# Patient Record
Sex: FEMALE | Race: BLACK OR AFRICAN AMERICAN | Employment: FULL TIME | URBAN - METROPOLITAN AREA
[De-identification: names, ages, dates, MRNs, and addresses within clinical notes are randomized per-mention and may not be internally consistent; named-entity substitution may affect disease eponyms.]

---

## 2018-08-28 ENCOUNTER — HOSPITAL ENCOUNTER (EMERGENCY)
Age: 28
Discharge: HOME | End: 2018-08-28
Payer: MEDICAID

## 2018-08-28 ENCOUNTER — APPOINTMENT (OUTPATIENT)
Dept: GENERAL RADIOLOGY | Age: 28
End: 2018-08-28
Payer: MEDICAID

## 2018-08-28 VITALS
SYSTOLIC BLOOD PRESSURE: 167 MMHG | OXYGEN SATURATION: 100 % | WEIGHT: 293 LBS | HEIGHT: 67 IN | DIASTOLIC BLOOD PRESSURE: 84 MMHG | BODY MASS INDEX: 45.99 KG/M2 | RESPIRATION RATE: 14 BRPM | HEART RATE: 92 BPM | TEMPERATURE: 99.5 F

## 2018-08-28 DIAGNOSIS — J18.9 PNEUMONIA OF BOTH LOWER LOBES DUE TO INFECTIOUS ORGANISM: Primary | ICD-10-CM

## 2018-08-28 DIAGNOSIS — J02.9 SORE THROAT: ICD-10-CM

## 2018-08-28 DIAGNOSIS — M25.551 RIGHT HIP PAIN: ICD-10-CM

## 2018-08-28 LAB
CHP ED QC CHECK: YES
PREGNANCY TEST URINE, POC: NEGATIVE
STREP GRP A PCR: NEGATIVE

## 2018-08-28 PROCEDURE — 87880 STREP A ASSAY W/OPTIC: CPT

## 2018-08-28 PROCEDURE — 99283 EMERGENCY DEPT VISIT LOW MDM: CPT

## 2018-08-28 PROCEDURE — 73502 X-RAY EXAM HIP UNI 2-3 VIEWS: CPT

## 2018-08-28 PROCEDURE — 71046 X-RAY EXAM CHEST 2 VIEWS: CPT

## 2018-08-28 PROCEDURE — 6370000000 HC RX 637 (ALT 250 FOR IP): Performed by: NURSE PRACTITIONER

## 2018-08-28 PROCEDURE — 94664 DEMO&/EVAL PT USE INHALER: CPT

## 2018-08-28 PROCEDURE — 94640 AIRWAY INHALATION TREATMENT: CPT

## 2018-08-28 RX ORDER — ACETAMINOPHEN 500 MG
500 TABLET ORAL EVERY 6 HOURS PRN
Qty: 20 TABLET | Refills: 0 | Status: SHIPPED | OUTPATIENT
Start: 2018-08-28 | End: 2019-12-29

## 2018-08-28 RX ORDER — ALBUTEROL SULFATE 90 UG/1
2 AEROSOL, METERED RESPIRATORY (INHALATION) EVERY 6 HOURS PRN
Qty: 1 INHALER | Refills: 0 | Status: SHIPPED | OUTPATIENT
Start: 2018-08-28 | End: 2019-12-29

## 2018-08-28 RX ORDER — IPRATROPIUM BROMIDE AND ALBUTEROL SULFATE 2.5; .5 MG/3ML; MG/3ML
1 SOLUTION RESPIRATORY (INHALATION)
Status: COMPLETED | OUTPATIENT
Start: 2018-08-28 | End: 2018-08-28

## 2018-08-28 RX ORDER — DOXYCYCLINE HYCLATE 100 MG
100 TABLET ORAL 2 TIMES DAILY
Qty: 20 TABLET | Refills: 0 | Status: SHIPPED | OUTPATIENT
Start: 2018-08-28 | End: 2018-09-07

## 2018-08-28 RX ORDER — PREDNISONE 20 MG/1
60 TABLET ORAL ONCE
Status: COMPLETED | OUTPATIENT
Start: 2018-08-28 | End: 2018-08-28

## 2018-08-28 RX ORDER — PREDNISONE 20 MG/1
TABLET ORAL
Qty: 18 TABLET | Refills: 0 | Status: SHIPPED | OUTPATIENT
Start: 2018-08-28 | End: 2018-09-07

## 2018-08-28 RX ADMIN — IPRATROPIUM BROMIDE AND ALBUTEROL SULFATE 1 AMPULE: 2.5; .5 SOLUTION RESPIRATORY (INHALATION) at 20:38

## 2018-08-28 RX ADMIN — PREDNISONE 60 MG: 20 TABLET ORAL at 19:49

## 2018-08-28 RX ADMIN — IPRATROPIUM BROMIDE AND ALBUTEROL SULFATE 1 AMPULE: 2.5; .5 SOLUTION RESPIRATORY (INHALATION) at 20:37

## 2018-08-28 ASSESSMENT — PAIN DESCRIPTION - FREQUENCY: FREQUENCY: CONTINUOUS

## 2018-08-28 ASSESSMENT — PAIN DESCRIPTION - ONSET: ONSET: SUDDEN

## 2018-08-28 ASSESSMENT — PAIN DESCRIPTION - PAIN TYPE: TYPE: ACUTE PAIN

## 2018-08-28 ASSESSMENT — PAIN DESCRIPTION - LOCATION: LOCATION: THROAT

## 2018-08-28 ASSESSMENT — PAIN SCALES - GENERAL: PAINLEVEL_OUTOF10: 9

## 2018-08-28 ASSESSMENT — PAIN DESCRIPTION - DESCRIPTORS: DESCRIPTORS: SORE

## 2018-08-28 NOTE — ED PROVIDER NOTES
Exam  · Constitutional/General: Alert and oriented x3, well appearing, non toxic  · HEENT:  NC/NT. PERRLA,  Airway patent. Posterior pharynx is erythematous with no exudates and 1+ tonsils. · Neck: Supple, full ROM, non tender to palpation in the midline, no stridor, no crepitus, no meningeal signs  · Respiratory: Lungs diminished to auscultation bilaterally, no wheezes, rales, or rhonchi. Not in respiratory distress  · CV:  Regular rate. Regular rhythm. No murmurs, gallops, or rubs. 2+ distal pulses  · Chest: No chest wall tenderness  · GI:  Abdomen Soft, Non tender, Non distended. +BS. No rebound, guarding, or rigidity. No pulsatile masses. · Musculoskeletal: Moves all extremities x 4. Warm and well perfused, no clubbing, cyanosis, or edema. Capillary refill <3 seconds. 2+ pedal and posterior tibial pulses intact tenderness negative Homans sign bilaterally. · Integument: skin warm and dry. No rashes. · Lymphatic: no lymphadenopathy noted  · Neurologic: GCS 15, no focal deficits, symmetric strength 5/5 in the upper and lower extremities bilaterally  · Psychiatric: Normal Affect    Lab / Imaging Results   (All laboratory and radiology results have been personally reviewed by myself)  Labs:  Results for orders placed or performed during the hospital encounter of 08/28/18   Strep screen group a throat   Result Value Ref Range    Strep Grp A PCR Negative Negative   POC Pregnancy Urine Qual   Result Value Ref Range    Preg Test, Ur NEGATIVE     QC OK? YES      Imaging: All Radiology results interpreted by Radiologist unless otherwise noted. XR HIP RIGHT (2-3 VIEWS)   Final Result   1. Mild joint space narrowing and acetabular sclerosis of the right   hip, greater than expected for patient's age. Orthopedic consultation   suggested. 2. No acute fracture identified. If there is persistent clinical pain   or symptomatology, a return to medical attention within 2-7 days and   further imaging is recommended.

## 2018-11-10 ENCOUNTER — HOSPITAL ENCOUNTER (EMERGENCY)
Age: 28
Discharge: HOME | End: 2018-11-10
Payer: MEDICAID

## 2018-11-10 ENCOUNTER — APPOINTMENT (OUTPATIENT)
Dept: GENERAL RADIOLOGY | Age: 28
End: 2018-11-10
Payer: MEDICAID

## 2018-11-10 VITALS
OXYGEN SATURATION: 98 % | BODY MASS INDEX: 43.79 KG/M2 | DIASTOLIC BLOOD PRESSURE: 90 MMHG | RESPIRATION RATE: 16 BRPM | TEMPERATURE: 98.3 F | HEART RATE: 80 BPM | HEIGHT: 67 IN | SYSTOLIC BLOOD PRESSURE: 150 MMHG | WEIGHT: 279 LBS

## 2018-11-10 DIAGNOSIS — S93.402A SPRAIN OF LEFT ANKLE, UNSPECIFIED LIGAMENT, INITIAL ENCOUNTER: Primary | ICD-10-CM

## 2018-11-10 PROCEDURE — 73630 X-RAY EXAM OF FOOT: CPT

## 2018-11-10 PROCEDURE — 73610 X-RAY EXAM OF ANKLE: CPT

## 2018-11-10 PROCEDURE — 6370000000 HC RX 637 (ALT 250 FOR IP): Performed by: PHYSICIAN ASSISTANT

## 2018-11-10 PROCEDURE — 99283 EMERGENCY DEPT VISIT LOW MDM: CPT

## 2018-11-10 RX ORDER — NAPROXEN 500 MG/1
500 TABLET ORAL 2 TIMES DAILY
Qty: 14 TABLET | Refills: 0 | Status: SHIPPED | OUTPATIENT
Start: 2018-11-10 | End: 2019-12-29

## 2018-11-10 RX ORDER — IBUPROFEN 800 MG/1
800 TABLET ORAL ONCE
Status: COMPLETED | OUTPATIENT
Start: 2018-11-10 | End: 2018-11-10

## 2018-11-10 RX ADMIN — IBUPROFEN 800 MG: 800 TABLET, FILM COATED ORAL at 18:01

## 2018-11-10 ASSESSMENT — PAIN SCALES - GENERAL: PAINLEVEL_OUTOF10: 8

## 2018-11-10 NOTE — ED PROVIDER NOTES
of left ankle, unspecified ligament, initial encounter      Plan   Discharge to home  Patient condition is stable    New Medications     Discharge Medication List as of 11/10/2018  6:18 PM      START taking these medications    Details   naproxen (NAPROSYN) 500 MG tablet Take 1 tablet by mouth 2 times daily for 7 days, Disp-14 tablet, R-0Print           Electronically signed by Paul Ga PA-C   DD: 11/10/18  **This report was transcribed using voice recognition software. Every effort was made to ensure accuracy; however, inadvertent computerized transcription errors may be present.   END OF ED PROVIDER NOTE'      Pina Dickinson PA-C  11/10/18 [id number]

## 2019-08-21 ENCOUNTER — APPOINTMENT (OUTPATIENT)
Dept: GENERAL RADIOLOGY | Age: 29
End: 2019-08-21
Payer: MEDICAID

## 2019-08-21 ENCOUNTER — HOSPITAL ENCOUNTER (EMERGENCY)
Age: 29
Discharge: HOME | End: 2019-08-21
Payer: MEDICAID

## 2019-08-21 VITALS
HEIGHT: 67 IN | BODY MASS INDEX: 43.79 KG/M2 | DIASTOLIC BLOOD PRESSURE: 95 MMHG | OXYGEN SATURATION: 98 % | WEIGHT: 279 LBS | HEART RATE: 93 BPM | SYSTOLIC BLOOD PRESSURE: 151 MMHG | TEMPERATURE: 98.6 F | RESPIRATION RATE: 14 BRPM

## 2019-08-21 DIAGNOSIS — S93.401A SPRAIN OF RIGHT ANKLE, UNSPECIFIED LIGAMENT, INITIAL ENCOUNTER: Primary | ICD-10-CM

## 2019-08-21 DIAGNOSIS — S90.01XA CONTUSION OF RIGHT ANKLE, INITIAL ENCOUNTER: ICD-10-CM

## 2019-08-21 PROCEDURE — 73630 X-RAY EXAM OF FOOT: CPT

## 2019-08-21 PROCEDURE — 99283 EMERGENCY DEPT VISIT LOW MDM: CPT

## 2019-08-21 PROCEDURE — 6370000000 HC RX 637 (ALT 250 FOR IP): Performed by: NURSE PRACTITIONER

## 2019-08-21 PROCEDURE — 73610 X-RAY EXAM OF ANKLE: CPT

## 2019-08-21 RX ORDER — IBUPROFEN 800 MG/1
800 TABLET ORAL ONCE
Status: COMPLETED | OUTPATIENT
Start: 2019-08-21 | End: 2019-08-21

## 2019-08-21 RX ORDER — IBUPROFEN 800 MG/1
800 TABLET ORAL EVERY 8 HOURS PRN
Qty: 21 TABLET | Refills: 0 | Status: SHIPPED | OUTPATIENT
Start: 2019-08-21 | End: 2020-07-07 | Stop reason: ALTCHOICE

## 2019-08-21 RX ADMIN — IBUPROFEN 800 MG: 800 TABLET, FILM COATED ORAL at 21:23

## 2019-08-21 ASSESSMENT — PAIN SCALES - GENERAL: PAINLEVEL_OUTOF10: 7

## 2019-09-03 ENCOUNTER — HOSPITAL ENCOUNTER (EMERGENCY)
Age: 29
Discharge: HOME | End: 2019-09-03
Payer: MEDICAID

## 2019-09-03 VITALS
HEIGHT: 67 IN | SYSTOLIC BLOOD PRESSURE: 171 MMHG | TEMPERATURE: 98.1 F | OXYGEN SATURATION: 95 % | HEART RATE: 98 BPM | BODY MASS INDEX: 45.36 KG/M2 | WEIGHT: 289 LBS | DIASTOLIC BLOOD PRESSURE: 89 MMHG | RESPIRATION RATE: 16 BRPM

## 2019-09-03 DIAGNOSIS — L02.419 AXILLARY ABSCESS: Primary | ICD-10-CM

## 2019-09-03 PROCEDURE — 99282 EMERGENCY DEPT VISIT SF MDM: CPT

## 2019-09-03 PROCEDURE — 2500000003 HC RX 250 WO HCPCS: Performed by: NURSE PRACTITIONER

## 2019-09-03 PROCEDURE — 6370000000 HC RX 637 (ALT 250 FOR IP): Performed by: NURSE PRACTITIONER

## 2019-09-03 PROCEDURE — 10060 I&D ABSCESS SIMPLE/SINGLE: CPT

## 2019-09-03 RX ORDER — SULFAMETHOXAZOLE AND TRIMETHOPRIM 800; 160 MG/1; MG/1
2 TABLET ORAL ONCE
Status: COMPLETED | OUTPATIENT
Start: 2019-09-03 | End: 2019-09-03

## 2019-09-03 RX ORDER — TRAMADOL HYDROCHLORIDE 50 MG/1
50 TABLET ORAL EVERY 6 HOURS PRN
Qty: 8 TABLET | Refills: 0 | Status: SHIPPED | OUTPATIENT
Start: 2019-09-03 | End: 2019-09-05

## 2019-09-03 RX ORDER — CEPHALEXIN 500 MG/1
500 CAPSULE ORAL 3 TIMES DAILY
Qty: 21 CAPSULE | Refills: 0 | Status: SHIPPED | OUTPATIENT
Start: 2019-09-03 | End: 2019-09-10

## 2019-09-03 RX ORDER — SULFAMETHOXAZOLE AND TRIMETHOPRIM 800; 160 MG/1; MG/1
2 TABLET ORAL 2 TIMES DAILY
Qty: 28 TABLET | Refills: 0 | Status: SHIPPED | OUTPATIENT
Start: 2019-09-03 | End: 2019-09-10

## 2019-09-03 RX ORDER — LIDOCAINE HYDROCHLORIDE 10 MG/ML
5 INJECTION, SOLUTION EPIDURAL; INFILTRATION; INTRACAUDAL; PERINEURAL ONCE
Status: COMPLETED | OUTPATIENT
Start: 2019-09-03 | End: 2019-09-03

## 2019-09-03 RX ORDER — CEPHALEXIN 500 MG/1
500 CAPSULE ORAL ONCE
Status: COMPLETED | OUTPATIENT
Start: 2019-09-03 | End: 2019-09-03

## 2019-09-03 RX ADMIN — LIDOCAINE HYDROCHLORIDE 5 ML: 10 INJECTION, SOLUTION EPIDURAL; INFILTRATION; INTRACAUDAL; PERINEURAL at 13:21

## 2019-09-03 RX ADMIN — CEPHALEXIN 500 MG: 500 CAPSULE ORAL at 13:21

## 2019-09-03 RX ADMIN — SULFAMETHOXAZOLE AND TRIMETHOPRIM 2 TABLET: 800; 160 TABLET ORAL at 13:21

## 2019-09-03 ASSESSMENT — PAIN DESCRIPTION - LOCATION: LOCATION: ARM

## 2019-09-03 ASSESSMENT — PAIN SCALES - GENERAL: PAINLEVEL_OUTOF10: 9

## 2019-09-03 ASSESSMENT — PAIN DESCRIPTION - FREQUENCY: FREQUENCY: CONTINUOUS

## 2019-09-03 ASSESSMENT — PAIN DESCRIPTION - PAIN TYPE: TYPE: ACUTE PAIN

## 2019-09-03 ASSESSMENT — PAIN DESCRIPTION - ONSET: ONSET: GRADUAL

## 2019-09-03 ASSESSMENT — PAIN DESCRIPTION - PROGRESSION: CLINICAL_PROGRESSION: GRADUALLY WORSENING

## 2019-09-03 ASSESSMENT — PAIN DESCRIPTION - DESCRIPTORS: DESCRIPTORS: NUMBNESS

## 2019-09-03 ASSESSMENT — PAIN DESCRIPTION - ORIENTATION: ORIENTATION: RIGHT

## 2019-09-03 NOTE — ED PROVIDER NOTES
[facility]       Department of Emergency Medicine   ED  Provider Note  Admit Date/RoomTime: 9/3/2019 12:56 PM  ED Room: 32/32    HPI:   Nazanin Dodson 33 y.o. female who presents to the ED with localized pain and swelling with erythema to right axilla. The patient states this began gradually. In nature, the pain is described as burning and aching. The patient denies any signs and symptoms of systemic illness associated with this including fever. Pt also denies chest pain, SOB, pain with breathing, abdominal pain, nausea, vomiting, fever/chills, numbness/tingling, sensation changes, arm swelling, rash, or recent trauma/injury. She denies any new soaps, lotions, detergents, or medications. Patient states she has had an abscess in the same spot a while ago. Patient is alert and oriented x3 and in no apparent distress at this exam. She is nontoxic appearing. ROS:   Unless otherwise stated in this report or unable to obtain because of the patient's clinical or mental status as evidenced by the medical record, this patients's positive and negative responses for Review of Systems, constitutional, psych, eyes, ENT, cardiovascular, respiratory, gastrointestinal, neurological, genitourinary, musculoskeletal, integument systems and systems related to the presenting problem are either stated in the preceding or were not pertinent or were negative for the symptoms and/or complaints related to the medical problem. Past Medical History:  has no past medical history on file. Past Surgical History:  has a past surgical history that includes [place] tooth extraction. Social History:  reports that she has never smoked. She has never used smokeless tobacco. She reports that she drinks alcohol. She reports that she does not use drugs. Family History: family history is not on file. The patients home medications have been reviewed. Allergies: Patient has no known allergies.   Allergies have been reviewed with

## 2019-12-29 ENCOUNTER — APPOINTMENT (OUTPATIENT)
Dept: GENERAL RADIOLOGY | Age: 29
End: 2019-12-29
Payer: MEDICAID

## 2019-12-29 ENCOUNTER — HOSPITAL ENCOUNTER (EMERGENCY)
Age: 29
Discharge: HOME | End: 2019-12-29
Payer: MEDICAID

## 2019-12-29 VITALS
DIASTOLIC BLOOD PRESSURE: 107 MMHG | TEMPERATURE: 98.3 F | WEIGHT: 289 LBS | RESPIRATION RATE: 20 BRPM | SYSTOLIC BLOOD PRESSURE: 175 MMHG | HEIGHT: 67 IN | BODY MASS INDEX: 45.36 KG/M2 | OXYGEN SATURATION: 98 % | HEART RATE: 102 BPM

## 2019-12-29 DIAGNOSIS — J40 BRONCHITIS: Primary | ICD-10-CM

## 2019-12-29 PROCEDURE — 71046 X-RAY EXAM CHEST 2 VIEWS: CPT

## 2019-12-29 PROCEDURE — 99283 EMERGENCY DEPT VISIT LOW MDM: CPT

## 2019-12-29 RX ORDER — BROMPHENIRAMINE MALEATE, PSEUDOEPHEDRINE HYDROCHLORIDE, AND DEXTROMETHORPHAN HYDROBROMIDE 2; 30; 10 MG/5ML; MG/5ML; MG/5ML
5 SYRUP ORAL 4 TIMES DAILY PRN
Qty: 118 ML | Refills: 0 | Status: SHIPPED | OUTPATIENT
Start: 2019-12-29 | End: 2021-09-01

## 2019-12-29 RX ORDER — ALBUTEROL SULFATE 90 UG/1
2 AEROSOL, METERED RESPIRATORY (INHALATION) 4 TIMES DAILY PRN
Qty: 1 INHALER | Refills: 0 | Status: SHIPPED | OUTPATIENT
Start: 2019-12-29 | End: 2021-09-01

## 2019-12-29 RX ORDER — DOXYCYCLINE HYCLATE 100 MG
100 TABLET ORAL 2 TIMES DAILY
Qty: 20 TABLET | Refills: 0 | Status: SHIPPED | OUTPATIENT
Start: 2019-12-29 | End: 2020-01-08

## 2019-12-29 RX ORDER — IBUPROFEN 800 MG/1
800 TABLET ORAL EVERY 6 HOURS PRN
Qty: 16 TABLET | Refills: 0 | Status: SHIPPED | OUTPATIENT
Start: 2019-12-29 | End: 2020-07-07 | Stop reason: ALTCHOICE

## 2020-05-25 ENCOUNTER — HOSPITAL ENCOUNTER (EMERGENCY)
Age: 30
Discharge: HOME | End: 2020-05-25
Attending: EMERGENCY MEDICINE
Payer: MEDICAID

## 2020-05-25 VITALS
DIASTOLIC BLOOD PRESSURE: 78 MMHG | TEMPERATURE: 97.2 F | HEIGHT: 67 IN | RESPIRATION RATE: 14 BRPM | HEART RATE: 82 BPM | WEIGHT: 289 LBS | OXYGEN SATURATION: 96 % | BODY MASS INDEX: 45.36 KG/M2 | SYSTOLIC BLOOD PRESSURE: 158 MMHG

## 2020-05-25 LAB
AMORPHOUS: ABNORMAL
BACTERIA: ABNORMAL /HPF
BILIRUBIN URINE: NEGATIVE
BLOOD, URINE: ABNORMAL
CLARITY: ABNORMAL
COLOR: YELLOW
EPITHELIAL CELLS, UA: ABNORMAL /HPF
GLUCOSE URINE: NEGATIVE MG/DL
HCG, URINE, POC: NEGATIVE
KETONES, URINE: NEGATIVE MG/DL
LEUKOCYTE ESTERASE, URINE: ABNORMAL
Lab: NORMAL
NEGATIVE QC PASS/FAIL: NORMAL
NITRITE, URINE: NEGATIVE
PH UA: 7 (ref 5–9)
POSITIVE QC PASS/FAIL: NORMAL
PROTEIN UA: NEGATIVE MG/DL
RBC UA: ABNORMAL /HPF (ref 0–2)
SPECIFIC GRAVITY UA: 1.02 (ref 1–1.03)
UROBILINOGEN, URINE: 0.2 E.U./DL
WBC UA: ABNORMAL /HPF (ref 0–5)

## 2020-05-25 PROCEDURE — 81001 URINALYSIS AUTO W/SCOPE: CPT

## 2020-05-25 PROCEDURE — 99284 EMERGENCY DEPT VISIT MOD MDM: CPT

## 2020-05-25 RX ORDER — PHENAZOPYRIDINE HYDROCHLORIDE 100 MG/1
100 TABLET, FILM COATED ORAL 3 TIMES DAILY PRN
Qty: 9 TABLET | Refills: 0 | Status: SHIPPED | OUTPATIENT
Start: 2020-05-25 | End: 2020-05-28

## 2020-05-25 RX ORDER — CEFDINIR 300 MG/1
300 CAPSULE ORAL 2 TIMES DAILY
Qty: 14 CAPSULE | Refills: 0 | Status: SHIPPED | OUTPATIENT
Start: 2020-05-25 | End: 2020-06-01

## 2020-05-25 ASSESSMENT — PAIN DESCRIPTION - PAIN TYPE: TYPE: ACUTE PAIN

## 2020-05-25 ASSESSMENT — PAIN DESCRIPTION - LOCATION: LOCATION: ABDOMEN

## 2020-05-25 ASSESSMENT — ENCOUNTER SYMPTOMS
COUGH: 0
SINUS PAIN: 0
ABDOMINAL PAIN: 0
DIARRHEA: 0
BACK PAIN: 0
NAUSEA: 0
SHORTNESS OF BREATH: 0
CHEST TIGHTNESS: 0
VOMITING: 0
SORE THROAT: 0

## 2020-05-25 ASSESSMENT — PAIN DESCRIPTION - FREQUENCY: FREQUENCY: CONTINUOUS

## 2020-05-25 ASSESSMENT — PAIN DESCRIPTION - ORIENTATION: ORIENTATION: RIGHT;LEFT;LOWER

## 2020-05-25 ASSESSMENT — PAIN SCALES - GENERAL: PAINLEVEL_OUTOF10: 7

## 2020-05-25 ASSESSMENT — PAIN DESCRIPTION - DESCRIPTORS: DESCRIPTORS: CRAMPING

## 2020-05-25 NOTE — LETTER
[address] [facility] Emergency Department  [address] [place] [address]  Phone: [phone]    No name on file. May 25, 2020     Patient: Savannah Clarke   YOB: 1990   Date of Visit: 5/25/2020       To Whom It May Concern: It is my medical opinion that Eleonora Ramirez may return to work on 1 day. If you have any questions or concerns, please don't hesitate to call.     Sincerely,      Kayla Zavala

## 2020-05-25 NOTE — ED PROVIDER NOTES
---------------------------------------------  Past Medical History:  has no past medical history on file. Past Surgical History:  has a past surgical history that includes [place] tooth extraction. Social History:  reports that she has never smoked. She has never used smokeless tobacco. She reports current alcohol use. She reports that she does not use drugs. Family History: family history is not on file. The patients home medications have been reviewed. Allergies: Patient has no known allergies. -------------------------------------------------- RESULTS -------------------------------------------------  Labs:  Results for orders placed or performed during the hospital encounter of 05/25/20   URINALYSIS   Result Value Ref Range    Color, UA Yellow Straw/Yellow    Clarity, UA SL CLOUDY Clear    Glucose, Ur Negative Negative mg/dL    Bilirubin Urine Negative Negative    Ketones, Urine Negative Negative mg/dL    Specific Gravity, UA 1.020 1.005 - 1.030    Blood, Urine TRACE-INTACT Negative    pH, UA 7.0 5.0 - 9.0    Protein, UA Negative Negative mg/dL    Urobilinogen, Urine 0.2 <2.0 E.U./dL    Nitrite, Urine Negative Negative    Leukocyte Esterase, Urine LARGE (A) Negative   Microscopic Urinalysis   Result Value Ref Range    WBC, UA 10-20 (A) 0 - 5 /HPF    RBC, UA 0-1 0 - 2 /HPF    Epithelial Cells, UA MODERATE /HPF    Bacteria, UA MODERATE (A) None Seen /HPF    Amorphous, UA MODERATE    POC Pregnancy Urine Qual   Result Value Ref Range    HCG, Urine, POC Negative Negative    Lot Number [id number]     Positive QC Pass/Fail Acceptable     Negative QC Pass/Fail Acceptable        Radiology:  No orders to display           ------------------------- NURSING NOTES AND VITALS REVIEWED ---------------------------  Date / Time Roomed:  5/25/2020  2:00 AM  ED Bed Assignment:  20/20    The nursing notes within the ED encounter and vital signs as below have been reviewed.    BP (!) 161/85   Pulse 89   Temp

## 2020-05-25 NOTE — ED NOTES
Reviewed discharge instructions with patient, discussed medications and addressed all patient questions/concerns. Pt verbalizes understanding.        Lakesha Lilly RN  05/25/20 [id number]

## 2020-07-06 ASSESSMENT — PAIN DESCRIPTION - DESCRIPTORS: DESCRIPTORS: BURNING

## 2020-07-06 ASSESSMENT — PAIN SCALES - GENERAL: PAINLEVEL_OUTOF10: 10

## 2020-07-06 ASSESSMENT — PAIN DESCRIPTION - LOCATION: LOCATION: VAGINA

## 2020-07-07 ENCOUNTER — HOSPITAL ENCOUNTER (EMERGENCY)
Age: 30
Discharge: HOME | End: 2020-07-07
Payer: MEDICAID

## 2020-07-07 VITALS
HEART RATE: 70 BPM | BODY MASS INDEX: 45.99 KG/M2 | DIASTOLIC BLOOD PRESSURE: 99 MMHG | WEIGHT: 293 LBS | SYSTOLIC BLOOD PRESSURE: 157 MMHG | TEMPERATURE: 97.4 F | HEIGHT: 67 IN | OXYGEN SATURATION: 98 % | RESPIRATION RATE: 16 BRPM

## 2020-07-07 LAB
BACTERIA: ABNORMAL /HPF
BILIRUBIN URINE: NEGATIVE
BLOOD, URINE: ABNORMAL
CLARITY: ABNORMAL
COLOR: YELLOW
CRYSTALS, UA: ABNORMAL /HPF
EPITHELIAL CELLS, UA: ABNORMAL /HPF
GLUCOSE URINE: NEGATIVE MG/DL
HCG(URINE) PREGNANCY TEST: NEGATIVE
KETONES, URINE: NEGATIVE MG/DL
LEUKOCYTE ESTERASE, URINE: ABNORMAL
NITRITE, URINE: NEGATIVE
PH UA: 6 (ref 5–9)
PROTEIN UA: NEGATIVE MG/DL
RBC UA: ABNORMAL /HPF (ref 0–2)
SPECIFIC GRAVITY UA: >=1.03 (ref 1–1.03)
UROBILINOGEN, URINE: 0.2 E.U./DL
WBC UA: >20 /HPF (ref 0–5)
YEAST: PRESENT /HPF

## 2020-07-07 PROCEDURE — 6360000002 HC RX W HCPCS: Performed by: NURSE PRACTITIONER

## 2020-07-07 PROCEDURE — 96372 THER/PROPH/DIAG INJ SC/IM: CPT

## 2020-07-07 PROCEDURE — 99283 EMERGENCY DEPT VISIT LOW MDM: CPT

## 2020-07-07 PROCEDURE — 81025 URINE PREGNANCY TEST: CPT

## 2020-07-07 PROCEDURE — 6370000000 HC RX 637 (ALT 250 FOR IP): Performed by: NURSE PRACTITIONER

## 2020-07-07 PROCEDURE — 81001 URINALYSIS AUTO W/SCOPE: CPT

## 2020-07-07 RX ORDER — IBUPROFEN 800 MG/1
800 TABLET ORAL EVERY 8 HOURS PRN
Qty: 21 TABLET | Refills: 0 | OUTPATIENT
Start: 2020-07-07 | End: 2021-03-27

## 2020-07-07 RX ORDER — KETOROLAC TROMETHAMINE 30 MG/ML
30 INJECTION, SOLUTION INTRAMUSCULAR; INTRAVENOUS ONCE
Status: COMPLETED | OUTPATIENT
Start: 2020-07-07 | End: 2020-07-07

## 2020-07-07 RX ORDER — HYDROXYZINE PAMOATE 25 MG/1
25 CAPSULE ORAL ONCE
Status: COMPLETED | OUTPATIENT
Start: 2020-07-07 | End: 2020-07-07

## 2020-07-07 RX ORDER — DEXAMETHASONE SODIUM PHOSPHATE 10 MG/ML
10 INJECTION INTRAMUSCULAR; INTRAVENOUS ONCE
Status: COMPLETED | OUTPATIENT
Start: 2020-07-07 | End: 2020-07-07

## 2020-07-07 RX ORDER — CEPHALEXIN 500 MG/1
500 CAPSULE ORAL ONCE
Status: COMPLETED | OUTPATIENT
Start: 2020-07-07 | End: 2020-07-07

## 2020-07-07 RX ORDER — FLUCONAZOLE 150 MG/1
150 TABLET ORAL ONCE
Qty: 1 TABLET | Refills: 0 | Status: SHIPPED | OUTPATIENT
Start: 2020-07-07 | End: 2020-07-07

## 2020-07-07 RX ORDER — DIAPER,BRIEF,INFANT-TODD,DISP
EACH MISCELLANEOUS ONCE
Status: COMPLETED | OUTPATIENT
Start: 2020-07-07 | End: 2020-07-07

## 2020-07-07 RX ORDER — HYDROXYZINE 50 MG/1
25 TABLET, FILM COATED ORAL 3 TIMES DAILY PRN
Qty: 5 TABLET | Refills: 0 | Status: SHIPPED | OUTPATIENT
Start: 2020-07-07 | End: 2021-09-01

## 2020-07-07 RX ORDER — CEPHALEXIN 500 MG/1
500 CAPSULE ORAL 2 TIMES DAILY
Qty: 20 CAPSULE | Refills: 0 | Status: SHIPPED | OUTPATIENT
Start: 2020-07-07 | End: 2020-07-17

## 2020-07-07 RX ORDER — BACITRACIN, NEOMYCIN, POLYMYXIN B 400; 3.5; 5 [USP'U]/G; MG/G; [USP'U]/G
OINTMENT TOPICAL
Qty: 30 G | Refills: 0 | Status: SHIPPED | OUTPATIENT
Start: 2020-07-07 | End: 2020-07-17

## 2020-07-07 RX ADMIN — DEXAMETHASONE SODIUM PHOSPHATE 10 MG: 10 INJECTION INTRAMUSCULAR; INTRAVENOUS at 01:28

## 2020-07-07 RX ADMIN — BACITRACIN ZINC: 500 OINTMENT TOPICAL at 01:28

## 2020-07-07 RX ADMIN — CEPHALEXIN 500 MG: 500 CAPSULE ORAL at 02:36

## 2020-07-07 RX ADMIN — HYDROXYZINE PAMOATE 25 MG: 25 CAPSULE ORAL at 01:28

## 2020-07-07 RX ADMIN — KETOROLAC TROMETHAMINE 30 MG: 30 INJECTION, SOLUTION INTRAMUSCULAR at 01:28

## 2020-07-07 ASSESSMENT — PAIN SCALES - GENERAL: PAINLEVEL_OUTOF10: 9

## 2020-07-07 NOTE — ED PROVIDER NOTES
[facility]  HPI:  7/7/20, Time: 1:11 AM EDT         Mica Cerna is a 26 y.o. female presenting to the ED for burning and swelling to vaginal area. Patient reports that she got a [place] wax 2 days ago and that they did not do a good job so she decided to use Sam Leaver to her pubic area to remove hair. Reports that it instantly started to burn and swell. Patient then removed cream.  She reports that she did have burning upon urination and had increasing swelling and pain in vaginal area. She reports that she did have pain relief when ice was applied and she did take Tylenol earlier today and 3 PM. Denies any shortness of breath, chest pain, nausea, vomiting or diarrhea. Reports that the time of the [place] wax, she felt that she was developing a yeast infection. Denies any fever or general malaise. Review of Systems:   Pertinent positives and negatives are stated within HPI, all other systems reviewed and are negative.          --------------------------------------------- PAST HISTORY ---------------------------------------------  Past Medical History:  has no past medical history on file. Past Surgical History:  has a past surgical history that includes [place] tooth extraction. Social History:  reports that she has been smoking. She has never used smokeless tobacco. She reports current alcohol use. She reports that she does not use drugs. Family History: family history is not on file. The patients home medications have been reviewed. Allergies: Patient has no known allergies.     -------------------------------------------------- RESULTS -------------------------------------------------  All laboratory and radiology results have been personally reviewed by myself   LABS:  Results for orders placed or performed during the hospital encounter of 07/07/20   Urinalysis   Result Value Ref Range    Color, UA Yellow Straw/Yellow    Clarity, UA CLOUDY (A) Clear    Glucose, Ur Negative Negative mg/dL Bilirubin Urine Negative Negative    Ketones, Urine Negative Negative mg/dL    Specific Gravity, UA >=1.030 1.005 - 1.030    Blood, Urine MODERATE (A) Negative    pH, UA 6.0 5.0 - 9.0    Protein, UA Negative Negative mg/dL    Urobilinogen, Urine 0.2 <2.0 E.U./dL    Nitrite, Urine Negative Negative    Leukocyte Esterase, Urine LARGE (A) Negative   Pregnancy, urine   Result Value Ref Range    HCG(Urine) Pregnancy Test NEGATIVE NEGATIVE   Microscopic Urinalysis   Result Value Ref Range    WBC, UA >20 (A) 0 - 5 /HPF    RBC, UA 10-20 (A) 0 - 2 /HPF    Epithelial Cells, UA MANY /HPF    Bacteria, UA MANY (A) None Seen /HPF    Yeast, UA Present (A) None Seen /HPF    Crystals, UA Few (A) None Seen /HPF       RADIOLOGY:  Interpreted by Radiologist.  No orders to display       ------------------------- NURSING NOTES AND VITALS REVIEWED ---------------------------   The nursing notes within the ED encounter and vital signs as below have been reviewed. BP (!) 184/115   Pulse 90   Temp 97.4 °F (36.3 °C)   Resp 16   Ht 5' 7\" (1.702 m)   Wt 297 lb (134.7 kg)   SpO2 97%   BMI 46.52 kg/m²   Oxygen Saturation Interpretation: Normal      ---------------------------------------------------PHYSICAL EXAM--------------------------------------      Constitutional/General: Alert and oriented x3, well appearing, non toxic in NAD, denies any fever or general malaise  Head: Normocephalic and atraumatic  Eyes: PERRL, EOMI  Mouth: Oropharynx clear, handling secretions, no trismus  Neck: Supple, full ROM,   Pulmonary: Lungs clear to auscultation bilaterally, no wheezes, rales, or rhonchi. Not in respiratory distress  Cardiovascular:  Regular rate and rhythm, no murmurs, gallops, or rubs. 2+ distal pulses  Abdomen: Soft, non tender, non distended, reports pain upon urination, no point tenderness, negative for CVA tenderness  Extremities: Moves all extremities x 4.  Warm and well perfused  Skin: warm and dry without rash,upon examination, patient labia majora to have erythema and edema. Tissue appears to be fragile. Patient reports that it is itchy. No blister formation noted. Neurologic: GCS 15, cranial nerves II through XII intact, speech clear and appropriate, gait steady, no acute neurological deficits noted. Psych: Normal Affect      ------------------------------ ED COURSE/MEDICAL DECISION MAKING----------------------  Medications   dexamethasone (DECADRON) injection 10 mg (10 mg Intramuscular Given 7/7/20 0128)   ketorolac (TORADOL) injection 30 mg (30 mg Intramuscular Given 7/7/20 0128)   bacitracin zinc ointment ( Topical Given 7/7/20 0128)   hydrOXYzine (VISTARIL) capsule 25 mg (25 mg Oral Given 7/7/20 0128)   cephALEXin (KEFLEX) capsule 500 mg (500 mg Oral Given 7/7/20 0236)         ED COURSE:       Medical Decision Making: We will obtain urinalysis to rule out urinary tract infection or yeast infection. Will provide patient with Decadron to reduce swelling and hydroxyzine for itching relief. Will provide patient with Toradol pain relief and bacitracin ointment to vaginal area for skin management. Patient's urinalysis shows large leukocytes and cloudy clarity. Will provide patient with first dose of Keflex and provide prescription for discharge. Provide patient with prescription for 1 tablet of Diflucan for yeast infection. Will provide patient with prescription for bacitracin. Patient educated regarding discharge instructions and to follow-up with her primary care physician or OB/GYN. Patient provided with work notice for time off. Patient verbalized understanding and discharged. Also educated as when to return to the emergency department. Counseling: The emergency provider has spoken with the patient and discussed todays results, in addition to providing specific details for the plan of care and counseling regarding the diagnosis and prognosis.   Questions are answered at this time and they are agreeable with the

## 2020-07-07 NOTE — ED NOTES
Bed: 10  Expected date:   Expected time:   Means of arrival:   Comments:  triage     Dilshad Dickinson, RN  07/07/20 8619

## 2020-07-28 ENCOUNTER — TELEPHONE (OUTPATIENT)
Dept: OBGYN | Age: 30
End: 2020-07-28

## 2020-08-05 ENCOUNTER — HOSPITAL ENCOUNTER (EMERGENCY)
Age: 30
Discharge: HOME | End: 2020-08-06
Payer: MEDICAID

## 2020-08-05 PROCEDURE — 87491 CHLMYD TRACH DNA AMP PROBE: CPT

## 2020-08-05 PROCEDURE — 99283 EMERGENCY DEPT VISIT LOW MDM: CPT

## 2020-08-05 PROCEDURE — 96372 THER/PROPH/DIAG INJ SC/IM: CPT

## 2020-08-05 PROCEDURE — 87591 N.GONORRHOEAE DNA AMP PROB: CPT

## 2020-08-05 PROCEDURE — 99284 EMERGENCY DEPT VISIT MOD MDM: CPT

## 2020-08-06 VITALS
SYSTOLIC BLOOD PRESSURE: 156 MMHG | HEART RATE: 74 BPM | OXYGEN SATURATION: 98 % | BODY MASS INDEX: 45.99 KG/M2 | WEIGHT: 293 LBS | HEIGHT: 67 IN | TEMPERATURE: 97.1 F | DIASTOLIC BLOOD PRESSURE: 98 MMHG | RESPIRATION RATE: 18 BRPM

## 2020-08-06 LAB
BACTERIA: ABNORMAL /HPF
BILIRUBIN URINE: NEGATIVE
BLOOD, URINE: NEGATIVE
CLARITY: CLEAR
CLUE CELLS: NORMAL
COLOR: YELLOW
EPITHELIAL CELLS, UA: ABNORMAL /HPF
GLUCOSE URINE: NEGATIVE MG/DL
KETONES, URINE: NEGATIVE MG/DL
LEUKOCYTE ESTERASE, URINE: ABNORMAL
NITRITE, URINE: NEGATIVE
PH UA: 6.5 (ref 5–9)
PROTEIN UA: NEGATIVE MG/DL
RBC UA: ABNORMAL /HPF (ref 0–2)
SOURCE WET PREP: NORMAL
SPECIFIC GRAVITY UA: 1.02 (ref 1–1.03)
TRICHOMONAS PREP: NORMAL
UROBILINOGEN, URINE: 1 E.U./DL
WBC UA: ABNORMAL /HPF (ref 0–5)
YEAST WET PREP: NORMAL

## 2020-08-06 PROCEDURE — 81001 URINALYSIS AUTO W/SCOPE: CPT

## 2020-08-06 PROCEDURE — 6360000002 HC RX W HCPCS: Performed by: NURSE PRACTITIONER

## 2020-08-06 PROCEDURE — 6370000000 HC RX 637 (ALT 250 FOR IP): Performed by: NURSE PRACTITIONER

## 2020-08-06 PROCEDURE — 87088 URINE BACTERIA CULTURE: CPT

## 2020-08-06 PROCEDURE — 87210 SMEAR WET MOUNT SALINE/INK: CPT

## 2020-08-06 RX ORDER — AZITHROMYCIN 250 MG/1
1000 TABLET, FILM COATED ORAL ONCE
Status: COMPLETED | OUTPATIENT
Start: 2020-08-06 | End: 2020-08-06

## 2020-08-06 RX ORDER — CEPHALEXIN 500 MG/1
500 CAPSULE ORAL 2 TIMES DAILY
Qty: 20 CAPSULE | Refills: 0 | Status: SHIPPED | OUTPATIENT
Start: 2020-08-06 | End: 2020-08-16

## 2020-08-06 RX ORDER — METRONIDAZOLE 500 MG/1
500 TABLET ORAL 2 TIMES DAILY
Qty: 14 TABLET | Refills: 0 | Status: SHIPPED | OUTPATIENT
Start: 2020-08-06 | End: 2020-08-13

## 2020-08-06 RX ORDER — FLUCONAZOLE 150 MG/1
150 TABLET ORAL ONCE
Qty: 1 TABLET | Refills: 0 | Status: SHIPPED | OUTPATIENT
Start: 2020-08-06 | End: 2020-08-06

## 2020-08-06 RX ORDER — CEFTRIAXONE SODIUM 250 MG/1
250 INJECTION, POWDER, FOR SOLUTION INTRAMUSCULAR; INTRAVENOUS ONCE
Status: COMPLETED | OUTPATIENT
Start: 2020-08-06 | End: 2020-08-06

## 2020-08-06 RX ORDER — LIDOCAINE HYDROCHLORIDE 10 MG/ML
INJECTION, SOLUTION EPIDURAL; INFILTRATION; INTRACAUDAL; PERINEURAL
Status: DISCONTINUED
Start: 2020-08-06 | End: 2020-08-06 | Stop reason: HOSPADM

## 2020-08-06 RX ORDER — AZITHROMYCIN 250 MG/1
TABLET, FILM COATED ORAL
Status: DISCONTINUED
Start: 2020-08-06 | End: 2020-08-06 | Stop reason: HOSPADM

## 2020-08-06 RX ORDER — CEFTRIAXONE SODIUM 250 MG/1
INJECTION, POWDER, FOR SOLUTION INTRAMUSCULAR; INTRAVENOUS
Status: DISCONTINUED
Start: 2020-08-06 | End: 2020-08-06 | Stop reason: HOSPADM

## 2020-08-06 RX ADMIN — AZITHROMYCIN 1000 MG: 250 TABLET, FILM COATED ORAL at 01:08

## 2020-08-06 RX ADMIN — CEFTRIAXONE SODIUM 250 MG: 250 INJECTION, POWDER, FOR SOLUTION INTRAMUSCULAR; INTRAVENOUS at 01:08

## 2020-08-06 NOTE — ED PROVIDER NOTES
encounter of 08/05/20   Wet prep, genital    Specimen: Vaginal   Result Value Ref Range    Trichomonas Prep None Seen     Yeast, Wet Prep None Seen     Clue Cells, Wet Prep None Seen     Source Wet Prep VAGINAL    C.trachomatis N.gonorrhoeae DNA    Specimen: Cervix   Result Value Ref Range    Source Endocervix    Urinalysis   Result Value Ref Range    Color, UA Yellow Straw/Yellow    Clarity, UA Clear Clear    Glucose, Ur Negative Negative mg/dL    Bilirubin Urine Negative Negative    Ketones, Urine Negative Negative mg/dL    Specific Gravity, UA 1.025 1.005 - 1.030    Blood, Urine Negative Negative    pH, UA 6.5 5.0 - 9.0    Protein, UA Negative Negative mg/dL    Urobilinogen, Urine 1.0 <2.0 E.U./dL    Nitrite, Urine Negative Negative    Leukocyte Esterase, Urine SMALL (A) Negative   Microscopic Urinalysis   Result Value Ref Range    WBC, UA 5-10 (A) 0 - 5 /HPF    RBC, UA NONE 0 - 2 /HPF    Epithelial Cells, UA FEW /HPF    Bacteria, UA FEW (A) None Seen /HPF       RADIOLOGY:  Interpreted by Radiologist.  No orders to display       ------------------------- NURSING NOTES AND VITALS REVIEWED ---------------------------   The nursing notes within the ED encounter and vital signs as below have been reviewed. BP (!) 162/107   Pulse 83   Temp 97.1 °F (36.2 °C) (Temporal)   Resp 18   Ht 5' 7\" (1.702 m)   Wt (!) 315 lb (142.9 kg)   SpO2 100%   BMI 49.34 kg/m²   Oxygen Saturation Interpretation: Normal      ---------------------------------------------------PHYSICAL EXAM--------------------------------------      Constitutional/General: Alert and oriented x3, well appearing, non toxic in NAD  Head: Normocephalic and atraumatic  Eyes: PERRL, EOMI  Mouth: Oropharynx clear, handling secretions, no trismus  Neck: Supple, full ROM,   Pulmonary: Lungs clear to auscultation bilaterally, no wheezes, rales, or rhonchi. Not in respiratory distress  Cardiovascular:  Regular rate and rhythm, no murmurs, gallops, or rubs.  2+ distal pulses  Abdomen: Soft, non tender, non distended, pelvic exam completed by this provider. Patient with thick white vaginal discharge noted in the vaginal vault. Cervix is pink and free from lesions. No adnexal tender no cervical wall motion tenderness noted no bleeding noted labia majora is erythematous and slightly swollen patient does admit to aggressively itching down there  Extremities: Moves all extremities x 4. Warm and well perfused  Skin: warm and dry without rash  Neurologic: GCS 15,  Psych: Normal Affect      ------------------------------ ED COURSE/MEDICAL DECISION MAKING----------------------  Medications   cefTRIAXone (ROCEPHIN) injection 250 mg (250 mg Intramuscular Given 8/6/20 0108)   azithromycin (ZITHROMAX) tablet 1,000 mg (1,000 mg Oral Given 8/6/20 0108)         ED COURSE:       Medical Decision Making: Plan will be to obtain urinalysis will also perform pelvic exam, urine for GC and chlamydia is pending. We did empirically treat patient with Rocephin 250 mg IM injection as well as Zithromax 1 g orally. Urine is positive for urinary tract infection, urine culture will be pending. Patient was treated already with the Rocephin, patient with negative wet prep. There still is concern for vaginitis as patient does have notable vaginal irritation and discharge, will start patient on Flagyl, patient will be discharged home with Flagyl, Keflex, Diflucan. She was educated on supportive measures at home and the importance of good follow-up care as well as when to return back to the emergency department with full understanding. Patient overall nontoxic. Patient neurovascular intact. Patient safely discharged home       Counseling: The emergency provider has spoken with the patient and discussed todays results, in addition to providing specific details for the plan of care and counseling regarding the diagnosis and prognosis.   Questions are answered at this time and they are agreeable with the plan.      --------------------------------- IMPRESSION AND DISPOSITION ---------------------------------    IMPRESSION  1. Urinary tract infection without hematuria, site unspecified    2. Acute vaginitis        DISPOSITION  Disposition: Discharge to home  Patient condition is good      NOTE: This report was transcribed using voice recognition software.  Every effort was made to ensure accuracy; however, inadvertent computerized transcription errors may be present     DONTAE Aguirre - SRIRAM  08/06/20 [id number]

## 2020-08-07 LAB
C TRACH DNA GENITAL QL NAA+PROBE: NEGATIVE
N. GONORRHOEAE DNA: NEGATIVE
SOURCE: NORMAL

## 2020-08-08 LAB — URINE CULTURE, ROUTINE: NORMAL

## 2020-08-12 ENCOUNTER — TELEPHONE (OUTPATIENT)
Dept: OBGYN | Age: 30
End: 2020-08-12

## 2021-02-11 ENCOUNTER — HOSPITAL ENCOUNTER (EMERGENCY)
Age: 31
Discharge: HOME | End: 2021-02-11
Attending: EMERGENCY MEDICINE
Payer: MEDICAID

## 2021-02-11 VITALS
DIASTOLIC BLOOD PRESSURE: 92 MMHG | SYSTOLIC BLOOD PRESSURE: 145 MMHG | BODY MASS INDEX: 45.99 KG/M2 | RESPIRATION RATE: 16 BRPM | OXYGEN SATURATION: 97 % | HEIGHT: 67 IN | WEIGHT: 293 LBS | HEART RATE: 93 BPM | TEMPERATURE: 97.1 F

## 2021-02-11 DIAGNOSIS — I10 ASYMPTOMATIC HYPERTENSION: Primary | ICD-10-CM

## 2021-02-11 PROCEDURE — 99282 EMERGENCY DEPT VISIT SF MDM: CPT

## 2021-02-11 ASSESSMENT — ENCOUNTER SYMPTOMS
EYE PAIN: 0
SINUS PRESSURE: 0
ABDOMINAL DISTENTION: 0
COUGH: 0
SORE THROAT: 0
EYE DISCHARGE: 0
EYE REDNESS: 0
NAUSEA: 0
VOMITING: 0
BACK PAIN: 0
SHORTNESS OF BREATH: 0
ABDOMINAL PAIN: 0
WHEEZING: 0
DIARRHEA: 0

## 2021-02-11 ASSESSMENT — PAIN DESCRIPTION - ORIENTATION: ORIENTATION: MID

## 2021-02-11 ASSESSMENT — PAIN DESCRIPTION - LOCATION: LOCATION: HEAD

## 2021-02-11 ASSESSMENT — PAIN DESCRIPTION - FREQUENCY: FREQUENCY: CONTINUOUS

## 2021-02-11 ASSESSMENT — PAIN SCALES - GENERAL: PAINLEVEL_OUTOF10: 4

## 2021-02-11 ASSESSMENT — PAIN DESCRIPTION - DESCRIPTORS: DESCRIPTORS: DISCOMFORT;CONSTANT

## 2021-02-11 ASSESSMENT — PAIN DESCRIPTION - PROGRESSION: CLINICAL_PROGRESSION: NOT CHANGED

## 2021-02-11 ASSESSMENT — PAIN DESCRIPTION - PAIN TYPE: TYPE: ACUTE PAIN

## 2021-02-11 NOTE — ED PROVIDER NOTES
70-year-old female presents to the emergency department with high blood pressure. She was seen by her OB/GYN Dr. Kyle Watson earlier today and she had a blood pressure reading in the 575 systolic. She was told to come to the emergency department to have this evaluated. She states she has had intermittent headaches but is not currently having a headache and has not had one today and this headaches generally related to wearing her glasses. She states currently no headache no vision changes no ear ringing no dizziness no chest pain no shortness of breath no cough no abdominal pain nausea vomiting blood in her urine or other complaints at this time. She is currently on no antihypertensives. She states no other complaints at    The history is provided by the patient. Hypertension  Severity:  Mild  Onset quality:  Gradual  Chronicity:  New  Relieved by:  Nothing  Worsened by:  Nothing  Ineffective treatments:  None tried  Associated symptoms: no abdominal pain, no chest pain, no confusion, no dizziness, no ear pain, no fatigue, no fever, no headaches, no hypokalemia, no loss of consciousness, no nausea, no neck pain, no palpitations, no peripheral edema, no shortness of breath, no tinnitus, not vomiting and no weakness         Review of Systems   Constitutional: Negative for chills, fatigue and fever. HENT: Negative for ear pain, sinus pressure, sore throat and tinnitus. Eyes: Negative for pain, discharge and redness. Respiratory: Negative for cough, shortness of breath and wheezing. Cardiovascular: Negative for chest pain and palpitations. Gastrointestinal: Negative for abdominal distention, abdominal pain, diarrhea, nausea and vomiting. Genitourinary: Negative for dysuria and frequency. Musculoskeletal: Negative for arthralgias, back pain and neck pain. Skin: Negative for rash and wound. Neurological: Negative for dizziness, loss of consciousness, weakness and headaches.    Hematological: Negative for adenopathy. Psychiatric/Behavioral: Negative for confusion. All other systems reviewed and are negative. Physical Exam  Constitutional:       Appearance: Normal appearance. She is obese. HENT:      Head: Normocephalic. Mouth/Throat:      Mouth: Mucous membranes are moist.   Eyes:      Extraocular Movements: Extraocular movements intact. Pupils: Pupils are equal, round, and reactive to light. Neck:      Musculoskeletal: Normal range of motion and neck supple. No neck rigidity. Cardiovascular:      Rate and Rhythm: Normal rate and regular rhythm. Pulses: Normal pulses. Heart sounds: Normal heart sounds. Pulmonary:      Effort: Pulmonary effort is normal.      Breath sounds: Normal breath sounds. Abdominal:      General: Abdomen is flat. Bowel sounds are normal.      Palpations: Abdomen is soft. Musculoskeletal: Normal range of motion. Left lower leg: No edema. Skin:     General: Skin is warm. Capillary Refill: Capillary refill takes less than 2 seconds. Neurological:      General: No focal deficit present. Mental Status: She is alert and oriented to person, place, and time. Procedures     MDM  Number of Diagnoses or Management Options  Asymptomatic hypertension  Diagnosis management comments: Patient seen and examined. Patient is currently asymptomatic and her blood pressure on initial reading is 145/92. While this is an elevated blood pressure it is not a critical blood pressure that I feel immediate intervention needs to be done. Patient is felt to be safe for discharge as she is calm no complaints. She is encouraged to follow-up with her primary care physician for further evaluation of this elevated blood pressure.   She is agreeable to plan is discharged with outpatient follow-up.             --------------------------------------------- PAST HISTORY ---------------------------------------------  Past Medical History:  has no past medical history on file. Past Surgical History:  has a past surgical history that includes [place] tooth extraction. Social History:  reports that she has been smoking. She has never used smokeless tobacco. She reports current alcohol use. She reports that she does not use drugs. Family History: family history is not on file. The patients home medications have been reviewed. Allergies: Patient has no known allergies. -------------------------------------------------- RESULTS -------------------------------------------------  Labs:  No results found for this visit on 02/11/21. Radiology:  No orders to display       ------------------------- NURSING NOTES AND VITALS REVIEWED ---------------------------  Date / Time Roomed:  2/11/2021  5:58 PM  ED Bed Assignment:  [id number]    The nursing notes within the ED encounter and vital signs as below have been reviewed. BP (!) 145/92   Pulse 93   Temp 97.1 °F (36.2 °C)   Resp 16   Ht 5' 7\" (1.702 m)   Wt (!) 323 lb (146.5 kg)   LMP 02/01/2021 (Exact Date)   SpO2 97%   BMI 50.59 kg/m²   Oxygen Saturation Interpretation: Normal      ------------------------------------------ PROGRESS NOTES ------------------------------------------  I have spoken with the patient and discussed todays results, in addition to providing specific details for the plan of care and counseling regarding the diagnosis and prognosis. Their questions are answered at this time and they are agreeable with the plan. I discussed at length with them reasons for immediate return here for re evaluation. They will followup with their primary care physician by calling their office tomorrow. --------------------------------- ADDITIONAL PROVIDER NOTES ---------------------------------  At this time the patient is without objective evidence of an acute process requiring hospitalization or inpatient management.   They have remained hemodynamically stable throughout their entire ED visit and are stable for discharge with outpatient follow-up. The plan has been discussed in detail and they are aware of the specific conditions for emergent return, as well as the importance of follow-up. New Prescriptions    No medications on file       Diagnosis:  1. Asymptomatic hypertension        Disposition:  Patient's disposition: Discharge to home  Patient's condition is stable.          Jl Schirmer,   02/11/21 1809

## 2021-03-27 ENCOUNTER — HOSPITAL ENCOUNTER (EMERGENCY)
Age: 31
Discharge: HOME | End: 2021-03-27
Payer: MEDICAID

## 2021-03-27 ENCOUNTER — APPOINTMENT (OUTPATIENT)
Dept: GENERAL RADIOLOGY | Age: 31
End: 2021-03-27
Payer: MEDICAID

## 2021-03-27 VITALS
WEIGHT: 293 LBS | RESPIRATION RATE: 18 BRPM | HEART RATE: 99 BPM | OXYGEN SATURATION: 96 % | HEIGHT: 67 IN | TEMPERATURE: 98.1 F | BODY MASS INDEX: 45.99 KG/M2 | SYSTOLIC BLOOD PRESSURE: 155 MMHG | DIASTOLIC BLOOD PRESSURE: 96 MMHG

## 2021-03-27 DIAGNOSIS — S60.211A CONTUSION OF RIGHT WRIST, INITIAL ENCOUNTER: ICD-10-CM

## 2021-03-27 DIAGNOSIS — S62.001A OCCULT CLOSED FRACTURE OF SCAPHOID OF RIGHT WRIST, INITIAL ENCOUNTER: Primary | ICD-10-CM

## 2021-03-27 PROCEDURE — 99283 EMERGENCY DEPT VISIT LOW MDM: CPT

## 2021-03-27 PROCEDURE — 73110 X-RAY EXAM OF WRIST: CPT

## 2021-03-27 PROCEDURE — 99284 EMERGENCY DEPT VISIT MOD MDM: CPT

## 2021-03-27 PROCEDURE — 6370000000 HC RX 637 (ALT 250 FOR IP): Performed by: NURSE PRACTITIONER

## 2021-03-27 PROCEDURE — 29125 APPL SHORT ARM SPLINT STATIC: CPT

## 2021-03-27 RX ORDER — NAPROXEN 500 MG/1
500 TABLET ORAL 2 TIMES DAILY PRN
Qty: 14 TABLET | Refills: 0 | Status: SHIPPED | OUTPATIENT
Start: 2021-03-27 | End: 2021-09-01

## 2021-03-27 RX ORDER — HYDROCODONE BITARTRATE AND ACETAMINOPHEN 5; 325 MG/1; MG/1
1 TABLET ORAL EVERY 6 HOURS PRN
Qty: 12 TABLET | Refills: 0 | Status: SHIPPED | OUTPATIENT
Start: 2021-03-27 | End: 2021-03-30

## 2021-03-27 RX ORDER — IBUPROFEN 800 MG/1
800 TABLET ORAL ONCE
Status: COMPLETED | OUTPATIENT
Start: 2021-03-27 | End: 2021-03-27

## 2021-03-27 RX ADMIN — IBUPROFEN 800 MG: 800 TABLET, FILM COATED ORAL at 18:09

## 2021-03-27 ASSESSMENT — PAIN DESCRIPTION - LOCATION: LOCATION: WRIST

## 2021-03-27 ASSESSMENT — PAIN DESCRIPTION - ORIENTATION: ORIENTATION: RIGHT
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
## 2021-08-10 ENCOUNTER — HOSPITAL ENCOUNTER (OUTPATIENT)
Age: 31
Discharge: HOME | End: 2021-08-10
Payer: MEDICAID

## 2021-08-10 LAB — GONADOTROPIN, CHORIONIC (HCG) QUANT: 645.2 MIU/ML

## 2021-08-10 PROCEDURE — 36415 COLL VENOUS BLD VENIPUNCTURE: CPT

## 2021-08-10 PROCEDURE — 84702 CHORIONIC GONADOTROPIN TEST: CPT

## 2021-08-11 ENCOUNTER — HOSPITAL ENCOUNTER (OUTPATIENT)
Age: 31
Discharge: HOME | End: 2021-08-11
Payer: MEDICAID

## 2021-08-11 LAB — GONADOTROPIN, CHORIONIC (HCG) QUANT: 793.9 MIU/ML

## 2021-08-11 PROCEDURE — 84702 CHORIONIC GONADOTROPIN TEST: CPT

## 2021-08-11 PROCEDURE — 36415 COLL VENOUS BLD VENIPUNCTURE: CPT

## 2021-08-16 ENCOUNTER — APPOINTMENT (OUTPATIENT)
Dept: ULTRASOUND IMAGING | Age: 31
End: 2021-08-16
Payer: MEDICAID

## 2021-08-16 ENCOUNTER — HOSPITAL ENCOUNTER (EMERGENCY)
Age: 31
Discharge: HOME | End: 2021-08-16
Payer: MEDICAID

## 2021-08-16 VITALS
WEIGHT: 293 LBS | SYSTOLIC BLOOD PRESSURE: 142 MMHG | HEIGHT: 67 IN | TEMPERATURE: 98.5 F | RESPIRATION RATE: 16 BRPM | OXYGEN SATURATION: 99 % | DIASTOLIC BLOOD PRESSURE: 94 MMHG | BODY MASS INDEX: 45.99 KG/M2 | HEART RATE: 72 BPM

## 2021-08-16 DIAGNOSIS — O46.90 VAGINAL BLEEDING IN PREGNANCY: ICD-10-CM

## 2021-08-16 DIAGNOSIS — O20.0 THREATENED ABORTION, ANTEPARTUM: Primary | ICD-10-CM

## 2021-08-16 LAB
ABO/RH: NORMAL
BACTERIA: ABNORMAL /HPF
BILIRUBIN URINE: NEGATIVE
BLOOD, URINE: ABNORMAL
CLARITY: CLEAR
COLOR: YELLOW
EPITHELIAL CELLS, UA: ABNORMAL /HPF
GLUCOSE URINE: NEGATIVE MG/DL
GONADOTROPIN, CHORIONIC (HCG) QUANT: 2247 MIU/ML
KETONES, URINE: NEGATIVE MG/DL
LEUKOCYTE ESTERASE, URINE: NEGATIVE
MUCUS: PRESENT /LPF
NITRITE, URINE: NEGATIVE
PH UA: 6 (ref 5–9)
PROTEIN UA: NEGATIVE MG/DL
RBC UA: ABNORMAL /HPF (ref 0–2)
SPECIFIC GRAVITY UA: >=1.03 (ref 1–1.03)
UROBILINOGEN, URINE: 0.2 E.U./DL
WBC UA: ABNORMAL /HPF (ref 0–5)

## 2021-08-16 PROCEDURE — 86901 BLOOD TYPING SEROLOGIC RH(D): CPT

## 2021-08-16 PROCEDURE — 76817 TRANSVAGINAL US OBSTETRIC: CPT

## 2021-08-16 PROCEDURE — 86900 BLOOD TYPING SEROLOGIC ABO: CPT

## 2021-08-16 PROCEDURE — 84702 CHORIONIC GONADOTROPIN TEST: CPT

## 2021-08-16 PROCEDURE — 99285 EMERGENCY DEPT VISIT HI MDM: CPT

## 2021-08-16 PROCEDURE — 36415 COLL VENOUS BLD VENIPUNCTURE: CPT

## 2021-08-16 PROCEDURE — 81001 URINALYSIS AUTO W/SCOPE: CPT

## 2021-08-16 ASSESSMENT — PAIN DESCRIPTION - LOCATION: LOCATION: ABDOMEN

## 2021-08-16 ASSESSMENT — PAIN DESCRIPTION - FREQUENCY: FREQUENCY: CONTINUOUS

## 2021-08-16 ASSESSMENT — PAIN DESCRIPTION - DESCRIPTORS: DESCRIPTORS: CRAMPING

## 2021-08-16 ASSESSMENT — PAIN DESCRIPTION - ONSET: ONSET: GRADUAL

## 2021-08-16 ASSESSMENT — PAIN DESCRIPTION - ORIENTATION: ORIENTATION: LOWER

## 2021-08-16 ASSESSMENT — PAIN SCALES - GENERAL: PAINLEVEL_OUTOF10: 7

## 2021-08-16 ASSESSMENT — PAIN DESCRIPTION - PROGRESSION: CLINICAL_PROGRESSION: GRADUALLY WORSENING

## 2021-08-16 ASSESSMENT — PAIN DESCRIPTION - PAIN TYPE: TYPE: ACUTE PAIN

## 2021-08-16 NOTE — ED NOTES
FIRST PROVIDER CONTACT ASSESSMENT NOTE      Department of Emergency Medicine   8/16/21  5:38 PM EDT    Chief Complaint: Vaginal Bleeding (vaginal spotting, early pregnancy, haven't had first appt yet)      History of Present Illness:   Malka Wood is a 31 y.o. female who presents to the ED for abnormal spotting in early pregnancy. She is followed with Dr. Ashwini Ty and had blood work done. She has an appointment coming up with Dr. Vijay Cruz during the want to switch. She is currently not taking prenatal vitamin daily. She denies any blood clots. She states she has lower abdominal cramping. Medical History:  has no past medical history on file. Surgical History:  has a past surgical history that includes [place] tooth extraction. Social History:  reports that she has been smoking. She has never used smokeless tobacco. She reports current alcohol use. She reports that she does not use drugs. Family History: family history is not on file. *ALLERGIES*     Patient has no known allergies.      Physical Exam:      VS:  BP (!) 117/111   Pulse 90   Temp 98.2 °F (36.8 °C)   Resp 16   Ht 5' 7\" (1.702 m)   Wt (!) 323 lb (146.5 kg)   SpO2 97%   BMI 50.59 kg/m²      Initial Plan of Care:  Initiate Treatment-Testing, Proceed toTreatment Area When Bed Available for ED Attending/MLP to Continue Care    -----------------END OF FIRST PROVIDER CONTACT ASSESSMENT NOTE--------------  Electronically signed by DONTAE Coles CNP   DD: 8/16/21             DONTAE Coles CNP  08/16/21 1739

## 2021-08-17 NOTE — ED NOTES
Assisted Arben LINDO with pelvic exam, patient tolerated procedure well.      Brett Horton RN  08/16/21 0292

## 2021-08-17 NOTE — ED NOTES
Discharge instructions given and reviewed with patient. Instructed to follow up with Dr Nacho Hollins. Questions and concerns addressed. Pt departed ED ambulatory in no apparent distress. Personal belongings taken.      Shaun Cheatham RN  08/16/21 9491

## 2021-08-17 NOTE — ED PROVIDER NOTES
[facility]  Department of Emergency Medicine   ED  Encounter Note  Admit Date/RoomTime: 2021  8:08 PM  ED Room: 33/33    NAME: Melchor Garcia  : 1990  MRN: [id number]     Chief Complaint:  Vaginal Bleeding (vaginal spotting, early pregnancy, haven't had first appt yet)    History of Present Illness       Melchor Garcia is a 31 y.o. old female who presents to the emergency department by private vehicle for pelvic pain: mild and abnormal bleeding, which occured 1 day(s) prior to arrival.  Since onset the symptoms have been stable and mild in severity. Symptoms are associated with spotting and denies any back pain, fever, chills, vomiting, urinary frequency, dysuria or vaginal discharge. She takes no blood thinning agents. Patient's last menstrual period was 06/10/2021 (exact date). . . GYN/STD Hx: No Gynecological history, Birth Control: None. and STD Hx: No prior history of sexually transmitted disease. . Patient reports she followed up with her OB/GYN and they did an ultrasound but reported that it was very small. She has had blood tests every other day this week which she reports her doctor told her her numbers are growing but they are still following these. She is getting a new OB/GYN and has appointment established for this Friday. ROS   Pertinent positives and negatives are stated within HPI, all other systems reviewed and are negative. Past Medical History:  has no past medical history on file. Surgical History:  has a past surgical history that includes [place] tooth extraction. Social History:  reports that she has been smoking. She has never used smokeless tobacco. She reports current alcohol use. She reports that she does not use drugs. Family History: family history is not on file. Allergies: Patient has no known allergies.     Physical Exam   Oxygen Saturation Interpretation: Normal.        ED Triage Vitals [21 1736] BP Temp Temp src Pulse Resp SpO2 Height Weight   (!) 117/111 98.2 °F (36.8 °C) -- 90 16 97 % 5' 7\" (1.702 m) (!) 323 lb (146.5 kg)         General Appearance/Constitutional:  Alert, development consistent with age, NAD. HEENT:  NC/NT. PERRLA. Airway patent. Neck:  Supple. No lymphadenopathy. Respiratory:  Clear to auscultation and breath sounds equal.  CV:  Regular rate and rhythm. GI:  normal appearing, non-distended with no visible hernias. Bowel sounds: normal bowel sounds. Tenderness: No abdominal tenderness, guarding, rebound, rigidity or pulsatile mass. .        Liver: non-tender. Spleen:  non-tender. Back: CVA Tenderness: No CVA tenderness. : Pelvic Exam: (Same sex / third party chaperone present during examination). External Genitalia: General appearance; normal, Hair distribution; normal, Lesions absent. Urethral Meatus: Size normal, Location normal, Lesions absent, Prolapse absent. Vagina: no abnormal discharge or lesions. Cervix: normal appearing cervix without discharge or lesions nontender and nulliparous os. Uterus: difficult to discern due to obesity       [place]: no tenderness bilaterally. Anus/Perineum:  normal.  Integument:  Normal turgor. Warm, dry, without visible rash, unless noted elsewhere. Lymphatics: No lymphangitis or adenopathy noted. Neurological:  Orientation age-appropriate. Motor functions intact.     Lab / Imaging Results   (All laboratory and radiology results have been personally reviewed by myself)  Labs:  Results for orders placed or performed during the hospital encounter of 08/16/21   Urinalysis, reflex to microscopic   Result Value Ref Range    Color, UA Yellow Straw/Yellow    Clarity, UA Clear Clear    Glucose, Ur Negative Negative mg/dL    Bilirubin Urine Negative Negative    Ketones, Urine Negative Negative mg/dL    Specific Gravity, UA >=1.030 1.005 - 1.030    Blood, Urine LARGE (A) Negative pH, UA 6.0 5.0 - 9.0    Protein, UA Negative Negative mg/dL    Urobilinogen, Urine 0.2 <2.0 E.U./dL    Nitrite, Urine Negative Negative    Leukocyte Esterase, Urine Negative Negative   HCG, Quantitative, Pregnancy   Result Value Ref Range    hCG Quant 2247.0 (H) <10 mIU/mL   Microscopic Urinalysis   Result Value Ref Range    Mucus, UA Present (A) None Seen /LPF    WBC, UA 1-3 0 - 5 /HPF    RBC, UA 2-5 0 - 2 /HPF    Epithelial Cells, UA MANY /HPF    Bacteria, UA MANY (A) None Seen /HPF   ABO/RH   Result Value Ref Range    ABO/Rh O POS      Imaging: All Radiology results interpreted by Radiologist unless otherwise noted. US OB TRANSVAGINAL   Final Result   1. There appears to be a small early intrauterine gestational sac with an   estimated gestational age by ultrasound based on the mean sac diameter of 5   weeks and 2 days which corresponds to an estimated date of delivery by   ultrasound of 2022.      2.  Normal appearing small yolk sac identified. Fetal pole not seen. 3.  Normal appearance of the bilateral ovaries. Probable corpus luteum cyst   within the left ovary. There are no adnexal masses. Normal ovarian   vascularity. 4.  Small amount of fluid in the pelvis. Recommendation: Serial quantitative beta HCG evaluations and repeat pelvic   ultrasound in 7-10 days. ED Course / Medical Decision Making   Medications - No data to display     Re-examination:  21       Time: pt is in no distress  Consults:   None    Procedures:   none    MDM:   Patient presents with spotting and cramping in early pregnancy. She already has been seen by OB/GYN and ultrasound in office and as well as today showing gestational sac intrauterine but very small suggesting a very early gestation. Beta quant is climbing though it does not show that it is doubling mathematically.   Did discuss with patient that this could just be too early to tell versus a threatened  and gave her precautions of what may occur if she is having a miscarriage. She is advised to continue her outpatient follow-up with OB/GYN to follow the ultrasound and beta quant. Advised pelvic rest until follow-up with OB/GYN. Plan of Care/Counseling:  James Borden PA-C reviewed today's visit with the patient in addition to providing specific details for the plan of care and counseling regarding the diagnosis and prognosis. Questions are answered at this time and are agreeable with the plan. Assessment      1. Threatened , antepartum    2. Vaginal bleeding in pregnancy      Plan   Discharged home. Patient condition is stable    New Medications     Discharge Medication List as of 2021 10:00 PM        Electronically signed by James Borden PA-C   DD: 21  **This report was transcribed using voice recognition software. Every effort was made to ensure accuracy; however, inadvertent computerized transcription errors may be present.   END OF ED PROVIDER NOTE       James Borden PA-C  21 [id number]

## 2021-09-01 ENCOUNTER — APPOINTMENT (OUTPATIENT)
Dept: ULTRASOUND IMAGING | Age: 31
End: 2021-09-01
Payer: MEDICAID

## 2021-09-01 ENCOUNTER — HOSPITAL ENCOUNTER (EMERGENCY)
Age: 31
Discharge: HOME | End: 2021-09-01
Payer: MEDICAID

## 2021-09-01 VITALS
SYSTOLIC BLOOD PRESSURE: 164 MMHG | HEART RATE: 102 BPM | OXYGEN SATURATION: 97 % | DIASTOLIC BLOOD PRESSURE: 105 MMHG | TEMPERATURE: 97.3 F | RESPIRATION RATE: 16 BRPM

## 2021-09-01 DIAGNOSIS — O20.0 THREATENED ABORTION, ANTEPARTUM: Primary | ICD-10-CM

## 2021-09-01 LAB
ABO/RH: NORMAL
ALBUMIN SERPL-MCNC: 4 G/DL (ref 3.5–5.2)
ALP BLD-CCNC: 77 U/L (ref 35–104)
ALT SERPL-CCNC: 20 U/L (ref 0–32)
ANION GAP SERPL CALCULATED.3IONS-SCNC: 9 MMOL/L (ref 7–16)
AST SERPL-CCNC: 16 U/L (ref 0–31)
BACTERIA: ABNORMAL /HPF
BASOPHILS ABSOLUTE: 0.06 E9/L (ref 0–0.2)
BASOPHILS RELATIVE PERCENT: 0.5 % (ref 0–2)
BILIRUB SERPL-MCNC: <0.2 MG/DL (ref 0–1.2)
BILIRUBIN URINE: NEGATIVE
BLOOD, URINE: ABNORMAL
BUN BLDV-MCNC: 6 MG/DL (ref 6–20)
CALCIUM SERPL-MCNC: 9.5 MG/DL (ref 8.6–10.2)
CHLORIDE BLD-SCNC: 101 MMOL/L (ref 98–107)
CLARITY: ABNORMAL
CO2: 26 MMOL/L (ref 22–29)
COLOR: ABNORMAL
CREAT SERPL-MCNC: 0.7 MG/DL (ref 0.5–1)
EOSINOPHILS ABSOLUTE: 0.17 E9/L (ref 0.05–0.5)
EOSINOPHILS RELATIVE PERCENT: 1.4 % (ref 0–6)
GFR AFRICAN AMERICAN: >60
GFR NON-AFRICAN AMERICAN: >60 ML/MIN/1.73
GLUCOSE BLD-MCNC: 123 MG/DL (ref 74–99)
GLUCOSE URINE: NEGATIVE MG/DL
GONADOTROPIN, CHORIONIC (HCG) QUANT: 3917 MIU/ML
HCT VFR BLD CALC: 37.9 % (ref 34–48)
HEMOGLOBIN: 12.2 G/DL (ref 11.5–15.5)
IMMATURE GRANULOCYTES #: 0.04 E9/L
IMMATURE GRANULOCYTES %: 0.3 % (ref 0–5)
KETONES, URINE: ABNORMAL MG/DL
LEUKOCYTE ESTERASE, URINE: NEGATIVE
LYMPHOCYTES ABSOLUTE: 3.16 E9/L (ref 1.5–4)
LYMPHOCYTES RELATIVE PERCENT: 26 % (ref 20–42)
MCH RBC QN AUTO: 27.8 PG (ref 26–35)
MCHC RBC AUTO-ENTMCNC: 32.2 % (ref 32–34.5)
MCV RBC AUTO: 86.3 FL (ref 80–99.9)
MONOCYTES ABSOLUTE: 1.02 E9/L (ref 0.1–0.95)
MONOCYTES RELATIVE PERCENT: 8.4 % (ref 2–12)
NEUTROPHILS ABSOLUTE: 7.72 E9/L (ref 1.8–7.3)
NEUTROPHILS RELATIVE PERCENT: 63.4 % (ref 43–80)
NITRITE, URINE: NEGATIVE
PDW BLD-RTO: 14.8 FL (ref 11.5–15)
PH UA: 6 (ref 5–9)
PLATELET # BLD: 490 E9/L (ref 130–450)
PMV BLD AUTO: 9.2 FL (ref 7–12)
POTASSIUM SERPL-SCNC: 3.9 MMOL/L (ref 3.5–5)
PROTEIN UA: 100 MG/DL
RBC # BLD: 4.39 E12/L (ref 3.5–5.5)
RBC UA: ABNORMAL /HPF (ref 0–2)
SODIUM BLD-SCNC: 136 MMOL/L (ref 132–146)
SPECIFIC GRAVITY UA: >=1.03 (ref 1–1.03)
TOTAL PROTEIN: 7.5 G/DL (ref 6.4–8.3)
UROBILINOGEN, URINE: 1 E.U./DL
WBC # BLD: 12.2 E9/L (ref 4.5–11.5)
WBC UA: >20 /HPF (ref 0–5)

## 2021-09-01 PROCEDURE — 81001 URINALYSIS AUTO W/SCOPE: CPT

## 2021-09-01 PROCEDURE — 86901 BLOOD TYPING SEROLOGIC RH(D): CPT

## 2021-09-01 PROCEDURE — 86900 BLOOD TYPING SEROLOGIC ABO: CPT

## 2021-09-01 PROCEDURE — 99283 EMERGENCY DEPT VISIT LOW MDM: CPT

## 2021-09-01 PROCEDURE — 85025 COMPLETE CBC W/AUTO DIFF WBC: CPT

## 2021-09-01 PROCEDURE — 76817 TRANSVAGINAL US OBSTETRIC: CPT

## 2021-09-01 PROCEDURE — 80053 COMPREHEN METABOLIC PANEL: CPT

## 2021-09-01 PROCEDURE — 84702 CHORIONIC GONADOTROPIN TEST: CPT

## 2021-09-01 PROCEDURE — 36415 COLL VENOUS BLD VENIPUNCTURE: CPT

## 2021-09-01 ASSESSMENT — PAIN SCALES - GENERAL: PAINLEVEL_OUTOF10: 7

## 2021-09-02 NOTE — ED NOTES
FIRST PROVIDER CONTACT ASSESSMENT NOTE                                                                                                Department of Emergency Medicine                                                      First Provider Note  21  8:02 PM EDT  NAME: Shira Bergeron  : 1990  MRN: [id number]    Chief Complaint: Vaginal Bleeding (pt states she is 6 weeks pregnant. pt states she went to the restroom and a whole bunch of blood came out. )      History of Present Illness:   Shira Bergeron is a 31 y.o. female who presents to the ED for vaginal bleeding in the setting of pregnancy. Patient is approximately 6 weeks pregnant, G1, P0 who reports she started having vaginal bleeding. Patient also reports severe abdominal pain and cramping. Focused Physical Exam:  VS:    ED Triage Vitals [21]   BP Temp Temp src Pulse Resp SpO2 Height Weight   -- 97.3 °F (36.3 °C) -- 102 -- 97 % -- --        General: Alert and in no apparent distress. Medical History:  has no past medical history on file. Surgical History:  has a past surgical history that includes [place] tooth extraction. Social History:  reports that she has been smoking. She has never used smokeless tobacco. She reports current alcohol use. She reports that she does not use drugs. Family History: family history is not on file. Allergies: Patient has no known allergies.      Initial Plan of Care:  Initiate Treatment-Testing, Proceed toTreatment Area When Bed Available for ED Attending/MLP to Continue Care    -------------------------------------------------END OF FIRST PROVIDER CONTACT ASSESSMENT NOTE--------------------------------------------------------  Electronically signed by DONTAE Gaines CNP   DD: 21       DONTAE Gaines CNP  21

## 2021-09-02 NOTE — ED PROVIDER NOTES
Auto Differential   Result Value Ref Range    WBC 12.2 (H) 4.5 - 11.5 E9/L    RBC 4.39 3.50 - 5.50 E12/L    Hemoglobin 12.2 11.5 - 15.5 g/dL    Hematocrit 37.9 34.0 - 48.0 %    MCV 86.3 80.0 - 99.9 fL    MCH 27.8 26.0 - 35.0 pg    MCHC 32.2 32.0 - 34.5 %    RDW 14.8 11.5 - 15.0 fL    Platelets 680 (H) 368 - 450 E9/L    MPV 9.2 7.0 - 12.0 fL    Neutrophils % 63.4 43.0 - 80.0 %    Immature Granulocytes % 0.3 0.0 - 5.0 %    Lymphocytes % 26.0 20.0 - 42.0 %    Monocytes % 8.4 2.0 - 12.0 %    Eosinophils % 1.4 0.0 - 6.0 %    Basophils % 0.5 0.0 - 2.0 %    Neutrophils Absolute 7.72 (H) 1.80 - 7.30 E9/L    Immature Granulocytes # 0.04 E9/L    Lymphocytes Absolute 3.16 1.50 - 4.00 E9/L    Monocytes Absolute 1.02 (H) 0.10 - 0.95 E9/L    Eosinophils Absolute 0.17 0.05 - 0.50 E9/L    Basophils Absolute 0.06 0.00 - 0.20 E9/L   Comprehensive Metabolic Panel   Result Value Ref Range    Sodium 136 132 - 146 mmol/L    Potassium 3.9 3.5 - 5.0 mmol/L    Chloride 101 98 - 107 mmol/L    CO2 26 22 - 29 mmol/L    Anion Gap 9 7 - 16 mmol/L    Glucose 123 (H) 74 - 99 mg/dL    BUN 6 6 - 20 mg/dL    CREATININE 0.7 0.5 - 1.0 mg/dL    GFR Non-African American >60 >=60 mL/min/1.73    GFR African American >60     Calcium 9.5 8.6 - 10.2 mg/dL    Total Protein 7.5 6.4 - 8.3 g/dL    Albumin 4.0 3.5 - 5.2 g/dL    Total Bilirubin <0.2 0.0 - 1.2 mg/dL    Alkaline Phosphatase 77 35 - 104 U/L    ALT 20 0 - 32 U/L    AST 16 0 - 31 U/L   Urinalysis   Result Value Ref Range    Color, UA ORANGE (A) Straw/Yellow    Clarity, UA CLOUDY (A) Clear    Glucose, Ur Negative Negative mg/dL    Bilirubin Urine Negative Negative    Ketones, Urine TRACE (A) Negative mg/dL    Specific Gravity, UA >=1.030 1.005 - 1.030    Blood, Urine LARGE (A) Negative    pH, UA 6.0 5.0 - 9.0    Protein,  (A) Negative mg/dL    Urobilinogen, Urine 1.0 <2.0 E.U./dL    Nitrite, Urine Negative Negative    Leukocyte Esterase, Urine Negative Negative   hCG, quantitative, pregnancy Result Value Ref Range    hCG Quant 3917.0 (H) <10 mIU/mL   Microscopic Urinalysis   Result Value Ref Range    WBC, UA >20 (A) 0 - 5 /HPF    RBC, UA 0-1 0 - 2 /HPF    Bacteria, UA RARE (A) None Seen /HPF   ABO/RH   Result Value Ref Range    ABO/Rh O POS        RADIOLOGY:  Interpreted by Radiologist.  US OB TRANSVAGINAL   Final Result   1. There is an irregularly shaped and echogenic intrauterine gestational   sac. Small yolk sac identified. No fetal pole. The gestational sac resides   in the mid/lower uterine segment of the uterus. (Imaging features are concerning for a failed 1st trimester pregnancy. Please consider trending patient's quantitative beta HCG values. Repeat   pelvic ultrasound could be considered if indicated.)      2. Nonvisualization of either ovary. There are no adnexal masses. Critical results were called by Dr. Patricia Lerner to Edmond Mahajan on   9/1/2021 at 21:19.             ------------------------- NURSING NOTES AND VITALS REVIEWED ---------------------------   The nursing notes within the ED encounter and vital signs as below have been reviewed. BP (!) 164/105   Pulse 102   Temp 97.3 °F (36.3 °C)   Resp 16   LMP 06/10/2021 (Exact Date)   SpO2 97%   Oxygen Saturation Interpretation: Normal      ---------------------------------------------------PHYSICAL EXAM--------------------------------------      Constitutional/General: Alert and oriented x3, well appearing, non toxic in NAD  Head: Normocephalic and atraumatic  Eyes: PERRL, EOMI  Mouth: Oropharynx clear, handling secretions, no trismus  Neck: Supple, full ROM,   Pulmonary: Lungs clear to auscultation bilaterally, no wheezes, rales, or rhonchi. Not in respiratory distress  Cardiovascular:  Regular rate and rhythm, no murmurs, gallops, or rubs. 2+ distal pulses  Abdomen: Soft, non tender, non distended, pelvic exam completed by this provider.  Patient with small amount of dark burgundy blood noted in the vaginal vault. No clots noted. Cervical os is closed. Extremities: Moves all extremities x 4. Warm and well perfused  Skin: warm and dry without rash  Neurologic: GCS 15,  Psych: Normal Affect      ------------------------------ ED COURSE/MEDICAL DECISION MAKING----------------------  Medications - No data to display      ED COURSE:       Medical Decision Making:    Plan will be to obtain labs will also obtain ultrasound. Labs resulted CBC with white blood cell count 12.2 likely inflammatory response related to pregnancy, chemistry panel negative, beta quant 3917 patient actually during her last visit was 2200 so she is not significantly advancing as she should be. Patient is O+, urinalysis does show greater than 20 WBCs, will send off urine culture, ultrasound showing small irregularity shaped and echogenic intrauterine gestational sac, small yolk sac identified no fetal pole. The gestational sac resides in the mid to lower uterine segment of the uterus. Did make patient aware of these findings that they are concerning for an impending miscarriage. I did make her aware of pelvic rest. Patient was made aware to follow-up with her OB/GYN and to call in a.m. for appointment. Patient was educated on the importance of pelvic rest as well as when to return and that likely she is in the process of a miscarriage due to the low beta quant number as well as the irregularity noted on the ultrasound today. Patient expressed understanding. Patient will be safely discharged home. Counseling: The emergency provider has spoken with the patient and discussed todays results, in addition to providing specific details for the plan of care and counseling regarding the diagnosis and prognosis. Questions are answered at this time and they are agreeable with the plan.      --------------------------------- IMPRESSION AND DISPOSITION ---------------------------------    IMPRESSION  1.  Threatened , antepartum DISPOSITION  Disposition: Discharge to home  Patient condition is good      NOTE: This report was transcribed using voice recognition software.  Every effort was made to ensure accuracy; however, inadvertent computerized transcription errors may be present     DONTAE Gaines CNP  09/02/21 0301  ATTENDING PROVIDER ATTESTATION:     Supervising Physician, on-site, available for consultation, non-participatory in the evaluation or care of this patient       Marie Heredia MD  09/02/21 8444

## 2021-09-04 ENCOUNTER — HOSPITAL ENCOUNTER (EMERGENCY)
Age: 31
Discharge: LEFT BEFORE DISCHARGE | End: 2021-09-04
Payer: MEDICAID

## 2021-09-04 VITALS
TEMPERATURE: 97.3 F | SYSTOLIC BLOOD PRESSURE: 171 MMHG | OXYGEN SATURATION: 96 % | BODY MASS INDEX: 45.99 KG/M2 | DIASTOLIC BLOOD PRESSURE: 109 MMHG | RESPIRATION RATE: 16 BRPM | WEIGHT: 293 LBS | HEART RATE: 74 BPM | HEIGHT: 67 IN

## 2021-09-04 ASSESSMENT — PAIN DESCRIPTION - ORIENTATION: ORIENTATION: LOWER

## 2021-09-04 ASSESSMENT — PAIN DESCRIPTION - LOCATION: LOCATION: ABDOMEN

## 2021-09-04 ASSESSMENT — PAIN SCALES - GENERAL: PAINLEVEL_OUTOF10: 10

## 2021-09-04 NOTE — ED NOTES
FIRST PROVIDER CONTACT ASSESSMENT NOTE      Department of Emergency Medicine   9/4/21  3:15 AM EDT    Chief Complaint: Threatened Miscarriage (woke up @ 2am with cramping, passed a lot of blood, about 7 weeks pregnant)      History of Present Illness:   Missael Patrick is a 31 y.o. female who presents to the ED for    Medical History:  has no past medical history on file. Surgical History:  has a past surgical history that includes [place] tooth extraction. Social History:  reports that she has been smoking. She has never used smokeless tobacco. She reports current alcohol use. She reports that she does not use drugs. Family History: family history is not on file. *ALLERGIES*     Patient has no known allergies.      Physical Exam:      VS:  Pulse 74   Temp 97.3 °F (36.3 °C)   Resp 16   LMP 06/10/2021 (Exact Date)   SpO2 96%      Initial Plan of Care:  Initiate Treatment-Testing, Proceed toTreatment Area When Bed Available for ED Attending/MLP to Continue Care    -----------------END OF FIRST PROVIDER CONTACT ASSESSMENT NOTE--------------  Electronically signed by DONTAE Crabtree CNP   DD: 9/4/21             DONTAE Beltrán CNP  09/04/21 0315

## 2021-09-05 ENCOUNTER — HOSPITAL ENCOUNTER (EMERGENCY)
Age: 31
Discharge: HOME | End: 2021-09-05
Attending: EMERGENCY MEDICINE
Payer: MEDICAID

## 2021-09-05 ENCOUNTER — APPOINTMENT (OUTPATIENT)
Dept: ULTRASOUND IMAGING | Age: 31
End: 2021-09-05
Payer: MEDICAID

## 2021-09-05 VITALS
WEIGHT: 293 LBS | SYSTOLIC BLOOD PRESSURE: 155 MMHG | DIASTOLIC BLOOD PRESSURE: 86 MMHG | HEART RATE: 71 BPM | RESPIRATION RATE: 14 BRPM | OXYGEN SATURATION: 100 % | BODY MASS INDEX: 53.09 KG/M2 | TEMPERATURE: 98.5 F

## 2021-09-05 DIAGNOSIS — O03.9 MISCARRIAGE: Primary | ICD-10-CM

## 2021-09-05 LAB
ALBUMIN SERPL-MCNC: 4.1 G/DL (ref 3.5–5.2)
ALP BLD-CCNC: 69 U/L (ref 35–104)
ALT SERPL-CCNC: 14 U/L (ref 0–32)
ANION GAP SERPL CALCULATED.3IONS-SCNC: 11 MMOL/L (ref 7–16)
AST SERPL-CCNC: 18 U/L (ref 0–31)
BACTERIA: NORMAL /HPF
BASOPHILS ABSOLUTE: 0.03 E9/L (ref 0–0.2)
BASOPHILS RELATIVE PERCENT: 0.3 % (ref 0–2)
BILIRUB SERPL-MCNC: <0.2 MG/DL (ref 0–1.2)
BILIRUBIN URINE: ABNORMAL
BLOOD, URINE: ABNORMAL
BUN BLDV-MCNC: 6 MG/DL (ref 6–20)
CALCIUM SERPL-MCNC: 9 MG/DL (ref 8.6–10.2)
CHLORIDE BLD-SCNC: 105 MMOL/L (ref 98–107)
CLARITY: ABNORMAL
CO2: 25 MMOL/L (ref 22–29)
COLOR: ABNORMAL
CREAT SERPL-MCNC: 0.6 MG/DL (ref 0.5–1)
EOSINOPHILS ABSOLUTE: 0.13 E9/L (ref 0.05–0.5)
EOSINOPHILS RELATIVE PERCENT: 1.3 % (ref 0–6)
GFR AFRICAN AMERICAN: >60
GFR NON-AFRICAN AMERICAN: >60 ML/MIN/1.73
GLUCOSE BLD-MCNC: 99 MG/DL (ref 74–99)
GLUCOSE URINE: NEGATIVE MG/DL
HCT VFR BLD CALC: 37.9 % (ref 34–48)
HEMOGLOBIN: 12.1 G/DL (ref 11.5–15.5)
IMMATURE GRANULOCYTES #: 0.04 E9/L
IMMATURE GRANULOCYTES %: 0.4 % (ref 0–5)
KETONES, URINE: NEGATIVE MG/DL
LEUKOCYTE ESTERASE, URINE: NEGATIVE
LYMPHOCYTES ABSOLUTE: 2.66 E9/L (ref 1.5–4)
LYMPHOCYTES RELATIVE PERCENT: 27.2 % (ref 20–42)
MCH RBC QN AUTO: 28.1 PG (ref 26–35)
MCHC RBC AUTO-ENTMCNC: 31.9 % (ref 32–34.5)
MCV RBC AUTO: 87.9 FL (ref 80–99.9)
MONOCYTES ABSOLUTE: 0.63 E9/L (ref 0.1–0.95)
MONOCYTES RELATIVE PERCENT: 6.4 % (ref 2–12)
NEUTROPHILS ABSOLUTE: 6.28 E9/L (ref 1.8–7.3)
NEUTROPHILS RELATIVE PERCENT: 64.4 % (ref 43–80)
NITRITE, URINE: NEGATIVE
PDW BLD-RTO: 14.7 FL (ref 11.5–15)
PH UA: 6 (ref 5–9)
PLATELET # BLD: 463 E9/L (ref 130–450)
PMV BLD AUTO: 9.3 FL (ref 7–12)
POTASSIUM SERPL-SCNC: 4.3 MMOL/L (ref 3.5–5)
PROTEIN UA: 30 MG/DL
RBC # BLD: 4.31 E12/L (ref 3.5–5.5)
RBC UA: >20 /HPF (ref 0–2)
SODIUM BLD-SCNC: 141 MMOL/L (ref 132–146)
SPECIFIC GRAVITY UA: >=1.03 (ref 1–1.03)
TOTAL PROTEIN: 7.5 G/DL (ref 6.4–8.3)
UROBILINOGEN, URINE: 1 E.U./DL
WBC # BLD: 9.8 E9/L (ref 4.5–11.5)
WBC UA: NORMAL /HPF (ref 0–5)

## 2021-09-05 PROCEDURE — 96374 THER/PROPH/DIAG INJ IV PUSH: CPT

## 2021-09-05 PROCEDURE — 81001 URINALYSIS AUTO W/SCOPE: CPT

## 2021-09-05 PROCEDURE — 76817 TRANSVAGINAL US OBSTETRIC: CPT

## 2021-09-05 PROCEDURE — 80053 COMPREHEN METABOLIC PANEL: CPT

## 2021-09-05 PROCEDURE — 6360000002 HC RX W HCPCS: Performed by: EMERGENCY MEDICINE

## 2021-09-05 PROCEDURE — 99284 EMERGENCY DEPT VISIT MOD MDM: CPT

## 2021-09-05 PROCEDURE — 85025 COMPLETE CBC W/AUTO DIFF WBC: CPT

## 2021-09-05 RX ORDER — KETOROLAC TROMETHAMINE 30 MG/ML
30 INJECTION, SOLUTION INTRAMUSCULAR; INTRAVENOUS ONCE
Status: COMPLETED | OUTPATIENT
Start: 2021-09-05 | End: 2021-09-05

## 2021-09-05 RX ORDER — KETOROLAC TROMETHAMINE 10 MG/1
10 TABLET, FILM COATED ORAL EVERY 8 HOURS PRN
Qty: 15 TABLET | Refills: 0 | Status: SHIPPED | OUTPATIENT
Start: 2021-09-05 | End: 2022-04-18

## 2021-09-05 RX ADMIN — KETOROLAC TROMETHAMINE 30 MG: 30 INJECTION, SOLUTION INTRAMUSCULAR at 14:37

## 2021-09-05 ASSESSMENT — ENCOUNTER SYMPTOMS
COUGH: 0
NAUSEA: 0
CONSTIPATION: 0
SINUS PRESSURE: 0
EYE REDNESS: 0
VOMITING: 0
EYE DISCHARGE: 0
ABDOMINAL PAIN: 1
BELCHING: 0
WHEEZING: 0
EYE PAIN: 0
SHORTNESS OF BREATH: 0
SORE THROAT: 0
ABDOMINAL DISTENTION: 0
BACK PAIN: 0
DIARRHEA: 0

## 2021-09-05 ASSESSMENT — PAIN SCALES - GENERAL
PAINLEVEL_OUTOF10: 10
PAINLEVEL_OUTOF10: 10
PAINLEVEL_OUTOF10: 2

## 2021-09-05 ASSESSMENT — PAIN DESCRIPTION - PROGRESSION: CLINICAL_PROGRESSION: GRADUALLY IMPROVING

## 2021-09-05 NOTE — ED PROVIDER NOTES
65-year-old female who believes she is approximately 7 weeks pregnant presents emergency department after passing a large clot. Patient is known to be O+ she has an appointment with her OB on 9/13. Patient states she has had the bathroom for approximately hour excruciating pain before a large clot passed. She states no other complaint she has no nausea vomiting diarrhea abdominal pain just some mild abdominal pain. The history is provided by the patient. Abdominal Pain  Pain location:  Suprapubic  Pain quality: cramping    Pain radiates to:  Does not radiate  Pain severity:  Mild  Onset quality:  Gradual  Duration:  3 days  Timing:  Intermittent  Progression:  Waxing and waning  Chronicity:  New  Relieved by:  Nothing  Worsened by:  Nothing  Ineffective treatments:  None tried  Associated symptoms: vaginal bleeding    Associated symptoms: no anorexia, no belching, no chest pain, no chills, no constipation, no cough, no diarrhea, no dysuria, no fatigue, no fever, no hematuria, no nausea, no shortness of breath, no sore throat and no vomiting         Review of Systems   Constitutional: Negative for chills, fatigue and fever. HENT: Negative for ear pain, sinus pressure and sore throat. Eyes: Negative for pain, discharge and redness. Respiratory: Negative for cough, shortness of breath and wheezing. Cardiovascular: Negative for chest pain. Gastrointestinal: Positive for abdominal pain. Negative for abdominal distention, anorexia, constipation, diarrhea, nausea and vomiting. Genitourinary: Positive for vaginal bleeding. Negative for dysuria, frequency and hematuria. Musculoskeletal: Negative for arthralgias and back pain. Skin: Negative for rash and wound. Neurological: Negative for weakness and headaches. Hematological: Negative for adenopathy. All other systems reviewed and are negative. Physical Exam  Constitutional:       Appearance: She is well-developed.    HENT:      Head: Normocephalic and atraumatic. Cardiovascular:      Rate and Rhythm: Normal rate and regular rhythm. Pulmonary:      Effort: Pulmonary effort is normal.      Breath sounds: Normal breath sounds. Abdominal:      General: Abdomen is flat. Bowel sounds are normal.      Palpations: Abdomen is soft. Tenderness: There is abdominal tenderness in the suprapubic area. Skin:     General: Skin is warm. Neurological:      General: No focal deficit present. Mental Status: She is alert and oriented to person, place, and time. Procedures     MDM  Number of Diagnoses or Management Options  Miscarriage  Diagnosis management comments: Patient was seen and examined. Labs and imaging were ordered. Labs found to be reassuring ultrasound revealed empty uterus. Patient is likely had miscarriage vaginal exam revealed scant amount of blood within the vaginal vault and the os was still open. Explained to the patient she will likely have some continued bleeding for the next couple of days. Patient states understanding she will follow-up with OB/GYN is felt a safe for discharge outpatient follow-up.             --------------------------------------------- PAST HISTORY ---------------------------------------------  Past Medical History:  has no past medical history on file. Past Surgical History:  has a past surgical history that includes [place] tooth extraction. Social History:  reports that she has been smoking. She has never used smokeless tobacco. She reports current alcohol use. She reports that she does not use drugs. Family History: family history is not on file. The patients home medications have been reviewed. Allergies: Patient has no known allergies.     -------------------------------------------------- RESULTS -------------------------------------------------  Labs:  Results for orders placed or performed during the hospital encounter of 09/05/21   CBC auto differential   Result RBC, UA >20 0 - 2 /HPF    Bacteria, UA NONE SEEN None Seen /HPF       Radiology:  US OB TRANSVAGINAL   Final Result   No gestational sac or fetal structures seen within the uterus             ------------------------- NURSING NOTES AND VITALS REVIEWED ---------------------------  Date / Time Roomed:  9/5/2021 12:08 PM  ED Bed Assignment:  18/18    The nursing notes within the ED encounter and vital signs as below have been reviewed. BP (!) 155/86   Pulse 71   Temp 98.5 °F (36.9 °C) (Oral)   Resp 14   Wt (!) 339 lb (153.8 kg)   LMP 06/10/2021 (Exact Date)   SpO2 100%   BMI 53.09 kg/m²   Oxygen Saturation Interpretation: Normal      ------------------------------------------ PROGRESS NOTES ------------------------------------------  I have spoken with the patient and discussed todays results, in addition to providing specific details for the plan of care and counseling regarding the diagnosis and prognosis. Their questions are answered at this time and they are agreeable with the plan. I discussed at length with them reasons for immediate return here for re evaluation. They will followup with their primary care physician by calling their office tomorrow. --------------------------------- ADDITIONAL PROVIDER NOTES ---------------------------------  At this time the patient is without objective evidence of an acute process requiring hospitalization or inpatient management. They have remained hemodynamically stable throughout their entire ED visit and are stable for discharge with outpatient follow-up. The plan has been discussed in detail and they are aware of the specific conditions for emergent return, as well as the importance of follow-up. Discharge Medication List as of 9/5/2021  2:34 PM      START taking these medications    Details   ketorolac (TORADOL) 10 MG tablet Take 1 tablet by mouth every 8 hours as needed for Pain, Disp-15 tablet, R-0Print             Diagnosis:  1.  Miscarriage Disposition:  Patient's disposition: Discharge to home  Patient's condition is stable.        Joel Garcia DO  09/06/21 [id number]

## 2021-09-05 NOTE — ED NOTES
Pt transported to [address] [place] [address] Rd[address].      Nadine Wilson RN  09/05/21 1647

## 2021-09-05 NOTE — ED NOTES
Dr Robin Better completed vaginal exam at this time with nurse at bedside, patient tolerated well.      Gosia Gilman RN  09/05/21 [id number]

## 2022-03-06 ENCOUNTER — APPOINTMENT (OUTPATIENT)
Dept: CT IMAGING | Age: 32
End: 2022-03-06
Payer: MEDICAID

## 2022-03-06 ENCOUNTER — HOSPITAL ENCOUNTER (EMERGENCY)
Age: 32
Discharge: HOME | End: 2022-03-06
Attending: EMERGENCY MEDICINE
Payer: MEDICAID

## 2022-03-06 VITALS
TEMPERATURE: 97.6 F | DIASTOLIC BLOOD PRESSURE: 100 MMHG | RESPIRATION RATE: 18 BRPM | HEIGHT: 67 IN | OXYGEN SATURATION: 98 % | SYSTOLIC BLOOD PRESSURE: 164 MMHG | HEART RATE: 95 BPM | BODY MASS INDEX: 45.99 KG/M2 | WEIGHT: 293 LBS

## 2022-03-06 DIAGNOSIS — O26.891 ABDOMINAL PAIN DURING PREGNANCY IN FIRST TRIMESTER: ICD-10-CM

## 2022-03-06 DIAGNOSIS — R10.9 ABDOMINAL PAIN DURING PREGNANCY IN FIRST TRIMESTER: ICD-10-CM

## 2022-03-06 DIAGNOSIS — O21.9 NAUSEA AND VOMITING DURING PREGNANCY: Primary | ICD-10-CM

## 2022-03-06 LAB
ALBUMIN SERPL-MCNC: 4.1 G/DL (ref 3.5–5.2)
ALP BLD-CCNC: 88 U/L (ref 35–104)
ALT SERPL-CCNC: 16 U/L (ref 0–32)
ANION GAP SERPL CALCULATED.3IONS-SCNC: 11 MMOL/L (ref 7–16)
AST SERPL-CCNC: 20 U/L (ref 0–31)
BACTERIA: ABNORMAL /HPF
BASOPHILS ABSOLUTE: 0.02 E9/L (ref 0–0.2)
BASOPHILS RELATIVE PERCENT: 0.2 % (ref 0–2)
BILIRUB SERPL-MCNC: 0.3 MG/DL (ref 0–1.2)
BILIRUBIN URINE: NEGATIVE
BLOOD, URINE: NEGATIVE
BUN BLDV-MCNC: 6 MG/DL (ref 6–20)
CALCIUM SERPL-MCNC: 8.9 MG/DL (ref 8.6–10.2)
CHLORIDE BLD-SCNC: 102 MMOL/L (ref 98–107)
CLARITY: ABNORMAL
CO2: 22 MMOL/L (ref 22–29)
COLOR: YELLOW
CREAT SERPL-MCNC: 0.5 MG/DL (ref 0.5–1)
EOSINOPHILS ABSOLUTE: 0.08 E9/L (ref 0.05–0.5)
EOSINOPHILS RELATIVE PERCENT: 0.7 % (ref 0–6)
EPITHELIAL CELLS, UA: ABNORMAL /HPF
GFR AFRICAN AMERICAN: >60
GFR NON-AFRICAN AMERICAN: >60 ML/MIN/1.73
GLUCOSE BLD-MCNC: 114 MG/DL (ref 74–99)
GLUCOSE URINE: NEGATIVE MG/DL
GONADOTROPIN, CHORIONIC (HCG) QUANT: 315.1 MIU/ML
HCG, URINE, POC: POSITIVE
HCT VFR BLD CALC: 39.2 % (ref 34–48)
HEMOGLOBIN: 12.3 G/DL (ref 11.5–15.5)
IMMATURE GRANULOCYTES #: 0.04 E9/L
IMMATURE GRANULOCYTES %: 0.4 % (ref 0–5)
KETONES, URINE: NEGATIVE MG/DL
LACTIC ACID: 0.8 MMOL/L (ref 0.5–2.2)
LEUKOCYTE ESTERASE, URINE: NEGATIVE
LIPASE: 17 U/L (ref 13–60)
LYMPHOCYTES ABSOLUTE: 1.37 E9/L (ref 1.5–4)
LYMPHOCYTES RELATIVE PERCENT: 12.8 % (ref 20–42)
Lab: ABNORMAL
MCH RBC QN AUTO: 27.6 PG (ref 26–35)
MCHC RBC AUTO-ENTMCNC: 31.4 % (ref 32–34.5)
MCV RBC AUTO: 88.1 FL (ref 80–99.9)
MONOCYTES ABSOLUTE: 0.58 E9/L (ref 0.1–0.95)
MONOCYTES RELATIVE PERCENT: 5.4 % (ref 2–12)
NEGATIVE QC PASS/FAIL: ABNORMAL
NEUTROPHILS ABSOLUTE: 8.6 E9/L (ref 1.8–7.3)
NEUTROPHILS RELATIVE PERCENT: 80.5 % (ref 43–80)
NITRITE, URINE: NEGATIVE
PDW BLD-RTO: 14.2 FL (ref 11.5–15)
PH UA: 7 (ref 5–9)
PLATELET # BLD: 459 E9/L (ref 130–450)
PMV BLD AUTO: 9.1 FL (ref 7–12)
POSITIVE QC PASS/FAIL: ABNORMAL
POTASSIUM REFLEX MAGNESIUM: 4.4 MMOL/L (ref 3.5–5)
PROTEIN UA: NEGATIVE MG/DL
RBC # BLD: 4.45 E12/L (ref 3.5–5.5)
RBC UA: ABNORMAL /HPF (ref 0–2)
SODIUM BLD-SCNC: 135 MMOL/L (ref 132–146)
SPECIFIC GRAVITY UA: 1.02 (ref 1–1.03)
TOTAL PROTEIN: 7.7 G/DL (ref 6.4–8.3)
UROBILINOGEN, URINE: 1 E.U./DL
WBC # BLD: 10.7 E9/L (ref 4.5–11.5)
WBC UA: ABNORMAL /HPF (ref 0–5)

## 2022-03-06 PROCEDURE — 87088 URINE BACTERIA CULTURE: CPT

## 2022-03-06 PROCEDURE — 6360000002 HC RX W HCPCS: Performed by: STUDENT IN AN ORGANIZED HEALTH CARE EDUCATION/TRAINING PROGRAM

## 2022-03-06 PROCEDURE — 99283 EMERGENCY DEPT VISIT LOW MDM: CPT

## 2022-03-06 PROCEDURE — 83690 ASSAY OF LIPASE: CPT

## 2022-03-06 PROCEDURE — 96361 HYDRATE IV INFUSION ADD-ON: CPT

## 2022-03-06 PROCEDURE — 96375 TX/PRO/DX INJ NEW DRUG ADDON: CPT

## 2022-03-06 PROCEDURE — 83605 ASSAY OF LACTIC ACID: CPT

## 2022-03-06 PROCEDURE — 96374 THER/PROPH/DIAG INJ IV PUSH: CPT

## 2022-03-06 PROCEDURE — 2500000003 HC RX 250 WO HCPCS: Performed by: EMERGENCY MEDICINE

## 2022-03-06 PROCEDURE — 2580000003 HC RX 258: Performed by: STUDENT IN AN ORGANIZED HEALTH CARE EDUCATION/TRAINING PROGRAM

## 2022-03-06 PROCEDURE — 85025 COMPLETE CBC W/AUTO DIFF WBC: CPT

## 2022-03-06 PROCEDURE — 80053 COMPREHEN METABOLIC PANEL: CPT

## 2022-03-06 PROCEDURE — 81001 URINALYSIS AUTO W/SCOPE: CPT

## 2022-03-06 PROCEDURE — 84702 CHORIONIC GONADOTROPIN TEST: CPT

## 2022-03-06 RX ORDER — ONDANSETRON 2 MG/ML
8 INJECTION INTRAMUSCULAR; INTRAVENOUS ONCE
Status: COMPLETED | OUTPATIENT
Start: 2022-03-06 | End: 2022-03-06

## 2022-03-06 RX ORDER — 0.9 % SODIUM CHLORIDE 0.9 %
1000 INTRAVENOUS SOLUTION INTRAVENOUS ONCE
Status: COMPLETED | OUTPATIENT
Start: 2022-03-06 | End: 2022-03-06

## 2022-03-06 RX ADMIN — SODIUM CHLORIDE 1000 ML: 9 INJECTION, SOLUTION INTRAVENOUS at 08:34

## 2022-03-06 RX ADMIN — FAMOTIDINE 20 MG: 10 INJECTION, SOLUTION INTRAVENOUS at 08:41

## 2022-03-06 RX ADMIN — ONDANSETRON 8 MG: 2 INJECTION INTRAMUSCULAR; INTRAVENOUS at 08:41

## 2022-03-06 ASSESSMENT — ENCOUNTER SYMPTOMS
BACK PAIN: 0
CHEST TIGHTNESS: 0
COUGH: 0
NAUSEA: 1
SHORTNESS OF BREATH: 0
CONSTIPATION: 0
ABDOMINAL PAIN: 1
SINUS PAIN: 0
DIARRHEA: 1
SINUS PRESSURE: 0
EYE PAIN: 0
VOMITING: 1
EYE REDNESS: 0

## 2022-03-06 NOTE — ED PROVIDER NOTES
[facility]  Department of Emergency Medicine     Written by: Francisco Gaytan DO  Patient Name: Marilee Silveira  Attending Provider: All Moreno DO  Admit Date: 3/6/2022  7:24 AM  MRN: [id number]                   : 1990        Chief Complaint   Patient presents with    Abdominal Pain     LUQ abd pain with radiation to left back with emesis and diarrhea sicne yesterday     Back Pain    Emesis    - Chief complaint    Ms. Freddie Ugarte is a 26 yo female who presents to the ED due to abdominal pain, nausea, vomiting and diarrhea. Patient states she began having left-sided back pain yesterday that was mild in nature. Stated that she also had nausea without vomiting throughout the day yesterday. She notes that she woke up this morning with left upper quadrant abdominal pain that radiated to the left side of her back with associated nausea, vomiting and diarrhea. She describes the pain as cramping in nature and has been constant since onset. She states her pain is aggravated with palpation and certain movements and relieved with rest.  States that she has not been able to eat or drink anything due to persistent nausea and vomiting. She notes that she has not seen any blood in her emesis, urine or stool. She states that there is a possibility that she could be pregnant but she has not taken any home test.  She denies any fever, chills, chest pain, shortness of breath, urinary changes, headaches or vision changes. Review of Systems   Constitutional: Negative for chills, fatigue and fever. HENT: Negative for congestion, sinus pressure and sinus pain. Eyes: Negative for pain and redness. Respiratory: Negative for cough, chest tightness and shortness of breath. Cardiovascular: Negative for chest pain, palpitations and leg swelling. Gastrointestinal: Positive for abdominal pain, diarrhea, nausea and vomiting. Negative for constipation.    Genitourinary: Negative for dysuria, flank pain, frequency, hematuria and urgency. Musculoskeletal: Negative for arthralgias, back pain, gait problem, joint swelling and myalgias. Skin: Negative for rash. Neurological: Negative for dizziness, seizures, syncope, weakness, light-headedness, numbness and headaches. Physical Exam  Constitutional:       General: She is not in acute distress. Appearance: Normal appearance. She is obese. She is not ill-appearing. HENT:      Head: Normocephalic and atraumatic. Right Ear: External ear normal.      Left Ear: External ear normal.      Mouth/Throat:      Mouth: Mucous membranes are moist.      Pharynx: Oropharynx is clear. Eyes:      Extraocular Movements: Extraocular movements intact. Conjunctiva/sclera: Conjunctivae normal.   Cardiovascular:      Rate and Rhythm: Normal rate and regular rhythm. Pulses: Normal pulses. Heart sounds: Normal heart sounds. Pulmonary:      Effort: Pulmonary effort is normal. No respiratory distress. Breath sounds: Normal breath sounds. No wheezing. Abdominal:      General: Abdomen is flat. Bowel sounds are normal.      Palpations: Abdomen is soft. Tenderness: There is abdominal tenderness in the left upper quadrant. There is no guarding or rebound. Negative signs include Mccollum's sign. Musculoskeletal:         General: Normal range of motion. Cervical back: Normal range of motion and neck supple. Skin:     General: Skin is warm and dry. Neurological:      General: No focal deficit present. Mental Status: She is alert and oriented to person, place, and time. Mental status is at baseline. Motor: No weakness. Psychiatric:         Mood and Affect: Mood normal.         Behavior: Behavior normal.          Procedures       MDM  Number of Diagnoses or Management Options  Abdominal pain during pregnancy in first trimester  Nausea and vomiting during pregnancy  Diagnosis management comments:  This is a 26 yo female who presents to the ED due to abdominal pain, nausea, vomiting and diarrhea. Patient was initially given Zofran, 1 L normal saline bolus and Pepcid. Patient's CBC was benign within normal limits. CMP was also benign within normal limits. Lipase and lactic acid were normal.  Urinalysis is negative with few bacteria. Patient's pregnancy test was positive with a quantitative hCG of 315. Patient has been told of these findings and on reevaluation she reports some relief of her symptoms. She been told to follow-up with her OB/GYN and primary care provider regarding her symptoms and new positive pregnancy result. She has been told to return the emergency department if her symptoms return, worsen or change in any time. --------------------------------------------- PAST HISTORY ---------------------------------------------  Past Medical History:  has no past medical history on file. Past Surgical History:  has a past surgical history that includes [place] tooth extraction. Social History:  reports that she has been smoking. She has never used smokeless tobacco. She reports current alcohol use. She reports that she does not use drugs. Family History: family history is not on file. The patients home medications have been reviewed. Allergies: Patient has no known allergies.     -------------------------------------------------- RESULTS -------------------------------------------------  Labs:  Results for orders placed or performed during the hospital encounter of 03/06/22   CBC with Auto Differential   Result Value Ref Range    WBC 10.7 4.5 - 11.5 E9/L    RBC 4.45 3.50 - 5.50 E12/L    Hemoglobin 12.3 11.5 - 15.5 g/dL    Hematocrit 39.2 34.0 - 48.0 %    MCV 88.1 80.0 - 99.9 fL    MCH 27.6 26.0 - 35.0 pg    MCHC 31.4 (L) 32.0 - 34.5 %    RDW 14.2 11.5 - 15.0 fL    Platelets 823 (H) 343 - 450 E9/L    MPV 9.1 7.0 - 12.0 fL    Neutrophils % 80.5 (H) 43.0 - 80.0 %    Immature Granulocytes % 0.4 0.0 - 5.0 %    Lymphocytes % 12.8 (L) 20.0 - 42.0 %    Monocytes % 5.4 2.0 - 12.0 %    Eosinophils % 0.7 0.0 - 6.0 %    Basophils % 0.2 0.0 - 2.0 %    Neutrophils Absolute 8.60 (H) 1.80 - 7.30 E9/L    Immature Granulocytes # 0.04 E9/L    Lymphocytes Absolute 1.37 (L) 1.50 - 4.00 E9/L    Monocytes Absolute 0.58 0.10 - 0.95 E9/L    Eosinophils Absolute 0.08 0.05 - 0.50 E9/L    Basophils Absolute 0.02 0.00 - 0.20 E9/L   Comprehensive Metabolic Panel w/ Reflex to MG   Result Value Ref Range    Sodium 135 132 - 146 mmol/L    Potassium reflex Magnesium 4.4 3.5 - 5.0 mmol/L    Chloride 102 98 - 107 mmol/L    CO2 22 22 - 29 mmol/L    Anion Gap 11 7 - 16 mmol/L    Glucose 114 (H) 74 - 99 mg/dL    BUN 6 6 - 20 mg/dL    CREATININE 0.5 0.5 - 1.0 mg/dL    GFR Non-African American >60 >=60 mL/min/1.73    GFR African American >60     Calcium 8.9 8.6 - 10.2 mg/dL    Total Protein 7.7 6.4 - 8.3 g/dL    Albumin 4.1 3.5 - 5.2 g/dL    Total Bilirubin 0.3 0.0 - 1.2 mg/dL    Alkaline Phosphatase 88 35 - 104 U/L    ALT 16 0 - 32 U/L    AST 20 0 - 31 U/L   Lipase   Result Value Ref Range    Lipase 17 13 - 60 U/L   Lactic Acid   Result Value Ref Range    Lactic Acid 0.8 0.5 - 2.2 mmol/L   Urinalysis with Microscopic   Result Value Ref Range    Color, UA Yellow Straw/Yellow    Clarity, UA SL CLOUDY Clear    Glucose, Ur Negative Negative mg/dL    Bilirubin Urine Negative Negative    Ketones, Urine Negative Negative mg/dL    Specific Gravity, UA 1.020 1.005 - 1.030    Blood, Urine Negative Negative    pH, UA 7.0 5.0 - 9.0    Protein, UA Negative Negative mg/dL    Urobilinogen, Urine 1.0 <2.0 E.U./dL    Nitrite, Urine Negative Negative    Leukocyte Esterase, Urine Negative Negative    WBC, UA 0-1 0 - 5 /HPF    RBC, UA NONE 0 - 2 /HPF    Epithelial Cells, UA FEW /HPF    Bacteria, UA FEW (A) None Seen /HPF   HCG, Quantitative, Pregnancy   Result Value Ref Range    hCG Quant 315.1 (H) <10 mIU/mL   POC Pregnancy Urine   Result Value Ref Range    HCG, Urine, POC Positive Negative    Lot Number [id number]     Positive QC Pass/Fail Pass     Negative QC Pass/Fail Pass        Radiology:  No orders to display       ------------------------- NURSING NOTES AND VITALS REVIEWED ---------------------------  Date / Time Roomed:  3/6/2022  7:24 AM  ED Bed Assignment:  07/07    The nursing notes within the ED encounter and vital signs as below have been reviewed. BP (!) 164/100   Pulse 95   Temp 97.6 °F (36.4 °C) (Temporal)   Resp 18   Ht 5' 7\" (1.702 m)   Wt (!) 338 lb (153.3 kg)   LMP 01/25/2021   SpO2 98%   BMI 52.94 kg/m²   Oxygen Saturation Interpretation: Normal      ------------------------------------------ PROGRESS NOTES ------------------------------------------  11:04 AM EST  I have spoken with the patient and discussed todays results, in addition to providing specific details for the plan of care and counseling regarding the diagnosis and prognosis. Their questions are answered at this time and they are agreeable with the plan. I discussed at length with them reasons for immediate return here for re evaluation. They will followup with their OB/GYN and primary care physician by calling their office tomorrow. --------------------------------- ADDITIONAL PROVIDER NOTES ---------------------------------  At this time the patient is without objective evidence of an acute process requiring hospitalization or inpatient management. They have remained hemodynamically stable throughout their entire ED visit and are stable for discharge with outpatient follow-up. The plan has been discussed in detail and they are aware of the specific conditions for emergent return, as well as the importance of follow-up. New Prescriptions    No medications on file       Diagnosis:  1. Nausea and vomiting during pregnancy    2.  Abdominal pain during pregnancy in first trimester        Disposition:  Patient's disposition: Discharge to home  Patient's condition is stable. Patient was seen and evaluated by myself and my attending Brad Serrano DO. Assessment and Plan discussed with attending provider, please see attestation for final plan of care.      DO Hayes Maza DO  Resident  03/07/22 5215

## 2022-03-08 LAB — URINE CULTURE, ROUTINE: NORMAL

## 2022-04-14 ENCOUNTER — HOSPITAL ENCOUNTER (EMERGENCY)
Age: 32
Discharge: HOME | End: 2022-04-14
Attending: STUDENT IN AN ORGANIZED HEALTH CARE EDUCATION/TRAINING PROGRAM
Payer: MEDICAID

## 2022-04-14 ENCOUNTER — APPOINTMENT (OUTPATIENT)
Dept: ULTRASOUND IMAGING | Age: 32
End: 2022-04-14
Payer: MEDICAID

## 2022-04-14 VITALS
OXYGEN SATURATION: 98 % | RESPIRATION RATE: 18 BRPM | DIASTOLIC BLOOD PRESSURE: 110 MMHG | TEMPERATURE: 98 F | SYSTOLIC BLOOD PRESSURE: 160 MMHG | HEART RATE: 89 BPM

## 2022-04-14 DIAGNOSIS — E86.0 DEHYDRATION: Primary | ICD-10-CM

## 2022-04-14 DIAGNOSIS — R53.83 FATIGUE, UNSPECIFIED TYPE: ICD-10-CM

## 2022-04-14 DIAGNOSIS — R11.0 NAUSEA: ICD-10-CM

## 2022-04-14 LAB
ALBUMIN SERPL-MCNC: 3.8 G/DL (ref 3.5–5.2)
ALP BLD-CCNC: 61 U/L (ref 35–104)
ALT SERPL-CCNC: 15 U/L (ref 0–32)
ANION GAP SERPL CALCULATED.3IONS-SCNC: 8 MMOL/L (ref 7–16)
AST SERPL-CCNC: 13 U/L (ref 0–31)
BACTERIA: ABNORMAL /HPF
BASOPHILS ABSOLUTE: 0.03 E9/L (ref 0–0.2)
BASOPHILS RELATIVE PERCENT: 0.3 % (ref 0–2)
BILIRUB SERPL-MCNC: <0.2 MG/DL (ref 0–1.2)
BILIRUBIN URINE: NEGATIVE
BLOOD, URINE: NEGATIVE
BUN BLDV-MCNC: 6 MG/DL (ref 6–20)
CALCIUM SERPL-MCNC: 9.2 MG/DL (ref 8.6–10.2)
CHLORIDE BLD-SCNC: 101 MMOL/L (ref 98–107)
CHP ED QC CHECK: YES
CLARITY: CLEAR
CO2: 24 MMOL/L (ref 22–29)
COLOR: YELLOW
CREAT SERPL-MCNC: 0.7 MG/DL (ref 0.5–1)
EKG ATRIAL RATE: 75 BPM
EKG P AXIS: 56 DEGREES
EKG P-R INTERVAL: 184 MS
EKG Q-T INTERVAL: 400 MS
EKG QRS DURATION: 78 MS
EKG QTC CALCULATION (BAZETT): 446 MS
EKG R AXIS: 41 DEGREES
EKG T AXIS: 30 DEGREES
EKG VENTRICULAR RATE: 75 BPM
EOSINOPHILS ABSOLUTE: 0.09 E9/L (ref 0.05–0.5)
EOSINOPHILS RELATIVE PERCENT: 0.8 % (ref 0–6)
GFR AFRICAN AMERICAN: >60
GFR NON-AFRICAN AMERICAN: >60 ML/MIN/1.73
GLUCOSE BLD-MCNC: 112 MG/DL (ref 74–99)
GLUCOSE BLD-MCNC: 116 MG/DL
GLUCOSE URINE: NEGATIVE MG/DL
GONADOTROPIN, CHORIONIC (HCG) QUANT: ABNORMAL MIU/ML
HCT VFR BLD CALC: 36.4 % (ref 34–48)
HEMOGLOBIN: 11.9 G/DL (ref 11.5–15.5)
IMMATURE GRANULOCYTES #: 0.06 E9/L
IMMATURE GRANULOCYTES %: 0.5 % (ref 0–5)
INFLUENZA A BY PCR: NOT DETECTED
INFLUENZA B BY PCR: NOT DETECTED
KETONES, URINE: ABNORMAL MG/DL
LACTIC ACID: 1 MMOL/L (ref 0.5–2.2)
LEUKOCYTE ESTERASE, URINE: ABNORMAL
LYMPHOCYTES ABSOLUTE: 2.32 E9/L (ref 1.5–4)
LYMPHOCYTES RELATIVE PERCENT: 20.3 % (ref 20–42)
MCH RBC QN AUTO: 28.3 PG (ref 26–35)
MCHC RBC AUTO-ENTMCNC: 32.7 % (ref 32–34.5)
MCV RBC AUTO: 86.5 FL (ref 80–99.9)
METER GLUCOSE: 116 MG/DL (ref 74–99)
MONOCYTES ABSOLUTE: 0.77 E9/L (ref 0.1–0.95)
MONOCYTES RELATIVE PERCENT: 6.7 % (ref 2–12)
NEUTROPHILS ABSOLUTE: 8.15 E9/L (ref 1.8–7.3)
NEUTROPHILS RELATIVE PERCENT: 71.4 % (ref 43–80)
NITRITE, URINE: NEGATIVE
PDW BLD-RTO: 14.7 FL (ref 11.5–15)
PH UA: 6 (ref 5–9)
PLATELET # BLD: 403 E9/L (ref 130–450)
PMV BLD AUTO: 9.2 FL (ref 7–12)
POTASSIUM SERPL-SCNC: 4.1 MMOL/L (ref 3.5–5)
PROTEIN UA: NEGATIVE MG/DL
RBC # BLD: 4.21 E12/L (ref 3.5–5.5)
RBC UA: ABNORMAL /HPF (ref 0–2)
SARS-COV-2, NAAT: NOT DETECTED
SODIUM BLD-SCNC: 133 MMOL/L (ref 132–146)
SPECIFIC GRAVITY UA: 1.02 (ref 1–1.03)
TOTAL PROTEIN: 6.8 G/DL (ref 6.4–8.3)
UROBILINOGEN, URINE: 1 E.U./DL
WBC # BLD: 11.4 E9/L (ref 4.5–11.5)
WBC UA: ABNORMAL /HPF (ref 0–5)

## 2022-04-14 PROCEDURE — 93010 ELECTROCARDIOGRAM REPORT: CPT | Performed by: INTERNAL MEDICINE

## 2022-04-14 PROCEDURE — 96361 HYDRATE IV INFUSION ADD-ON: CPT

## 2022-04-14 PROCEDURE — 85025 COMPLETE CBC W/AUTO DIFF WBC: CPT

## 2022-04-14 PROCEDURE — 76817 TRANSVAGINAL US OBSTETRIC: CPT

## 2022-04-14 PROCEDURE — 87635 SARS-COV-2 COVID-19 AMP PRB: CPT

## 2022-04-14 PROCEDURE — 80053 COMPREHEN METABOLIC PANEL: CPT

## 2022-04-14 PROCEDURE — 2580000003 HC RX 258: Performed by: STUDENT IN AN ORGANIZED HEALTH CARE EDUCATION/TRAINING PROGRAM

## 2022-04-14 PROCEDURE — 82962 GLUCOSE BLOOD TEST: CPT

## 2022-04-14 PROCEDURE — 99284 EMERGENCY DEPT VISIT MOD MDM: CPT

## 2022-04-14 PROCEDURE — 6360000002 HC RX W HCPCS: Performed by: STUDENT IN AN ORGANIZED HEALTH CARE EDUCATION/TRAINING PROGRAM

## 2022-04-14 PROCEDURE — 36415 COLL VENOUS BLD VENIPUNCTURE: CPT

## 2022-04-14 PROCEDURE — 96374 THER/PROPH/DIAG INJ IV PUSH: CPT

## 2022-04-14 PROCEDURE — 84702 CHORIONIC GONADOTROPIN TEST: CPT

## 2022-04-14 PROCEDURE — 87502 INFLUENZA DNA AMP PROBE: CPT

## 2022-04-14 PROCEDURE — 93005 ELECTROCARDIOGRAM TRACING: CPT | Performed by: STUDENT IN AN ORGANIZED HEALTH CARE EDUCATION/TRAINING PROGRAM

## 2022-04-14 PROCEDURE — 81001 URINALYSIS AUTO W/SCOPE: CPT

## 2022-04-14 PROCEDURE — 83605 ASSAY OF LACTIC ACID: CPT

## 2022-04-14 RX ORDER — ONDANSETRON 2 MG/ML
4 INJECTION INTRAMUSCULAR; INTRAVENOUS ONCE
Status: COMPLETED | OUTPATIENT
Start: 2022-04-14 | End: 2022-04-14

## 2022-04-14 RX ORDER — 0.9 % SODIUM CHLORIDE 0.9 %
1000 INTRAVENOUS SOLUTION INTRAVENOUS ONCE
Status: COMPLETED | OUTPATIENT
Start: 2022-04-14 | End: 2022-04-14

## 2022-04-14 RX ADMIN — ONDANSETRON 4 MG: 2 INJECTION INTRAMUSCULAR; INTRAVENOUS at 14:39

## 2022-04-14 RX ADMIN — SODIUM CHLORIDE 1000 ML: 9 INJECTION, SOLUTION INTRAVENOUS at 14:27

## 2022-04-14 NOTE — Clinical Note
Saritha Garcia was seen and treated in our emergency department on 4/14/2022. She may return to work on 04/17/2022. If you have any questions or concerns, please don't hesitate to call.       Mark Daniel MD

## 2022-04-14 NOTE — ED PROVIDER NOTES
Fariba Oseguera is a 27year old female  currently about 11 weeks pregnant who presented to ED with concern for nausea, vomiting, diarrhea, and lightheadedness. Patient states she has had one day of not feeling well and having gastroenteritis symptoms. Patient denies chest pain or shortness of breath. Patient is currently about 10-11 weeks pregnant, patient's pregnancy has been complicated by gestational diabetes. Patient denies blood in stool. Patient has been having some cramping pain, denies vaginal bleeding or discharge. The history is provided by the patient and medical records. Review of Systems   Constitutional: Positive for fatigue. Negative for chills, diaphoresis and fever. Eyes: Negative for photophobia and visual disturbance. Respiratory: Negative for cough, chest tightness and shortness of breath. Cardiovascular: Negative for chest pain, palpitations and leg swelling. Gastrointestinal: Positive for diarrhea, nausea and vomiting. Negative for abdominal distention, abdominal pain and blood in stool. Genitourinary: Negative for dysuria. Musculoskeletal: Negative for neck pain and neck stiffness. Skin: Negative for pallor and rash. Neurological: Negative for headaches. Psychiatric/Behavioral: Negative for confusion. Physical Exam  Vitals and nursing note reviewed. Constitutional:       General: She is not in acute distress. Appearance: Normal appearance. She is not ill-appearing. HENT:      Head: Normocephalic and atraumatic. Eyes:      General: No scleral icterus. Conjunctiva/sclera: Conjunctivae normal.      Pupils: Pupils are equal, round, and reactive to light. Cardiovascular:      Rate and Rhythm: Normal rate and regular rhythm. Pulmonary:      Effort: Pulmonary effort is normal.      Breath sounds: Normal breath sounds. Abdominal:      General: Bowel sounds are normal. There is no distension. Palpations: Abdomen is soft.       Tenderness: There is no abdominal tenderness. There is no guarding or rebound. Musculoskeletal:      Cervical back: Normal range of motion and neck supple. No rigidity. No muscular tenderness. Right lower leg: No edema. Left lower leg: No edema. Skin:     General: Skin is warm and dry. Capillary Refill: Capillary refill takes less than 2 seconds. Coloration: Skin is not pale. Findings: No erythema or rash. Neurological:      Mental Status: She is alert and oriented to person, place, and time. Psychiatric:         Mood and Affect: Mood normal.          Procedures     MDM  Number of Diagnoses or Management Options  Dehydration  Fatigue, unspecified type  Nausea  Diagnosis management comments: Briana Quinteros is a 27year old female who presented to ED with concern for gastroenteritis  Lab work stable  Patient given IVF with significant improvement of symptoms  Patient was not found to have UTI  Patient tolerating PO  Patient was not having chest pain or shortness of breath   U/s unremarkable   Patient will be discharged home with supportive care she is agreeable to plan and well appearing at time of discharge           EKG: This EKG is signed and interpreted by me. Rate: 75  Rhythm: Sinus  Interpretation: non-specific EKG, no St elevation or depression  Comparison: was normal         --------------------------------------------- PAST HISTORY ---------------------------------------------  Past Medical History:  has no past medical history on file. Past Surgical History:  has a past surgical history that includes [place] tooth extraction. Social History:  reports that she has been smoking. She has never used smokeless tobacco. She reports current alcohol use. She reports that she does not use drugs. Family History: family history is not on file. The patients home medications have been reviewed.     Allergies: Patient has no known allergies.     -------------------------------------------------- RESULTS -------------------------------------------------  Labs:  Results for orders placed or performed during the hospital encounter of 04/14/22   Rapid influenza A/B antigens    Specimen: Nares   Result Value Ref Range    Influenza A by PCR Not Detected Not Detected    Influenza B by PCR Not Detected Not Detected   COVID-19, Rapid    Specimen: Nasopharyngeal Swab   Result Value Ref Range    SARS-CoV-2, NAAT Not Detected Not Detected   CBC with Auto Differential   Result Value Ref Range    WBC 11.4 4.5 - 11.5 E9/L    RBC 4.21 3.50 - 5.50 E12/L    Hemoglobin 11.9 11.5 - 15.5 g/dL    Hematocrit 36.4 34.0 - 48.0 %    MCV 86.5 80.0 - 99.9 fL    MCH 28.3 26.0 - 35.0 pg    MCHC 32.7 32.0 - 34.5 %    RDW 14.7 11.5 - 15.0 fL    Platelets 007 809 - 131 E9/L    MPV 9.2 7.0 - 12.0 fL    Neutrophils % 71.4 43.0 - 80.0 %    Immature Granulocytes % 0.5 0.0 - 5.0 %    Lymphocytes % 20.3 20.0 - 42.0 %    Monocytes % 6.7 2.0 - 12.0 %    Eosinophils % 0.8 0.0 - 6.0 %    Basophils % 0.3 0.0 - 2.0 %    Neutrophils Absolute 8.15 (H) 1.80 - 7.30 E9/L    Immature Granulocytes # 0.06 E9/L    Lymphocytes Absolute 2.32 1.50 - 4.00 E9/L    Monocytes Absolute 0.77 0.10 - 0.95 E9/L    Eosinophils Absolute 0.09 0.05 - 0.50 E9/L    Basophils Absolute 0.03 0.00 - 0.20 E9/L   Comprehensive Metabolic Panel   Result Value Ref Range    Sodium 133 132 - 146 mmol/L    Potassium 4.1 3.5 - 5.0 mmol/L    Chloride 101 98 - 107 mmol/L    CO2 24 22 - 29 mmol/L    Anion Gap 8 7 - 16 mmol/L    Glucose 112 (H) 74 - 99 mg/dL    BUN 6 6 - 20 mg/dL    CREATININE 0.7 0.5 - 1.0 mg/dL    GFR Non-African American >60 >=60 mL/min/1.73    GFR African American >60     Calcium 9.2 8.6 - 10.2 mg/dL    Total Protein 6.8 6.4 - 8.3 g/dL    Albumin 3.8 3.5 - 5.2 g/dL    Total Bilirubin <0.2 0.0 - 1.2 mg/dL    Alkaline Phosphatase 61 35 - 104 U/L    ALT 15 0 - 32 U/L    AST 13 0 - 31 U/L   hCG, quantitative, pregnancy   Result Value Ref Range    hCG Quant 778307.0 (H) <10 mIU/mL   Urinalysis with Microscopic   Result Value Ref Range    Color, UA Yellow Straw/Yellow    Clarity, UA Clear Clear    Glucose, Ur Negative Negative mg/dL    Bilirubin Urine Negative Negative    Ketones, Urine TRACE (A) Negative mg/dL    Specific Gravity, UA 1.020 1.005 - 1.030    Blood, Urine Negative Negative    pH, UA 6.0 5.0 - 9.0    Protein, UA Negative Negative mg/dL    Urobilinogen, Urine 1.0 <2.0 E.U./dL    Nitrite, Urine Negative Negative    Leukocyte Esterase, Urine TRACE (A) Negative    WBC, UA 0-1 0 - 5 /HPF    RBC, UA 0-1 0 - 2 /HPF    Bacteria, UA RARE (A) None Seen /HPF   Lactic Acid   Result Value Ref Range    Lactic Acid 1.0 0.5 - 2.2 mmol/L   POCT glucose   Result Value Ref Range    Glucose 116 mg/dL    QC OK? Yes    POCT Glucose   Result Value Ref Range    Meter Glucose 116 (H) 74 - 99 mg/dL   EKG 12 Lead   Result Value Ref Range    Ventricular Rate 75 BPM    Atrial Rate 75 BPM    P-R Interval 184 ms    QRS Duration 78 ms    Q-T Interval 400 ms    QTc Calculation (Bazett) 446 ms    P Axis 56 degrees    R Axis 41 degrees    T Axis 30 degrees       Radiology:  US OB TRANSVAGINAL   Final Result   1. Single living intrauterine gestation estimated at 10 weeks 3 days   gestational age, EDC is 11/07/2022.      2.  No evidence of significant subchorionic fluid collection, trace fluid in   the endocervical canal.      3.  No adnexal mass or free pelvic fluid. US OB LESS THAN 14 WEEKS SINGLE OR FIRST GESTATION   Final Result   1. Single living early intrauterine gestation estimated at 10 weeks 3 days   +/-1 week by current ultrasound. 2.  Minimal fluid in the endometrial canal could reflect mild implantation   type hemorrhage.       3.  No significant subchorionic hemorrhage.             ------------------------- NURSING NOTES AND VITALS REVIEWED ---------------------------  Date / Time Roomed:  4/14/2022 11:41 AM  ED Bed Assignment:  39/39    The nursing notes within the ED encounter and vital signs as below have been reviewed. BP (!) 160/110   Pulse 89   Temp 98 °F (36.7 °C)   Resp 18   LMP 01/30/2022   SpO2 98%   Oxygen Saturation Interpretation: Normal      ------------------------------------------ PROGRESS NOTES ------------------------------------------  2:35 PM EDT  I have spoken with the patient and discussed todays results, in addition to providing specific details for the plan of care and counseling regarding the diagnosis and prognosis. Their questions are answered at this time and they are agreeable with the plan. I discussed at length with them reasons for immediate return here for re evaluation. They will followup with their primary care physician by calling their office tomorrow. --------------------------------- ADDITIONAL PROVIDER NOTES ---------------------------------  At this time the patient is without objective evidence of an acute process requiring hospitalization or inpatient management. They have remained hemodynamically stable throughout their entire ED visit and are stable for discharge with outpatient follow-up. The plan has been discussed in detail and they are aware of the specific conditions for emergent return, as well as the importance of follow-up. Discharge Medication List as of 4/14/2022  3:52 PM          Diagnosis:  1. Dehydration    2. Nausea    3. Fatigue, unspecified type        Disposition:  Patient's disposition: Discharge to home  Patient's condition is stable.           Dany Blandon MD  04/18/22 3253

## 2022-04-14 NOTE — ED NOTES
Department of Emergency Medicine  FIRST PROVIDER TRIAGE NOTE             Independent MLP           4/14/22  11:16 AM EDT    Date of Encounter: 4/14/22   MRN: [id number]      HPI: Anuj Mercedes is a 27 y.o. female who presents to the ED for No chief complaint on file. Pt presenting with vomiting starting today. Pt dx with gestational diabetes recently. 11 weeks pregnant. Denies abdominal pain or vaginal bleeding. ROS: Negative for cp or sob. PE: Gen Appearance/Constitutional: alert  HEENT: NC/NT. PERRLA,  Airway patent. Initial Plan of Care: All treatment areas with department are currently occupied. Plan to order/Initiate the following while awaiting opening in ED: labs.   Initiate Treatment-Testing, Proceed toTreatment Area When Bed Available for ED Attending/MLP to Continue Care    Electronically signed by Eliud Casiano PA-C   DD: 4/14/22       Eliud Casiano PA-C  04/14/22 1118

## 2022-04-18 ASSESSMENT — ENCOUNTER SYMPTOMS
VOMITING: 1
COUGH: 0
BLOOD IN STOOL: 0
NAUSEA: 1
PHOTOPHOBIA: 0
DIARRHEA: 1
ABDOMINAL DISTENTION: 0
CHEST TIGHTNESS: 0
SHORTNESS OF BREATH: 0
ABDOMINAL PAIN: 0

## 2022-06-15 ENCOUNTER — ANCILLARY PROCEDURE (OUTPATIENT)
Dept: OBGYN CLINIC | Age: 32
DRG: 547 | End: 2022-06-15
Payer: MEDICAID

## 2022-06-15 ENCOUNTER — HOSPITAL ENCOUNTER (INPATIENT)
Age: 32
LOS: 1 days | Discharge: HOME | DRG: 547 | End: 2022-06-17
Attending: OBSTETRICS & GYNECOLOGY | Admitting: OBSTETRICS & GYNECOLOGY
Payer: MEDICAID

## 2022-06-15 ENCOUNTER — INITIAL PRENATAL (OUTPATIENT)
Dept: OBGYN CLINIC | Age: 32
DRG: 547 | End: 2022-06-15
Payer: MEDICAID

## 2022-06-15 VITALS
BODY MASS INDEX: 45.99 KG/M2 | HEIGHT: 67 IN | HEART RATE: 101 BPM | DIASTOLIC BLOOD PRESSURE: 84 MMHG | WEIGHT: 293 LBS | SYSTOLIC BLOOD PRESSURE: 135 MMHG

## 2022-06-15 DIAGNOSIS — Z36.3 ENCOUNTER FOR ANTENATAL SCREENING FOR MALFORMATION USING ULTRASOUND: ICD-10-CM

## 2022-06-15 DIAGNOSIS — E66.01 MORBID OBESITY WITH BMI OF 50.0-59.9, ADULT (HCC): ICD-10-CM

## 2022-06-15 DIAGNOSIS — Z34.90 PREGNANCY, UNSPECIFIED GESTATIONAL AGE: ICD-10-CM

## 2022-06-15 DIAGNOSIS — O10.019 BENIGN ESSENTIAL HYPERTENSION, ANTEPARTUM: ICD-10-CM

## 2022-06-15 DIAGNOSIS — O34.32 CERVICAL FUNNELING AFFECTING PREGNANCY IN SECOND TRIMESTER: ICD-10-CM

## 2022-06-15 DIAGNOSIS — E66.01 MATERNAL MORBID OBESITY, ANTEPARTUM (HCC): ICD-10-CM

## 2022-06-15 DIAGNOSIS — Z36.3 ENCOUNTER FOR ANTENATAL SCREENING FOR MALFORMATION USING ULTRASOUND: Primary | ICD-10-CM

## 2022-06-15 DIAGNOSIS — O99.210 MATERNAL MORBID OBESITY, ANTEPARTUM (HCC): ICD-10-CM

## 2022-06-15 LAB
ABO/RH: NORMAL
ALBUMIN SERPL-MCNC: 3.8 G/DL (ref 3.5–5.2)
ALP BLD-CCNC: 63 U/L (ref 35–104)
ALT SERPL-CCNC: 10 U/L (ref 0–32)
ANION GAP SERPL CALCULATED.3IONS-SCNC: 12 MMOL/L (ref 7–16)
ANTIBODY SCREEN: NORMAL
AST SERPL-CCNC: 13 U/L (ref 0–31)
BILIRUB SERPL-MCNC: <0.2 MG/DL (ref 0–1.2)
BUN BLDV-MCNC: 6 MG/DL (ref 6–20)
CALCIUM SERPL-MCNC: 9.4 MG/DL (ref 8.6–10.2)
CHLORIDE BLD-SCNC: 103 MMOL/L (ref 98–107)
CO2: 20 MMOL/L (ref 22–29)
CREAT SERPL-MCNC: 0.5 MG/DL (ref 0.5–1)
CREATININE URINE: 147 MG/DL (ref 29–226)
GFR AFRICAN AMERICAN: >60
GFR NON-AFRICAN AMERICAN: >60 ML/MIN/1.73
GLUCOSE BLD-MCNC: 93 MG/DL (ref 74–99)
HBA1C MFR BLD: 5.9 % (ref 4–5.6)
HCT VFR BLD CALC: 35 % (ref 34–48)
HEMOGLOBIN: 11.5 G/DL (ref 11.5–15.5)
MCH RBC QN AUTO: 28.7 PG (ref 26–35)
MCHC RBC AUTO-ENTMCNC: 32.9 % (ref 32–34.5)
MCV RBC AUTO: 87.3 FL (ref 80–99.9)
PDW BLD-RTO: 14.2 FL (ref 11.5–15)
PLATELET # BLD: 411 E9/L (ref 130–450)
PMV BLD AUTO: 8.8 FL (ref 7–12)
POTASSIUM SERPL-SCNC: 4.1 MMOL/L (ref 3.5–5)
PROTEIN PROTEIN: 9 MG/DL (ref 0–12)
PROTEIN/CREAT RATIO: 0.1
PROTEIN/CREAT RATIO: 0.1 (ref 0–0.2)
RBC # BLD: 4.01 E12/L (ref 3.5–5.5)
SODIUM BLD-SCNC: 135 MMOL/L (ref 132–146)
TOTAL PROTEIN: 6.9 G/DL (ref 6.4–8.3)
TSH SERPL DL<=0.05 MIU/L-ACNC: 2.01 UIU/ML (ref 0.27–4.2)
WBC # BLD: 12.5 E9/L (ref 4.5–11.5)

## 2022-06-15 PROCEDURE — 84156 ASSAY OF PROTEIN URINE: CPT

## 2022-06-15 PROCEDURE — 83036 HEMOGLOBIN GLYCOSYLATED A1C: CPT

## 2022-06-15 PROCEDURE — G0378 HOSPITAL OBSERVATION PER HR: HCPCS

## 2022-06-15 PROCEDURE — 76817 TRANSVAGINAL US OBSTETRIC: CPT | Performed by: OBSTETRICS & GYNECOLOGY

## 2022-06-15 PROCEDURE — 85027 COMPLETE CBC AUTOMATED: CPT

## 2022-06-15 PROCEDURE — 76811 OB US DETAILED SNGL FETUS: CPT | Performed by: OBSTETRICS & GYNECOLOGY

## 2022-06-15 PROCEDURE — 80053 COMPREHEN METABOLIC PANEL: CPT

## 2022-06-15 PROCEDURE — 84443 ASSAY THYROID STIM HORMONE: CPT

## 2022-06-15 PROCEDURE — 99214 OFFICE O/P EST MOD 30 MIN: CPT | Performed by: NURSE PRACTITIONER

## 2022-06-15 PROCEDURE — 99203 OFFICE O/P NEW LOW 30 MIN: CPT | Performed by: OBSTETRICS & GYNECOLOGY

## 2022-06-15 PROCEDURE — 99203 OFFICE O/P NEW LOW 30 MIN: CPT

## 2022-06-15 PROCEDURE — 86901 BLOOD TYPING SEROLOGIC RH(D): CPT

## 2022-06-15 PROCEDURE — 36415 COLL VENOUS BLD VENIPUNCTURE: CPT

## 2022-06-15 PROCEDURE — 86850 RBC ANTIBODY SCREEN: CPT

## 2022-06-15 PROCEDURE — 86900 BLOOD TYPING SEROLOGIC ABO: CPT

## 2022-06-15 PROCEDURE — 82570 ASSAY OF URINE CREATININE: CPT

## 2022-06-15 NOTE — PROGRESS NOTES
Patient presents to labor and delivery [email or web address],  for cerclage scheduled tomorrow at 1400. Denies LOF, VB, CTX's.

## 2022-06-15 NOTE — PROGRESS NOTES
6/15/22    Carol Ann Silver MD  [address] [facility]     RE:  Justino Nicolas  : 1990   AGE: 27 y.o. This report has been created using voice recognition software. It may contain errors which are inherent in voice recognition technology. Dear Dr. Alexx Dukes:    Nohemy Alaniz had an appointment today for the following indications:    Patient Active Problem List   Diagnosis    Maternal morbid obesity, antepartum ([address]    Incompetent cervix during second trimester, antepartum    Benign essential hypertension, antepartum    Morbid obesity with BMI of 50.0-59.9, adult ([address]     Nohemy Alaniz is a 27 y.o. female, who is G3(0,0,2,0). She has an Estimated Date of Delivery: 22 based on her previous ultrasound assessment. She is currently 19 weeks 3 days gestation based on that assessment. The patient was seen for initial evaluation in our office today secondary to a prior history of 2 previous first trimester pregnancy losses. She has had no vaginal bleeding or leaking of amniotic fluid. She complained of no uterine contraction activity. The cervix was noted to be open with the amniotic membranes funneling to the external cervical os on the ultrasound evaluation performed today. The patient has no risk factors for incompetent cervix. She denied any cervical surgery. She had no family history of mullerian abnormalities. She was asymptomatic. The patient has a past history of hypertension. She currently takes no antihypertensive medication. Her blood pressure today was 135/84. She had 1+ albuminuria. I discussed the [place] screen with the patient today. I advised her that this a screening test, not a diagnostic test. I advised her that the test screens only for trisomy 21, 25, 15, and sex chromosome aneuploidy.  We discussed the sensitivity of the test which I advised the patient is as follows:      99.99% for detection of trisomy 21 with a specificity of 99.99%     99.99% for trisomy 25 with a specificity of 99.99%     99.99% for trisomy 15 with a specificity of 99.99%     99.99% for triploidy with a specificity of 99.99%    >99.9% for fetal sex determination    89% of XXX, XXY and XYY    She understands that the [place] testing does not replace diagnostic genetic testing. She understands the limitations of this testing, including, but not limited to, not detecting partial fetal karyotype abnormalities, and in some cases, limited information regarding unbalanced translocations. The test is also limited in that the results may not reflect chromosomes of the fetus due to confined placental mosaicism or chromosomal changes in the mother. The test is validated for single and twin pregnancies with a gestational age of at least 9 weeks. Chromosomal mosaicism cannot be distinguished, and in some cases, not detected by this method. A negative test does not eliminate the possibility of fetal chromosome abnormalities in regions tested or and other areas of the genome. The tests cannot rule out other genetic diseases or birth defects, including open neural tube defects. The patient had her NIPT drawn on 6/14/2022. The results are not yet available for review. A fetal ultrasound evaluation was performed and a detailed report included in the EMR under the imaging tab. A living ziegler intrauterine fetus was identified in the cephalic presentation, with normal fetal heart motion and normal fetal motion noted. The placenta was anterior. The amniotic fluid volume was within normal limits. The biometric measurements were consistent with an estimated fetal weight of 304 grams. Visualization of the fetal anatomy was limited secondary to poor acoustic transmission to the patient's anterior abdominal wall in the fetal position.                      GENETIC SCREENING/TERATOLOGY COUNSELING                  (Includes patient, FTB, and any affected family members)    Patient Age > 35 Years NO   Thalassemia ( MVC<80) NO   Congential Heart Defect NO   Neural Tube Defect NO   Cisco-Sachs NO   Sickle Cell Disease NO   Sickle Cell Trait NO   Sickle C Disease or Trait NO   Hemophilia NO   Muscular Dystrophy NO   Cystic Fibrosis NO   Lindsey Disease NO   Autism NO   Mental Retardation NO   History of Fragile X NO   Maternal Diabetes NO   Other Genetic Disease or Syndrome NO   Previous Child With Congenital Abnormality Not Listed NO   Recreational Drugs NO                                        INFECTION HISTORY     HEPATITIS IMMUNIZED:  YES   HEPATITIS INFECTION:  NO   EXPOSURE TO TB NO   PARVOVIRUS B-19 NO   CHICKEN POX  YES   MEASLES NO   HIV NO   OTHER RASH OR VIRAL ILLNESS SINCE LMP NO   UTI RECURRENT NO   HPV NO     OB History    Para Term  AB Living   3 0 0 0 2 0   SAB IAB Ectopic Molar Multiple Live Births   2 0 0 0 0 0      # Outcome Date GA Lbr Skyler/2nd Weight Sex Delivery Anes PTL Lv   3 Current            2 2021 6w0d          1 2020 5w0d            PAST GYNECOLOGICAL  HISTORY:  Negative for abnormal pap smears. Negative for sexually transmitted diseases. Negative for cervical LEEP / conization /cryosurgery. Negative for uterine surgery. Negative for ovarian or tubal surgery. Past Medical History:   Diagnosis Date    Hypertension        Past Surgical History:   Procedure Laterality Date    WISDOM TOOTH EXTRACTION         No Known Allergies    No current outpatient medications on file. Social History     Tobacco Use    Smoking status: Former Smoker    Smokeless tobacco: Never Used   Substance Use Topics    Alcohol use: Not Currently     Comment: social     FAMILY MEDICAL HISTORY:   Negative for congenital abnormalities, autism, genetic disease and mental retardation, not listed above.      Review of Systems :   CONSTITUTIONAL : No fever, no chills   HEENT : No headache, no visual changes, no rhinorrhea, no sore throat be admitted for placement of a rescue cervical cerclage secondary to the finding of amniotic membranes funneling to the external cervical os on today's ultrasound evaluation. Laboratory testing was ordered including a CMP, CBC, TSH, type and screen, hemoglobin A1c, and protein creatinine ratio. DVT prophylaxis should be utilized throughout her admission. The patient was advised that she should be sexually abstinent through the balance of her pregnancy. Further evaluation and management will be dependent on the results of patient's testing and her clinical presentation. The total time in minutes spent with the patient, reviewing medical records, reviewing imaging studies, performing ultrasonic imaging, reviewing laboratory testing, and documenting information was 45 minutes, of which, 50% of the time was spent in patient education, counseling, and coordinating care with the patient, her provider, and/or her family. This included our telephone conversation at the time of the patient's assessment to discuss the findings on today's evaluation and my recommendations for management. Available paper and electronic medical records were reviewed. Medical, surgical, obstetric, GYN, family, and genetic histories were obtained. I reviewed with the patient the results of the fetal ultrasound evaluation today including the finding of an incompetent cervix with the amniotic membranes funneling to the external cervical os. I discussed with her the option for placement of a rescue cervical cerclage in an effort to prolong her pregnancy. I advised the patient the placement of a cervical cerclage does not guarantee prolongation of her pregnancy but may improve her chances of carrying her pregnancy to a viable gestational age. I discussed with her limitations in her physical and sexual activity recommended after placement of the cervical cerclage. I reviewed with her potential risks and benefits of the procedure. The patient elected to have the cervical cerclage performed will be admitted to the hospital today. I discussed the patient with Dr. Nirav Leon who is the physician on-call for our service. He will be performing the cervical cerclage on 6/16/2022. I answered all of her questions to her satisfaction. I asked her to call if she had any additional questions prior to her next visit. If you have any questions regarding her management, please contact me at your convenience and thank you for allowing me to participate in her care.     Sincerely,        Shonna Peterson MD, MS, Donnie Meeks RDCS, RDMS, RVT  Director [address] [place] [address]  [phone]

## 2022-06-15 NOTE — H&P
Department of Obstetrics and Gynecology  Labor and Delivery  Nurse Practitioner Triage Note      SUBJECTIVE:  Lelia Hernandez is a 27 y.o. female, , Patient's last menstrual period was 2022.,Estimated Date of Delivery: 22, 19w3d, sent from [facility] office for cerclage placement. Hx of incompetent cervix. Two previous first trimester losses. Prenatal course: stated above    MEDICAL HISTORY:      Diagnosis Date    Hypertension        SURGICAL HISTORY:      Procedure Laterality Date    WISDOM TOOTH EXTRACTION         FAMILY HISTORY  History reviewed. No pertinent family history. Medication prior to Admission:  Medications Prior to Admission: Blood Glucose Monitoring Suppl (ONE TOUCH ULTRA 2) w/Device KIT, USE AS DIRECTED  ONETOUCH ULTRA strip, TEST AS DIRECTED FOUR TIMES DAILY  Lancets (ONETOUCH DELICA PLUS [id number]) MISC, TEST FOUR TIMES DAILY  Prenatal Vit-Fe Fumarate-FA (PRENATAL VITAMIN) 27-1 MG TABS tablet, Take 1 tablet by mouth daily  Prenatal Vit-Fe Fumarate-FA (PRENATAL PLUS) 27-1 MG TABS, Take 1 tablet by mouth daily (Patient not taking: Reported on 2021)    ALLERGIES:  Patient has no known allergies.       Review of Systems:   Ears, nose, mouth, throat, and face: negative  Respiratory: negative  Cardiovascular: negative  Gastrointestinal: negative  Genitourinary:negative  Integument/breast: negative  Hematologic/lymphatic: negative  Musculoskeletal:negative  Neurological: negative  Behavioral/Psych: negative  Endocrine: negative  Allergic/Immunologic: negative    OBJECTIVE    Vitals:  LMP 2022       NECK:  Supple, symmetrical, trachea midline, no adenopathy, thyroid symmetric, not enlarged and no tenderness, skin normal  LUNGS:  No increased work of breathing, good air exchange, clear to auscultation bilaterally, no crackles or wheezing  CARDIOVASCULAR:  normal S1 and S2  ABDOMEN:  Soft, nontender    Cervix:         deferred    Fetal heart rate:        Spot check fhts-150 bpm    Contraction frequency: 0 minutes        General Labs:    CBC:   Lab Results   Component Value Date    WBC 11.4 04/14/2022    RBC 4.21 04/14/2022    HGB 11.9 04/14/2022    HCT 36.4 04/14/2022    MCV 86.5 04/14/2022    RDW 14.7 04/14/2022     04/14/2022     CMP:    Lab Results   Component Value Date     04/14/2022    K 4.1 04/14/2022    K 4.4 03/06/2022     04/14/2022    CO2 24 04/14/2022    BUN 6 04/14/2022    PROT 6.8 04/14/2022     U/A:  No components found for: Joel Bredianne, USPGRAV, UPH, UPROTEIN, UGLUCOSE, UKETONE, UBILI, UBLOOD, UNITRITE, UUROBIL, ULEUKEST, USQEPI, URENEPI, UWBC, URBC, Synchari, Willian Schwab        ASSESSMENT & PLAN:   Orders per mfm  Observation  Cerclage tomorrow at 1400  Npo at midnight

## 2022-06-15 NOTE — PROGRESS NOTES
Patient here as new OB for prenatal ultrasound  Complaining of slight cramping and some pressure  Did not bring sugar log  States that she had 3 hr sugar test and did not pass first hr but the next 2 were normal  +fluttering    Ning Asher in antepartum notified of pts arrival to unit

## 2022-06-15 NOTE — PATIENT INSTRUCTIONS
Please arrive for your scheduled appointment at least 15 minutes early with your actual insurance card+ a photo ID. Also if you need any refills ordered or have questions, it may take up 48 hours to reply. Please allow ample time for your refills. Call me when you use last refill. Thank you for your cooperation. Call your primary obstetrician with bleeding, leaking of fluid, abdominal tenderness, headache, blurry vision, epigastric pain and increased urinary frequency. Any questions contact Wayne Licona at [phone]. If you are experiencing an emergency and need immediate help, call 911 or go to go emergency room or labor and delivery. if you are sick, not feeling well or have an infectious process going on please reschedule your appointment by calling [phone]. Also if any family members are not feeling well, please do not bring them to your appointment. We appreciate your cooperation. We are doing this in order to protect our pregnant mothers+ their babies. Patient Education        Weeks 18 to 22 of Your Pregnancy: Care Instructions  Overview     Your baby is continuing to develop quickly. Sometime between 18 and 22 weeks, you'll probably start to feel your baby move. At first, these small fetal movements feel like fluttering or \"butterflies. \" Or they may feel like gas bubbles. As your baby grows, these movements will become stronger. You may also notice that your baby hiccups. Babies at this stage cannow suck their thumbs. You may find that your nausea and fatigue are gone. You may feel better overall and have more energy than you did in your first trimester. But you might now also have some new discomforts, like sleep problems or leg cramps. Talk to yourdoctor about things you can do at home to ease these problems. Follow-up care is a key part of your treatment and safety. Be sure to make and go to all appointments, and call your doctor if you are having problems.  It's also a good idea to know your test results and keep alist of the medicines you take. How can you care for yourself at home? Ease sleep problems   Avoid caffeine in drinks or chocolate late in the day.  Get some exercise every day.  Take a warm shower or bath before bed.  Have a light snack or glass of milk at bedtime.  Do relaxation exercises in bed to calm your mind and body.  Support your legs and back with extra pillows. Try a pillow between your legs if you sleep on your side.  Do not use sleeping pills or alcohol. They could harm your baby. Ease leg cramps   Do not massage your calf during the cramp.  Sit on a firm bed or chair. Straighten your leg, and bend your foot (flex your ankle) slowly upward, toward your knee. Bend your toes up and down.  Stand on a cool, flat surface. Stretch your toes upward, and take small steps walking on your heels.  Use a heating pad or hot water bottle to help with muscle ache. Prevent leg cramps   Be sure to get enough calcium. If you are worried that you are not getting enough, talk to your doctor.  Exercise every day, and stretch your legs before bed.  Take a warm bath before bed, and try leg warmers at night. Where can you learn more? Go to https://KabampeSeaDragon Softwareeb.Wipster. org and sign in to your E[employer] account. Enter R169 in the [employer] box to learn more about \"Weeks 18 to 22 of Your Pregnancy: Care Instructions. \"     If you do not have an account, please click on the \"Sign Up Now\" link. Current as of: June 16, 2021               Content Version: 13.2  © 2006-2022 [employer]. Care instructions adapted under license by [facility] [address] If you have questions about a medical condition or this instruction, always ask your healthcare professional. [place] [address] any warranty or liability for your use of this information.          Patient Education        Learning About When to Call Your Doctor During Pregnancy (Up to 20 Weeks)  Overview     It's common to have concerns about what might be a problem during your pregnancy. Most pregnancies don't have any serious problems. But it's stillimportant to know when to call your doctor if you have certain symptoms. These are general suggestions. Your doctor may give you some more informationabout when to call. When to call your doctor (up to 20 weeks)  Call 911 anytime you think you may need emergency care. For example, call if:   You passed out (lost consciousness). Call your doctor now or seek immediate medical care if:   You have a fever.  You have vaginal bleeding.  You are dizzy or lightheaded, or you feel like you may faint.  You have symptoms of a urinary tract infection. These may include:  ? Pain or burning when you urinate. ? A frequent need to urinate without being able to pass much urine. ? Pain in the flank, which is just below the rib cage and above the waist on either side of the back. ? Blood in your urine.  You have belly pain.  You think you are having contractions.  You have a sudden release of fluid from your vagina. Watch closely for changes in your health, and be sure to contact your doctor if:   You have vaginal discharge that smells bad.  You have other concerns about your pregnancy. Follow-up care is a key part of your treatment and safety. Be sure to make and go to all appointments, and call your doctor if you are having problems. It's also a good idea to know your test results and keep alist of the medicines you take. Where can you learn more? Go to https://john.healthFilmTrack. org and sign in to your [employer] account. Enter O015 in the [employer] box to learn more about \"Learning About When to Call Your Doctor During Pregnancy (Up to 20 Weeks). \"     If you do not have an account, please click on the \"Sign Up Now\" link.   Current as of: June 16, 2021               Content Version: 13.2  © 7588-6101 Healthwise, Incorporated. Care instructions adapted under license by [facility] ([facility]). If you have questions about a medical condition or this instruction, always ask your healthcare professional. [address] any warranty or liability for your use of this information.

## 2022-06-16 ENCOUNTER — ANESTHESIA (OUTPATIENT)
Dept: LABOR AND DELIVERY | Age: 32
DRG: 547 | End: 2022-06-16
Payer: MEDICAID

## 2022-06-16 ENCOUNTER — ANESTHESIA EVENT (OUTPATIENT)
Dept: LABOR AND DELIVERY | Age: 32
DRG: 547 | End: 2022-06-16
Payer: MEDICAID

## 2022-06-16 PROBLEM — Z3A.19 19 WEEKS GESTATION OF PREGNANCY: Status: ACTIVE | Noted: 2022-06-16

## 2022-06-16 LAB
GLUCOSE URINE, POC: NORMAL
PROTEIN UA: ABNORMAL

## 2022-06-16 PROCEDURE — 3600000002 HC SURGERY LEVEL 2 BASE: Performed by: OBSTETRICS & GYNECOLOGY

## 2022-06-16 PROCEDURE — 2500000003 HC RX 250 WO HCPCS: Performed by: ANESTHESIOLOGY

## 2022-06-16 PROCEDURE — 6370000000 HC RX 637 (ALT 250 FOR IP): Performed by: OBSTETRICS & GYNECOLOGY

## 2022-06-16 PROCEDURE — 51701 INSERT BLADDER CATHETER: CPT

## 2022-06-16 PROCEDURE — 2709999900 HC NON-CHARGEABLE SUPPLY: Performed by: OBSTETRICS & GYNECOLOGY

## 2022-06-16 PROCEDURE — 99232 SBSQ HOSP IP/OBS MODERATE 35: CPT | Performed by: OBSTETRICS & GYNECOLOGY

## 2022-06-16 PROCEDURE — 3700000000 HC ANESTHESIA ATTENDED CARE: Performed by: OBSTETRICS & GYNECOLOGY

## 2022-06-16 PROCEDURE — 7100000001 HC PACU RECOVERY - ADDTL 15 MIN: Performed by: OBSTETRICS & GYNECOLOGY

## 2022-06-16 PROCEDURE — 6360000002 HC RX W HCPCS: Performed by: OBSTETRICS & GYNECOLOGY

## 2022-06-16 PROCEDURE — 0UVC7ZZ RESTRICTION OF CERVIX, VIA NATURAL OR ARTIFICIAL OPENING: ICD-10-PCS | Performed by: OBSTETRICS & GYNECOLOGY

## 2022-06-16 PROCEDURE — 2580000003 HC RX 258: Performed by: OBSTETRICS & GYNECOLOGY

## 2022-06-16 PROCEDURE — 96374 THER/PROPH/DIAG INJ IV PUSH: CPT

## 2022-06-16 PROCEDURE — G0379 DIRECT REFER HOSPITAL OBSERV: HCPCS

## 2022-06-16 PROCEDURE — 6370000000 HC RX 637 (ALT 250 FOR IP)

## 2022-06-16 PROCEDURE — G0378 HOSPITAL OBSERVATION PER HR: HCPCS

## 2022-06-16 PROCEDURE — 3700000001 HC ADD 15 MINUTES (ANESTHESIA): Performed by: OBSTETRICS & GYNECOLOGY

## 2022-06-16 PROCEDURE — 2580000003 HC RX 258: Performed by: NURSE ANESTHETIST, CERTIFIED REGISTERED

## 2022-06-16 PROCEDURE — 3600000012 HC SURGERY LEVEL 2 ADDTL 15MIN: Performed by: OBSTETRICS & GYNECOLOGY

## 2022-06-16 PROCEDURE — 1220000000 HC SEMI PRIVATE OB R&B

## 2022-06-16 PROCEDURE — 7100000000 HC PACU RECOVERY - FIRST 15 MIN: Performed by: OBSTETRICS & GYNECOLOGY

## 2022-06-16 RX ORDER — BUPIVACAINE HYDROCHLORIDE 7.5 MG/ML
INJECTION, SOLUTION INTRASPINAL
Status: COMPLETED | OUTPATIENT
Start: 2022-06-16 | End: 2022-06-16

## 2022-06-16 RX ORDER — ONDANSETRON 2 MG/ML
4 INJECTION INTRAMUSCULAR; INTRAVENOUS EVERY 6 HOURS PRN
Status: DISCONTINUED | OUTPATIENT
Start: 2022-06-16 | End: 2022-06-17 | Stop reason: HOSPADM

## 2022-06-16 RX ORDER — SODIUM CHLORIDE 0.9 % (FLUSH) 0.9 %
5-40 SYRINGE (ML) INJECTION PRN
Status: DISCONTINUED | OUTPATIENT
Start: 2022-06-16 | End: 2022-06-16 | Stop reason: HOSPADM

## 2022-06-16 RX ORDER — SODIUM CHLORIDE, SODIUM LACTATE, POTASSIUM CHLORIDE, CALCIUM CHLORIDE 600; 310; 30; 20 MG/100ML; MG/100ML; MG/100ML; MG/100ML
INJECTION, SOLUTION INTRAVENOUS CONTINUOUS PRN
Status: DISCONTINUED | OUTPATIENT
Start: 2022-06-16 | End: 2022-06-16 | Stop reason: SDUPTHER

## 2022-06-16 RX ORDER — ACETAMINOPHEN 325 MG/1
650 TABLET ORAL EVERY 4 HOURS PRN
Status: DISCONTINUED | OUTPATIENT
Start: 2022-06-16 | End: 2022-06-17 | Stop reason: HOSPADM

## 2022-06-16 RX ORDER — SODIUM CHLORIDE 0.9 % (FLUSH) 0.9 %
5-40 SYRINGE (ML) INJECTION EVERY 12 HOURS SCHEDULED
Status: DISCONTINUED | OUTPATIENT
Start: 2022-06-16 | End: 2022-06-16 | Stop reason: HOSPADM

## 2022-06-16 RX ORDER — SODIUM CHLORIDE 9 MG/ML
INJECTION, SOLUTION INTRAVENOUS PRN
Status: DISCONTINUED | OUTPATIENT
Start: 2022-06-16 | End: 2022-06-16 | Stop reason: HOSPADM

## 2022-06-16 RX ORDER — ONDANSETRON 4 MG/1
4 TABLET, ORALLY DISINTEGRATING ORAL EVERY 8 HOURS PRN
Status: DISCONTINUED | OUTPATIENT
Start: 2022-06-16 | End: 2022-06-17 | Stop reason: HOSPADM

## 2022-06-16 RX ORDER — ACETAMINOPHEN 650 MG
TABLET, EXTENDED RELEASE ORAL
Status: DISPENSED
Start: 2022-06-16 | End: 2022-06-17

## 2022-06-16 RX ORDER — SODIUM CHLORIDE, SODIUM LACTATE, POTASSIUM CHLORIDE, CALCIUM CHLORIDE 600; 310; 30; 20 MG/100ML; MG/100ML; MG/100ML; MG/100ML
INJECTION, SOLUTION INTRAVENOUS CONTINUOUS
Status: DISCONTINUED | OUTPATIENT
Start: 2022-06-16 | End: 2022-06-17 | Stop reason: HOSPADM

## 2022-06-16 RX ORDER — ACETAMINOPHEN 325 MG/1
TABLET ORAL
Status: COMPLETED
Start: 2022-06-16 | End: 2022-06-16

## 2022-06-16 RX ORDER — CEPHALEXIN 500 MG/1
500 CAPSULE ORAL 2 TIMES DAILY
Status: DISCONTINUED | OUTPATIENT
Start: 2022-06-16 | End: 2022-06-17 | Stop reason: HOSPADM

## 2022-06-16 RX ORDER — INDOMETHACIN 25 MG/1
50 CAPSULE ORAL EVERY 8 HOURS
Status: DISCONTINUED | OUTPATIENT
Start: 2022-06-16 | End: 2022-06-17 | Stop reason: HOSPADM

## 2022-06-16 RX ADMIN — INDOMETHACIN 50 MG: 25 CAPSULE ORAL at 16:31

## 2022-06-16 RX ADMIN — BUPIVACAINE HYDROCHLORIDE 1.3 MG: 7.5 INJECTION, SOLUTION SUBARACHNOID at 14:08

## 2022-06-16 RX ADMIN — CEPHALEXIN 500 MG: 500 CAPSULE ORAL at 21:15

## 2022-06-16 RX ADMIN — SODIUM CHLORIDE, POTASSIUM CHLORIDE, SODIUM LACTATE AND CALCIUM CHLORIDE: 600; 310; 30; 20 INJECTION, SOLUTION INTRAVENOUS at 17:26

## 2022-06-16 RX ADMIN — ACETAMINOPHEN 650 MG: 325 TABLET ORAL at 05:55

## 2022-06-16 RX ADMIN — SODIUM CHLORIDE, POTASSIUM CHLORIDE, SODIUM LACTATE AND CALCIUM CHLORIDE: 600; 310; 30; 20 INJECTION, SOLUTION INTRAVENOUS at 13:49

## 2022-06-16 RX ADMIN — Medication 2000 MG: at 13:48

## 2022-06-16 RX ADMIN — SODIUM CHLORIDE, POTASSIUM CHLORIDE, SODIUM LACTATE AND CALCIUM CHLORIDE: 600; 310; 30; 20 INJECTION, SOLUTION INTRAVENOUS at 14:25

## 2022-06-16 RX ADMIN — SODIUM CHLORIDE, POTASSIUM CHLORIDE, SODIUM LACTATE AND CALCIUM CHLORIDE: 600; 310; 30; 20 INJECTION, SOLUTION INTRAVENOUS at 05:30

## 2022-06-16 ASSESSMENT — PAIN DESCRIPTION - LOCATION
LOCATION: HEAD
LOCATION: ABDOMEN

## 2022-06-16 ASSESSMENT — PAIN SCALES - GENERAL
PAINLEVEL_OUTOF10: 8
PAINLEVEL_OUTOF10: 5

## 2022-06-16 ASSESSMENT — PAIN DESCRIPTION - DESCRIPTORS
DESCRIPTORS: DULL
DESCRIPTORS: SORE;THROBBING
DESCRIPTORS: CRAMPING
DESCRIPTORS: SORE;THROBBING

## 2022-06-16 ASSESSMENT — PAIN DESCRIPTION - ORIENTATION
ORIENTATION: INNER
ORIENTATION: LOWER

## 2022-06-16 ASSESSMENT — PAIN - FUNCTIONAL ASSESSMENT: PAIN_FUNCTIONAL_ASSESSMENT: ACTIVITIES ARE NOT PREVENTED

## 2022-06-16 NOTE — ANESTHESIA PROCEDURE NOTES
Spinal Block    Patient location during procedure: OB  End time: 6/16/2022 2:32 PM  Reason for block: procedure for pain, post-op pain management, primary anesthetic and at surgeon's request  Staffing  Performed: anesthesiologist   Anesthesiologist: Phil Wells MD  Resident/CRNA: Carri Barton APRN - ANNELIESE  Other anesthesia staff: Giselle Abbott RN  Spinal Block  Patient position: sitting  Prep: ChloraPrep  Patient monitoring: cardiac monitor, continuous pulse ox, frequent blood pressure checks and oxygen  Approach: midline  Location: L3/L4  Provider prep: mask and sterile gloves  Local infiltration: lidocaine  Needle  Needle type: Pencan   Needle gauge: 24 G  Needle length: 4.75 in  Needle insertion depth: 10 cm  Assessment  Events: cerebrospinal fluid  Swirl obtained: Yes  CSF: clear  Attempts: 3+  Hemodynamics: stable  Additional Notes  Tuohy needle was used as an introducer.   Preanesthetic Checklist  Completed: patient identified, IV checked, risks and benefits discussed, surgical/procedural consents, equipment checked, pre-op evaluation, timeout performed, anesthesia consent given, oxygen available and monitors applied/VS acknowledged

## 2022-06-16 NOTE — PROGRESS NOTES
Dr. Parekh Class called with orders. States to begin indocin 50 mg Q8H for 9 total doses and Keflex 500 mg BID for 6 total doses. States to keep pt over night and he will come to assess tomorrow.

## 2022-06-16 NOTE — PROGRESS NOTES
Pt back in room post cerclage placement. No LOF, VB or ctx. Pt states she feels a very minimal cramping on her left side. VSS, BP cycling. Pt reports no pain at this time. Call light within reach.

## 2022-06-16 NOTE — ANESTHESIA PRE PROCEDURE
Department of Anesthesiology  Preprocedure Note       Name:  Lord Nguyen   Age:  27 y.o.  :  1990                                          MRN:  [id number]         Date:  2022      Surgeon: Rebecca Yañez):  Cody Bradley MD    Procedure: Procedure(s):  CERVIX CERCLAGE PLACEMENT    Medications prior to admission:   Prior to Admission medications    Medication Sig Start Date End Date Taking?  Authorizing Provider   Blood Glucose Monitoring Suppl (ONE TOUCH ULTRA 2) w/Device KIT USE AS DIRECTED 22   Historical Provider, MD   ONETOUCH ULTRA strip TEST AS DIRECTED FOUR TIMES DAILY 22   Historical Provider, MD   Lancets ([address]) [address] [facility] TEST FOUR TIMES DAILY 22   Historical Provider, MD   Prenatal Vit-Fe Fumarate-FA (PRENATAL VITAMIN) 27-1 MG TABS tablet Take 1 tablet by mouth daily 3/15/22   Rosetta Khan MD   Prenatal Vit-Fe Fumarate-FA (PRENATAL PLUS) 27-1 MG TABS Take 1 tablet by mouth daily  Patient not taking: Reported on 2021   Rosetta Khan MD   ibuprofen (IBU) 800 MG tablet Take 1 tablet by mouth every 8 hours as needed for Pain 7/7/20 3/27/21  Iván Holder, APRN - CNP       Current medications:    Current Facility-Administered Medications   Medication Dose Route Frequency Provider Last Rate Last Admin    lactated ringers infusion   IntraVENous Continuous Mikhail Pittman  mL/hr at 22 0530 New Bag at 22 0530    lactated ringers infusion   IntraVENous Continuous Mikhail Pittman MD        acetaminophen (TYLENOL) tablet 650 mg  650 mg Oral Q4H PRN Mikhail Pittamn MD   650 mg at 22 0555    ondansetron (ZOFRAN-ODT) disintegrating tablet 4 mg  4 mg Oral Q8H PRN Mikhail Pittman MD        Or    ondansetron [facility] injection 4 mg  4 mg IntraVENous Q6H PRN Mikhail Pittman MD           Allergies:  No Known Allergies    Problem List:    Patient Active Problem List   Diagnosis Code    Maternal morbid obesity, antepartum ([facility] O99.210, E66.01    Incompetent cervix during second trimester, antepartum O34.32    Benign essential hypertension, antepartum O10.019    Morbid obesity with BMI of 50.0-59.9, adult ([facility]) E66.01, Z68.43    19 weeks gestation of pregnancy Z3A.19       Past Medical History:        Diagnosis Date    Hypertension        Past Surgical History:        Procedure Laterality Date    WISDOM TOOTH EXTRACTION         Social History:    Social History     Tobacco Use    Smoking status: Former Smoker    Smokeless tobacco: Never Used   Substance Use Topics    Alcohol use: Not Currently     Comment: social                                Counseling given: Not Answered      Vital Signs (Current):   Vitals:    06/15/22 1729 06/15/22 1857 06/16/22 0755   BP: (!) 144/79 (!) 143/77 128/66   Pulse: 84 99 83   Resp: 16 16 16   Temp: 36.6 °C (97.9 °F) 36.7 °C (98 °F) 36.7 °C (98.1 °F)   TempSrc: Oral Oral                                               BP Readings from Last 3 Encounters:   06/16/22 128/66   06/15/22 135/84   06/14/22 132/84       NPO Status:  > 8 hours                                                                               BMI:   Wt Readings from Last 3 Encounters:   06/15/22 (!) 344 lb (156 kg)   06/14/22 (!) 350 lb (158.8 kg)   05/17/22 (!) 348 lb 3.2 oz (157.9 kg)     There is no height or weight on file to calculate BMI.    CBC:   Lab Results   Component Value Date    WBC 12.5 06/15/2022    RBC 4.01 06/15/2022    HGB 11.5 06/15/2022    HCT 35.0 06/15/2022    MCV 87.3 06/15/2022    RDW 14.2 06/15/2022     06/15/2022       CMP:   Lab Results   Component Value Date     06/15/2022    K 4.1 06/15/2022    K 4.4 03/06/2022     06/15/2022    CO2 20 06/15/2022    BUN 6 06/15/2022    CREATININE 0.5 06/15/2022    GFRAA >60 06/15/2022    LABGLOM >60 06/15/2022    GLUCOSE 93 06/15/2022    GLUCOSE 94 10/25/2011    PROT 6.9 06/15/2022    CALCIUM 9.4 06/15/2022    BILITOT <0.2 06/15/2022 ALKPHOS 63 06/15/2022    AST 13 06/15/2022    ALT 10 06/15/2022       POC Tests: No results for input(s): POCGLU, POCNA, POCK, POCCL, POCBUN, POCHEMO, POCHCT in the last 72 hours. Coags: No results found for: PROTIME, INR, APTT    HCG (If Applicable):   Lab Results   Component Value Date    PREGTESTUR positive 03/15/2022        ABGs: No results found for: PHART, PO2ART, EUK2OEM, MKR7AFW, BEART, B5JFLBDC     Type & Screen (If Applicable):  No results found for: LABABO, LABRH    Drug/Infectious Status (If Applicable):  No results found for: HIV, HEPCAB    COVID-19 Screening (If Applicable):   Lab Results   Component Value Date    COVID19 Not Detected 04/14/2022           Anesthesia Evaluation  Patient summary reviewed and Nursing notes reviewed no history of anesthetic complications:   Airway: Mallampati: II  TM distance: >3 FB   Neck ROM: full  Mouth opening: > = 3 FB   Dental: normal exam         Pulmonary:Negative Pulmonary ROS and normal exam  breath sounds clear to auscultation                             Cardiovascular:    (+) hypertension:,         Rhythm: regular  Rate: normal                    Neuro/Psych:   Negative Neuro/Psych ROS              GI/Hepatic/Renal:   (+) morbid obesity          Endo/Other:    (+) Diabetes ( Gestational diabetes), . Abdominal:             Vascular: negative vascular ROS. Other Findings:           Anesthesia Plan      spinal and general     ASA 3     (Dicussed with patient the plan for a spinal with GA as back up. All questions answered.)        Anesthetic plan and risks discussed with patient. Plan discussed with attending.                   CBC   Lab Results   Component Value Date    WBC 12.5 06/15/2022    RBC 4.01 06/15/2022    HGB 11.5 06/15/2022    HCT 35.0 06/15/2022    MCV 87.3 06/15/2022    RDW 14.2 06/15/2022     06/15/2022     CMP    Lab Results   Component Value Date     06/15/2022    K 4.1 06/15/2022    K 4.4 03/06/2022  06/15/2022    CO2 20 06/15/2022    BUN 6 06/15/2022    CREATININE 0.5 06/15/2022    GFRAA >60 06/15/2022    LABGLOM >60 06/15/2022    GLUCOSE 93 06/15/2022    GLUCOSE 94 10/25/2011    PROT 6.9 06/15/2022    CALCIUM 9.4 06/15/2022    BILITOT <0.2 06/15/2022    ALKPHOS 63 06/15/2022    AST 13 06/15/2022    ALT 10 06/15/2022     BMP    Lab Results   Component Value Date     06/15/2022    K 4.1 06/15/2022    K 4.4 03/06/2022     06/15/2022    CO2 20 06/15/2022    BUN 6 06/15/2022    CREATININE 0.5 06/15/2022    CALCIUM 9.4 06/15/2022    GFRAA >60 06/15/2022    LABGLOM >60 06/15/2022    GLUCOSE 93 06/15/2022    GLUCOSE 94 10/25/2011     POCGlucose  Recent Labs     06/15/22  1720   GLUCOSE 93        Coags  No results found for: PROTIME, INR, APTT    HCG (If Applicable)   Lab Results   Component Value Date    PREGTESTUR positive 03/15/2022        ABGs   No results found for: PHART, PO2ART, STT8EKB, ZFQ1GRS, BEART, I2OKQOYR     Type & Screen (If Applicable)  Lab Results   Component Value Date    ABORH O POS 06/15/2022     Active Problem List with ICD10 Codes  Patient Active Problem List   Diagnosis Code    Maternal morbid obesity, antepartum ([facility] O99.210, E66.01    Incompetent cervix during second trimester, antepartum O34.32    Benign essential hypertension, antepartum O10.019    Morbid obesity with BMI of 50.0-59.9, adult ([facility] E66.01, Z68.43    19 weeks gestation of pregnancy Z3A.19       DONTAE Curry CRNA  June 16, 2022  2:33 PM    Jones Seay RN   6/16/2022        Chart reviewed, patient seen. Agree with above assessment.     DONTAE Curry CRNA  6/16/22

## 2022-06-16 NOTE — PROGRESS NOTES
MATERNAL FETAL MEDICINE PROGRESS NOTE    NAME: Cori Felty  MRN: [id number]            SERVICE DATE: 2022  SERVICE TIME: 9:30 AM  REQUESTING PROVIDER: Chanetta Krabbe, MD          Cori Felty is a 27 y.o. female,  at 23w4d with an NILESH of 2022, by Last Menstrual Period dating method. Patient is here with the following problems:  Principal Problem:    Incompetent cervix during second trimester, antepartum  Active Problems:    19 weeks gestation of pregnancy  Resolved Problems:    * No resolved hospital problems. *      SUBJECTIVE:  HISTORY OF THE PRESENT ILLNESS:  HISTORY REVIEW  Past Medical History:   Diagnosis Date    Hypertension      Past Surgical History:   Procedure Laterality Date    WISDOM TOOTH EXTRACTION       History reviewed. No pertinent family history. Social History     Socioeconomic History    Marital status: Single     Spouse name: Not on file    Number of children: Not on file    Years of education: Not on file    Highest education level: Not on file   Occupational History    Not on file   Tobacco Use    Smoking status: Former Smoker    Smokeless tobacco: Never Used   Vaping Use    Vaping Use: Never used   Substance and Sexual Activity    Alcohol use: Not Currently     Comment: social    Drug use: No    Sexual activity: Not on file   Other Topics Concern    Not on file   Social History Narrative    Not on file     Social Determinants of Health     Financial Resource Strain:     Difficulty of Paying Living Expenses: Not on file   Food Insecurity:     Worried About [address] in the Last Year: Not on file    [address] in the Last Year: Not on file   Transportation Needs:     Lack of Transportation (Medical): Not on file    Lack of Transportation (Non-Medical):  Not on file   Physical Activity:     Days of Exercise per Week: Not on file    Minutes of Exercise per Session: Not on file   Stress:     Feeling of Stress : Not on file   Social Connections:     Frequency of Communication with Friends and Family: Not on file    Frequency of Social Gatherings with Friends and Family: Not on file    Attends [employer] Services: Not on file    Active Member of Clubs or Organizations: Not on file    Attends Club or Organization Meetings: Not on file    Marital Status: Not on file   Intimate Partner Violence:     Fear of Current or Ex-Partner: Not on file    Emotionally Abused: Not on file    Physically Abused: Not on file    Sexually Abused: Not on file   Housing Stability:     Unable to Pay for Housing in the Last Year: Not on file    Number of [place] in the Last Year: Not on file    Unstable Housing in the Last Year: Not on file     OB History    Para Term  AB Living   3 0 0 0 2 0   SAB IAB Ectopic Molar Multiple Live Births   2 0 0 0 0 0      # Outcome Date GA Lbr Skyler/2nd Weight Sex Delivery Anes PTL Lv   3 Current            2 2021 6w0d          1 2020 5w0d                ALLERGIES: Patient has no known allergies. CURRENT MEDS:     lactated ringers 125 mL/hr at 22 0530    lactated ringers       acetaminophen, ondansetron **OR** ondansetron    PRIOR TO ADMISSION MEDICATIONS:  Prior to Admission medications    Medication Sig Start Date End Date Taking?  Authorizing Provider   Blood Glucose Monitoring Suppl (ONE TOUCH ULTRA 2) w/Device KIT USE AS DIRECTED 22   Historical Provider, MD   ONETOUCH ULTRA strip TEST AS DIRECTED FOUR TIMES DAILY 22   Historical Provider, MD   Lancets ([address]) [address] [facility] TEST FOUR TIMES DAILY 22   Historical Provider, MD   Prenatal Vit-Fe Fumarate-FA (PRENATAL VITAMIN) 27-1 MG TABS tablet Take 1 tablet by mouth daily 3/15/22   Monalisa Holguin MD   Prenatal Vit-Fe Fumarate-FA (PRENATAL PLUS) 27-1 MG TABS Take 1 tablet by mouth daily  Patient not taking: Reported on 2021   Monalisa Holguin MD   ibuprofen (IBU) 800 MG tablet Take 1 tablet by mouth every 8 hours as needed for Pain 7/7/20 3/27/21  DONTAE Guerra - CNP       OBJECTIVE:    REVIEW OF SYSTEMS:    Fetal Movement normal  Vaginal Bleeding denies  Contractions denies  LOF/ROM denies  S/Sx Pre-eclampsia denies    PHYSICAL EXAM    Well Developed well-nourished -American female in no apparent distress  Lungs/Breathing regular, unlabored  Heart regular rhythm and rate  Abdomen Gravid, soft, nontender, no rebound, no guarding, no contractions palpated  Extremities full range of motion        LAST VITALS:  /66   Pulse 83   Temp 98 °F (36.7 °C) (Oral)   Resp 16   LMP 01/30/2022        PRENATAL LABS  Diagnostic tests reviewed for today's visit:   Recent Results (from the past 24 hour(s))   Hemoglobin A1C    Collection Time: 06/15/22  5:20 PM   Result Value Ref Range    Hemoglobin A1C 5.9 (H) 4.0 - 5.6 %   TSH    Collection Time: 06/15/22  5:20 PM   Result Value Ref Range    TSH 2.010 0.270 - 4.200 uIU/mL   Comprehensive Metabolic Panel    Collection Time: 06/15/22  5:20 PM   Result Value Ref Range    Sodium 135 132 - 146 mmol/L    Potassium 4.1 3.5 - 5.0 mmol/L    Chloride 103 98 - 107 mmol/L    CO2 20 (L) 22 - 29 mmol/L    Anion Gap 12 7 - 16 mmol/L    Glucose 93 74 - 99 mg/dL    BUN 6 6 - 20 mg/dL    CREATININE 0.5 0.5 - 1.0 mg/dL    GFR Non-African American >60 >=60 mL/min/1.73    GFR African American >60     Calcium 9.4 8.6 - 10.2 mg/dL    Total Protein 6.9 6.4 - 8.3 g/dL    Albumin 3.8 3.5 - 5.2 g/dL    Total Bilirubin <0.2 0.0 - 1.2 mg/dL    Alkaline Phosphatase 63 35 - 104 U/L    ALT 10 0 - 32 U/L    AST 13 0 - 31 U/L   CBC    Collection Time: 06/15/22  5:20 PM   Result Value Ref Range    WBC 12.5 (H) 4.5 - 11.5 E9/L    RBC 4.01 3.50 - 5.50 E12/L    Hemoglobin 11.5 11.5 - 15.5 g/dL    Hematocrit 35.0 34.0 - 48.0 %    MCV 87.3 80.0 - 99.9 fL    MCH 28.7 26.0 - 35.0 pg    MCHC 32.9 32.0 - 34.5 %    RDW 14.2 11.5 - 15.0 fL    Platelets 539 006 - 806 E9/L    MPV 8.8 7.0 - 12.0 fL   TYPE AND SCREEN    Collection Time: 06/15/22  5:20 PM   Result Value Ref Range    ABO/Rh O POS     Antibody Screen NEG    Protein / creatinine ratio, urine    Collection Time: 06/15/22  7:30 PM   Result Value Ref Range    Protein, Ur 9 0 - 12 mg/dL    Creatinine, Ur 147 29 - 226 mg/dL    Protein/Creat Ratio 0.1 0.0 - 0.2    Protein/Creat Ratio 0.1          IMP:  1. Nikita Holland is a 27 y.o. female  at 19w4d with incompetent cervix admitted for cerclage placement today. 2. Risks and benefits of the procedure have been explained to the patient. Patient accepts the procedure. She is aware that this gives her the best option for a term pregnancy. PLAN:  1. Cerclage placement today  2. We will most likely watch patient overnight and plan on discharging in a.m.  3. Patient will be placed on pelvic rest the remainder of the pregnancy. 4. I would like her off work for at least 1 to 2 weeks after the cerclage. 5. Follow-up would be otherwise as indicated      I spent 25 minutes of direct contact time with the patient of which greater than 50% of the time was used to  the patient, discuss complications and problems related to her pregnancy, or coordinating her care. I answered all of her questions to her satisfaction.     SIGNATURE: Yayo Lares MD  DATE: 2022  TIME: 9:30 AM

## 2022-06-16 NOTE — OP NOTE
Operative Note      Patient: Marcio Hollis  YOB: 1990  MRN: [id number]    Date of Procedure: 6/16/2022    Pre-Op Diagnosis: cerclage    Post-Op Diagnosis: Same       Procedure(s):  CERVIX CERCLAGE PLACEMENT    Surgeon(s):  Adam Jalloh MD    Assistant:   * No surgical staff found *    Anesthesia: Local    Estimated Blood Loss (mL): less than 50     Complications: None    Specimens:   * No specimens in log *    Implants:  * No implants in log *      Drains: * No LDAs found *    Findings: Cervix appeared to be somewhat shortened. There were no membranes protruding through the os. Detailed Description of Procedure:   DATE OF PROCEDURE:    SURGEON:  Adam Jalloh MD   ASSISTANT:  PREOPERATIVE DIAGNOSES:   19+ week IUP with incompetent cervix  POSTOPERATIVE DIAGNOSES:  Same  OPERATION:  Salcido cerclage. ANESTHESIA:  Spinal.  ESTIMATED BLOOD LOSS: Less than 50 cc  IV FLUIDS:  URINE OUTPUT: mL.  COMPLICATIONS:  None. CONDITION:  Patient taken to recovery room in stable condition. FINDINGS: Cervix was somewhat shortened. The os was not open. There were no membranes noted at the os. The vagina was unremarkable  INDICATION FOR SURGERY: There was good cervical length noted on transvaginal ultrasound. However the cervix appeared to be dilated 1 cm clear to the cervical os. Functional remaining cervix was less than 5 mm. PROCEDURE:  After obtaining informed consent, the patient was taken to the operating room, was given a spinal anesthesia that was found to be successful. She was placed in the dorsal lithotomy position. Her perineum and vagina were scrubbed and draped in the usual sterile fashion. A heavy weighted speculum was placed over the posterior vaginal wall, exposing the cervix. The bladder reflection was identified by placing an uterine sound into the bladder. The upper lip of the cervix was grasped with sponge forceps, as well as the lower lip.   Starting at 1 o'clock and continuing in a counterclockwise fashion sequential bites were taken of the cervix using a 5 Ethibond suture. A sterile button was placed between the cervix and the suture and tied into place. The sponge forceps were removed from the cervix. The vagina was wiped clean of all clots. The weighted speculum was removed from the vagina. Estimated blood loss was < than 50 cc. All pack, lap and instruments counts were correct. The patient was taken out of the dorsal lithotomy position and taken to the recovery room in stable condition. The patient tolerated the procedure well.             Electronically signed by Nestor Kuhn MD on 6/16/2022 at 4:33 PM

## 2022-06-16 NOTE — PROGRESS NOTES
RN at bedside. Spot check performed, FHT noted at 150. Pt denies vaginal bleeding, leaking of fluid, or contractions.  Call light within reach

## 2022-06-17 VITALS
SYSTOLIC BLOOD PRESSURE: 121 MMHG | TEMPERATURE: 98.6 F | OXYGEN SATURATION: 97 % | RESPIRATION RATE: 18 BRPM | DIASTOLIC BLOOD PRESSURE: 63 MMHG | HEART RATE: 95 BPM

## 2022-06-17 PROCEDURE — G0378 HOSPITAL OBSERVATION PER HR: HCPCS

## 2022-06-17 PROCEDURE — 2580000003 HC RX 258: Performed by: OBSTETRICS & GYNECOLOGY

## 2022-06-17 PROCEDURE — 6370000000 HC RX 637 (ALT 250 FOR IP): Performed by: OBSTETRICS & GYNECOLOGY

## 2022-06-17 PROCEDURE — 99232 SBSQ HOSP IP/OBS MODERATE 35: CPT | Performed by: OBSTETRICS & GYNECOLOGY

## 2022-06-17 RX ORDER — CEPHALEXIN 500 MG/1
500 CAPSULE ORAL 2 TIMES DAILY
Qty: 4 CAPSULE | Refills: 0 | Status: SHIPPED | OUTPATIENT
Start: 2022-06-17 | End: 2022-06-19

## 2022-06-17 RX ORDER — INDOMETHACIN 50 MG/1
50 CAPSULE ORAL EVERY 8 HOURS
Qty: 60 CAPSULE | Refills: 3 | Status: ON HOLD | OUTPATIENT
Start: 2022-06-17 | End: 2022-08-01 | Stop reason: HOSPADM

## 2022-06-17 RX ADMIN — CEPHALEXIN 500 MG: 500 CAPSULE ORAL at 08:16

## 2022-06-17 RX ADMIN — INDOMETHACIN 50 MG: 25 CAPSULE ORAL at 00:14

## 2022-06-17 RX ADMIN — SODIUM CHLORIDE, POTASSIUM CHLORIDE, SODIUM LACTATE AND CALCIUM CHLORIDE: 600; 310; 30; 20 INJECTION, SOLUTION INTRAVENOUS at 04:17

## 2022-06-17 RX ADMIN — INDOMETHACIN 50 MG: 25 CAPSULE ORAL at 08:16

## 2022-06-17 ASSESSMENT — PAIN DESCRIPTION - DESCRIPTORS: DESCRIPTORS: CRAMPING

## 2022-06-17 NOTE — PROGRESS NOTES
MATERNAL FETAL MEDICINE PROGRESS NOTE    NAME: Mague Saavedra  MRN: [id number]            SERVICE DATE: 2022  SERVICE TIME: 10:00 AM  REQUESTING PROVIDER: Jacki Casanova MD          Mague Saavedra is a 27 y.o. female,  at 19w5d with an NILESH of 2022, by Last Menstrual Period dating method. Patient is here with the following problems:  Principal Problem:    Incompetent cervix during second trimester, antepartum  Active Problems:    19 weeks gestation of pregnancy  Resolved Problems:    * No resolved hospital problems. *      SUBJECTIVE:  HISTORY OF THE PRESENT ILLNESS:  HISTORY REVIEW  Past Medical History:   Diagnosis Date    Hypertension      Past Surgical History:   Procedure Laterality Date    WISDOM TOOTH EXTRACTION       History reviewed. No pertinent family history. Social History     Socioeconomic History    Marital status: Single     Spouse name: Not on file    Number of children: Not on file    Years of education: Not on file    Highest education level: Not on file   Occupational History    Not on file   Tobacco Use    Smoking status: Former Smoker    Smokeless tobacco: Never Used   Vaping Use    Vaping Use: Never used   Substance and Sexual Activity    Alcohol use: Not Currently     Comment: social    Drug use: No    Sexual activity: Not on file   Other Topics Concern    Not on file   Social History Narrative    Not on file     Social Determinants of Health     Financial Resource Strain:     Difficulty of Paying Living Expenses: Not on file   Food Insecurity:     Worried About [address] in the Last Year: Not on file    [address] in the Last Year: Not on file   Transportation Needs:     Lack of Transportation (Medical): Not on file    Lack of Transportation (Non-Medical):  Not on file   Physical Activity:     Days of Exercise per Week: Not on file    Minutes of Exercise per Session: Not on file   Stress:     Feeling of Stress : Not on file   Social Connections:     Frequency of Communication with Friends and Family: Not on file    Frequency of Social Gatherings with Friends and Family: Not on file    Attends Adventism Services: Not on file    Active Member of Clubs or Organizations: Not on file    Attends Club or Organization Meetings: Not on file    Marital Status: Not on file   Intimate Partner Violence:     Fear of Current or Ex-Partner: Not on file    Emotionally Abused: Not on file    Physically Abused: Not on file    Sexually Abused: Not on file   Housing Stability:     Unable to Pay for Housing in the Last Year: Not on file    Number of [place] in the Last Year: Not on file    Unstable Housing in the Last Year: Not on file     OB History    Para Term  AB Living   3 0 0 0 2 0   SAB IAB Ectopic Molar Multiple Live Births   2 0 0 0 0 0      # Outcome Date GA Lbr Skyler/2nd Weight Sex Delivery Anes PTL Lv   3 Current            2 2021 6w0d          1 2020 5w0d                ALLERGIES: Patient has no known allergies. CURRENT MEDS:   indomethacin  50 mg Oral Q8H    cephALEXin  500 mg Oral BID      lactated ringers 125 mL/hr at 22 0530    lactated ringers 125 mL/hr at 22 0417     acetaminophen, ondansetron **OR** ondansetron    PRIOR TO ADMISSION MEDICATIONS:  Prior to Admission medications    Medication Sig Start Date End Date Taking?  Authorizing Provider   Blood Glucose Monitoring Suppl (ONE TOUCH ULTRA 2) w/Device KIT USE AS DIRECTED 22   Historical ProviderMD Sanders Glassing strip TEST AS DIRECTED FOUR TIMES DAILY 22   Historical Provider, MD   Lancets ([place]) [address] [facility] TEST FOUR TIMES DAILY 22   Historical Provider, MD   Prenatal Vit-Fe Fumarate-FA (PRENATAL VITAMIN) 27-1 MG TABS tablet Take 1 tablet by mouth daily 3/15/22   Helga Saint, MD   Prenatal Vit-Fe Fumarate-FA (PRENATAL PLUS) 27-1 MG TABS Take 1 tablet by mouth daily  Patient not taking: Reported on 2021   Mell Roman MD   ibuprofen (IBU) 800 MG tablet Take 1 tablet by mouth every 8 hours as needed for Pain 7/7/20 3/27/21  DONTAE Horne - SRIRAM       OBJECTIVE:    REVIEW OF SYSTEMS:    Fetal Movement good  Vaginal Bleeding minimal following cerclage  Contractions denies  LOF/ROM denies  S/Sx Pre-eclampsia denies    PHYSICAL EXAM    Well Developed well-nourished -American female in no apparent distress  Lungs/Breathing regular, unlabored  Heart regular rhythm rate  Abdomen Gravid, soft, nontender, no guarding no rebound, no contractions palpated  Extremities full range of motion motion in all        LAST VITALS:  /63   Pulse 95   Temp 98.6 °F (37 °C) (Oral)   Resp 18   LMP 2022   SpO2 97%        PRENATAL LABS  Diagnostic tests reviewed for today's visit:   Recent Results (from the past 24 hour(s))   POCT urine qual dipstick glucose    Collection Time: 22  3:57 PM   Result Value Ref Range    Glucose, UA POC neg    POCT urine qual dipstick protein    Collection Time: 22  3:57 PM   Result Value Ref Range    Protein, UA 1+ (A) Negative         IMP:  1. Holden Paniagua is a 27 y.o. female  at 19w5d with incompetent cervix who is status post cerclage placement  2. She is doing well safe to go home. 3. She needs to be off work at least 2 weeks. Plan:  1. Discharge to home. 2.  See me in 1 week for speculum exam.  3.  Off work for at least 2 weeks. 4.  We will then follow-up with Dr. Zaida Ace. 5.  Follow-up would be otherwise as clinically indicated. I spent 20 minutes of direct contact time with the patient of which greater than 50% of the time was used to  the patient, discuss complications and problems related to her pregnancy, or coordinating her care. I answered all of her questions to her satisfaction.     SIGNATURE: Tammy Sam MD  DATE: 2022  TIME: 10:00 AM

## 2022-06-17 NOTE — DISCHARGE SUMMARY
MATERNAL FETAL MEDICINE CONSULT    NAME: Frank Mcgrath  MRN: [id number]            SERVICE DATE: June 17, 2022  SERVICE TIME: 10:06 AM  DATE OF ADMISSION:  DATE OF DISCHARGE: June 17, 2022  ATTENDING: Satinder Jones MD          ADMISSION DIAGNOSIS:   Patient Active Problem List   Diagnosis    Maternal morbid obesity, antepartum ([facility]    Incompetent cervix during second trimester, antepartum    Benign essential hypertension, antepartum    Morbid obesity with BMI of 50.0-59.9, adult ([facility]    19 weeks gestation of pregnancy       DISCHARGE DIAGNOSIS  19-week IUP with incompetent cervix, status post cerclage placement    SUBJECTIVE:  HISTORY REVIEW  Past Medical History:   Diagnosis Date    Hypertension      Past Surgical History:   Procedure Laterality Date    WISDOM TOOTH EXTRACTION       History reviewed. No pertinent family history. Social History     Socioeconomic History    Marital status: Single     Spouse name: Not on file    Number of children: Not on file    Years of education: Not on file    Highest education level: Not on file   Occupational History    Not on file   Tobacco Use    Smoking status: Former Smoker    Smokeless tobacco: Never Used   Vaping Use    Vaping Use: Never used   Substance and Sexual Activity    Alcohol use: Not Currently     Comment: social    Drug use: No    Sexual activity: Not on file   Other Topics Concern    Not on file   Social History Narrative    Not on file     Social Determinants of Health     Financial Resource Strain:     Difficulty of Paying Living Expenses: Not on file   Food Insecurity:     Worried About [address] in the Last Year: Not on file    [address] in the Last Year: Not on file   Transportation Needs:     Lack of Transportation (Medical): Not on file    Lack of Transportation (Non-Medical):  Not on file   Physical Activity:     Days of Exercise per Week: Not on file    Minutes of Exercise per Session: Not on file   Stress:  Feeling of Stress : Not on file   Social Connections:     Frequency of Communication with Friends and Family: Not on file    Frequency of Social Gatherings with Friends and Family: Not on file    Attends Cheondoism Services: Not on file    Active Member of Clubs or Organizations: Not on file    Attends Club or Organization Meetings: Not on file    Marital Status: Not on file   Intimate Partner Violence:     Fear of Current or Ex-Partner: Not on file    Emotionally Abused: Not on file    Physically Abused: Not on file    Sexually Abused: Not on file   Housing Stability:     Unable to Pay for Housing in the Last Year: Not on file    Number of [place] in the Last Year: Not on file    Unstable Housing in the Last Year: Not on file     OB History    Para Term  AB Living   3 0 0 0 2 0   SAB IAB Ectopic Molar Multiple Live Births   2 0 0 0 0 0      # Outcome Date GA Lbr Skyler/2nd Weight Sex Delivery Anes PTL Lv   3 Current            2 2021 6w0d          1 SAB  5w0d                ALLERGIES: Patient has no known allergies. CURRENT MEDS:   indomethacin  50 mg Oral Q8H    cephALEXin  500 mg Oral BID      lactated ringers 125 mL/hr at 22 0530    lactated ringers 125 mL/hr at 22 0417     acetaminophen, ondansetron **OR** ondansetron      HOSPITAL COURSE:  Clay Nguyen was admitted to the hospital once it was noted that she had an ultrasound consistent with incompetent cervix. After a night of fasting she was taken to the operating room where she underwent a spinal anesthetic and a cerclage was placed. She tolerated procedure well.   And the day following the procedure she was discharged to home        PROCEDURES/TREATMENTS:  Salcido cerclage placement      DIET:  Regular    DISCHARGE MEDICATIONS:  Prenatal vitamin, Indocin for total of 3 days, Keflex for total of 3 days    DISCHARGE TO:  Home    FOLLOW-UP:  1 week in outpatient clinic    SIGNATURE: Camryn Herrera MD  DATE: June 17, 2022  TIME: 10:06 AM

## 2022-06-17 NOTE — PROGRESS NOTES
Pt assisted to BR x1, voided. New pads and linens applied to bed. Pink spotting noted on toilet paper.

## 2022-06-17 NOTE — PROGRESS NOTES
Patient of Dr Gavin Gomez and Dr Erendira Holguin post op cerclage awake upon entry. Denies any VB or LOF. States some cramping. VSS and call light within reach.

## 2022-06-17 NOTE — ANESTHESIA POSTPROCEDURE EVALUATION
Department of Anesthesiology  Postprocedure Note    Patient: Veronica Parham  MRN: [id number]  YOB: 1990  Date of evaluation: 6/17/2022  Time:  7:32 AM     Procedure Summary     Date: 06/16/22 Room / Location: [facility] OR  / [facility]    Anesthesia Start: 6569 Anesthesia Stop: 4029    Procedure: CERVIX CERCLAGE PLACEMENT (N/A ) Diagnosis:       Premature cervical dilation in second trimester      (cerclage)    Surgeons: Ag Ramirez MD Responsible Provider: Hardeep Tang MD    Anesthesia Type: spinal, general ASA Status: 3          Anesthesia Type: No value filed. Wilian Phase I:      Wilian Phase II:      Last vitals: Reviewed and per EMR flowsheets.        Anesthesia Post Evaluation    Patient location during evaluation: bedside  Patient participation: complete - patient participated  Level of consciousness: awake and alert  Pain score: 0  Airway patency: patent  Nausea & Vomiting: no nausea and no vomiting  Complications: no  Cardiovascular status: blood pressure returned to baseline  Respiratory status: room air  Hydration status: euvolemic

## 2022-06-23 ENCOUNTER — ROUTINE PRENATAL (OUTPATIENT)
Dept: OBGYN CLINIC | Age: 32
End: 2022-06-23
Payer: MEDICAID

## 2022-06-23 VITALS
BODY MASS INDEX: 54.74 KG/M2 | HEART RATE: 100 BPM | WEIGHT: 293 LBS | DIASTOLIC BLOOD PRESSURE: 81 MMHG | SYSTOLIC BLOOD PRESSURE: 115 MMHG

## 2022-06-23 DIAGNOSIS — O10.019 BENIGN ESSENTIAL HYPERTENSION, ANTEPARTUM: ICD-10-CM

## 2022-06-23 DIAGNOSIS — O99.210 MATERNAL MORBID OBESITY, ANTEPARTUM (HCC): Primary | ICD-10-CM

## 2022-06-23 DIAGNOSIS — Z3A.20 20 WEEKS GESTATION OF PREGNANCY: ICD-10-CM

## 2022-06-23 DIAGNOSIS — E66.01 MORBID OBESITY WITH BMI OF 50.0-59.9, ADULT (HCC): ICD-10-CM

## 2022-06-23 DIAGNOSIS — E66.01 MATERNAL MORBID OBESITY, ANTEPARTUM (HCC): Primary | ICD-10-CM

## 2022-06-23 DIAGNOSIS — O34.32 CERVICAL FUNNELING AFFECTING PREGNANCY IN SECOND TRIMESTER: ICD-10-CM

## 2022-06-23 PROCEDURE — 99213 OFFICE O/P EST LOW 20 MIN: CPT | Performed by: OBSTETRICS & GYNECOLOGY

## 2022-06-23 PROCEDURE — 99024 POSTOP FOLLOW-UP VISIT: CPT | Performed by: OBSTETRICS & GYNECOLOGY

## 2022-06-23 RX ORDER — FLUCONAZOLE 150 MG/1
150 TABLET ORAL ONCE
Qty: 2 TABLET | Refills: 0 | Status: SHIPPED | OUTPATIENT
Start: 2022-06-23 | End: 2022-06-23

## 2022-06-23 NOTE — PROGRESS NOTES
[address]  FETAL MEDICINE  [address] [facility] [address] [phone]   [facility] [address] FETAL MEDICINE  [address]  [facility] [address]  Dept: [address] [facility]: [phone]     2022    RE:  Lord Nguyen     : 1990   AGE: 27 y.o. Dear Dr. Ardine Opitz,    Thank you for allowing me to see Lord Nguyen. As I'm sure you will recall, Lord Nguyen is a 27 y.o. [id number]'s last menstrual period was 2022.  Estimated Date of Delivery: 22 at 20w4d seen in our office today for the following:    REASON FOR VISIT: Post cerclage checkup    Patient Active Problem List    Diagnosis Date Noted    19 weeks gestation of pregnancy 2022    Maternal morbid obesity, antepartum ([facility] 06/15/2022    Incompetent cervix during second trimester, antepartum 06/15/2022    Benign essential hypertension, antepartum 06/15/2022    Morbid obesity with BMI of 50.0-59.9, adult ([address] 06/15/2022        PAST HISTORY:  OB History    Para Term  AB Living   3 0 0 0 2 0   SAB IAB Ectopic Molar Multiple Live Births   2 0 0 0 0 0      # Outcome Date GA Lbr Skyler/2nd Weight Sex Delivery Anes PTL Lv   3 Current            2 2021 6w0d          1 2020 5w0d                 MEDICAL:  Past Medical History:   Diagnosis Date    Hypertension         SURGICAL:  Past Surgical History:   Procedure Laterality Date    CERVICAL CERCLAGE N/A 2022    CERVIX CERCLAGE PLACEMENT performed by Cody Bradley MD at [facility] OR    WISDOM TOOTH EXTRACTION         ALLERGIES:    No Known Allergies      MEDICATIONS:    Current Outpatient Medications   Medication Sig Dispense Refill    indomethacin (INDOCIN) 50 MG capsule Take 1 capsule by mouth every 8 hours for 6 doses 60 capsule 3    Blood Glucose Monitoring Suppl (ONE TOUCH ULTRA 2) w/Device KIT USE AS DIRECTED      ONETOUCH ULTRA strip TEST AS DIRECTED FOUR TIMES DAILY      Lancets (ONETOUCH DELICA PLUS [id number]) MISC TEST FOUR TIMES DAILY      Prenatal Vit-Fe Fumarate-FA (PRENATAL VITAMIN) 27-1 MG TABS tablet Take 1 tablet by mouth daily 30 tablet 11     No current facility-administered medications for this visit. Social History     Socioeconomic History    Marital status: Single     Spouse name: None    Number of children: None    Years of education: None    Highest education level: None   Occupational History    None   Tobacco Use    Smoking status: Former Smoker    Smokeless tobacco: Never Used   Vaping Use    Vaping Use: Never used   Substance and Sexual Activity    Alcohol use: Not Currently     Comment: social    Drug use: No    Sexual activity: None   Other Topics Concern    None   Social History Narrative    None     Social Determinants of Health     Financial Resource Strain:     Difficulty of Paying Living Expenses: Not on file   Food Insecurity:     Worried About Running Out of Food in the Last Year: Not on file    Andi of Food in the Last Year: Not on file   Transportation Needs:     Lack of Transportation (Medical): Not on file    Lack of Transportation (Non-Medical):  Not on file   Physical Activity:     Days of Exercise per Week: Not on file    Minutes of Exercise per Session: Not on file   Stress:     Feeling of Stress : Not on file   Social Connections:     Frequency of Communication with Friends and Family: Not on file    Frequency of Social Gatherings with Friends and Family: Not on file    Attends Buddhism Services: Not on file    Active Member of Clubs or Organizations: Not on file    Attends Club or Organization Meetings: Not on file    Marital Status: Not on file   Intimate Partner Violence:     Fear of Current or Ex-Partner: Not on file    Emotionally Abused: Not on file    Physically Abused: Not on file    Sexually Abused: Not on file   Housing Stability:     Unable to Pay for Housing in the Last Year: Not on file    Number of Places Lived in the Last Year: Not on file    Unstable Housing in the Last Year: Not on file          FAMILY MEDICAL HISTORY:   History reviewed. No pertinent family history. PHYSICAL EXAMINATION:  /81   Pulse 100   Wt (!) 349 lb 8 oz (158.5 kg)   LMP 01/30/2022   Body mass index is 54.74 kg/m². Urine dipstick:  No results found for this visit on 06/23/22. Discussion:  1. Cervix appeared to be long and closed. 2. The knot for the cerclage is between 12 and 1:00.  3. It does look like the patient may have yeast.    IMPRESSION:  1. Single intrauterine gestation at 20+ weeks with an incompetent cervix status post cerclage placement with yeast infection. RECOMMENDATIONS:  Each of the recommendations were discussed with the patient:  1. I will give her Diflucan for her yeast.  2.  She is to follow-up in 3 weeks for completion of anatomy. The patient is to continue to follow with you in your office for ongoing obstetric care. PLAN:    As noted above or sooner prn.     Sincerely,        Anastasia Owen MD

## 2022-06-23 NOTE — PROGRESS NOTES
Pt here for pregnancy ultrasound  Pt had cerclage 1 week ago today  Pt continuing to take Indocin at home  Dr Narendra Simpson prescribed labetolol but patient did not start medicine as bp stable  Pt taking blood sugars at home and checking fasting in morning-ranges from   No urine obtained in triage area  Pt took antibiotic and thinks she may have yeast infection   Pt feeling fluttering for movement

## 2022-06-23 NOTE — PATIENT INSTRUCTIONS
Patient Education        Weeks 18 to 22 of Your Pregnancy: Care Instructions  Overview     Your baby is continuing to develop quickly. Sometime between 18 and 22 weeks, you'll probably start to feel your baby move. At first, these small fetal movements feel like fluttering or \"butterflies. \" Or they may feel like gas bubbles. As your baby grows, these movements will become stronger. You may also notice that your baby hiccups. Babies at this stage cannow suck their thumbs. You may find that your nausea and fatigue are gone. You may feel better overall and have more energy than you did in your first trimester. But you might now also have some new discomforts, like sleep problems or leg cramps. Talk to yourdoctor about things you can do at home to ease these problems. Follow-up care is a key part of your treatment and safety. Be sure to make and go to all appointments, and call your doctor if you are having problems. It's also a good idea to know your test results and keep alist of the medicines you take. How can you care for yourself at home? Ease sleep problems   Avoid caffeine in drinks or chocolate late in the day.  Get some exercise every day.  Take a warm shower or bath before bed.  Have a light snack or glass of milk at bedtime.  Do relaxation exercises in bed to calm your mind and body.  Support your legs and back with extra pillows. Try a pillow between your legs if you sleep on your side.  Do not use sleeping pills or alcohol. They could harm your baby. Ease leg cramps   Do not massage your calf during the cramp.  Sit on a firm bed or chair. Straighten your leg, and bend your foot (flex your ankle) slowly upward, toward your knee. Bend your toes up and down.  Stand on a cool, flat surface. Stretch your toes upward, and take small steps walking on your heels.  Use a heating pad or hot water bottle to help with muscle ache. Prevent leg cramps   Be sure to get enough calcium.  If you are worried that you are not getting enough, talk to your doctor.  Exercise every day, and stretch your legs before bed.  Take a warm bath before bed, and try leg warmers at night. Where can you learn more? Go to https://john.[employer]. org and sign in to your [employer] account. Enter V146 in the [facility] box to learn more about \"Weeks 18 to 22 of Your Pregnancy: Care Instructions. \"     If you do not have an account, please click on the \"Sign Up Now\" link. Current as of: February 23, 2022               Content Version: 13.3  © 2006-2022 [employer]. Care instructions adapted under license by [facility] ([facility]). If you have questions about a medical condition or this instruction, always ask your healthcare professional. [address] any warranty or liability for your use of this information. Patient Education        Learning About When to Call Your Doctor During Pregnancy (After 20 Weeks)  Overview  It's common to have concerns about what might be a problem when you're pregnant. Most pregnancies don't have any serious problems. But it's still important to know when to call your doctor if you have certain symptoms orsigns of labor. These are general suggestions. Your doctor may give you some more informationabout when to call. When to call your doctor (after 20 weeks)  Call 911 anytime you think you may need emergency care. For example, call if:   You have severe vaginal bleeding.  You have sudden, severe pain in your belly.  You passed out (lost consciousness).  You have a seizure.  You see or feel the umbilical cord.  You think you are about to deliver your baby and can't make it safely to the hospital.  Call your doctor now or seek immediate medical care if:   You have vaginal bleeding.  You have belly pain.  You have a fever.    You have symptoms of preeclampsia, such as:  ? Sudden swelling of your face, hands, or feet.  ? New vision problems (such as dimness, blurring, or seeing spots). ? A severe headache.  You have a sudden release of fluid from your vagina. (You think your water broke.)   You think that you may be in labor. This means that you've had at least 6 contractions in an hour.  You notice that your baby has stopped moving or is moving much less than normal.   You have symptoms of a urinary tract infection. These may include:  ? Pain or burning when you urinate. ? A frequent need to urinate without being able to pass much urine. ? Pain in the flank, which is just below the rib cage and above the waist on either side of the back. ? Blood in your urine. Watch closely for changes in your health, and be sure to contact your doctor if:   You have vaginal discharge that smells bad.  You have skin changes, such as:  ? A rash. ? Itching. ? Yellow color to your skin.  You have other concerns about your pregnancy. If you have labor signs at 37 weeks or more  If you have signs of labor at 37 weeks or more, your doctor may tell you tocall when your labor becomes more active. Symptoms of active labor include:   Contractions that are regular.  Contractions that are less than 5 minutes apart.  Contractions that are hard to talk through. Follow-up care is a key part of your treatment and safety. Be sure to make and go to all appointments, and call your doctor if you are having problems. It's also a good idea to know your test results and keep alist of the medicines you take. Where can you learn more? Go to https://john.[employer]. org and sign in to your [employer] account. Enter  in the [facility] box to learn more about \"Learning About When to Call Your Doctor During Pregnancy (After 20 Weeks). \"     If you do not have an account, please click on the \"Sign Up Now\" link.   Current as of: February 23, 2022               Content Version: 13.3  © 7676-2244 Healthwise[employer]. Care instructions adapted under license by [facility] ([facility]). If you have questions about a medical condition or this instruction, always ask your healthcare professional. [address] any warranty or liability for your use of this information. Please arrive for your scheduled appointment at least 15 minutes early with your actual insurance card+ a photo ID. Also if you need any refills ordered or have questions, it may take up 48 hours to reply. Please allow ample time for your refills. Call me when you use last refill. Thank you for your cooperation. Call your primary obstetrician with bleeding, leaking of fluid, abdominal tenderness, headache, blurry vision, epigastric pain and increased urinary frequency. If you are experiencing an emergency and need immediate help, call 911 or go to go emergency room or labor and delivery. if you are sick, not feeling well or have an infectious process going on please reschedule your appointment by calling [phone]. Also if any family members are not feeling well, please do not bring them to your appointment. We appreciate your cooperation. We are doing this in order to protect our pregnant mothers+ their babies. if you are sick, not feeling well or have an infectious process going on please reschedule your appointment by calling [phone]. Also if any family members are not feeling well, please do not bring them to your appointment. We appreciate your cooperation. We are doing this in order to protect our pregnant mothers+ their babies.

## 2022-07-12 ENCOUNTER — ANCILLARY PROCEDURE (OUTPATIENT)
Dept: OBGYN CLINIC | Age: 32
DRG: 547 | End: 2022-07-12
Payer: MEDICAID

## 2022-07-12 ENCOUNTER — HOSPITAL ENCOUNTER (INPATIENT)
Age: 32
LOS: 19 days | Discharge: HOME | DRG: 547 | End: 2022-08-01
Attending: OBSTETRICS & GYNECOLOGY | Admitting: STUDENT IN AN ORGANIZED HEALTH CARE EDUCATION/TRAINING PROGRAM
Payer: MEDICAID

## 2022-07-12 DIAGNOSIS — N89.8 VAGINA ITCHING: ICD-10-CM

## 2022-07-12 DIAGNOSIS — O28.0 LOW MATERNAL SERUM VITAMIN B12: ICD-10-CM

## 2022-07-12 DIAGNOSIS — O34.32 INCOMPETENT CERVIX IN PREGNANCY, ANTEPARTUM, SECOND TRIMESTER: ICD-10-CM

## 2022-07-12 DIAGNOSIS — O10.019 BENIGN ESSENTIAL HYPERTENSION, ANTEPARTUM: ICD-10-CM

## 2022-07-12 DIAGNOSIS — Z3A.23 23 WEEKS GESTATION OF PREGNANCY: Primary | ICD-10-CM

## 2022-07-12 DIAGNOSIS — R73.09 ELEVATED HEMOGLOBIN A1C: ICD-10-CM

## 2022-07-12 DIAGNOSIS — R79.0 LOW FERRITIN: ICD-10-CM

## 2022-07-12 DIAGNOSIS — R79.89 LOW VITAMIN D LEVEL: ICD-10-CM

## 2022-07-12 DIAGNOSIS — E66.01 MORBID OBESITY WITH BODY MASS INDEX OF 50.0-59.9 IN ADULT (HCC): ICD-10-CM

## 2022-07-12 LAB
ALBUMIN SERPL-MCNC: 3.2 G/DL (ref 3.5–5.2)
ALP BLD-CCNC: 59 U/L (ref 35–104)
ALT SERPL-CCNC: 10 U/L (ref 0–32)
AST SERPL-CCNC: 14 U/L (ref 0–31)
BACTERIA: ABNORMAL /HPF
BILIRUB SERPL-MCNC: <0.2 MG/DL (ref 0–1.2)
BILIRUBIN DIRECT: <0.2 MG/DL (ref 0–0.3)
BILIRUBIN URINE: NEGATIVE
BILIRUBIN, INDIRECT: ABNORMAL MG/DL (ref 0–1)
BLOOD, URINE: NEGATIVE
BUN BLDV-MCNC: 7 MG/DL (ref 6–20)
CLARITY: CLEAR
COLOR: YELLOW
CREAT SERPL-MCNC: 0.6 MG/DL (ref 0.5–1)
CREATININE URINE: 226 MG/DL (ref 29–226)
EPITHELIAL CELLS, UA: ABNORMAL /HPF
FIBRINOGEN: 497 MG/DL (ref 200–400)
GFR AFRICAN AMERICAN: >60
GFR NON-AFRICAN AMERICAN: >60 ML/MIN/1.73
GLUCOSE URINE: NEGATIVE MG/DL
HCT VFR BLD CALC: 32.9 % (ref 34–48)
HEMOGLOBIN: 10.9 G/DL (ref 11.5–15.5)
KETONES, URINE: NEGATIVE MG/DL
LACTATE DEHYDROGENASE: 153 U/L (ref 135–214)
LEUKOCYTE ESTERASE, URINE: ABNORMAL
MCH RBC QN AUTO: 29.2 PG (ref 26–35)
MCHC RBC AUTO-ENTMCNC: 33.1 % (ref 32–34.5)
MCV RBC AUTO: 88.2 FL (ref 80–99.9)
NITRITE, URINE: NEGATIVE
PDW BLD-RTO: 13.8 FL (ref 11.5–15)
PH UA: 7 (ref 5–9)
PLATELET # BLD: 390 E9/L (ref 130–450)
PMV BLD AUTO: 9.2 FL (ref 7–12)
PROTEIN PROTEIN: 15 MG/DL (ref 0–12)
PROTEIN UA: NEGATIVE MG/DL
PROTEIN/CREAT RATIO: 0.1
PROTEIN/CREAT RATIO: 0.1 (ref 0–0.2)
RBC # BLD: 3.73 E12/L (ref 3.5–5.5)
RBC UA: ABNORMAL /HPF (ref 0–2)
SPECIFIC GRAVITY UA: 1.02 (ref 1–1.03)
TOTAL PROTEIN: 6.2 G/DL (ref 6.4–8.3)
URIC ACID, SERUM: 5.1 MG/DL (ref 2.4–5.7)
UROBILINOGEN, URINE: 1 E.U./DL
WBC # BLD: 11.9 E9/L (ref 4.5–11.5)
WBC UA: ABNORMAL /HPF (ref 0–5)

## 2022-07-12 PROCEDURE — G0379 DIRECT REFER HOSPITAL OBSERV: HCPCS

## 2022-07-12 PROCEDURE — 84550 ASSAY OF BLOOD/URIC ACID: CPT

## 2022-07-12 PROCEDURE — 85384 FIBRINOGEN ACTIVITY: CPT

## 2022-07-12 PROCEDURE — 84520 ASSAY OF UREA NITROGEN: CPT

## 2022-07-12 PROCEDURE — 6370000000 HC RX 637 (ALT 250 FOR IP)

## 2022-07-12 PROCEDURE — 82565 ASSAY OF CREATININE: CPT

## 2022-07-12 PROCEDURE — 82570 ASSAY OF URINE CREATININE: CPT

## 2022-07-12 PROCEDURE — 85027 COMPLETE CBC AUTOMATED: CPT

## 2022-07-12 PROCEDURE — 99252 IP/OBS CONSLTJ NEW/EST SF 35: CPT | Performed by: OBSTETRICS & GYNECOLOGY

## 2022-07-12 PROCEDURE — 0UVC7ZZ RESTRICTION OF CERVIX, VIA NATURAL OR ARTIFICIAL OPENING: ICD-10-PCS | Performed by: STUDENT IN AN ORGANIZED HEALTH CARE EDUCATION/TRAINING PROGRAM

## 2022-07-12 PROCEDURE — 36415 COLL VENOUS BLD VENIPUNCTURE: CPT

## 2022-07-12 PROCEDURE — 80076 HEPATIC FUNCTION PANEL: CPT

## 2022-07-12 PROCEDURE — 76816 OB US FOLLOW-UP PER FETUS: CPT | Performed by: OBSTETRICS & GYNECOLOGY

## 2022-07-12 PROCEDURE — G0378 HOSPITAL OBSERVATION PER HR: HCPCS

## 2022-07-12 PROCEDURE — 99219 PR INITIAL OBSERVATION CARE/DAY 50 MINUTES: CPT | Performed by: ADVANCED PRACTICE MIDWIFE

## 2022-07-12 PROCEDURE — 84156 ASSAY OF PROTEIN URINE: CPT

## 2022-07-12 PROCEDURE — 81001 URINALYSIS AUTO W/SCOPE: CPT

## 2022-07-12 PROCEDURE — 6370000000 HC RX 637 (ALT 250 FOR IP): Performed by: OBSTETRICS & GYNECOLOGY

## 2022-07-12 PROCEDURE — 83615 LACTATE (LD) (LDH) ENZYME: CPT

## 2022-07-12 RX ORDER — SODIUM CHLORIDE 0.9 % (FLUSH) 0.9 %
5-40 SYRINGE (ML) INJECTION EVERY 12 HOURS SCHEDULED
Status: DISCONTINUED | OUTPATIENT
Start: 2022-07-12 | End: 2022-07-25

## 2022-07-12 RX ORDER — SODIUM CHLORIDE 0.9 % (FLUSH) 0.9 %
5-40 SYRINGE (ML) INJECTION PRN
Status: DISCONTINUED | OUTPATIENT
Start: 2022-07-12 | End: 2022-07-25

## 2022-07-12 RX ORDER — ACETAMINOPHEN 325 MG/1
TABLET ORAL
Status: COMPLETED
Start: 2022-07-12 | End: 2022-07-12

## 2022-07-12 RX ORDER — SODIUM CHLORIDE 9 MG/ML
INJECTION, SOLUTION INTRAVENOUS PRN
Status: DISCONTINUED | OUTPATIENT
Start: 2022-07-12 | End: 2022-07-25

## 2022-07-12 RX ORDER — ACETAMINOPHEN 325 MG/1
650 TABLET ORAL EVERY 4 HOURS PRN
Status: DISCONTINUED | OUTPATIENT
Start: 2022-07-12 | End: 2022-08-01 | Stop reason: HOSPADM

## 2022-07-12 RX ORDER — INDOMETHACIN 50 MG/1
50 CAPSULE ORAL 2 TIMES DAILY WITH MEALS
COMMUNITY
End: 2022-08-01

## 2022-07-12 RX ORDER — NIFEDIPINE 30 MG/1
30 TABLET, FILM COATED, EXTENDED RELEASE ORAL DAILY
Status: DISCONTINUED | OUTPATIENT
Start: 2022-07-12 | End: 2022-07-15

## 2022-07-12 RX ADMIN — NIFEDIPINE 30 MG: 30 TABLET, EXTENDED RELEASE ORAL at 19:40

## 2022-07-12 RX ADMIN — ACETAMINOPHEN 650 MG: 325 TABLET ORAL at 23:36

## 2022-07-12 ASSESSMENT — PAIN DESCRIPTION - ORIENTATION: ORIENTATION: INNER

## 2022-07-12 ASSESSMENT — PAIN DESCRIPTION - DESCRIPTORS: DESCRIPTORS: NAGGING

## 2022-07-12 ASSESSMENT — PAIN DESCRIPTION - LOCATION: LOCATION: HEAD

## 2022-07-12 ASSESSMENT — PAIN SCALES - GENERAL: PAINLEVEL_OUTOF10: 8

## 2022-07-12 ASSESSMENT — PAIN - FUNCTIONAL ASSESSMENT: PAIN_FUNCTIONAL_ASSESSMENT: ACTIVITIES ARE NOT PREVENTED

## 2022-07-12 NOTE — PROGRESS NOTES
23w2d presents to antepartum from dr Canas Or office for elevated bp's. Pt states she did have a headache this morning and yesterday morning, states headache went away after taking tylenol. Pt states she used to see Dr Abdon Daniel and was taking labetalol but it made her dizzy so she stopped. Pt is now seeing Dr Nidhi Croft and has not needed any more bp medication. Pt had a cerclage placed in , is still taking indocin prescription. Pt denies any bleeding, leaking, or contractions. Placed on EFM, -150, very difficult to continuously monitor due to maternal habitus. Will notify house officer.

## 2022-07-12 NOTE — H&P
Department of Obstetrics and Gynecology  Midwife Obstetrics History and Physical  Admission H and P / Observation Initial Evaluation        CHIEF COMPLAINT:  Sent from Office for elevated blood pressures    HISTORY OF PRESENT ILLNESS:  Lelia Hernandez is a 27 y.o. female , Patient's last menstrual period was 2022.,  at 23w2d. Presents to L&D with  1. A daily headache every day for last two days, located left temple, with no other associated symptoms. Headache has usually resolved with Tylenol within the hour. No headache today. 2. Increased central heart burn / no R sided epigastric pain  3. Positive fetal movement. No leaking of fluid or contraction. 4. S/P Cerclage 3 weeks ago. OB History        3    Para   0    Term   0       0    AB   2    Living   0       SAB   2    IAB   0    Ectopic   0    Molar   0    Multiple   0    Live Births   0                Estimated Due Date: determined by: LMP = 7w6d ultrasound      Pregnancy complicated by:   Patient Active Problem List   Diagnosis Code    Maternal morbid obesity, antepartum ([facility] O99.210, E66.01    Incompetent cervix during second trimester, antepartum O34.32    Benign essential hypertension, antepartum O10.019    Morbid obesity with BMI of 50.0-59.9, adult ([facility] [address] E66.01, Z68.43    20 weeks gestation of pregnancy Z3A.20     PAST OB HISTORY  OB History        3    Para   0    Term   0       0    AB   2    Living   0       SAB   2    IAB   0    Ectopic   0    Molar   0    Multiple   0    Live Births   0                Past Medical History:        Diagnosis Date    Hypertension      Specifically no history of asthma. Past Surgical History:        Procedure Laterality Date    CERVICAL CERCLAGE N/A 2022    CERVIX CERCLAGE PLACEMENT performed by Roland Caldwell MD at [facility] L[facility]D OR    WISDOM TOOTH EXTRACTION       Specifically no history of anesthesia reactions or problems.    No history of bleeding or trouble healing / recovering from surgery. Social History:    TOBACCO:   reports that she has quit smoking. She has never used smokeless tobacco.  ETOH:   reports previous alcohol use. DRUGS:   reports no history of drug use. Family History:   History reviewed. No pertinent family history. Specifically no family history of bleeding problems or anesthesia reactions. Medications Prior to Admission:  Medications Prior to Admission: indomethacin (INDOCIN) 50 MG capsule, Take 50 mg by mouth 2 times daily (with meals)  indomethacin (INDOCIN) 50 MG capsule, Take 1 capsule by mouth every 8 hours for 6 doses  Blood Glucose Monitoring Suppl (ONE TOUCH ULTRA 2) w/Device KIT, USE AS DIRECTED  ONETOUCH ULTRA strip, TEST AS DIRECTED FOUR TIMES DAILY  Lancets (ONETOUCH DELICA PLUS [id number]) MISC, TEST FOUR TIMES DAILY  Prenatal Vit-Fe Fumarate-FA (PRENATAL VITAMIN) 27-1 MG TABS tablet, Take 1 tablet by mouth daily    Allergies:  Patient has no known allergies.     Prenatal Labs  Blood type: O  Antibody screen:   Hemoglobin:   Lab Results   Component Value Date    HGB 11.5 06/15/2022      Hematocrit:   Lab Results   Component Value Date    HCT 35.0 06/15/2022      Platelets:   Rubella: Immine  RPR: Neg  Hepatitis B Surface Antigen: Neg  HIV:Neg  Hep C:    Gonorrhea:   Lab Results   Component Value Date    GONDNA NEGATIVE 08/05/2020     Chlamydia:   Lab Results   Component Value Date    LABCHLA NEGATIVE 08/05/2020     Group B Strep: N/A    COVID-19:   Vaccination status:   1 vaccine, needs second dose and boosters  Lab Results   Component Value Date    COVID19 Not Detected 04/14/2022       TDAP vaccination status: N/A  Flu Vaccination status: N/A    REVIEW OF SYSTEMS:          CONSTITUTIONAL :      No fever, no chills   HEENT :                         Headache absent,   visual disturbances absent  CARDIOVASCULAR :    No chest pain, no palpitations, no edema   RESPIRATORY :            No pain, no shortness of breath ABDOMEN/Uterus:   GASTROINTESTINAL : No N/V, no D/C,    abdominal pain absent   GENITOURINARY :      Dysuria   absent,   hematuria absent,   urinary frequency absent  Vaginal bleeding absent  Vaginal discharge absent  MUSCULOSKELETAL:  No myalgia,   back pain absent  NEUROLOGICAL :    No migraine, no seizures. INTEGUMENTARY: Denies lesions or rashes, Edema none    Pertinent positives and negatives addressed in HPI, other systems reviewed and negative      PHYSICAL EXAM:    /64   Pulse 100   Temp 98.2 °F (36.8 °C) (Oral)   Resp 18   LMP 2022     General appearance:  awake, alert, cooperative, no apparent distress, and appears stated age  Neurologic:  Awake, alert, oriented to name, place and time. Ambulatory to unit    Lungs:  Respirations easy and unlabored  Abdomen:   soft, gravid, non-tender,    Fetal position vertex by Leopold's   EFW AGA  : CVA tenderness absent  EXTREMITIES:   Fetal heart rate:  Baseline Heart Rate 145     SSE: not done      Impression:    1. 27 y.o.  23w2d   2, GBS: unknown  3. Fetal Heart Tracing category: appropriate for 23 weeks  4. Elevated blood pressures       Plan:  Pre-ecclamptic labs  Cycle Bps Q 15    I will call dr Danette Morrison with results of Pre-E panel.              Electronically signed by DONTAE Montero CNM on 2022 at 11:05 AM

## 2022-07-12 NOTE — CONSULTS
[facility]  [address] [place] [address].  [address] [facility]. [address]  Ph: [phone] Fax: [phone]       RE: Jose Maria Alvarado [birth year]  Dear Alessio Lindsay : We saw  Ms. Sameer Bose  for antepartum testing  i ultrasound  on the Antepartum Unit at [facility] in [facility]  on  22 . OB History 32yo  3. Para 0 @ 23w2d    Risk Factors Morbid Obesity - 57  359#    bmi 56   Two Previous SAB  Open Cervix with funneling of the amniotic membranes to the external cervical os. S/P Cerclage   22   High Blood Pressure - labile               ~Note - VSS- Afebrile - no complaints   bp 123/61      The patient had a detailed ultrasound performed today which was reassuring . A detailed report is included in the EMR under the imaging tab from today's date. 1. Single living intrauterine pregnancy at 23w 2d by clinical NILESH. 2. There is appropriate interval growth. 3. EFW is 43% at 550 g.   4. Amniotic fluid appeared normal.   5. Placenta is anterior without evidence of previa. 6. Transvaginal Ultrasound: The cervix measures 10.1 mm with a funnel and debris is noted. The cerclage is visualized. Reviewed w/ attending. Suggest Nifedipine XR 30mg - benefit from BPO control and limiting uterine   irritability   DVT precautions while abed.    Will follow- Steroids / at 23w5/6d    MgSO4 for neuroprotection  @ 24w       Marty Mars MD  Maternal Fetal Medicine      cerclage inplace  across top of picture

## 2022-07-13 ENCOUNTER — ANCILLARY PROCEDURE (OUTPATIENT)
Dept: OBGYN CLINIC | Age: 32
DRG: 547 | End: 2022-07-13
Payer: MEDICAID

## 2022-07-13 LAB
REASON FOR REJECTION: NORMAL
REJECTED TEST: NORMAL
SARS-COV-2, NAAT: NORMAL

## 2022-07-13 PROCEDURE — 1220000000 HC SEMI PRIVATE OB R&B

## 2022-07-13 PROCEDURE — 87635 SARS-COV-2 COVID-19 AMP PRB: CPT

## 2022-07-13 PROCEDURE — 6370000000 HC RX 637 (ALT 250 FOR IP): Performed by: OBSTETRICS & GYNECOLOGY

## 2022-07-13 PROCEDURE — 76815 OB US LIMITED FETUS(S): CPT | Performed by: OBSTETRICS & GYNECOLOGY

## 2022-07-13 RX ADMIN — ACETAMINOPHEN 650 MG: 325 TABLET ORAL at 22:48

## 2022-07-13 RX ADMIN — NIFEDIPINE 30 MG: 30 TABLET, EXTENDED RELEASE ORAL at 09:25

## 2022-07-13 ASSESSMENT — PAIN DESCRIPTION - DESCRIPTORS
DESCRIPTORS: CRAMPING
DESCRIPTORS: CRAMPING
DESCRIPTORS: ACHING

## 2022-07-13 ASSESSMENT — PAIN - FUNCTIONAL ASSESSMENT: PAIN_FUNCTIONAL_ASSESSMENT: ACTIVITIES ARE NOT PREVENTED

## 2022-07-13 ASSESSMENT — PAIN SCALES - GENERAL: PAINLEVEL_OUTOF10: 6

## 2022-07-13 ASSESSMENT — PAIN DESCRIPTION - ORIENTATION: ORIENTATION: LEFT;LOWER

## 2022-07-13 ASSESSMENT — PAIN DESCRIPTION - LOCATION: LOCATION: BACK

## 2022-07-13 NOTE — PROGRESS NOTES
CHIEF COMPLAINT:  elevated blood pressure/cervical incompetence    HISTORY OF PRESENT ILLNESS:      The patient is a 27 y.o. female at 18w1d. OB History        3    Para   0    Term   0       0    AB   2    Living   0       SAB   2    IAB   0    Ectopic   0    Molar   0    Multiple   0    Live Births   0            Patient presents with a chief complaint as above and is being admitted for observation    Estimated Due Date: Estimated Date of Delivery: 22    PRENATAL CARE:    Complicated by: none    PAST OB HISTORY  OB History        3    Para   0    Term   0       0    AB   2    Living   0       SAB   2    IAB   0    Ectopic   0    Molar   0    Multiple   0    Live Births   0                Past Medical History:        Diagnosis Date    Hypertension      Past Surgical History:        Procedure Laterality Date    CERVICAL CERCLAGE N/A 2022    CERVIX CERCLAGE PLACEMENT performed by Cheryl Dominique MD at [facility] L&D OR    WISDOM TOOTH EXTRACTION       Allergies:  Patient has no known allergies.   Social History:    Social History     Socioeconomic History    Marital status: Single     Spouse name: Not on file    Number of children: Not on file    Years of education: Not on file    Highest education level: Not on file   Occupational History    Not on file   Tobacco Use    Smoking status: Former Smoker    Smokeless tobacco: Never Used   Vaping Use    Vaping Use: Never used   Substance and Sexual Activity    Alcohol use: Not Currently     Comment: social    Drug use: No    Sexual activity: Not on file   Other Topics Concern    Not on file   Social History Narrative    Not on file     Social Determinants of Health     Financial Resource Strain:     Difficulty of Paying Living Expenses: Not on file   Food Insecurity:     Worried About [address] in the Last Year: Not on file    Andi of Food in the Last Year: Not on file   Transportation Needs:     Lack of Transportation (Medical): Not on file    Lack of Transportation (Non-Medical): Not on file   Physical Activity:     Days of Exercise per Week: Not on file    Minutes of Exercise per Session: Not on file   Stress:     Feeling of Stress : Not on file   Social Connections:     Frequency of Communication with Friends and Family: Not on file    Frequency of Social Gatherings with Friends and Family: Not on file    Attends Baptism Services: Not on file    Active Member of [address] [place] [address] or Organizations: Not on file    Attends Club or Organization Meetings: Not on file    Marital Status: Not on file   Intimate Partner Violence:     Fear of Current or Ex-Partner: Not on file    Emotionally Abused: Not on file    Physically Abused: Not on file    Sexually Abused: Not on file   Housing Stability:     Unable to Pay for Housing in the Last Year: Not on file    Number of [place] in the Last Year: Not on file    Unstable Housing in the Last Year: Not on file     Family History:   History reviewed. No pertinent family history.   Medications Prior to Admission:  Medications Prior to Admission: indomethacin (INDOCIN) 50 MG capsule, Take 50 mg by mouth 2 times daily (with meals)  indomethacin (INDOCIN) 50 MG capsule, Take 1 capsule by mouth every 8 hours for 6 doses  Blood Glucose Monitoring Suppl (ONE TOUCH ULTRA 2) w/Device KIT, USE AS DIRECTED  ONETOUCH ULTRA strip, TEST AS DIRECTED FOUR TIMES DAILY  Lancets (ONETOUCH DELICA PLUS [id number]) MISC, TEST FOUR TIMES DAILY  Prenatal Vit-Fe Fumarate-FA (PRENATAL VITAMIN) 27-1 MG TABS tablet, Take 1 tablet by mouth daily    REVIEW OF SYSTEMS:    CONSTITUTIONAL:  negative  RESPIRATORY:  negative  CARDIOVASCULAR:  negative  GASTROINTESTINAL:  negative  ALLERGIC/IMMUNOLOGIC:  negative  NEUROLOGICAL:  negative  BEHAVIOR/PSYCH:  negative    PHYSICAL EXAM:  Vitals:    07/12/22 1940 07/12/22 2341 07/13/22 0450 07/13/22 0925   BP: 139/86 121/63 134/79 (!) 143/90   Pulse: 100 96 86 (!) 101   Resp:  16 16 16   Temp:    98.2 °F (36.8 °C)   TempSrc:    Oral     General appearance:  awake, alert, cooperative, no apparent distress, and appears stated age  Neurologic:  Awake, alert, oriented to name, place and time. Lungs:  No increased work of breathing, good air exchange  Abdomen:  Soft, non tender, gravid, consistent with her gestational age,  Fetal heart rate:  Reassuring.   Pelvis:  Adequate pelvis    Membranes:  Intact    ASSESSMENT AND PLAN:    Labor: Admit,r/o PIH, routine labor orders  Fetus: Reassuring    Other: IV hydration and antiemetics, MFM for HTN /cervical cerclage   Discussed with dr Brandie Sousa

## 2022-07-13 NOTE — PROGRESS NOTES
Care of patient taken over. Patient denies leaking of fluid, vaginal bleeding, ctx. +FM. Heart tones Dopplered at 152. Nifedipine given. No needs at this time. Call light within reach.

## 2022-07-14 ENCOUNTER — ANCILLARY PROCEDURE (OUTPATIENT)
Dept: OBGYN CLINIC | Age: 32
DRG: 547 | End: 2022-07-14
Payer: MEDICAID

## 2022-07-14 PROCEDURE — 6370000000 HC RX 637 (ALT 250 FOR IP): Performed by: OBSTETRICS & GYNECOLOGY

## 2022-07-14 PROCEDURE — 1220000000 HC SEMI PRIVATE OB R&B

## 2022-07-14 PROCEDURE — 76815 OB US LIMITED FETUS(S): CPT | Performed by: OBSTETRICS & GYNECOLOGY

## 2022-07-14 RX ADMIN — NIFEDIPINE 30 MG: 30 TABLET, EXTENDED RELEASE ORAL at 08:50

## 2022-07-14 NOTE — PROGRESS NOTES
Patient seen today. No complaints  Patient has no contractions  Fetal heart tones stable  Reviewed MFM note for possible shortening of the cervix  Patient has been placed on calcium channel blocker.   Blood pressure  Blood pressure stable  Intrauterine pregnancy at 23+ weeks  Consider steroids on her soon  Elevate MFM for counseled and decision for discharge

## 2022-07-14 NOTE — FLOWSHEET NOTE
Pt now states she is no longer having pain in left side, but still has lower back pain she rates a 6

## 2022-07-14 NOTE — PROGRESS NOTES
Dr Asha Gilman called and updated on Bps.  Orders to watch the patient over night and we will reassess in the AM

## 2022-07-14 NOTE — PROGRESS NOTES
Pt called out and states she is having pain that just started in her left side and her lower back. She describes the left sided pain constant, cramping rates 8 on 1-10 scale. Back pain she describes as dull constant aching and rates 7 on 1-10 scale. Pt placed on toco. Bedside nurse updated and will medicate with Tylenol that is ordered prn.

## 2022-07-15 ENCOUNTER — ANCILLARY PROCEDURE (OUTPATIENT)
Dept: OBGYN CLINIC | Age: 32
DRG: 547 | End: 2022-07-15
Payer: MEDICAID

## 2022-07-15 LAB
ALBUMIN SERPL-MCNC: 3.9 G/DL (ref 3.5–5.2)
ALP BLD-CCNC: 65 U/L (ref 35–104)
ALT SERPL-CCNC: 10 U/L (ref 0–32)
ANION GAP SERPL CALCULATED.3IONS-SCNC: 17 MMOL/L (ref 7–16)
AST SERPL-CCNC: 9 U/L (ref 0–31)
BACTERIA: ABNORMAL /HPF
BASOPHILS ABSOLUTE: 0.02 E9/L (ref 0–0.2)
BASOPHILS RELATIVE PERCENT: 0.1 % (ref 0–2)
BILIRUB SERPL-MCNC: <0.2 MG/DL (ref 0–1.2)
BILIRUBIN URINE: NEGATIVE
BLOOD, URINE: NEGATIVE
BUN BLDV-MCNC: 9 MG/DL (ref 6–20)
CALCIUM SERPL-MCNC: 9.7 MG/DL (ref 8.6–10.2)
CHLORIDE BLD-SCNC: 100 MMOL/L (ref 98–107)
CLARITY: CLEAR
CLUE CELLS: NORMAL
CO2: 17 MMOL/L (ref 22–29)
COLOR: YELLOW
CREAT SERPL-MCNC: 0.6 MG/DL (ref 0.5–1)
CREATININE URINE: 115 MG/DL (ref 29–226)
EOSINOPHILS ABSOLUTE: 0.01 E9/L (ref 0.05–0.5)
EOSINOPHILS RELATIVE PERCENT: 0.1 % (ref 0–6)
FOLATE: 14.1 NG/ML (ref 4.8–24.2)
GFR AFRICAN AMERICAN: >60
GFR NON-AFRICAN AMERICAN: >60 ML/MIN/1.73
GLUCOSE BLD-MCNC: 192 MG/DL (ref 74–99)
GLUCOSE URINE: >=1000 MG/DL
HBA1C MFR BLD: 5.7 % (ref 4–5.6)
HCT VFR BLD CALC: 35.6 % (ref 34–48)
HEMOGLOBIN: 11.8 G/DL (ref 11.5–15.5)
IMMATURE GRANULOCYTES #: 0.16 E9/L
IMMATURE GRANULOCYTES %: 0.9 % (ref 0–5)
KETONES, URINE: ABNORMAL MG/DL
LACTATE DEHYDROGENASE: 141 U/L (ref 135–214)
LEUKOCYTE ESTERASE, URINE: NEGATIVE
LYMPHOCYTES ABSOLUTE: 1.86 E9/L (ref 1.5–4)
LYMPHOCYTES RELATIVE PERCENT: 10.7 % (ref 20–42)
MAGNESIUM: 1.7 MG/DL (ref 1.6–2.6)
MCH RBC QN AUTO: 29.5 PG (ref 26–35)
MCHC RBC AUTO-ENTMCNC: 33.1 % (ref 32–34.5)
MCV RBC AUTO: 89 FL (ref 80–99.9)
MONOCYTES ABSOLUTE: 1.03 E9/L (ref 0.1–0.95)
MONOCYTES RELATIVE PERCENT: 5.9 % (ref 2–12)
MUCUS: PRESENT /LPF
NEUTROPHILS ABSOLUTE: 14.37 E9/L (ref 1.8–7.3)
NEUTROPHILS RELATIVE PERCENT: 82.3 % (ref 43–80)
NITRITE, URINE: NEGATIVE
PDW BLD-RTO: 13.7 FL (ref 11.5–15)
PH UA: 6 (ref 5–9)
PLATELET # BLD: 440 E9/L (ref 130–450)
PMV BLD AUTO: 9.3 FL (ref 7–12)
POTASSIUM SERPL-SCNC: 4 MMOL/L (ref 3.5–5)
PROTEIN PROTEIN: 7 MG/DL (ref 0–12)
PROTEIN UA: NEGATIVE MG/DL
PROTEIN/CREAT RATIO: 0.1
PROTEIN/CREAT RATIO: 0.1 (ref 0–0.2)
RBC # BLD: 4 E12/L (ref 3.5–5.5)
RBC UA: ABNORMAL /HPF (ref 0–2)
SODIUM BLD-SCNC: 134 MMOL/L (ref 132–146)
SOURCE WET PREP: NORMAL
SPECIFIC GRAVITY UA: 1.02 (ref 1–1.03)
T4 FREE: 0.9 NG/DL (ref 0.93–1.7)
TOTAL PROTEIN: 7.1 G/DL (ref 6.4–8.3)
TRICHOMONAS PREP: NORMAL
TSH SERPL DL<=0.05 MIU/L-ACNC: 1.12 UIU/ML (ref 0.27–4.2)
URIC ACID, SERUM: 4.2 MG/DL (ref 2.4–5.7)
UROBILINOGEN, URINE: 0.2 E.U./DL
VITAMIN B-12: 381 PG/ML (ref 211–946)
WBC # BLD: 17.5 E9/L (ref 4.5–11.5)
WBC UA: ABNORMAL /HPF (ref 0–5)
YEAST WET PREP: NORMAL

## 2022-07-15 PROCEDURE — 84439 ASSAY OF FREE THYROXINE: CPT

## 2022-07-15 PROCEDURE — 99233 SBSQ HOSP IP/OBS HIGH 50: CPT | Performed by: OBSTETRICS & GYNECOLOGY

## 2022-07-15 PROCEDURE — 82746 ASSAY OF FOLIC ACID SERUM: CPT

## 2022-07-15 PROCEDURE — 87088 URINE BACTERIA CULTURE: CPT

## 2022-07-15 PROCEDURE — 82570 ASSAY OF URINE CREATININE: CPT

## 2022-07-15 PROCEDURE — 6370000000 HC RX 637 (ALT 250 FOR IP): Performed by: OBSTETRICS & GYNECOLOGY

## 2022-07-15 PROCEDURE — 36415 COLL VENOUS BLD VENIPUNCTURE: CPT

## 2022-07-15 PROCEDURE — 80053 COMPREHEN METABOLIC PANEL: CPT

## 2022-07-15 PROCEDURE — 84550 ASSAY OF BLOOD/URIC ACID: CPT

## 2022-07-15 PROCEDURE — 81001 URINALYSIS AUTO W/SCOPE: CPT

## 2022-07-15 PROCEDURE — 99356 PR PROLONGED SVC I/P OR OBS SETTING 1ST HOUR: CPT | Performed by: OBSTETRICS & GYNECOLOGY

## 2022-07-15 PROCEDURE — 76815 OB US LIMITED FETUS(S): CPT | Performed by: OBSTETRICS & GYNECOLOGY

## 2022-07-15 PROCEDURE — 82306 VITAMIN D 25 HYDROXY: CPT

## 2022-07-15 PROCEDURE — 84156 ASSAY OF PROTEIN URINE: CPT

## 2022-07-15 PROCEDURE — 85025 COMPLETE CBC W/AUTO DIFF WBC: CPT

## 2022-07-15 PROCEDURE — 87491 CHLMYD TRACH DNA AMP PROBE: CPT

## 2022-07-15 PROCEDURE — 83735 ASSAY OF MAGNESIUM: CPT

## 2022-07-15 PROCEDURE — 87591 N.GONORRHOEAE DNA AMP PROB: CPT

## 2022-07-15 PROCEDURE — 84481 FREE ASSAY (FT-3): CPT

## 2022-07-15 PROCEDURE — 93975 VASCULAR STUDY: CPT | Performed by: OBSTETRICS & GYNECOLOGY

## 2022-07-15 PROCEDURE — 84443 ASSAY THYROID STIM HORMONE: CPT

## 2022-07-15 PROCEDURE — 87070 CULTURE OTHR SPECIMN AEROBIC: CPT

## 2022-07-15 PROCEDURE — 83036 HEMOGLOBIN GLYCOSYLATED A1C: CPT

## 2022-07-15 PROCEDURE — 6360000002 HC RX W HCPCS: Performed by: OBSTETRICS & GYNECOLOGY

## 2022-07-15 PROCEDURE — 86376 MICROSOMAL ANTIBODY EACH: CPT

## 2022-07-15 PROCEDURE — 76816 OB US FOLLOW-UP PER FETUS: CPT | Performed by: OBSTETRICS & GYNECOLOGY

## 2022-07-15 PROCEDURE — 87210 SMEAR WET MOUNT SALINE/INK: CPT

## 2022-07-15 PROCEDURE — 83615 LACTATE (LD) (LDH) ENZYME: CPT

## 2022-07-15 PROCEDURE — 1220000000 HC SEMI PRIVATE OB R&B

## 2022-07-15 PROCEDURE — 86800 THYROGLOBULIN ANTIBODY: CPT

## 2022-07-15 PROCEDURE — 82607 VITAMIN B-12: CPT

## 2022-07-15 PROCEDURE — 82728 ASSAY OF FERRITIN: CPT

## 2022-07-15 PROCEDURE — 2580000003 HC RX 258: Performed by: OBSTETRICS & GYNECOLOGY

## 2022-07-15 RX ORDER — PROGESTERONE 200 MG/1
CAPSULE ORAL
Qty: 30 CAPSULE | Refills: 3 | Status: ON HOLD | OUTPATIENT
Start: 2022-07-15 | End: 2022-10-08 | Stop reason: HOSPADM

## 2022-07-15 RX ORDER — NIFEDIPINE 30 MG/1
30 TABLET, FILM COATED, EXTENDED RELEASE ORAL EVERY 12 HOURS
Status: DISCONTINUED | OUTPATIENT
Start: 2022-07-15 | End: 2022-08-01 | Stop reason: HOSPADM

## 2022-07-15 RX ORDER — ASPIRIN 81 MG/1
81 TABLET, CHEWABLE ORAL DAILY
Status: DISCONTINUED | OUTPATIENT
Start: 2022-07-15 | End: 2022-08-01 | Stop reason: HOSPADM

## 2022-07-15 RX ORDER — CEFDINIR 300 MG/1
300 CAPSULE ORAL EVERY 12 HOURS SCHEDULED
Status: COMPLETED | OUTPATIENT
Start: 2022-07-18 | End: 2022-07-23

## 2022-07-15 RX ORDER — METRONIDAZOLE 500 MG/1
500 TABLET ORAL EVERY 8 HOURS SCHEDULED
Status: COMPLETED | OUTPATIENT
Start: 2022-07-18 | End: 2022-07-23

## 2022-07-15 RX ORDER — BETAMETHASONE SODIUM PHOSPHATE AND BETAMETHASONE ACETATE 3; 3 MG/ML; MG/ML
12 INJECTION, SUSPENSION INTRA-ARTICULAR; INTRALESIONAL; INTRAMUSCULAR; SOFT TISSUE ONCE
Status: COMPLETED | OUTPATIENT
Start: 2022-07-15 | End: 2022-07-15

## 2022-07-15 RX ORDER — CLARITHROMYCIN 500 MG/1
500 TABLET, COATED ORAL EVERY 12 HOURS SCHEDULED
Status: COMPLETED | OUTPATIENT
Start: 2022-07-15 | End: 2022-07-22

## 2022-07-15 RX ORDER — METRONIDAZOLE 500 MG/100ML
500 INJECTION, SOLUTION INTRAVENOUS EVERY 8 HOURS
Status: DISPENSED | OUTPATIENT
Start: 2022-07-15 | End: 2022-07-18

## 2022-07-15 RX ADMIN — Medication 1000 MG: at 23:56

## 2022-07-15 RX ADMIN — CLARITHROMYCIN 500 MG: 500 TABLET ORAL at 23:55

## 2022-07-15 RX ADMIN — ASPIRIN 81 MG CHEWABLE TABLET 81 MG: 81 TABLET CHEWABLE at 18:48

## 2022-07-15 RX ADMIN — BETAMETHASONE SODIUM PHOSPHATE AND BETAMETHASONE ACETATE 12 MG: 3; 3 INJECTION, SUSPENSION INTRA-ARTICULAR; INTRALESIONAL; INTRAMUSCULAR at 00:50

## 2022-07-15 RX ADMIN — NIFEDIPINE 30 MG: 30 TABLET, EXTENDED RELEASE ORAL at 09:27

## 2022-07-15 RX ADMIN — NIFEDIPINE 30 MG: 30 TABLET, EXTENDED RELEASE ORAL at 23:55

## 2022-07-15 NOTE — PROGRESS NOTES
Patient seen today. No complaints  Patient has no contractions  Fetal heart tones stable  Reviewed MFM note for possible shortening of the cervix  Steroids yesterday repeat today   Mgsulfate this weekend   Patient has been placed on calcium channel blocker.   Blood pressure  Blood pressure stable  Intrauterine pregnancy at 23+ weeks  Patient will be here over weekend

## 2022-07-15 NOTE — PROGRESS NOTES
[facility] Follow-Up Consultation/Antepartum Note    S:  The patient is a 79-year-old  3 para 0-0-2-0 currently at 23 weeks 5 days (LMP = [address]) who was sent to the hospital on 2022 secondary to blood pressure elevations that were noted during a routine obstetric visit. She had persistent diastolic elevation in the mid 90s. The patient was admitted on 2022. She was seen and evaluated by maternal-fetal medicine and started on nifedipine XL 30 mg daily. A transvaginal ultrasound was performed. The patient's cervix measured 10 mm with funneling seen to the level of the cerclage. Intra amniotic debris was noted in the cervical canal.  Betamethasone for fetal benefit was recommended at that time. Magnesium sulfate was also recommended. The patient received her first dose of betamethasone on 2022. The patient's blood pressures have continued to be labile. She denied having any symptoms of headache, vision change, chest pain, shortness of breath, nausea, vomiting, and or right upper quadrant pain. She notes fetal movement. She denies having any symptoms of leaking of fluid or vaginal bleeding. She reports having occasional cramping.     O:   Patient Vitals for the past 24 hrs:   BP Temp Temp src Pulse Resp   07/15/22 2259 (!) 140/77 -- -- 91 16   07/15/22 1810 134/85 98.1 °F (36.7 °C) Oral (!) 108 14   07/15/22 1427 130/75 -- -- (!) 112 14   07/15/22 1046 (!) 151/75 -- -- (!) 103 14   07/15/22 0929 (!) 178/91 97.8 °F (36.6 °C) Oral (!) 108 14        Gen NAD  CV RRR  Lungs CTA B  Abd Gravid/soft/NTTP/NABS  Ext no edema BLE, no calf TTP BLE, +1 patellar reflexes BLE, no clonus BLE    Ultrasound 23 weeks 2 days:  S IUP, cephalic, heart rate 008, anterior placenta, composite gestational age 21 weeks 0 days, EFW 1 pound 3 ounces, 43rd percentile, transvaginal cervical length 10 mm with funneling to the level of the cerclage, intra amniotic debris and sludge noted    Ultrasound 23 wk 5 days:   S IUP, breech, heart rate 153, anterior placenta, JOSE ANTONIO normal, the cervix appears dilated transabdominally. A/P:  27 y.o.  at 23w5d (LMP = 7 wk US) with:    1. Chronic versus Gestational hypertension -- The patient is a 34-year-old  3 para 0-0-2-0 currently at 23 weeks 5 days (LMP = 7 [address]) who was sent to the hospital on 2022 secondary to blood pressure elevations that were noted during a routine obstetric visit. She had persistent diastolic elevation in the mid 90s. The patient was admitted on 2022. She was seen and evaluated by maternal-fetal medicine and started on nifedipine XL 30 mg daily. A transvaginal ultrasound was performed. The patient's cervix measured 10 mm with funneling seen to the level of the cerclage. Intra amniotic debris was noted in the cervical canal.  Betamethasone for fetal benefit was recommended at that time. Magnesium sulfate was also recommended. The patient received her first dose of betamethasone on 2022. The patient's blood pressures have continued to be labile. She denied having any symptoms of headache, vision change, chest pain, shortness of breath, nausea, vomiting, and or right upper quadrant pain. She notes fetal movement. She denies having any symptoms of leaking of fluid or vaginal bleeding. She reports having occasional cramping. The patient's prenatal records were reviewed. Her blood pressure has been elevated since 6 weeks gestation. Thus there is concern for underlying chronic hypertension. --The risks of hypertension in pregnancy were reviewed including poor fetal growth, placental abruption, preeclampsia (50%),  delivery, and/or fetal loss. --A daily low-dose aspirin was recommended. She should continue this for the remainder of pregnancy and 6 to 8 weeks postpartum. --She should monitor fetal kick counts daily, instructions were reviewed.     --Preeclampsia labs should be checked weekly and prn symptoms  --In the absence of any additional complications, delivery can be planned for 37-39 weeks' gestation  --Delivery should be considered at 40 to 39 weeks gestation, or sooner with any additional complications. Delivery timing will be readdressed as the patient's pregnancy progresses. Delivery should be considered sooner with any nonreassuring fetal testing, persistent severe gestational hypertension, the need for  blood pressure medication, and/or the development of preeclampsia. --Recommend increasing Procardia XL dose from 30 mg daily to 30 mg twice daily  --Recommendation a daily LDASA for the remainder of pregnancy and 6-8 weeks postpartum  --Continue inpatient care  --Continue close monitoring for the development of preeclampsia. 2.  Cervical insufficiency status post cerclage placement -- The patient's prenatal records were reviewed. Is seen for a consultation by maternal-fetal medicine on 6/15/2022. Patient's cervix was noted to be open with the amniotic membranes tunneled to the level of the external cervical os. She was sent to the hospital for additional evaluation and possible cerclage placement. The patient underwent an exam/ultrasound indicated cerclage on  without complication. She had a follow-up visit with Dr. Chepe Bo on 2022. Per that note, the cerclage stitch was visualized and appeared to be intact. The patient appeared to have yeast on a speculum exam.    The patient's cervix was reevaluated on 2022. The cervix appeared to be completely funneled to the level of the external os. Is unclear if the cervix is dilated. The recommendation was made for betamethasone for fetal benefit and magnesium sulfate for neuro protection.     The patient is receiving betamethasone -7/15  Magnesium sulfate for fetal benefit ordered   labor and P PROM precautions were reviewed  Infection screening recommended  Recommend vaginal progesterone if there is residual cervical length. If the membranes are exposed, consider oral supplementation. 3.   Intra amniotic debris/sludge -- The ultrasound was reviewed with the patient. Intra amniotic debris was seen within the cervical canal overlying the internal cervical os. The patient was counseled that this finding can be associated with hemorrhage or intra amniotic infection. The patient is being followed for cervical insufficiency. The patient was counseled that the finding of intrauterine debris or sludge can be associated with hemorrhage or infection. Some studies have shown that treatment with intravenous or oral antibiotics can decrease the risk for complications associated with the finding of intra amniotic debris/sludge including chorioamnionitis, endometritis (23.1%),  infection (61.1%), poor fetal growth, and  birth (46.2%). Additionally, treatment has been shown to eliminate the sonographic presence of debris/sludge in some cases. The risks and benefits of antibiotics were discussed with the patient. After this conversation, the patient opted for treatment. Antibiotics were ordered including: Ceftriaxone 1 g every 12 hours x3 days then Omnicef 300 mg twice daily x5 days; Flagyl 500 mg IV every 8 hours x3 days then 500 mg p.o. twice daily x5 days; clarithromycin 500 mg twice daily x7 days. The regimen is based off a paper by Sherry Dumont et al., Evidence that antibiotic administration is effective in the treatment of a subset of patient with intra-amniotic infection/inflammation present ion with cervical insufficiency. This regimen has also been shown to be effective in prolonging latency in the setting of PPROM . 4. Anemia -- The patient's H/H at admission was noted to be low at 10.9/32.9  --Baseline nutrition panel, TFTs, hemoglobin electrophoresis, reticulocyte count, and peripheral smear ordered  --Supplement as needed    5.   Obesity in pregnancy -- Counseling was previously provided. She did not have any additional questions or concerns today. The patient has gained 12 pounds during this pregnancy. Her BMI is 56.23.    Given the increased risks previously described, additional testing is recommended. Fetal growth should be monitored every 3-4 weeks starting at 24-26 weeks' gestation. Fetal growth was appropriate for the gestational age today. The patient should monitor fetal kick counts daily, starting at 28 weeks' gestation. She should be scheduled for increased fetal surveillance with twice weekly fetal testing after 32 weeks' gestation. In the absence of any complications, delivery is recommended at 39 weeks' gestation. Given the increased risk for hypertensive disorders of pregnancy,  the potential utility of a low dose aspirin in reducing her risk for hypertensive disorders of pregnancy was reviewed. The risks and benefits of low dose aspirin were discussed. She was counseled that she could take 81 mg of aspirin, daily. She should continue this for the remainder of pregnancy and 6-8 weeks postpartum. Additional maternal evaluation was recommended with a nutrition panel, thyroid function studies, and a hemoglobin A1c.     6.  Routine OB -- FHTs q shift, start NST TID at 24 wga  --GBS unknown    7. Fetal well-being --  Continuous toco monitoring and FHTs with magnesium  --Continue growth scans q 3 wks. Last growth US: 23w2d, 43rd percentile  --Fetal heart tones q shift. --Continue BPP twice weekly, will order for tomorrow  --BMZ 7/14-7/15  --Candidate for rescue steroids on/after 7/28/22  --Magnesium sulfate for neuro protection 7/15-7/16    8. DVT prophylaxis -- Continue SHAHRZAD hose    --The total time spent on today's visit was 70 minutes.   This included preparation for the consultation (i.e. reviewing prior external notes and test results), performance of a medically appropriate history and examination, counseling, orders for medications, tests or other procedures, and coordination of care. Greater than 50% of the time was spent face-to-face with the patient and with counseling and coordination of care. This time is exclusive of procedures performed. I answered all of  the patient's questions to her satisfaction. --The ultrasound findings and plan were reviewed with the patient's nurse. The referring provider was also contacted regarding ultrasound findings and plan of care. --If you have any questions regarding her management, please contact me at your convenience and thank you for allowing me to participate in her care. Lavon Vinson MD, [facility]  [phone]        *All or parts of this note may have been generated using a voice recognition program. There may be typo, grammar, or Word substitution errors that have escaped my review of this note.

## 2022-07-15 NOTE — PROGRESS NOTES
Pt states she is feeling good, states good fetal movement, denies LOF Or vaginal bleeding or contractions. Spot check fetal heart tones, present.   Pt got up to shower, bed linens changed

## 2022-07-15 NOTE — PROGRESS NOTES
Assumed pt care. Pt denies vaginal bleeding, leaking of fluid or contractions. Spot check performed via doppler. FHT noted at 158.  Call light within reach

## 2022-07-16 ENCOUNTER — ANCILLARY PROCEDURE (OUTPATIENT)
Dept: OBGYN CLINIC | Age: 32
DRG: 547 | End: 2022-07-16
Payer: MEDICAID

## 2022-07-16 PROBLEM — D64.9 ANEMIA: Status: ACTIVE | Noted: 2022-07-16

## 2022-07-16 PROBLEM — O28.3 ABNORMAL ULTRASONIC FINDING ON ANTENATAL SCREENING OF MOTHER: Status: ACTIVE | Noted: 2022-07-16

## 2022-07-16 LAB
FERRITIN: 20 NG/ML
METER GLUCOSE: 166 MG/DL (ref 74–99)
METER GLUCOSE: 180 MG/DL (ref 74–99)
T3 FREE: 2.6 PG/ML (ref 2–4.4)
VITAMIN D 25-HYDROXY: 25 NG/ML (ref 30–100)

## 2022-07-16 PROCEDURE — 82962 GLUCOSE BLOOD TEST: CPT

## 2022-07-16 PROCEDURE — 6370000000 HC RX 637 (ALT 250 FOR IP): Performed by: OBSTETRICS & GYNECOLOGY

## 2022-07-16 PROCEDURE — A4216 STERILE WATER/SALINE, 10 ML: HCPCS | Performed by: OBSTETRICS & GYNECOLOGY

## 2022-07-16 PROCEDURE — 2580000003 HC RX 258: Performed by: OBSTETRICS & GYNECOLOGY

## 2022-07-16 PROCEDURE — 6360000002 HC RX W HCPCS: Performed by: OBSTETRICS & GYNECOLOGY

## 2022-07-16 PROCEDURE — 76817 TRANSVAGINAL US OBSTETRIC: CPT | Performed by: OBSTETRICS & GYNECOLOGY

## 2022-07-16 PROCEDURE — 99233 SBSQ HOSP IP/OBS HIGH 50: CPT | Performed by: OBSTETRICS & GYNECOLOGY

## 2022-07-16 PROCEDURE — 1220000000 HC SEMI PRIVATE OB R&B

## 2022-07-16 PROCEDURE — 2500000003 HC RX 250 WO HCPCS: Performed by: OBSTETRICS & GYNECOLOGY

## 2022-07-16 PROCEDURE — 76815 OB US LIMITED FETUS(S): CPT | Performed by: OBSTETRICS & GYNECOLOGY

## 2022-07-16 RX ORDER — INSULIN LISPRO 100 [IU]/ML
0-6 INJECTION, SOLUTION INTRAVENOUS; SUBCUTANEOUS NIGHTLY
Status: DISCONTINUED | OUTPATIENT
Start: 2022-07-16 | End: 2022-07-24

## 2022-07-16 RX ORDER — BETAMETHASONE SODIUM PHOSPHATE AND BETAMETHASONE ACETATE 3; 3 MG/ML; MG/ML
12 INJECTION, SUSPENSION INTRA-ARTICULAR; INTRALESIONAL; INTRAMUSCULAR; SOFT TISSUE ONCE
Status: COMPLETED | OUTPATIENT
Start: 2022-07-16 | End: 2022-07-16

## 2022-07-16 RX ORDER — MAGNESIUM SULFATE HEPTAHYDRATE 40 MG/ML
4000 INJECTION, SOLUTION INTRAVENOUS ONCE
Status: COMPLETED | OUTPATIENT
Start: 2022-07-16 | End: 2022-07-16

## 2022-07-16 RX ORDER — CHOLECALCIFEROL (VITAMIN D3) 50 MCG
2000 TABLET ORAL DAILY
Status: DISCONTINUED | OUTPATIENT
Start: 2022-07-16 | End: 2022-08-01 | Stop reason: HOSPADM

## 2022-07-16 RX ORDER — LANOLIN ALCOHOL/MO/W.PET/CERES
1000 CREAM (GRAM) TOPICAL DAILY
Status: DISCONTINUED | OUTPATIENT
Start: 2022-07-16 | End: 2022-08-01 | Stop reason: HOSPADM

## 2022-07-16 RX ORDER — DEXTROSE MONOHYDRATE 50 MG/ML
100 INJECTION, SOLUTION INTRAVENOUS PRN
Status: DISCONTINUED | OUTPATIENT
Start: 2022-07-16 | End: 2022-08-01 | Stop reason: HOSPADM

## 2022-07-16 RX ORDER — SODIUM CHLORIDE, SODIUM LACTATE, POTASSIUM CHLORIDE, CALCIUM CHLORIDE 600; 310; 30; 20 MG/100ML; MG/100ML; MG/100ML; MG/100ML
INJECTION, SOLUTION INTRAVENOUS CONTINUOUS
Status: DISCONTINUED | OUTPATIENT
Start: 2022-07-16 | End: 2022-07-25

## 2022-07-16 RX ORDER — INDOMETHACIN 25 MG/1
25 CAPSULE ORAL EVERY 6 HOURS
Status: COMPLETED | OUTPATIENT
Start: 2022-07-16 | End: 2022-07-18

## 2022-07-16 RX ORDER — DOCUSATE SODIUM 100 MG/1
100 CAPSULE, LIQUID FILLED ORAL DAILY
Status: DISCONTINUED | OUTPATIENT
Start: 2022-07-16 | End: 2022-07-16

## 2022-07-16 RX ORDER — INSULIN LISPRO 100 [IU]/ML
0-12 INJECTION, SOLUTION INTRAVENOUS; SUBCUTANEOUS
Status: DISCONTINUED | OUTPATIENT
Start: 2022-07-16 | End: 2022-07-24

## 2022-07-16 RX ORDER — DOCUSATE SODIUM 100 MG/1
100 CAPSULE, LIQUID FILLED ORAL 2 TIMES DAILY
Status: DISCONTINUED | OUTPATIENT
Start: 2022-07-16 | End: 2022-08-01 | Stop reason: HOSPADM

## 2022-07-16 RX ORDER — FERROUS SULFATE 325(65) MG
325 TABLET ORAL 2 TIMES DAILY WITH MEALS
Status: DISCONTINUED | OUTPATIENT
Start: 2022-07-16 | End: 2022-08-01 | Stop reason: HOSPADM

## 2022-07-16 RX ADMIN — SODIUM CHLORIDE, POTASSIUM CHLORIDE, SODIUM LACTATE AND CALCIUM CHLORIDE: 600; 310; 30; 20 INJECTION, SOLUTION INTRAVENOUS at 20:55

## 2022-07-16 RX ADMIN — DOCUSATE SODIUM 100 MG: 100 CAPSULE, LIQUID FILLED ORAL at 20:54

## 2022-07-16 RX ADMIN — Medication 1000 MG: at 23:26

## 2022-07-16 RX ADMIN — MAGNESIUM SULFATE HEPTAHYDRATE 1000 MG/HR: 40 INJECTION, SOLUTION INTRAVENOUS at 16:29

## 2022-07-16 RX ADMIN — CLARITHROMYCIN 500 MG: 500 TABLET ORAL at 20:54

## 2022-07-16 RX ADMIN — METRONIDAZOLE 500 MG: 500 INJECTION, SOLUTION INTRAVENOUS at 15:47

## 2022-07-16 RX ADMIN — ACETAMINOPHEN 650 MG: 325 TABLET ORAL at 21:09

## 2022-07-16 RX ADMIN — Medication 2000 UNITS: at 15:46

## 2022-07-16 RX ADMIN — NIFEDIPINE 30 MG: 30 TABLET, EXTENDED RELEASE ORAL at 21:33

## 2022-07-16 RX ADMIN — MAGNESIUM SULFATE HEPTAHYDRATE 4000 MG: 40 INJECTION, SOLUTION INTRAVENOUS at 15:48

## 2022-07-16 RX ADMIN — CLARITHROMYCIN 500 MG: 500 TABLET ORAL at 08:48

## 2022-07-16 RX ADMIN — ASPIRIN 81 MG CHEWABLE TABLET 81 MG: 81 TABLET CHEWABLE at 08:48

## 2022-07-16 RX ADMIN — ACETAMINOPHEN 650 MG: 325 TABLET ORAL at 05:32

## 2022-07-16 RX ADMIN — METRONIDAZOLE 500 MG: 500 INJECTION, SOLUTION INTRAVENOUS at 08:48

## 2022-07-16 RX ADMIN — INSULIN LISPRO 1 UNITS: 100 INJECTION, SOLUTION INTRAVENOUS; SUBCUTANEOUS at 21:04

## 2022-07-16 RX ADMIN — INDOMETHACIN 25 MG: 25 CAPSULE ORAL at 16:23

## 2022-07-16 RX ADMIN — INDOMETHACIN 25 MG: 25 CAPSULE ORAL at 22:05

## 2022-07-16 RX ADMIN — FAMOTIDINE 20 MG: 10 INJECTION INTRAVENOUS at 20:54

## 2022-07-16 RX ADMIN — FERROUS SULFATE TAB 325 MG (65 MG ELEMENTAL FE) 325 MG: 325 (65 FE) TAB at 18:46

## 2022-07-16 RX ADMIN — Medication 1000 MG: at 12:08

## 2022-07-16 RX ADMIN — METRONIDAZOLE 500 MG: 500 INJECTION, SOLUTION INTRAVENOUS at 00:08

## 2022-07-16 RX ADMIN — CYANOCOBALAMIN TAB 1000 MCG 1000 MCG: 1000 TAB at 15:46

## 2022-07-16 RX ADMIN — NIFEDIPINE 30 MG: 30 TABLET, EXTENDED RELEASE ORAL at 08:48

## 2022-07-16 RX ADMIN — BETAMETHASONE SODIUM PHOSPHATE AND BETAMETHASONE ACETATE 12 MG: 3; 3 INJECTION, SUSPENSION INTRA-ARTICULAR; INTRALESIONAL; INTRAMUSCULAR at 01:35

## 2022-07-16 RX ADMIN — INSULIN LISPRO 2 UNITS: 100 INJECTION, SOLUTION INTRAVENOUS; SUBCUTANEOUS at 18:46

## 2022-07-16 ASSESSMENT — PAIN DESCRIPTION - DESCRIPTORS
DESCRIPTORS: ACHING
DESCRIPTORS: ACHING

## 2022-07-16 ASSESSMENT — PAIN SCALES - GENERAL
PAINLEVEL_OUTOF10: 0
PAINLEVEL_OUTOF10: 7

## 2022-07-16 ASSESSMENT — PAIN DESCRIPTION - LOCATION: LOCATION: HEAD

## 2022-07-16 NOTE — PROGRESS NOTES
RN at bedside FHT were spot checked with the doppler and FHT were 149-153. Pt perceives +FM. PT denies LOF, VB and CTX.

## 2022-07-16 NOTE — PROGRESS NOTES
Assumed care of patient. Patient states +FM, denies LOF, VB, or any contractions she can feel. FHT obtained via portable US ranging from 140s-150. All questions answered, call light within reach.

## 2022-07-16 NOTE — PROGRESS NOTES
RN at bedside to spot check FHT. FHT were 156-161. Pt perceives +FM. Pt denies LOF, VB and CTX. Pt is resting comfortably and has no complaints at this time.

## 2022-07-16 NOTE — PROGRESS NOTES
Edgardo Carpenter is a 27 y.o. female patient. Patient seen today. No complaints  Patient has no contractions  Fetal heart tones stable  Reviewed [facility] note for possible shortening of the cervix  Steroids yesterday repeat today  Mgsulfate now   Discussed with dr Chalino Blankenship plan   Patient has been placed on calcium channel blocker.   Blood pressure  Blood pressure stable  Intrauterine pregnancy at 24weeks  Patient will be here over weekend   Current Facility-Administered Medications   Medication Dose Route Frequency Provider Last Rate Last Admin    NIFEdipine (ADALAT CC) extended release tablet 30 mg  30 mg Oral Q12H Ana Galicia MD   30 mg at 07/16/22 0848    aspirin chewable tablet 81 mg  81 mg Oral Daily Ana Galicia MD   81 mg at 07/16/22 0848    cefTRIAXone (ROCEPHIN) 1,000 mg in sodium chloride flush 10 mL IV syringe  1,000 mg IntraVENous Q12H Ana Galicia MD   1,000 mg at 07/15/22 2356    [START ON 7/18/2022] cefdinir (OMNICEF) capsule 300 mg  300 mg Oral 2 times per day Ana Galicia MD        clarithromycin (BIAXIN) tablet 500 mg  500 mg Oral 2 times per day Ana Galicia MD   500 mg at 07/16/22 0848    metronidazole (FLAGYL) 500 mg in 0.9% NaCl 100 mL IVPB premix  500 mg IntraVENous Q8H Ana Galicia MD   Stopped at 07/16/22 0937    [START ON 7/18/2022] metroNIDAZOLE (FLAGYL) tablet 500 mg  500 mg Oral 3 times per day Ana Galicia MD        sodium chloride flush 0.9 % injection 5-40 mL  5-40 mL IntraVENous 2 times per day DONTAE Ricci - CIRILO        sodium chloride flush 0.9 % injection 5-40 mL  5-40 mL IntraVENous PRN DONTAE Ricci - CNLEONIE        0.9 % sodium chloride infusion   IntraVENous PRN DONTAE Ricci - CIRILO        acetaminophen (TYLENOL) tablet 650 mg  650 mg Oral Q4H PRN Meg Marocs MD   650 mg at 07/16/22 0532     No Known Allergies  Principal Problem:    23 weeks gestation of pregnancy  Active Problems:    Incompetent cervix in pregnancy, antepartum, second trimester    Morbid obesity with body mass index of 50.0-59.9 in adult [facility])    Abnormal ultrasonic finding on  screening of mother    Anemia  Resolved Problems:    * No resolved hospital problems. *    Blood pressure 136/82, pulse 97, temperature 97.9 °F (36.6 °C), temperature source Oral, resp. rate 16, height 5' 7\" (1.702 m), weight (!) 359 lb (162.8 kg), last menstrual period 2022, unknown if currently breastfeeding.         Gabriella Kim MD  2022

## 2022-07-16 NOTE — PROGRESS NOTES
[facility] Follow-Up Consultation/Antepartum Note    S:  The patient is a 25-year-old  3 para 0-0-2-0 currently at 23 weeks 5 days (LMP = [address]) who was sent to the hospital on 2022 secondary to blood pressure elevations that were noted during a routine obstetric visit. She had persistent diastolic elevation in the mid 90s. The patient was admitted on 2022. She was seen and evaluated by maternal-fetal medicine and started on nifedipine XL 30 mg daily. A transvaginal ultrasound was performed. The patient's cervix measured 10 mm with funneling seen to the level of the cerclage. Intra amniotic debris was noted in the cervical canal.  Betamethasone for fetal benefit was recommended at that time. Magnesium sulfate was also recommended. The patient received her first dose of betamethasone on 2022. The patient's blood pressures continued to be labile. She denied having any symptoms of preeclampsia. The Procardia XL was increased to BID on 7/15/22. Today, the patient denies any symptoms of headache, vision change, chest pain, shortness of breath, nausea, vomiting, and or right upper quadrant pain. She notes fetal movement. She denies having any symptoms of leaking of fluid or vaginal bleeding. She reports having occasional contractions.      O:   Patient Vitals for the past 24 hrs:   BP Temp Temp src Pulse Resp   22 0852 136/82 97.9 °F (36.6 °C) Oral 97 16   22 0503 132/61 -- -- (!) 102 16   07/15/22 2259 (!) 140/77 -- -- 91 16   07/15/22 1810 134/85 98.1 °F (36.7 °C) Oral (!) 108 14   07/15/22 1427 130/75 -- -- (!) 112 14        Gen NAD  CV RRR  Lungs CTA B  Abd Gravid/soft/NTTP/NABS  Ext no edema BLE, no calf TTP BLE, +1 patellar reflexes BLE, no clonus BLE    Ultrasound 23 weeks 2 days:  S IUP, cephalic, heart rate 548, anterior placenta, composite gestational age 21 weeks 0 days, EFW 1 pound 3 ounces, 43rd percentile, transvaginal cervical length 10 mm with funneling to the level of the cerclage, intra amniotic debris and sludge noted    Ultrasound 23 wk 5 days:   S IUP, breech, heart rate 153, anterior placenta, JOSE ANTONIO normal, the cervix appears dilated transabdominally. US 23 wk 6 days:  S IUP, breech, fetal heart rate 150, anterior placenta, JOSE ANTONIO 13 cm, transvaginal cervical length 3.5 mm with funneling seen measuring 2.1 cm wide by 3 cm long. The cerclage was visualized. A/P:  27 y.o.  at 23w6d (LMP = 7 wk US) with:    1. Chronic versus Gestational hypertension -- The patient is a 79-year-old  3 para 0-0-2-0 currently at 23 weeks 5 days (LMP = 7 [address]) who was sent to the hospital on 2022 secondary to blood pressure elevations that were noted during a routine obstetric visit. She had persistent diastolic elevation in the mid 90s. The patient was admitted on 2022. She was seen and evaluated by maternal-fetal medicine and started on nifedipine XL 30 mg daily. A transvaginal ultrasound was performed. The patient's cervix measured 10 mm with funneling seen to the level of the cerclage. Intra amniotic debris was noted in the cervical canal.  Betamethasone for fetal benefit was recommended at that time. Magnesium sulfate was also recommended. The patient received her first dose of betamethasone on 2022. The patient's blood pressures have continued to be labile. She denied having any symptoms of headache, vision change, chest pain, shortness of breath, nausea, vomiting, and or right upper quadrant pain. She notes fetal movement. She denies having any symptoms of leaking of fluid or vaginal bleeding. She reports having occasional cramping. The patient's prenatal records were reviewed. Her blood pressure has been elevated since 6 weeks gestation. Thus there is concern for underlying chronic hypertension.     --The risks of hypertension in pregnancy were reviewed including poor fetal growth, placental abruption, preeclampsia (25-50%),  delivery, and/or fetal loss. --A daily low-dose aspirin was recommended. She should continue this for the remainder of pregnancy and 6 to 8 weeks postpartum. --She should monitor fetal kick counts daily, instructions were reviewed. --Preeclampsia labs should be checked weekly and prn symptoms  --In the absence of any additional complications, delivery can be planned for 37-39 weeks' gestation  --Delivery should be considered at 40 to 39 weeks gestation, or sooner with any additional complications. Delivery timing will be readdressed as the patient's pregnancy progresses. Delivery should be considered sooner with any nonreassuring fetal testing, persistent severe gestational hypertension, the need for  blood pressure medication, and/or the development of preeclampsia. -- Blood pressure is stable, continue Procardia XL 30 mg twice daily  -- Continue daily LDASA for remainder of pregnancy and 6-8 week postpartum  --Continue inpatient care  --Continue close monitoring for the development of preeclampsia. 2.  Cervical insufficiency status post cerclage placement -- The patient's prenatal records were reviewed. Is seen for a consultation by maternal-fetal medicine on 6/15/2022. Patient's cervix was noted to be open with the amniotic membranes tunneled to the level of the external cervical os. She was sent to the hospital for additional evaluation and possible cerclage placement. The patient underwent an exam/ultrasound indicated cerclage on  without complication. She had a follow-up visit with Dr. Lawrence Carey on 2022. Per that note, the cerclage stitch was visualized and appeared to be intact. The patient appeared to have yeast on a speculum exam.    The patient's cervix was reevaluated on 2022. The cervix appeared to be completely funneled to the level of the external os. Is unclear if the cervix is dilated.     The recommendation was made for betamethasone for fetal benefit and magnesium sulfate for neuro protection. Today, the patient denies any cramping or contractions. A transvaginal ultrasound was repeated. The patient's cervix is approximately 3.5 mm in length. The cerclage appeared to be intact. Funneling was seen measuring 2 cm in width by 3 cm in length. Status post betamethasone -7/15  Magnesium sulfate for fetal benefit x24 hours ordered   labor and PPROM precautions were reviewed  Infection screening recommended. Screening completed 7/15/2022 and includes: Wet prep negative, GC chlamydia pending, genital culture pending  Recommend vaginal progesterone given there is residual cervical length. The patient indicated that she may take medication orally. She does not feel comfortable placing the medication vaginally. Continued inpatient care given the early gestational age and increased risk for  premature rupture membranes, infection, and/or  delivery    3. Intra amniotic debris/sludge -- The ultrasound was reviewed with the patient. 23 weeks 2 days, intra amniotic debris was seen overlying the internal cervical os. The patient was counseled that this finding can be associated with hemorrhage or intra amniotic infection. The patient is being followed for cervical insufficiency. Transvaginal imaging was repeated today. A defined area of debris/sludge was not seen. The amniotic fluid within the cervical canal appeared slightly increased in echogenicity. Counseling was previously provided. Counseling was reviewed. The patient did not have any additional questions or concerns. The patient was counseled that the finding of intrauterine debris or sludge can be associated with hemorrhage or infection.   Some studies have shown that treatment with intravenous or oral antibiotics can decrease the risk for complications associated with the finding of intra amniotic debris/sludge including chorioamnionitis, endometritis (23.1%),  infection (61.1%), poor fetal growth, and  birth (46.2%). Additionally, treatment has been shown to eliminate the sonographic presence of debris/sludge in some cases. The risks and benefits of antibiotics were previously discussed with the patient. After this conversation, the patient opted for treatment. Antibiotics were ordered including: Ceftriaxone 1 g every 12 hours x3 days then Omnicef 300 mg twice daily x5 days; Flagyl 500 mg IV every 8 hours x3 days then 500 mg p.o. twice daily x5 days; clarithromycin 500 mg twice daily x7 days. The regimen is based off a paper by Osito Pelayo et al., Evidence that antibiotic administration is effective in the treatment of a subset of patient with intra-amniotic infection/inflammation present ion with cervical insufficiency. This regimen has also been shown to be effective in prolonging latency in the setting of PPROM . 4. Anemia -- The patient's H/H at admission was noted to be low at 10.9/32.9  --Baseline nutrition panel, TFTs, hemoglobin electrophoresis, reticulocyte count, and peripheral smear ordered  --Testing completed 7/15/2022, the patient results included: H/H10.9/32.9, MCV 88.2, platelet count 800,978, potassium 4, creatinine 0.6, calcium 9.7, ALT 10, AST 9, uric acid 4.2, , ferritin 20, folate 14.1, vitamin B12 321, vitamin D 25, magnesium 1.7, TSH 1.12, free T4 0.9, free T3 2.6, TPO pending, antithyroglobulin antibody pending, hemoglobin electrophoresis pending, hemoglobin A1c 5.7%, urine protein creatinine ratio 0.1, GC chlamydia pending, wet prep negative, genital culture pending, urinalysis positive for glucose, urine culture pending. --Additional recommendations are below    5. Obesity in pregnancy -- Counseling was previously provided. She did not have any additional questions or concerns today. The patient has gained 12 pounds during this pregnancy.   Her BMI is 56.23.    Given the increased risks previously described, additional testing is recommended. Fetal growth should be monitored every 3-4 weeks starting at 24-26 weeks' gestation. Fetal growth was appropriate for the gestational age today. The patient should monitor fetal kick counts daily, starting at 28 weeks' gestation. She should be scheduled for increased fetal surveillance with twice weekly fetal testing after 32 weeks' gestation. In the absence of any complications, delivery is recommended at 39 weeks' gestation. Given the increased risk for hypertensive disorders of pregnancy,  the potential utility of a low dose aspirin in reducing her risk for hypertensive disorders of pregnancy was reviewed. The risks and benefits of low dose aspirin were discussed. She was counseled that she could take 81 mg of aspirin, daily. She should continue this for the remainder of pregnancy and 6-8 weeks postpartum. Additional maternal evaluation was recommended with a nutrition panel, thyroid function studies, and a hemoglobin A1c. --Testing completed 7/15/2022, results summarized above. Additional recommendations are below. 6.  Elevated glucose/hemoglobin A1c -- The patient's random glucose was elevated at 192. Her urinalysis was positive for glucose. The patient reports that she completed an early glucose screen that was slightly abnormal.  She has been monitoring fasting blood sugars at home. She has not required treatment. The patient was counseled that her blood sugars may be elevated secondary to the recent steroid administration. Recommendations:  Monitor blood sugar fasting and 2 hours postprandial  Sliding scale insulin coverage as needed  If the patient's blood sugars remain elevated 5 to 7 days after betamethasone, will start scheduled insulin    7. Hypothyroxinemia -- The patient's lab results were reviewed following her consultation. Her free T4 was mildly decreased at 0.9.   Her TSH was normal at 1.12 and free T3 normal at 2.6. Screening for thyroid peroxidase antibody and antithyroglobulin antibody was pending. Isolated maternal hypothyroxinemia (low T4) is defined as a maternal free T4 concentration in the lower 2.5th to 5th percentile of the reference range, in conjunction with a normal TSH. The effect of isolated maternal hypothyroxinemia on  and  outcome is unclear. Study outcomes regarding pregnancy outcomes is conflicting. In one study, isolated hypothyroxinemia was not associated with adverse pregnancy outcomes. Another study, women with a low free T4 and normal TSH at increased risk for  labor, macrosomia, and gestational diabetes. Regarding cognitive outcomes, some studies have reported that hypothyroxinemia between 12 and 20 weeks gestation was associated with a lower mean intelligence, psychomotor, and/or behavioral scores compared to children born to women with normal thyroid function. Alternatively, another study looking at children at age 3 did not find any differences in neurocognitive development. Studies have also evaluated the benefit of thyroid hormone replacement. There was no significant difference in neurodevelopmental or behavioral outcomes and children at 11years of age between the thyroid replacement group and placebo group. Additionally, there were no significant differences in the rate of  delivery, preeclampsia, gestational hypertension, pregnancy loss, and/or any other maternal or fetal outcomes. Per the American thyroid Association, treatment is not recommended for women with isolated hypothyroxinemia. However, thyroid function study should be monitored closely, every 4 weeks throughout the pregnancy. --Recommend repeat thyroid function studies in 4 weeks, on/after 2022  --Long-term follow-up with PCP for monitoring management    8.   Low ferritin -- The patient's ferritin was low at 20 (considered low if <15 in absence of anemia or <40 in setting of anemia)  --Ferrous sulfate 325 mg BID prescribed  --Monitor levels serially  --Monitor nutrition panel q4-6 weeks (CBC, CMP, magnesium, ferritin, folate, vitamin B12, vitamin D25OH), 8/12/2022  --Follow up with PCP for long term monitoring and management    9. Low vitamin D -- The patient's vitamin D was low at 25  --Recommend vitamin D3 2000 IU daily  --Monitor levels serially  --Monitor nutrition panel q4-6 weeks (CBC, CMP, magnesium, ferritin, folate, vitamin B12, vitamin D25OH), on/after 8/12/2022  --Follow up with PCP for long term monitoring and management    10. Low vitamin B12 -- The patient's vitamin B12 level was borderline at 381. Individuals with vitamin B12 level between 200 and 400 are increased risk for anemia and side effects related to low vitamin B12. Thus, supplementation is recommended  --Recommend vitamin B12, 1000 mcg daily  --Monitor levels serially  --Repeat nutrition panel (CBC, CMP, magnesium, ferritin, folate, vitamin B12, vitamin D 25 OH) in 4 weeks, 8/12/2022  --Long-term follow-up with PCP for monitoring and management    11. Routine OB -- FHTs q shift, start NST TID at 24 wga  --GBS unknown    12. Fetal well-being --  Continuous toco monitoring and FHTs with magnesium  --Continue growth scans q 3 wks. Last growth US: 23w2d, 43rd percentile  --Fetal heart tones q shift. --Continue BPP twice weekly, will order for tomorrow  --BMZ 7/14-7/15  --Candidate for rescue steroids on/after 7/28/22  --Magnesium sulfate for neuro protection 7/16-7/17    13. DVT prophylaxis -- Continue SHAHRZAD hose    --The total time spent on today's visit was 35 minutes. This included preparation for the consultation (i.e. reviewing prior external notes and test results), performance of a medically appropriate history and examination, counseling, orders for medications, tests or other procedures, and coordination of care.   Greater than 50% of the time was spent face-to-face with the patient and with counseling and coordination of care. This time is exclusive of procedures performed. I answered all of  the patient's questions to her satisfaction. --The ultrasound findings and plan were reviewed with the patient's nurse. The referring provider was also contacted regarding ultrasound findings and plan of care. --If you have any questions regarding her management, please contact me at your convenience and thank you for allowing me to participate in her care. Joanne Serrato MD, [address] [facility]  [phone]        *All or parts of this note may have been generated using a voice recognition program. There may be typo, grammar, or Word substitution errors that have escaped my review of this note.

## 2022-07-17 ENCOUNTER — ANCILLARY PROCEDURE (OUTPATIENT)
Dept: OBGYN CLINIC | Age: 32
DRG: 547 | End: 2022-07-17
Payer: MEDICAID

## 2022-07-17 PROBLEM — R73.09 ELEVATED HEMOGLOBIN A1C: Status: ACTIVE | Noted: 2022-07-17

## 2022-07-17 PROBLEM — R79.0 LOW FERRITIN: Status: ACTIVE | Noted: 2022-07-17

## 2022-07-17 PROBLEM — R79.89 LOW VITAMIN D LEVEL: Status: ACTIVE | Noted: 2022-07-17

## 2022-07-17 PROBLEM — O28.0 LOW MATERNAL SERUM VITAMIN B12: Status: ACTIVE | Noted: 2022-07-17

## 2022-07-17 LAB
METER GLUCOSE: 102 MG/DL (ref 74–99)
METER GLUCOSE: 126 MG/DL (ref 74–99)
METER GLUCOSE: 149 MG/DL (ref 74–99)
METER GLUCOSE: 161 MG/DL (ref 74–99)

## 2022-07-17 PROCEDURE — 6370000000 HC RX 637 (ALT 250 FOR IP): Performed by: OBSTETRICS & GYNECOLOGY

## 2022-07-17 PROCEDURE — 82962 GLUCOSE BLOOD TEST: CPT

## 2022-07-17 PROCEDURE — 1220000000 HC SEMI PRIVATE OB R&B

## 2022-07-17 PROCEDURE — 2580000003 HC RX 258: Performed by: OBSTETRICS & GYNECOLOGY

## 2022-07-17 PROCEDURE — 99232 SBSQ HOSP IP/OBS MODERATE 35: CPT | Performed by: OBSTETRICS & GYNECOLOGY

## 2022-07-17 PROCEDURE — 6360000002 HC RX W HCPCS: Performed by: OBSTETRICS & GYNECOLOGY

## 2022-07-17 PROCEDURE — 76819 FETAL BIOPHYS PROFIL W/O NST: CPT | Performed by: OBSTETRICS & GYNECOLOGY

## 2022-07-17 PROCEDURE — 2580000003 HC RX 258: Performed by: ADVANCED PRACTICE MIDWIFE

## 2022-07-17 PROCEDURE — 76815 OB US LIMITED FETUS(S): CPT | Performed by: OBSTETRICS & GYNECOLOGY

## 2022-07-17 PROCEDURE — 2500000003 HC RX 250 WO HCPCS: Performed by: OBSTETRICS & GYNECOLOGY

## 2022-07-17 PROCEDURE — A4216 STERILE WATER/SALINE, 10 ML: HCPCS | Performed by: OBSTETRICS & GYNECOLOGY

## 2022-07-17 RX ADMIN — FAMOTIDINE 20 MG: 10 INJECTION INTRAVENOUS at 07:48

## 2022-07-17 RX ADMIN — METRONIDAZOLE 500 MG: 500 INJECTION, SOLUTION INTRAVENOUS at 23:48

## 2022-07-17 RX ADMIN — SODIUM CHLORIDE, PRESERVATIVE FREE 10 ML: 5 INJECTION INTRAVENOUS at 21:18

## 2022-07-17 RX ADMIN — INDOMETHACIN 25 MG: 25 CAPSULE ORAL at 21:43

## 2022-07-17 RX ADMIN — FAMOTIDINE 20 MG: 10 INJECTION INTRAVENOUS at 21:17

## 2022-07-17 RX ADMIN — CLARITHROMYCIN 500 MG: 500 TABLET ORAL at 07:46

## 2022-07-17 RX ADMIN — INDOMETHACIN 25 MG: 25 CAPSULE ORAL at 15:55

## 2022-07-17 RX ADMIN — METRONIDAZOLE 500 MG: 500 INJECTION, SOLUTION INTRAVENOUS at 15:55

## 2022-07-17 RX ADMIN — FERROUS SULFATE TAB 325 MG (65 MG ELEMENTAL FE) 325 MG: 325 (65 FE) TAB at 16:01

## 2022-07-17 RX ADMIN — FERROUS SULFATE TAB 325 MG (65 MG ELEMENTAL FE) 325 MG: 325 (65 FE) TAB at 07:47

## 2022-07-17 RX ADMIN — DOCUSATE SODIUM 100 MG: 100 CAPSULE, LIQUID FILLED ORAL at 21:17

## 2022-07-17 RX ADMIN — INDOMETHACIN 25 MG: 25 CAPSULE ORAL at 04:15

## 2022-07-17 RX ADMIN — CLARITHROMYCIN 500 MG: 500 TABLET ORAL at 21:17

## 2022-07-17 RX ADMIN — ACETAMINOPHEN 650 MG: 325 TABLET ORAL at 08:27

## 2022-07-17 RX ADMIN — Medication 1000 MG: at 23:44

## 2022-07-17 RX ADMIN — DOCUSATE SODIUM 100 MG: 100 CAPSULE, LIQUID FILLED ORAL at 07:47

## 2022-07-17 RX ADMIN — Medication 1000 MG: at 11:54

## 2022-07-17 RX ADMIN — NIFEDIPINE 30 MG: 30 TABLET, EXTENDED RELEASE ORAL at 07:47

## 2022-07-17 RX ADMIN — ASPIRIN 81 MG CHEWABLE TABLET 81 MG: 81 TABLET CHEWABLE at 07:47

## 2022-07-17 RX ADMIN — SODIUM CHLORIDE, POTASSIUM CHLORIDE, SODIUM LACTATE AND CALCIUM CHLORIDE: 600; 310; 30; 20 INJECTION, SOLUTION INTRAVENOUS at 04:17

## 2022-07-17 RX ADMIN — NIFEDIPINE 30 MG: 30 TABLET, EXTENDED RELEASE ORAL at 21:17

## 2022-07-17 RX ADMIN — CYANOCOBALAMIN TAB 1000 MCG 1000 MCG: 1000 TAB at 08:24

## 2022-07-17 RX ADMIN — METRONIDAZOLE 500 MG: 500 INJECTION, SOLUTION INTRAVENOUS at 08:26

## 2022-07-17 RX ADMIN — INSULIN LISPRO 1 UNITS: 100 INJECTION, SOLUTION INTRAVENOUS; SUBCUTANEOUS at 21:12

## 2022-07-17 RX ADMIN — METRONIDAZOLE 500 MG: 500 INJECTION, SOLUTION INTRAVENOUS at 00:00

## 2022-07-17 RX ADMIN — Medication 2000 UNITS: at 07:47

## 2022-07-17 RX ADMIN — MAGNESIUM SULFATE HEPTAHYDRATE 1000 MG/HR: 40 INJECTION, SOLUTION INTRAVENOUS at 06:29

## 2022-07-17 RX ADMIN — INDOMETHACIN 25 MG: 25 CAPSULE ORAL at 08:24

## 2022-07-17 RX ADMIN — INSULIN LISPRO 2 UNITS: 100 INJECTION, SOLUTION INTRAVENOUS; SUBCUTANEOUS at 11:52

## 2022-07-17 NOTE — OR NURSING
2019         RE: Gabriela Agrawal  [address] [place] [id number]        Dear Colleague,    Thank you for referring your patient, Gabriela Agrawal, to the [facility]. Please see a copy of my visit note below.    Gabriela Agrawal  :  1934  DOS: May 30, 2019  MRN: [id number]    [facility] Procedure    Ultrasound Guided Right Intra-Articular Thumb CMC Joint Injection    Clinical History: She reports right hand pain for 3-4 weeks. She reports no injury. She has seen her PCP and was prescribed prednisone which reduced her pain slightly. She states her pain is in the right writs and thumb. She reports pain increases with gripping objects. She is right hand dominate.    Diagnosis:   1. Primary osteoarthritis of first carpometacarpal joint of right hand      Referring Physician: Cira Jennings MD  Hand / Upper Extremity Injection/Arthrocentesis: R thumb CMC  Date/Time: 2019 2:35 PM  Performed by: Cristiano Lambert DO  Authorized by: Cristiano Lambert DO     Indications:  Pain  Needle Size:  25 G  Guidance: ultrasound    Approach:  Radial  Condition: osteoarthritis    Location:  Thumb  Site:  R thumb CMC    Medications:  40 mg triamcinolone 40 MG/ML; 1 mL ropivacaine 5 MG/ML  Outcome:  Tolerated well, no immediate complications  Procedure discussed: discussed risks, benefits, and alternatives    Consent Given by:  Patient  Prep: patient was prepped and draped in usual sterile fashion        Impression:  Successful Right CMC joint injection.    Plan:  Follow up as directed with Dr Jennings  Good initial relief from anesthetic effect  Expectations and goals of CSI reviewed  Often 2-3 days for steroid effect, and can take up to two weeks for maximum effect  We discussed modified progressive pain-free activity as tolerated  Do not overuse in first two weeks if feeling better due to concern for vulnerability while steroid is  Assumed patient care. working  Supportive care reviewed  All questions were answered today  Contact us with additional questions or concerns  Signs and sx of concern reviewed         Cristiano Lambert DO, CAQ  Primary Care Sports Medicine  [facility]           Again, thank you for allowing me to participate in the care of your patient.        Sincerely,        Cristiano Lambert DO

## 2022-07-17 NOTE — PROGRESS NOTES
[facility] Follow-Up Consultation/Antepartum Note    S:  The patient reports feeling tired. She does not have any other complaints. She notes fetal movement and denies having symptoms of leaking of fluid, vaginal bleeding, cramping, and or contractions. She denied having any symptoms of headache, vision change, chest pain, shortness of breath, nausea, vomiting, and or right upper quadrant pain.     O:   Patient Vitals for the past 24 hrs:   BP Temp Temp src Pulse Resp SpO2   07/17/22 0925 -- -- -- (!) 108 18 --   07/17/22 0835 -- -- -- -- 18 99 %   07/17/22 0735 (!) 105/55 98.2 °F (36.8 °C) Oral 83 18 99 %   07/17/22 0700 136/62 -- -- 84 16 --   07/17/22 0630 (!) 107/53 -- -- 82 16 96 %   07/17/22 0600 116/64 -- -- 83 18 99 %   07/17/22 0535 129/60 -- -- 96 -- 99 %   07/17/22 0500 (!) 116/56 -- -- 86 18 94 %   07/17/22 0434 (!) 118/57 -- -- 93 -- 94 %   07/17/22 0400 124/62 97.9 °F (36.6 °C) Oral 92 18 94 %   07/17/22 0300 (!) 122/53 -- -- 97 18 95 %   07/17/22 0235 (!) 118/57 -- -- 96 -- 94 %   07/17/22 0135 (!) 126/57 -- -- 95 -- 94 %   07/17/22 0100 (!) 104/51 -- -- 94 18 95 %   07/17/22 0035 (!) 115/56 -- -- 97 -- 93 %   07/17/22 0000 (!) 113/58 -- -- (!) 105 16 96 %   07/16/22 2335 (!) 127/59 -- -- (!) 104 -- --   07/16/22 2300 (!) 108/49 98.2 °F (36.8 °C) Oral (!) 101 18 95 %   07/16/22 2235 (!) 129/59 -- -- (!) 107 -- 95 %   07/16/22 2200 (!) 141/76 -- -- (!) 102 18 95 %   07/16/22 2135 133/72 -- -- (!) 101 -- 97 %   07/16/22 2133 (!) 123/58 -- -- -- -- --   07/16/22 2100 (!) 123/58 -- -- (!) 103 18 96 %   07/16/22 2035 126/64 -- -- (!) 109 18 97 %   07/16/22 2000 (!) 156/61 -- -- (!) 112 16 98 %   07/16/22 1925 (!) 117/53 98.4 °F (36.9 °C) Oral (!) 105 18 97 %        Gen NAD  CV RRR  Lungs CTA B  Abd Gravid/soft/NTTP/NABS  Ext no edema BLE, no calf TTP BLE, +1 patellar reflexes BLE, no clonus BLE    Ultrasound 23 weeks 2 days:  S IUP, cephalic, heart rate 995, anterior placenta, composite gestational age 20 weeks 0 days, EFW 1 pound 3 ounces, 43rd percentile, transvaginal cervical length 10 mm with funneling to the level of the cerclage, intra amniotic debris and sludge noted     Ultrasound 23 wk 5 days:  S IUP, breech, heart rate 153, anterior placenta, JOSE ANTONIO normal, the cervix appears dilated transabdominally. US 23 wk 6 days:  S IUP, breech, fetal heart rate 150, anterior placenta, JOSE ANTONIO 13 cm, transvaginal cervical length 3.5 mm with funneling seen measuring 2.1 cm wide by 3 cm long. The cerclage was visualized.     Ultrasound 24 weeks 0 days:  S IUP, cephalic, heart rate 028, anterior placenta, JOSE ANTONIO appears normal, BPP 8/8      LABS:      Admission on 07/12/2022   Component Date Value Ref Range Status    WBC 07/12/2022 11.9 (A) 4.5 - 11.5 E9/L Final    RBC 07/12/2022 3.73  3.50 - 5.50 E12/L Final    Hemoglobin 07/12/2022 10.9 (A) 11.5 - 15.5 g/dL Final    Hematocrit 07/12/2022 32.9 (A) 34.0 - 48.0 % Final    MCV 07/12/2022 88.2  80.0 - 99.9 fL Final    MCH 07/12/2022 29.2  26.0 - 35.0 pg Final    MCHC 07/12/2022 33.1  32.0 - 34.5 % Final    RDW 07/12/2022 13.8  11.5 - 15.0 fL Final    Platelets 46/10/7074 390  130 - 450 E9/L Final    MPV 07/12/2022 9.2  7.0 - 12.0 fL Final    BUN 07/12/2022 7  6 - 20 mg/dL Final    CREATININE 07/12/2022 0.6  0.5 - 1.0 mg/dL Final    GFR Non- 07/12/2022 >60  >=60 mL/min/1.73 Final    GFR  07/12/2022 >60   Final    Total Protein 07/12/2022 6.2 (A) 6.4 - 8.3 g/dL Final    Albumin 07/12/2022 3.2 (A) 3.5 - 5.2 g/dL Final    Alkaline Phosphatase 07/12/2022 59  35 - 104 U/L Final    ALT 07/12/2022 10  0 - 32 U/L Final    AST 07/12/2022 14  0 - 31 U/L Final    Total Bilirubin 07/12/2022 <0.2  0.0 - 1.2 mg/dL Final    Bilirubin, Direct 07/12/2022 <0.2  0.0 - 0.3 mg/dL Final    Bilirubin, Indirect 07/12/2022 see below  0.0 - 1.0 mg/dL Final    Protein, Ur 07/12/2022 15 (A) 0 - 12 mg/dL Final    Creatinine, Ur 07/12/2022 226  29 - 226 mg/dL Final    Protein/Creat Ratio 07/12/2022 0.1  0.0 - 0.2 Final    Protein/Creat Ratio 07/12/2022 0.1   Final    Uric Acid, Serum 07/12/2022 5.1  2.4 - 5.7 mg/dL Final    Fibrinogen 07/12/2022 497 (A) 200 - 400 mg/dL Final    LD 07/12/2022 153  135 - 214 U/L Final    Color, UA 07/12/2022 Yellow  Straw/Yellow Final    Clarity, UA 07/12/2022 Clear  Clear Final    Glucose, Ur 07/12/2022 Negative  Negative mg/dL Final    Bilirubin Urine 07/12/2022 Negative  Negative Final    Ketones, Urine 07/12/2022 Negative  Negative mg/dL Final    Specific Gravity, UA 07/12/2022 1.020  1.005 - 1.030 Final    Blood, Urine 07/12/2022 Negative  Negative Final    pH, UA 07/12/2022 7.0  5.0 - 9.0 Final    Protein, UA 07/12/2022 Negative  Negative mg/dL Final    Urobilinogen, Urine 07/12/2022 1.0  <2.0 E.U./dL Final    Nitrite, Urine 07/12/2022 Negative  Negative Final    Leukocyte Esterase, Urine 07/12/2022 SMALL (A) Negative Final    WBC, UA 07/12/2022 1-3  0 - 5 /HPF Final    RBC, UA 07/12/2022 NONE  0 - 2 /HPF Final    Epithelial Cells, UA 07/12/2022 MANY  /HPF Final    Bacteria, UA 07/12/2022 MANY (A) None Seen /HPF Final    Rejected Test 07/13/2022 BMP CBC   Final    Reason for Rejection 07/13/2022 see below   Final    SARS-CoV-2, NAAT 07/13/2022 SEE BELOW  Not Detected Corrected    TSH 07/15/2022 1.120  0.270 - 4.200 uIU/mL Final    T4 Free 07/15/2022 0.90 (A) 0.93 - 1.70 ng/dL Final    T3, Free 07/15/2022 2.6  2.0 - 4.4 pg/mL Final    LD 07/15/2022 141  135 - 214 U/L Final    Uric Acid, Serum 07/15/2022 4.2  2.4 - 5.7 mg/dL Final    WBC 07/15/2022 17.5 (A) 4.5 - 11.5 E9/L Final    RBC 07/15/2022 4.00  3.50 - 5.50 E12/L Final    Hemoglobin 07/15/2022 11.8  11.5 - 15.5 g/dL Final    Hematocrit 07/15/2022 35.6  34.0 - 48.0 % Final    MCV 07/15/2022 89.0  80.0 - 99.9 fL Final    MCH 07/15/2022 29.5  26.0 - 35.0 pg Final    MCHC 07/15/2022 33.1  32.0 - 34.5 % Final    RDW 07/15/2022 13.7  11.5 - 15.0 fL Final    Platelets 70/76/0220 440  130 - 450 E9/L Final MPV 07/15/2022 9.3  7.0 - 12.0 fL Final    Neutrophils % 07/15/2022 82.3 (A) 43.0 - 80.0 % Final    Immature Granulocytes % 07/15/2022 0.9  0.0 - 5.0 % Final    Lymphocytes % 07/15/2022 10.7 (A) 20.0 - 42.0 % Final    Monocytes % 07/15/2022 5.9  2.0 - 12.0 % Final    Eosinophils % 07/15/2022 0.1  0.0 - 6.0 % Final    Basophils % 07/15/2022 0.1  0.0 - 2.0 % Final    Neutrophils Absolute 07/15/2022 14.37 (A) 1.80 - 7.30 E9/L Final    Immature Granulocytes # 07/15/2022 0.16  E9/L Final    Lymphocytes Absolute 07/15/2022 1.86  1.50 - 4.00 E9/L Final    Monocytes Absolute 07/15/2022 1.03 (A) 0.10 - 0.95 E9/L Final    Eosinophils Absolute 07/15/2022 0.01 (A) 0.05 - 0.50 E9/L Final    Basophils Absolute 07/15/2022 0.02  0.00 - 0.20 E9/L Final    Sodium 07/15/2022 134  132 - 146 mmol/L Final    Potassium 07/15/2022 4.0  3.5 - 5.0 mmol/L Final    Chloride 07/15/2022 100  98 - 107 mmol/L Final    CO2 07/15/2022 17 (A) 22 - 29 mmol/L Final    Anion Gap 07/15/2022 17 (A) 7 - 16 mmol/L Final    Glucose 07/15/2022 192 (A) 74 - 99 mg/dL Final    BUN 07/15/2022 9  6 - 20 mg/dL Final    CREATININE 07/15/2022 0.6  0.5 - 1.0 mg/dL Final    GFR Non- 07/15/2022 >60  >=60 mL/min/1.73 Final    GFR  07/15/2022 >60   Final    Calcium 07/15/2022 9.7  8.6 - 10.2 mg/dL Final    Total Protein 07/15/2022 7.1  6.4 - 8.3 g/dL Final    Albumin 07/15/2022 3.9  3.5 - 5.2 g/dL Final    Total Bilirubin 07/15/2022 <0.2  0.0 - 1.2 mg/dL Final    Alkaline Phosphatase 07/15/2022 65  35 - 104 U/L Final    ALT 07/15/2022 10  0 - 32 U/L Final    AST 07/15/2022 9  0 - 31 U/L Final    Ferritin 07/15/2022 20  ng/mL Final    Folate 07/15/2022 14.1  4.8 - 24.2 ng/mL Final    Vitamin B-12 07/15/2022 381  211 - 946 pg/mL Final    Vit D, 25-Hydroxy 07/15/2022 25 (A) 30 - 100 ng/mL Final    Magnesium 07/15/2022 1.7  1.6 - 2.6 mg/dL Final    Hemoglobin A1C 07/15/2022 5.7 (A) 4.0 - 5.6 % Final    Color, UA 07/15/2022 Yellow  Straw/Yellow Final    Clarity, UA 07/15/2022 Clear  Clear Final    Glucose, Ur 07/15/2022 >=1000 (A) Negative mg/dL Final    Bilirubin Urine 07/15/2022 Negative  Negative Final    Ketones, Urine 07/15/2022 TRACE (A) Negative mg/dL Final    Specific Gravity, UA 07/15/2022 1.025  1.005 - 1.030 Final    Blood, Urine 07/15/2022 Negative  Negative Final    pH, UA 07/15/2022 6.0  5.0 - 9.0 Final    Protein, UA 07/15/2022 Negative  Negative mg/dL Final    Urobilinogen, Urine 07/15/2022 0.2  <2.0 E.U./dL Final    Nitrite, Urine 07/15/2022 Negative  Negative Final    Leukocyte Esterase, Urine 07/15/2022 Negative  Negative Final    Mucus, UA 07/15/2022 Present (A) None Seen /LPF Final    WBC, UA 07/15/2022 NONE  0 - 5 /HPF Final    RBC, UA 07/15/2022 NONE  0 - 2 /HPF Final    Bacteria, UA 07/15/2022 NONE SEEN  None Seen /HPF Final    Protein, Ur 07/15/2022 7  0 - 12 mg/dL Final    Creatinine, Ur 07/15/2022 115  29 - 226 mg/dL Final    Protein/Creat Ratio 07/15/2022 0.1  0.0 - 0.2 Final    Protein/Creat Ratio 07/15/2022 0.1   Final    Genital Culture, Routine 07/15/2022    Preliminary                    Value:Group B beta hemolytic strep not isolated  Neisseria gonorrhoeae not isolated  Gardnerella vaginalis not isolated  Yeast not isolated  Vaginal jennifer isolated, including:  Streptococcus species  Corynebacterium      Trichomonas Prep 07/15/2022 None Seen   Final    Yeast, Wet Prep 07/15/2022 None Seen   Final    Clue Cells, Wet Prep 07/15/2022 None Seen   Final    Source Wet Prep 07/15/2022 VAGINAL   Final    Source 07/15/2022 Cervix/Vagina   Final    Meter Glucose 2022 166 (A) 74 - 99 mg/dL Final    Meter Glucose 2022 180 (A) 74 - 99 mg/dL Final    Meter Glucose 2022 102 (A) 74 - 99 mg/dL Final         A/P:  27 y.o.  at 24w0d (LMP = 7 [address]) with:    1.   Chronic versus Gestational hypertension -- The patient is a 79-year-old  3 para 0-0-2-0 currently at 23 weeks 5 days (LMP = 7 [address]) who was sent to the [facility] on 2022 secondary to blood pressure elevations that were noted during a routine obstetric visit. She had persistent diastolic elevation in the mid 90s. The patient was admitted on 2022. She was seen and evaluated by maternal-fetal medicine and started on nifedipine XL 30 mg daily. A transvaginal ultrasound was performed. The patient's cervix measured 10 mm with funneling seen to the level of the cerclage. Intra amniotic debris was noted in the cervical canal.  Betamethasone for fetal benefit was recommended at that time. Magnesium sulfate was also recommended. The patient received her first dose of betamethasone on 2022. The patient's blood pressures continued to be labile. She denied having any symptoms of preeclampsia. The Procardia XL was increased to BID on 7/15/22. The patient's blood pressures have been stable on Procardia XL, 30 mg twice daily. Today, she denied having any symptoms of headache, vision change, chest pain, shortness of breath, nausea, vomiting, and the right upper quadrant pain. The patient's prenatal records were previously reviewed. Her blood pressure has been elevated since 6 weeks gestation. Thus there is concern for underlying chronic hypertension. --Counseling was previously provided. Counseling was reviewed. The patient did not have any additional questions or concerns. --The risks of hypertension in pregnancy were reviewed including poor fetal growth, placental abruption, preeclampsia (25-50%),  delivery, and/or fetal loss. --A daily low-dose aspirin was recommended. She should continue this for the remainder of pregnancy and 6 to 8 weeks postpartum. --She should monitor fetal kick counts daily, instructions were reviewed.     --Preeclampsia labs should be checked weekly and prn symptoms  --In the absence of any additional complications, delivery can be planned for 37-39 weeks' gestation  --Delivery should be considered at 40 to 39 weeks gestation, or sooner with any additional complications. Delivery timing will be readdressed as the patient's pregnancy progresses. Delivery should be considered sooner with any nonreassuring fetal testing, persistent severe gestational hypertension, the need for  blood pressure medication, and/or the development of preeclampsia. -- Blood pressure is stable, continue Procardia XL 30 mg twice daily  -- Continue daily LDASA for remainder of pregnancy and 6-8 week postpartum  --Continue inpatient care  --Continue close monitoring for the development of preeclampsia. 2.  Cervical insufficiency status post cerclage placement -- The patient's prenatal records were previously reviewed. Is seen for a consultation by maternal-fetal medicine on 6/15/2022. Patient's cervix was noted to be open with the amniotic membranes tunneled to the level of the external cervical os. She was sent to the hospital for additional evaluation and possible cerclage placement. The patient underwent an exam/ultrasound indicated cerclage on 7/50/3813 without complication. She had a follow-up visit with Dr. Dane Wells on 6/23/2022. Per that note, the cerclage stitch was visualized and appeared to be intact. The patient appeared to have yeast on a speculum exam.     The patient's cervix was reevaluated on 7/12/2022. The cervix appeared to be completely funneled to the level of the external os. Is unclear if the cervix is dilated. The recommendation was made for betamethasone for fetal benefit and magnesium sulfate for neuro protection. Today, the patient denies any cramping or contractions. A transvaginal ultrasound was repeated on 7/16/2022. The patient's cervix is approximately 3.5 mm in length. The cerclage appeared to be intact. Funneling was seen measuring 2 cm in width by 3 cm in length.      Status post betamethasone 7/14-7/15  Magnesium sulfate for fetal benefit -   labor and PPROM precautions were reviewed  Infection screening recommended. Screening completed 7/15/2022 and includes: Wet prep negative, GC chlamydia pending, genital culture pending  Recommend vaginal progesterone given there is residual cervical length. The patient indicated that she may take medication orally. She does not feel comfortable placing the medication vaginally. NICU consult ordered  Continued inpatient care given the early gestational age and increased risk for  premature rupture membranes, infection, and/or  delivery     3. Intra amniotic debris/sludge, improving  -- The ultrasound was reviewed with the patient. 23 weeks 2 days, intra amniotic debris was seen overlying the internal cervical os. The patient was counseled that this finding can be associated with hemorrhage or intra amniotic infection. The patient is being followed for cervical insufficiency. Transvaginal imaging was repeated on 2022 at 23 weeks 6 days. A defined area of debris/sludge was not seen. The amniotic fluid within the cervical canal appeared slightly increased in echogenicity. Counseling was previously provided. Counseling was reviewed. The patient did not have any additional questions or concerns. The patient was counseled that the finding of intrauterine debris or sludge can be associated with hemorrhage or infection. Some studies have shown that treatment with intravenous or oral antibiotics can decrease the risk for complications associated with the finding of intra amniotic debris/sludge including chorioamnionitis, endometritis (23.1%),  infection (61.1%), poor fetal growth, and  birth (46.2%). Additionally, treatment has been shown to eliminate the sonographic presence of debris/sludge in some cases. The risks and benefits of antibiotics were previously discussed with the patient.   After this conversation, the patient opted for treatment. Antibiotics were ordered including: Ceftriaxone 1 g every 12 hours x3 days then Omnicef 300 mg twice daily x5 days; Flagyl 500 mg IV every 8 hours x3 days then 500 mg p.o. twice daily x5 days; clarithromycin 500 mg twice daily x7 days. The regimen is based off a paper by Leena Hammond et al., Evidence that antibiotic administration is effective in the treatment of a subset of patient with intra-amniotic infection/inflammation present ion with cervical insufficiency. This regimen has also been shown to be effective in prolonging latency in the setting of PPROM . 4. Anemia -- The patient's H/H at admission was noted to be low at 10.9/32.9  --Baseline nutrition panel, TFTs, hemoglobin electrophoresis, reticulocyte count, and peripheral smear ordered  --Testing completed 7/15/2022, the patient results included: H/H10.9/32.9, MCV 88.2, platelet count 435,208, potassium 4, creatinine 0.6, calcium 9.7, ALT 10, AST 9, uric acid 4.2, , ferritin 20, folate 14.1, vitamin B12 321, vitamin D 25, magnesium 1.7, TSH 1.12, free T4 0.9, free T3 2.6, TPO pending, antithyroglobulin antibody pending, hemoglobin electrophoresis pending, hemoglobin A1c 5.7%, urine protein creatinine ratio 0.1, GC chlamydia pending, wet prep negative, genital culture pending, urinalysis positive for glucose, urine culture pending. --Additional recommendations are below     5. Obesity in pregnancy -- Counseling was previously provided. She did not have any additional questions or concerns today. The patient has gained 12 pounds during this pregnancy. Her BMI is 56.23.     Given the increased risks previously described, additional testing is recommended. Fetal growth should be monitored every 3-4 weeks starting at 24-26 weeks' gestation. Fetal growth was appropriate for the gestational age today. The patient should monitor fetal kick counts daily, starting at 28 weeks' gestation.   She should be scheduled for increased fetal surveillance with twice weekly fetal testing after 32 weeks' gestation. In the absence of any complications, delivery is recommended at 39 weeks' gestation. Given the increased risk for hypertensive disorders of pregnancy,  the potential utility of a low dose aspirin in reducing her risk for hypertensive disorders of pregnancy was reviewed. The risks and benefits of low dose aspirin were discussed. She was counseled that she could take 81 mg of aspirin, daily. She should continue this for the remainder of pregnancy and 6-8 weeks postpartum. Additional maternal evaluation was recommended with a nutrition panel, thyroid function studies, and a hemoglobin A1c. --Testing completed 7/15/2022, results summarized above. Additional recommendations are below. 6.  Elevated glucose/hemoglobin A1c -- The patient's random glucose was elevated at 192. Her urinalysis was positive for glucose. The patient reports that she completed an early glucose screen that was slightly abnormal.  She has been monitoring fasting blood sugars at home. She has not required treatment. The patient was counseled that her blood sugars may be elevated secondary to the recent steroid administration. -- Blood sugars were reviewed. The patient is receiving sliding scale insulin coverage. Recommendations:  Monitor blood sugar fasting and 2 hours postprandial  Sliding scale insulin coverage as needed  If the patient's blood sugars remain elevated 5 to 7 days after betamethasone, will start scheduled insulin     7. Hypothyroxinemia -- The patient's lab results were reviewed following her consultation. Her free T4 was mildly decreased at 0.9. Her TSH was normal at 1.12 and free T3 normal at 2.6. Screening for thyroid peroxidase antibody and antithyroglobulin antibody was pending.     --Counseling was previously provided. Counseling was reviewed. The patient did not have any additional questions or concerns.      Per the American thyroid Association, treatment is not recommended for women with isolated hypothyroxinemia. However, thyroid function study should be monitored closely, every 4 weeks throughout the pregnancy. --Recommend repeat thyroid function studies in 4 weeks, on/after 8/12/2022  --Long-term follow-up with PCP for monitoring management     8. Low ferritin -- The patient's ferritin was low at 20 (considered low if <15 in absence of anemia or <40 in setting of anemia)  --Ferrous sulfate 325 mg BID prescribed  --Monitor levels serially  --Monitor nutrition panel q4-6 weeks (CBC, CMP, magnesium, ferritin, folate, vitamin B12, vitamin D25OH), 8/12/2022  --Follow up with PCP for long term monitoring and management     9. Low vitamin D -- The patient's vitamin D was low at 25  --Recommend vitamin D3 2000 IU daily  --Monitor levels serially  --Monitor nutrition panel q4-6 weeks (CBC, CMP, magnesium, ferritin, folate, vitamin B12, vitamin D25OH), on/after 8/12/2022  --Follow up with PCP for long term monitoring and management     10. Low vitamin B12 -- The patient's vitamin B12 level was borderline at 381. Individuals with vitamin B12 level between 200 and 400 are increased risk for anemia and side effects related to low vitamin B12. Thus, supplementation is recommended  --Recommend vitamin B12, 1000 mcg daily  --Monitor levels serially  --Repeat nutrition panel (CBC, CMP, magnesium, ferritin, folate, vitamin B12, vitamin D 25 [place]) in 4 weeks, 8/12/2022  --Long-term follow-up with PCP for monitoring and management     11. 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 She was counseled that she may be at increased risk for viral exposures such as CMV and parvovirus. -- Baseline screening recommended with her next set of labs      12. Routine OB -- FHTs q shift, start NST TID at 24 wga  --GBS unknown     13.   Fetal well-being --  Continuous toco monitoring and FHTs with magnesium  --Continue growth scans q 3 wks. Last growth US: 23w2d, 43rd percentile  --Fetal heart tones q shift. --Continue BPP twice weekly, will order for tomorrow  --BMZ 7/14-7/15  --Candidate for rescue steroids on/after 7/28/22  --Magnesium sulfate for neuro protection 7/16-7/17    14. DVT prophylaxis -- Continue SHAHRZAD hose         --The total time spent on today's visit was 25 minutes. This included preparation for the consultation (i.e. reviewing prior external notes and test results), performance of a medically appropriate history and examination, counseling, orders for medications, tests or other procedures, and coordination of care. Greater than 50% of the time was spent face-to-face with the patient and with counseling and coordination of care. This time is exclusive of procedures performed. I answered all of  the patient's questions to her satisfaction. -- The patient's referring provider was contacted. The ultrasound findings and plan were reviewed. --If you have any questions regarding her management, please contact me at your convenience and thank you for allowing me to participate in her care. Kristen [place]land [place], [address] [address] [facility]  [phone]        *All or parts of this note may have been generated using a voice recognition program. There may be typo, grammar, or Word substitution errors that have escaped my review of this note.

## 2022-07-17 NOTE — PROGRESS NOTES
Assumed pt care. Denies lof, vb or ctx's. Reports positive FM. Pt resting comfortably in bed at this time, call light within reach.

## 2022-07-18 ENCOUNTER — ANCILLARY PROCEDURE (OUTPATIENT)
Dept: OBGYN CLINIC | Age: 32
DRG: 547 | End: 2022-07-18
Payer: MEDICAID

## 2022-07-18 LAB
METER GLUCOSE: 156 MG/DL (ref 74–99)
METER GLUCOSE: 95 MG/DL (ref 74–99)
METER GLUCOSE: 97 MG/DL (ref 74–99)
METER GLUCOSE: 99 MG/DL (ref 74–99)
URINE CULTURE, ROUTINE: NORMAL

## 2022-07-18 PROCEDURE — 82962 GLUCOSE BLOOD TEST: CPT

## 2022-07-18 PROCEDURE — 6360000002 HC RX W HCPCS: Performed by: OBSTETRICS & GYNECOLOGY

## 2022-07-18 PROCEDURE — 2500000003 HC RX 250 WO HCPCS: Performed by: OBSTETRICS & GYNECOLOGY

## 2022-07-18 PROCEDURE — 99232 SBSQ HOSP IP/OBS MODERATE 35: CPT | Performed by: OBSTETRICS & GYNECOLOGY

## 2022-07-18 PROCEDURE — 76821 MIDDLE CEREBRAL ARTERY ECHO: CPT | Performed by: OBSTETRICS & GYNECOLOGY

## 2022-07-18 PROCEDURE — 76819 FETAL BIOPHYS PROFIL W/O NST: CPT | Performed by: OBSTETRICS & GYNECOLOGY

## 2022-07-18 PROCEDURE — 76820 UMBILICAL ARTERY ECHO: CPT | Performed by: OBSTETRICS & GYNECOLOGY

## 2022-07-18 PROCEDURE — 2580000003 HC RX 258: Performed by: OBSTETRICS & GYNECOLOGY

## 2022-07-18 PROCEDURE — 6370000000 HC RX 637 (ALT 250 FOR IP)

## 2022-07-18 PROCEDURE — 76815 OB US LIMITED FETUS(S): CPT | Performed by: OBSTETRICS & GYNECOLOGY

## 2022-07-18 PROCEDURE — 1220000000 HC SEMI PRIVATE OB R&B

## 2022-07-18 PROCEDURE — 6370000000 HC RX 637 (ALT 250 FOR IP): Performed by: OBSTETRICS & GYNECOLOGY

## 2022-07-18 PROCEDURE — A4216 STERILE WATER/SALINE, 10 ML: HCPCS | Performed by: OBSTETRICS & GYNECOLOGY

## 2022-07-18 PROCEDURE — 2580000003 HC RX 258: Performed by: ADVANCED PRACTICE MIDWIFE

## 2022-07-18 RX ORDER — METRONIDAZOLE 500 MG/1
TABLET ORAL
Status: COMPLETED
Start: 2022-07-18 | End: 2022-07-18

## 2022-07-18 RX ADMIN — CLARITHROMYCIN 500 MG: 500 TABLET ORAL at 21:55

## 2022-07-18 RX ADMIN — CYANOCOBALAMIN TAB 1000 MCG 1000 MCG: 1000 TAB at 08:58

## 2022-07-18 RX ADMIN — CLARITHROMYCIN 500 MG: 500 TABLET ORAL at 08:54

## 2022-07-18 RX ADMIN — FAMOTIDINE 20 MG: 10 INJECTION INTRAVENOUS at 21:55

## 2022-07-18 RX ADMIN — NIFEDIPINE 30 MG: 30 TABLET, EXTENDED RELEASE ORAL at 21:56

## 2022-07-18 RX ADMIN — Medication 2000 UNITS: at 08:58

## 2022-07-18 RX ADMIN — ASPIRIN 81 MG CHEWABLE TABLET 81 MG: 81 TABLET CHEWABLE at 08:54

## 2022-07-18 RX ADMIN — DOCUSATE SODIUM 100 MG: 100 CAPSULE, LIQUID FILLED ORAL at 08:55

## 2022-07-18 RX ADMIN — METRONIDAZOLE 500 MG: 500 TABLET ORAL at 08:57

## 2022-07-18 RX ADMIN — FAMOTIDINE 20 MG: 10 INJECTION INTRAVENOUS at 08:56

## 2022-07-18 RX ADMIN — INDOMETHACIN 25 MG: 25 CAPSULE ORAL at 08:56

## 2022-07-18 RX ADMIN — DOCUSATE SODIUM 100 MG: 100 CAPSULE, LIQUID FILLED ORAL at 21:56

## 2022-07-18 RX ADMIN — SODIUM CHLORIDE, PRESERVATIVE FREE 10 ML: 5 INJECTION INTRAVENOUS at 08:57

## 2022-07-18 RX ADMIN — SODIUM CHLORIDE, PRESERVATIVE FREE 10 ML: 5 INJECTION INTRAVENOUS at 21:58

## 2022-07-18 RX ADMIN — INDOMETHACIN 25 MG: 25 CAPSULE ORAL at 04:12

## 2022-07-18 RX ADMIN — CEFDINIR 300 MG: 300 CAPSULE ORAL at 16:47

## 2022-07-18 RX ADMIN — METRONIDAZOLE 500 MG: 500 TABLET ORAL at 16:47

## 2022-07-18 RX ADMIN — NIFEDIPINE 30 MG: 30 TABLET, EXTENDED RELEASE ORAL at 08:56

## 2022-07-18 RX ADMIN — Medication 1000 MG: at 11:58

## 2022-07-18 RX ADMIN — METRONIDAZOLE 500 MG: 500 TABLET ORAL at 21:55

## 2022-07-18 RX ADMIN — FERROUS SULFATE TAB 325 MG (65 MG ELEMENTAL FE) 325 MG: 325 (65 FE) TAB at 08:56

## 2022-07-18 RX ADMIN — FERROUS SULFATE TAB 325 MG (65 MG ELEMENTAL FE) 325 MG: 325 (65 FE) TAB at 16:48

## 2022-07-18 ASSESSMENT — PAIN SCALES - GENERAL: PAINLEVEL_OUTOF10: 0

## 2022-07-18 NOTE — PROGRESS NOTES
Attempt to obtain nst for 15 minutes, heart tones auscultated at 142, fetal movement palpated and endorsed by patient

## 2022-07-18 NOTE — PROGRESS NOTES
Patient lying in bed, denies ctx or cramping, lof or vb, states positive fetal movement, denies any pain, vss, denies any needs at this time, call light in reach.

## 2022-07-18 NOTE — CONSULTS
PRENATAL NEONATOLOGY CONSULT   Asked to see patient for Cervical insufficiency status post cerclage placement currently @ 24 weeks gestation  Requesting physician: Rommel Bravo  Future pediatrician/family practitioner: unknown    PRENATAL COURSE / MATERNAL DATA:   Mother's name: Jl Jacinto is a 28 y.o. [id number] female pregnant at 18w0d with Estimated Date of Delivery: 22. OB History    Para Term  AB Living   3 0 0 0 2 0   SAB IAB Ectopic Molar Multiple Live Births   2 0 0 0 0 0      # Outcome Date GA Lbr Skyler/2nd Weight Sex Delivery Anes PTL Lv   3 Current            2 SAB  6w0d          1 2020 5w0d              Prenatal care: good  Pregnancy complications: gestational HTN, gestational DM, cervical incompetence   complications: none. Significant past medical history: Obesity and Hypertension    Social History:   Marital status: single    Maternal Substance Abuse: none  Alcohol: no tobacco use  Tobacco: no tobacco use  Drugs: Never    Prenatal Labs: Maternal blood type: O pos  Antibody screen: unknown  Rubella : unknonn  RPR/VDRL: negative  Hep B: negative  HIV: negative  Hep C: unknown  GBS: unknown  Gonorrhea: unknown  Chlamydia: unknown    Glucose Tolerance Test: unknown  UDS: unknown  Other Screenings: none    Pertinent US Findings: from     1. Single living intrauterine pregnancy at 23w 2d by clinical NILESH. 2. There is appropriate interval growth. 3. EFW is 43% at 550 g.  4. Amniotic fluid appeared normal.  5. Placenta is anterior without evidence of previa. 6. Transvaginal Ultrasound: The cervix measures 10.1 mm with a funnel and debris is noted. The cerclage is visualized. Home medication during pregnancy:   No current facility-administered medications on file prior to encounter.      Current Outpatient Medications on File Prior to Encounter   Medication Sig Dispense Refill    indomethacin (INDOCIN) 50 MG capsule Take 50 mg by mouth 2 times daily (with meals)      indomethacin (INDOCIN) 50 MG capsule Take 1 capsule by mouth every 8 hours for 6 doses 60 capsule 3    Blood Glucose Monitoring Suppl (ONE TOUCH ULTRA 2) w/Device KIT USE AS DIRECTED      ONETOUCH ULTRA strip TEST AS DIRECTED FOUR TIMES DAILY      Lancets (ONETOUCH DELICA PLUS [id number]) MISC TEST FOUR TIMES DAILY      Prenatal Vit-Fe Fumarate-FA (PRENATAL VITAMIN) 27-1 MG TABS tablet Take 1 tablet by mouth daily 30 tablet 11    [DISCONTINUED] ibuprofen (IBU) 800 MG tablet Take 1 tablet by mouth every 8 hours as needed for Pain 21 tablet 0       Reviewed:  General: need for admission to NICU, location of NICU, NICU visitation policy, expected length of stay, discharge criteria  Resuscitation: NICU staff at delivery; possible need for resuscitation (may include but not limited to use of supplemental oxygen, respiratory support including need for intubation, risk for CPR, survival odds/morbidity/mortality  Respiratory: risk of RDS, Respiratory support: CPAP, ETT/mechanical ventilation/surfactant, risk of chronic lung disease  Infection: Risk factors for infection; need to start antibiotics  Cardiovascular: risk of PDA, hypotension; possible need for pressors  FEN/GI: IVF/TPN, gavage feeds/oral feeds, risk of NEC, use of maternal breast milk/donor breast milk/formula  Heme: Possible need for blood transfusion  Bili: risk for jaundice, need for phototherapy  Neurologic: Risk of IVH, MR/CP, ROP/blindness, deafness  Vascular Access: need for vascular access, risks/benefits of UAC/UVC  Misc: Possible need/indications for transfer to hospital outside ; need for subspecialty evaluation    Impression:  27 y.o. [id number] at 24w0d  admitted for monitoring of fetus in setting of incompetant cervix with cerclage, s/p BMZ and MG.      Maternal concerns:   Patient Active Problem List   Diagnosis    Maternal morbid obesity, antepartum ([facility] [address]    Incompetent cervix in pregnancy, antepartum, second trimester    Benign essential hypertension, antepartum    Morbid obesity with body mass index of 50.0-59.9 in adult ([facility] [address]    20 weeks gestation of pregnancy    23 weeks gestation of pregnancy    Abnormal ultrasonic finding on  screening of mother    Anemia    Elevated hemoglobin A1c    Low ferritin    Low maternal serum vitamin B12    Low vitamin D level       Recommendations:  I have taken this opportunity to review delivery room expectations with this mother and father. The family is aware that Neonatology will be present in the delivery room to stabilize from a cardiopulmonary standpoint. The infant will then be admitted to the  Intensive Care Unit if warranted for further evaluation and management. I reviewed with the family some of the problems associated with prematurity at approximately 24 weeks gestation. We recommended and encouraged mother to express breast milk, and she was assured that this would be possible during her infant's stay in the  Intensive Care Unit. The detailed information regarding the expression and storage of breast milk will be provided during her hospitalization. Thank you for allowing us to share in the care of this family. All questions answered and parents demonstrated good understanding of issues. Please do not hesitate to contact us if further Neonatology consultation or assistance can be provided. Will continue to follow, please keep advised. Appreciate consultation and opportunity to talk to your patient prior to delivery. I spent 45 minutes completing this consultation. More than 50% of the time was spent on the floor and/or in face to face discussion.       Electronically signed by Tu Pantoja DO on 2022 at 9:21 PM

## 2022-07-18 NOTE — PROGRESS NOTES
[facility] Follow-Up Consultation/Antepartum Note    S:  The patient reports that she feels well today. She notes fetal movement. She denies any leaking of fluid, vaginal bleeding, or contractions. She denies having any symptoms of headache, vision change, chest pain, shortness of breath, nausea, vomiting, and or right upper quadrant pain. O:   Patient Vitals for the past 24 hrs:   BP Temp Temp src Pulse Resp   07/18/22 0746 133/75 98.1 °F (36.7 °C) Oral 90 18   07/17/22 1944 134/74 98.3 °F (36.8 °C) Oral 94 16   07/17/22 1535 121/64 -- -- (!) 102 18        Gen NAD  CV RRR  Lungs CTA B  Abd Gravid/soft/NTTP/NABS  Ext no edema BLE, no calf TTP BLE, +1 patellar reflexes BLE, no clonus BLE    Ultrasound 23 weeks 2 days:  S IUP, cephalic, heart rate 639, anterior placenta, composite gestational age 21 weeks 0 days, EFW 1 pound 3 ounces, 43rd percentile, transvaginal cervical length 10 mm with funneling to the level of the cerclage, intra amniotic debris and sludge noted     Ultrasound 23 wk 5 days:  S IUP, breech, heart rate 153, anterior placenta, JOSE ANTONIO normal, the cervix appears dilated transabdominally. US 23 wk 6 days:  S IUP, breech, fetal heart rate 150, anterior placenta, JOSE ANTONIO 13 cm, transvaginal cervical length 3.5 mm with funneling seen measuring 2.1 cm wide by 3 cm long. The cerclage was visualized. Ultrasound 24 weeks 0 days:  S IUP, cephalic, heart rate 778, anterior placenta, JOSE ANTONIO appears normal, BPP 8/8    Ultrasound 24 weeks 1 day:  S IUP, footling breech, heart rate 157, anterior placenta, negative fluid normal, BPP 8/8.   Umbilical artery Doppler normal, MCA PSV normal, CPR PI normal.        LABS:      Admission on 07/12/2022   Component Date Value Ref Range Status    WBC 07/12/2022 11.9 (A) 4.5 - 11.5 E9/L Final    RBC 07/12/2022 3.73  3.50 - 5.50 E12/L Final    Hemoglobin 07/12/2022 10.9 (A) 11.5 - 15.5 g/dL Final    Hematocrit 07/12/2022 32.9 (A) 34.0 - 48.0 % Final    MCV 07/12/2022 88.2  80.0 - 99.9 fL Final    MCH 07/12/2022 29.2  26.0 - 35.0 pg Final    MCHC 07/12/2022 33.1  32.0 - 34.5 % Final    RDW 07/12/2022 13.8  11.5 - 15.0 fL Final    Platelets 24/00/9759 390  130 - 450 E9/L Final    MPV 07/12/2022 9.2  7.0 - 12.0 fL Final    BUN 07/12/2022 7  6 - 20 mg/dL Final    CREATININE 07/12/2022 0.6  0.5 - 1.0 mg/dL Final    GFR Non- 07/12/2022 >60  >=60 mL/min/1.73 Final    GFR  07/12/2022 >60   Final    Total Protein 07/12/2022 6.2 (A) 6.4 - 8.3 g/dL Final    Albumin 07/12/2022 3.2 (A) 3.5 - 5.2 g/dL Final    Alkaline Phosphatase 07/12/2022 59  35 - 104 U/L Final    ALT 07/12/2022 10  0 - 32 U/L Final    AST 07/12/2022 14  0 - 31 U/L Final    Total Bilirubin 07/12/2022 <0.2  0.0 - 1.2 mg/dL Final    Bilirubin, Direct 07/12/2022 <0.2  0.0 - 0.3 mg/dL Final    Bilirubin, Indirect 07/12/2022 see below  0.0 - 1.0 mg/dL Final    Protein, Ur 07/12/2022 15 (A) 0 - 12 mg/dL Final    Creatinine, Ur 07/12/2022 226  29 - 226 mg/dL Final    Protein/Creat Ratio 07/12/2022 0.1  0.0 - 0.2 Final    Protein/Creat Ratio 07/12/2022 0.1   Final    Uric Acid, Serum 07/12/2022 5.1  2.4 - 5.7 mg/dL Final    Fibrinogen 07/12/2022 497 (A) 200 - 400 mg/dL Final    LD 07/12/2022 153  135 - 214 U/L Final    Color, UA 07/12/2022 Yellow  Straw/Yellow Final    Clarity, UA 07/12/2022 Clear  Clear Final    Glucose, Ur 07/12/2022 Negative  Negative mg/dL Final    Bilirubin Urine 07/12/2022 Negative  Negative Final    Ketones, Urine 07/12/2022 Negative  Negative mg/dL Final    Specific Gravity, UA 07/12/2022 1.020  1.005 - 1.030 Final    Blood, Urine 07/12/2022 Negative  Negative Final    pH, UA 07/12/2022 7.0  5.0 - 9.0 Final    Protein, UA 07/12/2022 Negative  Negative mg/dL Final    Urobilinogen, Urine 07/12/2022 1.0  <2.0 E.U./dL Final    Nitrite, Urine 07/12/2022 Negative  Negative Final    Leukocyte Esterase, Urine 07/12/2022 SMALL (A) Negative Final    WBC, UA 07/12/2022 1-3  0 - 5 /HPF Final    RBC, UA 07/12/2022 NONE  0 - 2 /HPF Final    Epithelial Cells, UA 07/12/2022 MANY  /HPF Final    Bacteria, UA 07/12/2022 MANY (A) None Seen /HPF Final    Rejected Test 07/13/2022 BMP CBC   Final    Reason for Rejection 07/13/2022 see below   Final    SARS-CoV-2, NAAT 07/13/2022 SEE BELOW  Not Detected Corrected    TSH 07/15/2022 1.120  0.270 - 4.200 uIU/mL Final    T4 Free 07/15/2022 0.90 (A) 0.93 - 1.70 ng/dL Final    T3, Free 07/15/2022 2.6  2.0 - 4.4 pg/mL Final    LD 07/15/2022 141  135 - 214 U/L Final    Uric Acid, Serum 07/15/2022 4.2  2.4 - 5.7 mg/dL Final    WBC 07/15/2022 17.5 (A) 4.5 - 11.5 E9/L Final    RBC 07/15/2022 4.00  3.50 - 5.50 E12/L Final    Hemoglobin 07/15/2022 11.8  11.5 - 15.5 g/dL Final    Hematocrit 07/15/2022 35.6  34.0 - 48.0 % Final    MCV 07/15/2022 89.0  80.0 - 99.9 fL Final    MCH 07/15/2022 29.5  26.0 - 35.0 pg Final    MCHC 07/15/2022 33.1  32.0 - 34.5 % Final    RDW 07/15/2022 13.7  11.5 - 15.0 fL Final    Platelets 42/44/5029 440  130 - 450 E9/L Final    MPV 07/15/2022 9.3  7.0 - 12.0 fL Final    Neutrophils % 07/15/2022 82.3 (A) 43.0 - 80.0 % Final    Immature Granulocytes % 07/15/2022 0.9  0.0 - 5.0 % Final    Lymphocytes % 07/15/2022 10.7 (A) 20.0 - 42.0 % Final    Monocytes % 07/15/2022 5.9  2.0 - 12.0 % Final    Eosinophils % 07/15/2022 0.1  0.0 - 6.0 % Final    Basophils % 07/15/2022 0.1  0.0 - 2.0 % Final    Neutrophils Absolute 07/15/2022 14.37 (A) 1.80 - 7.30 E9/L Final    Immature Granulocytes # 07/15/2022 0.16  E9/L Final    Lymphocytes Absolute 07/15/2022 1.86  1.50 - 4.00 E9/L Final    Monocytes Absolute 07/15/2022 1.03 (A) 0.10 - 0.95 E9/L Final    Eosinophils Absolute 07/15/2022 0.01 (A) 0.05 - 0.50 E9/L Final    Basophils Absolute 07/15/2022 0.02  0.00 - 0.20 E9/L Final    Sodium 07/15/2022 134  132 - 146 mmol/L Final    Potassium 07/15/2022 4.0  3.5 - 5.0 mmol/L Final    Chloride 07/15/2022 100  98 - 107 mmol/L Final    CO2 07/15/2022 17 (A) 22 - 29 mmol/L Final Anion Gap 07/15/2022 17 (A) 7 - 16 mmol/L Final    Glucose 07/15/2022 192 (A) 74 - 99 mg/dL Final    BUN 07/15/2022 9  6 - 20 mg/dL Final    CREATININE 07/15/2022 0.6  0.5 - 1.0 mg/dL Final    GFR Non- 07/15/2022 >60  >=60 mL/min/1.73 Final    GFR  07/15/2022 >60   Final    Calcium 07/15/2022 9.7  8.6 - 10.2 mg/dL Final    Total Protein 07/15/2022 7.1  6.4 - 8.3 g/dL Final    Albumin 07/15/2022 3.9  3.5 - 5.2 g/dL Final    Total Bilirubin 07/15/2022 <0.2  0.0 - 1.2 mg/dL Final    Alkaline Phosphatase 07/15/2022 65  35 - 104 U/L Final    ALT 07/15/2022 10  0 - 32 U/L Final    AST 07/15/2022 9  0 - 31 U/L Final    Ferritin 07/15/2022 20  ng/mL Final    Folate 07/15/2022 14.1  4.8 - 24.2 ng/mL Final    Vitamin B-12 07/15/2022 381  211 - 946 pg/mL Final    Vit D, 25-Hydroxy 07/15/2022 25 (A) 30 - 100 ng/mL Final    Magnesium 07/15/2022 1.7  1.6 - 2.6 mg/dL Final    Hemoglobin A1C 07/15/2022 5.7 (A) 4.0 - 5.6 % Final    Urine Culture, Routine 07/15/2022    Final                    Value:<10,000 CFU/mL  Mixed gram positive organisms      Color, UA 07/15/2022 Yellow  Straw/Yellow Final    Clarity, UA 07/15/2022 Clear  Clear Final    Glucose, Ur 07/15/2022 >=1000 (A) Negative mg/dL Final    Bilirubin Urine 07/15/2022 Negative  Negative Final    Ketones, Urine 07/15/2022 TRACE (A) Negative mg/dL Final    Specific Gravity, UA 07/15/2022 1.025  1.005 - 1.030 Final    Blood, Urine 07/15/2022 Negative  Negative Final    pH, UA 07/15/2022 6.0  5.0 - 9.0 Final    Protein, UA 07/15/2022 Negative  Negative mg/dL Final    Urobilinogen, Urine 07/15/2022 0.2  <2.0 E.U./dL Final    Nitrite, Urine 07/15/2022 Negative  Negative Final    Leukocyte Esterase, Urine 07/15/2022 Negative  Negative Final    Mucus, UA 07/15/2022 Present (A) None Seen /LPF Final    WBC, UA 07/15/2022 NONE  0 - 5 /HPF Final    RBC, UA 07/15/2022 NONE  0 - 2 /HPF Final    Bacteria, UA 07/15/2022 NONE SEEN  None Seen /HPF Final Protein, Ur 07/15/2022 7  0 - 12 mg/dL Final    Creatinine, Ur 07/15/2022 115  29 - 226 mg/dL Final    Protein/Creat Ratio 07/15/2022 0.1  0.0 - 0.2 Final    Protein/Creat Ratio 07/15/2022 0.1   Final    Genital Culture, Routine 07/15/2022    Preliminary                    Value:Group B beta hemolytic strep not isolated  Neisseria gonorrhoeae not isolated  Gardnerella vaginalis not isolated  Yeast not isolated  Vaginal jennifer isolated, including:  Streptococcus species  Corynebacterium      Trichomonas Prep 07/15/2022 None Seen   Final    Yeast, Wet Prep 07/15/2022 None Seen   Final    Clue Cells, Wet Prep 07/15/2022 None Seen   Final    Source Wet Prep 07/15/2022 VAGINAL   Final    Source 07/15/2022 Cervix/Vagina   Final    Meter Glucose 2022 166 (A) 74 - 99 mg/dL Final    Meter Glucose 2022 180 (A) 74 - 99 mg/dL Final    Meter Glucose 2022 102 (A) 74 - 99 mg/dL Final    Meter Glucose 2022 149 (A) 74 - 99 mg/dL Final    Meter Glucose 2022 126 (A) 74 - 99 mg/dL Final    Meter Glucose 2022 161 (A) 74 - 99 mg/dL Final    Meter Glucose 2022 95  74 - 99 mg/dL Final    Meter Glucose 2022 97  74 - 99 mg/dL Final    Meter Glucose 2022 99  74 - 99 mg/dL Final         A/P:  27 y.o.  at 24w1d (LMP = 7 WK US) with:    1. Chronic versus Gestational hypertension -- The patient is a 18-year-old  3 para 0-0-2-0 currently at 23 weeks 5 days (LMP = 7 [address]) who was sent to the hospital on 2022 secondary to blood pressure elevations that were noted during a routine obstetric visit. She had persistent diastolic elevation in the mid 90s. The patient was admitted on 2022. She was seen and evaluated by maternal-fetal medicine and started on nifedipine XL 30 mg daily. A transvaginal ultrasound was performed. The patient's cervix measured 10 mm with funneling seen to the level of the cerclage.   Intra amniotic debris was noted in the cervical canal. Betamethasone for fetal benefit was recommended at that time. Magnesium sulfate was also recommended. The patient received her first dose of betamethasone on 2022. The patient's blood pressures continued to be labile. She denied having any symptoms of preeclampsia. The Procardia XL was increased to BID on 7/15/22. The patient's blood pressures have been stable on Procardia XL, 30 mg twice daily. Today, she again denied having any symptoms of headache, vision change, chest pain, shortness of breath, nausea, vomiting, and the right upper quadrant pain. The patient's prenatal records were previously reviewed. Her blood pressure has been elevated since 6 weeks gestation. Thus there is concern for underlying chronic hypertension. --Counseling was previously provided. Counseling was reviewed. The patient did not have any additional questions or concerns. --The risks of hypertension in pregnancy were previously reviewed including poor fetal growth, placental abruption, preeclampsia (25-50%),  delivery, and/or fetal loss. --A daily low-dose aspirin was recommended. She should continue this for the remainder of pregnancy and 6 to 8 weeks postpartum. --She should monitor fetal kick counts daily, instructions were reviewed. --Preeclampsia labs should be checked weekly and prn symptoms  --In the absence of any additional complications, delivery can be planned for 37-39 weeks' gestation  --Delivery should be considered at 40 to 39 weeks gestation, or sooner with any additional complications. Delivery timing will be readdressed as the patient's pregnancy progresses. Delivery should be considered sooner with any nonreassuring fetal testing, persistent severe gestational hypertension, the need for  blood pressure medication, and/or the development of preeclampsia.      -- Blood pressure is stable, continue Procardia XL 30 mg twice daily  -- Continue daily LDASA for remainder of pregnancy and 6-8 week postpartum  --Continue inpatient care  --Continue close monitoring for the development of preeclampsia. 2.  Cervical insufficiency status post cerclage placement -- The patient's prenatal records were previously reviewed. Is seen for a consultation by maternal-fetal medicine on 6/15/2022. Patient's cervix was noted to be open with the amniotic membranes tunneled to the level of the external cervical os. She was sent to the hospital for additional evaluation and possible cerclage placement. The patient underwent an exam/ultrasound indicated cerclage on  without complication. She had a follow-up visit with Dr. Bee Mars on 2022. Per that note, the cerclage stitch was visualized and appeared to be intact. The patient appeared to have yeast on a speculum exam.     The patient's cervix was reevaluated on 2022. The cervix appeared to be completely funneled to the level of the external os. Is unclear if the cervix is dilated. The recommendation was made for betamethasone for fetal benefit and magnesium sulfate for neuro protection. Today, the patient denies any cramping or contractions. A transvaginal ultrasound was repeated on 2022. The patient's cervix is approximately 3.5 mm in length. The cerclage appeared to be intact. Funneling was seen measuring 2 cm in width by 3 cm in length. Status post betamethasone -7/15  Status post magnesium sulfate for fetal benefit -   labor and PPROM precautions were reviewed  Infection screening recommended. Screening completed 7/15/2022 and includes: Wet prep negative, GC chlamydia negative genital culture negative, urine culture mixed  Recommend vaginal progesterone given there is residual cervical length. The patient indicated that she has started the medication. She is tolerating it well.   Continue vaginal progesterone until 36-37 weeks gestation  Status post NICU consult  Continued inpatient care given the early gestational age and increased risk for  premature rupture membranes, infection, and/or  delivery     3. Intra amniotic debris/sludge, improving  -- The ultrasound was reviewed with the patient. At 23 weeks 2 days, intra amniotic debris was seen overlying the internal cervical os. The patient was counseled that this finding can be associated with hemorrhage or intra amniotic infection. The patient is being followed for cervical insufficiency. Transvaginal imaging was repeated on 2022 at 23 weeks 6 days. A defined area of debris/sludge was not seen. The amniotic fluid within the cervical canal appeared slightly increased in echogenicity. Counseling was previously provided. Counseling was reviewed. The patient did not have any additional questions or concerns. The patient was counseled that the finding of intrauterine debris or sludge can be associated with hemorrhage or infection. Some studies have shown that treatment with intravenous or oral antibiotics can decrease the risk for complications associated with the finding of intra amniotic debris/sludge including chorioamnionitis, endometritis (23.1%),  infection (61.1%), poor fetal growth, and  birth (46.2%). Additionally, treatment has been shown to eliminate the sonographic presence of debris/sludge in some cases. The risks and benefits of antibiotics were previously discussed with the patient. After this conversation, the patient opted for treatment. Antibiotics were ordered including: Ceftriaxone 1 g every 12 hours x3 days then Omnicef 300 mg twice daily x5 days; Flagyl 500 mg IV every 8 hours x3 days then 500 mg p.o. twice daily x5 days; clarithromycin 500 mg twice daily x7 days. -- The patient is tolerating the antibiotics well.      The regimen is based off a paper by Lynne Peñaloza et al., Evidence that antibiotic administration is effective in the treatment of a subset of patient with intra-amniotic infection/inflammation presenting with cervical insufficiency. This regimen has also been shown to be effective in prolonging latency in the setting of PPROM . 4. Anemia -- The patient's H/H at admission was noted to be low at 10.9/32.9  --Baseline nutrition panel, TFTs, hemoglobin electrophoresis, reticulocyte count, and peripheral smear ordered  --Testing completed 7/15/2022, the patient results included: H/H10.9/32.9, MCV 88.2, platelet count 375,633, potassium 4, creatinine 0.6, calcium 9.7, ALT 10, AST 9, uric acid 4.2, , ferritin 20, folate 14.1, vitamin B12 321, vitamin D 25, magnesium 1.7, TSH 1.12, free T4 0.9, free T3 2.6, TPO pending, antithyroglobulin antibody pending, hemoglobin electrophoresis pending, hemoglobin A1c 5.7%, urine protein creatinine ratio 0.1, GC chlamydia pending, wet prep negative, genital culture pending, urinalysis positive for glucose, urine culture pending. --Additional recommendations are below     5. Obesity in pregnancy -- Counseling was previously provided. She did not have any additional questions or concerns today. The patient has gained 12 pounds during this pregnancy as of 7/12/2022. Her BMI was 56.23 at that time. Given the increased risks previously described, additional testing is recommended. Fetal growth should be monitored every 3-4 weeks starting at 24-26 weeks' gestation. Fetal growth was appropriate for the gestational age today. The patient should monitor fetal kick counts daily, starting at 28 weeks' gestation. She should be scheduled for increased fetal surveillance with twice weekly fetal testing after 32 weeks' gestation. In the absence of any complications, delivery is recommended at 39 weeks' gestation.      Given the increased risk for hypertensive disorders of pregnancy,  the potential utility of a low dose aspirin in reducing her risk for hypertensive disorders of pregnancy was reviewed. The risks and benefits of low dose aspirin were discussed. She was counseled that she could take 81 mg of aspirin, daily. She should continue this for the remainder of pregnancy and 6-8 weeks postpartum. Additional maternal evaluation was recommended with a nutrition panel, thyroid function studies, and a hemoglobin A1c. --Testing completed 7/15/2022, results summarized above. Additional recommendations are below. 6.  Elevated glucose/hemoglobin A1c -- The patient's random glucose was elevated at 192. Her urinalysis was positive for glucose. The patient reports that she completed an early glucose screen that was slightly abnormal.  She has been monitoring fasting blood sugars at home. She has not required treatment. The patient was counseled that her blood sugars may be elevated secondary to the recent steroid administration. -- Blood sugars were reviewed. The patient is receiving sliding scale insulin coverage. -- Orders were previously clarified with nursing. Although the order said that the sliding scale was for postprandial coverage, the patient had been receiving preprandial coverage. At this time, the patient should be having blood sugars checked fasting and 2 hours postprandial.  Sliding scale insulin coverage as ordered for her postprandial values. Recommendations:  Monitor blood sugar fasting and 2 hours postprandial  Sliding scale insulin coverage as needed for postprandial elevations  If the patient's blood sugars remain elevated 5 to 7 days after betamethasone, will start scheduled insulin     7. Hypothyroxinemia -- The patient's lab results were reviewed following her consultation. Her free T4 was mildly decreased at 0.9. Her TSH was normal at 1.12 and free T3 normal at 2.6. Screening for thyroid peroxidase antibody and antithyroglobulin antibody was pending.     --Counseling was previously provided. Counseling was reviewed.   The patient did not have well-being --  Continuous toco monitoring and FHTs with magnesium  --Continue growth scans q 3 wks. Last growth US: 23w2d, 43rd percentile  --Fetal heart tones q shift. --Continue BPP twice weekly, will order for tomorrow  --BMZ 7/14-7/15  --Candidate for rescue steroids on/after 7/28/22  --Magnesium sulfate for neuro protection 7/16-7/17 14. DVT prophylaxis -- Continue SHAHRZAD hose      --The total time spent on today's visit was 25 minutes. This included preparation for the consultation (i.e. reviewing prior external notes and test results), performance of a medically appropriate history and examination, counseling, orders for medications, tests or other procedures, and coordination of care. Greater than 50% of the time was spent face-to-face with the patient and with counseling and coordination of care. This time is exclusive of procedures performed. I answered all of  the patient's questions to her satisfaction. --If you have any questions regarding her management, please contact me at your convenience and thank you for allowing me to participate in her care. Stevo Cleaning MD, [address] [facility]  [phone]        *All or parts of this note may have been generated using a voice recognition program. There may be typo, grammar, or Word substitution errors that have escaped my review of this note.

## 2022-07-18 NOTE — PROGRESS NOTES
Rachael Lopez is a 27 y.o. female patient. Patient seen today. No complaints  Patient has no contractions  Fetal heart tones stable  Reviewed M note for possible shortening of the cervix  Steroids yesterday repeat today  Mgsulfate now  Discussed with dr Linda Gamez plan   Patient has been placed on calcium channel blocker.   Blood pressure  Blood pressure stable  Intrauterine pregnancy at 23+weeks  Patient will be here over weekend   Current Facility-Administered Medications             Current Facility-Administered Medications   Medication Dose Route Frequency Provider Last Rate Last Admin    NIFEdipine (ADALAT CC) extended release tablet 30 mg  30 mg Oral Q12H Alexei Emery MD   30 mg at 07/16/22 0848    aspirin chewable tablet 81 mg  81 mg Oral Daily Alexei Emery MD   81 mg at 07/16/22 0848    cefTRIAXone (ROCEPHIN) 1,000 mg in sodium chloride flush 10 mL IV syringe  1,000 mg IntraVENous Q12H Alexei Emery MD   1,000 mg at 07/15/22 2356    [START ON 7/18/2022] cefdinir (OMNICEF) capsule 300 mg  300 mg Oral 2 times per day Alexei Emery MD        clarithromycin (BIAXIN) tablet 500 mg  500 mg Oral 2 times per day Alexei Emery MD   500 mg at 07/16/22 0848    metronidazole (FLAGYL) 500 mg in 0.9% NaCl 100 mL IVPB premix  500 mg IntraVENous Q8H Alexei Emery MD   Stopped at 07/16/22 0937    [START ON 7/18/2022] metroNIDAZOLE (FLAGYL) tablet 500 mg  500 mg Oral 3 times per day Alexei Emery MD        sodium chloride flush 0.9 % injection 5-40 mL  5-40 mL IntraVENous 2 times per day Jovan John, APRN - CNM        sodium chloride flush 0.9 % injection 5-40 mL  5-40 mL IntraVENous PRN Jovan John, APRN - CNM        0.9 % sodium chloride infusion   IntraVENous PRN Jovan John, APRN - CNM        acetaminophen (TYLENOL) tablet 650 mg  650 mg Oral Q4H PRN Constantino Cameron MD   650 mg at 07/16/22 0532         No Known Allergies  Principal Problem:    23 weeks gestation of pregnancy  Active Problems:    Incompetent cervix in pregnancy, antepartum, second trimester    Morbid obesity with body mass index of 50.0-59.9 in adult [facility])    Abnormal ultrasonic finding on  screening of mother    Anemia  [facility]fm note reviewed   Neonatology consult reviewed   Appreciate

## 2022-07-19 ENCOUNTER — ANCILLARY PROCEDURE (OUTPATIENT)
Dept: OBGYN CLINIC | Age: 32
DRG: 547 | End: 2022-07-19
Payer: MEDICAID

## 2022-07-19 LAB
GENITAL CULTURE, ROUTINE: NORMAL
METER GLUCOSE: 114 MG/DL (ref 74–99)
METER GLUCOSE: 114 MG/DL (ref 74–99)
METER GLUCOSE: 144 MG/DL (ref 74–99)
METER GLUCOSE: 147 MG/DL (ref 74–99)
METER GLUCOSE: 177 MG/DL (ref 74–99)
THYROGLOBULIN ANTIBODY: <12 IU/ML (ref 0–40)
THYROID PEROXIDASE (TPO) ABS: <4 IU/ML (ref 0–25)

## 2022-07-19 PROCEDURE — 76819 FETAL BIOPHYS PROFIL W/O NST: CPT | Performed by: OBSTETRICS & GYNECOLOGY

## 2022-07-19 PROCEDURE — 76820 UMBILICAL ARTERY ECHO: CPT | Performed by: OBSTETRICS & GYNECOLOGY

## 2022-07-19 PROCEDURE — 1220000000 HC SEMI PRIVATE OB R&B

## 2022-07-19 PROCEDURE — 2500000003 HC RX 250 WO HCPCS: Performed by: OBSTETRICS & GYNECOLOGY

## 2022-07-19 PROCEDURE — 76821 MIDDLE CEREBRAL ARTERY ECHO: CPT | Performed by: OBSTETRICS & GYNECOLOGY

## 2022-07-19 PROCEDURE — 6370000000 HC RX 637 (ALT 250 FOR IP): Performed by: OBSTETRICS & GYNECOLOGY

## 2022-07-19 PROCEDURE — 99232 SBSQ HOSP IP/OBS MODERATE 35: CPT | Performed by: OBSTETRICS & GYNECOLOGY

## 2022-07-19 PROCEDURE — 2580000003 HC RX 258: Performed by: OBSTETRICS & GYNECOLOGY

## 2022-07-19 PROCEDURE — 76815 OB US LIMITED FETUS(S): CPT | Performed by: OBSTETRICS & GYNECOLOGY

## 2022-07-19 PROCEDURE — 2580000003 HC RX 258: Performed by: ADVANCED PRACTICE MIDWIFE

## 2022-07-19 PROCEDURE — A4216 STERILE WATER/SALINE, 10 ML: HCPCS | Performed by: OBSTETRICS & GYNECOLOGY

## 2022-07-19 PROCEDURE — 82962 GLUCOSE BLOOD TEST: CPT

## 2022-07-19 RX ADMIN — CYANOCOBALAMIN TAB 1000 MCG 1000 MCG: 1000 TAB at 09:08

## 2022-07-19 RX ADMIN — DOCUSATE SODIUM 100 MG: 100 CAPSULE, LIQUID FILLED ORAL at 21:02

## 2022-07-19 RX ADMIN — Medication 2000 UNITS: at 09:08

## 2022-07-19 RX ADMIN — NIFEDIPINE 30 MG: 30 TABLET, EXTENDED RELEASE ORAL at 09:08

## 2022-07-19 RX ADMIN — SODIUM CHLORIDE, PRESERVATIVE FREE 10 ML: 5 INJECTION INTRAVENOUS at 09:10

## 2022-07-19 RX ADMIN — METRONIDAZOLE 500 MG: 500 TABLET ORAL at 14:28

## 2022-07-19 RX ADMIN — INSULIN LISPRO 2 UNITS: 100 INJECTION, SOLUTION INTRAVENOUS; SUBCUTANEOUS at 20:12

## 2022-07-19 RX ADMIN — ASPIRIN 81 MG CHEWABLE TABLET 81 MG: 81 TABLET CHEWABLE at 09:08

## 2022-07-19 RX ADMIN — CLARITHROMYCIN 500 MG: 500 TABLET ORAL at 21:02

## 2022-07-19 RX ADMIN — METRONIDAZOLE 500 MG: 500 TABLET ORAL at 06:18

## 2022-07-19 RX ADMIN — DOCUSATE SODIUM 100 MG: 100 CAPSULE, LIQUID FILLED ORAL at 09:08

## 2022-07-19 RX ADMIN — CLARITHROMYCIN 500 MG: 500 TABLET ORAL at 09:08

## 2022-07-19 RX ADMIN — NIFEDIPINE 30 MG: 30 TABLET, EXTENDED RELEASE ORAL at 21:02

## 2022-07-19 RX ADMIN — METRONIDAZOLE 500 MG: 500 TABLET ORAL at 21:02

## 2022-07-19 RX ADMIN — INSULIN LISPRO 2 UNITS: 100 INJECTION, SOLUTION INTRAVENOUS; SUBCUTANEOUS at 14:41

## 2022-07-19 RX ADMIN — FAMOTIDINE 20 MG: 10 INJECTION INTRAVENOUS at 09:09

## 2022-07-19 RX ADMIN — ACETAMINOPHEN 650 MG: 325 TABLET ORAL at 06:18

## 2022-07-19 RX ADMIN — CEFDINIR 300 MG: 300 CAPSULE ORAL at 09:08

## 2022-07-19 RX ADMIN — FERROUS SULFATE TAB 325 MG (65 MG ELEMENTAL FE) 325 MG: 325 (65 FE) TAB at 09:09

## 2022-07-19 RX ADMIN — CEFDINIR 300 MG: 300 CAPSULE ORAL at 21:02

## 2022-07-19 ASSESSMENT — PAIN SCALES - GENERAL: PAINLEVEL_OUTOF10: 5

## 2022-07-19 NOTE — PROGRESS NOTES
versus Gestational hypertension -- The patient is a 40-year-old  3 para 0-0-2-0 who was sent to the hospital on 2022 at 23 weeks 2 days secondary to blood pressure elevations that were noted during a routine obstetric visit. She had persistent diastolic elevation in the mid 90s. The patient was admitted on 2022. She was seen and evaluated by maternal-fetal medicine and started on nifedipine XL 30 mg daily. A transvaginal ultrasound was performed. The patient's cervix measured 10 mm with funneling seen to the level of the cerclage. Intra amniotic debris was noted in the cervical canal.  Betamethasone for fetal benefit was recommended at that time. Magnesium sulfate was also recommended. The patient received her first dose of betamethasone on 2022. The patient's blood pressures continued to be labile. She denied having any symptoms of preeclampsia. The Procardia XL was increased to BID on 7/15/22. The patient's blood pressures have been stable on Procardia XL, 30 mg twice daily. Today, she again denied having any symptoms of headache, vision change, chest pain, shortness of breath, nausea, vomiting, and the right upper quadrant pain. The patient's prenatal records were previously reviewed. Her blood pressure has been elevated since 6 weeks gestation. Thus there is concern for underlying chronic hypertension. --Counseling was previously provided. Counseling was reviewed. The patient did not have any additional questions or concerns. --The risks of hypertension in pregnancy were previously reviewed including poor fetal growth, placental abruption, preeclampsia (25-50%),  delivery, and/or fetal loss. --A daily low-dose aspirin was recommended. She should continue this for the remainder of pregnancy and 6 to 8 weeks postpartum. --She should monitor fetal kick counts daily, instructions were reviewed.     --Preeclampsia labs should be checked Funneling was seen measuring 2 cm in width by 3 cm in length. Status post betamethasone -7/15  Status post magnesium sulfate for fetal benefit -   labor and PPROM precautions were reviewed  Infection screening recommended. Screening completed 7/15/2022 and includes: Wet prep negative, GC chlamydia negative, genital culture negative, urine culture mixed  Recommend vaginal progesterone given there is residual cervical length. The patient indicated that she has started the medication. She is tolerating it well. Continue vaginal progesterone until 36-37 weeks gestation  Status post NICU consult  Continued inpatient care given the early gestational age and increased risk for  premature rupture membranes, infection, and/or  delivery     3. Intra amniotic debris/sludge, improving  -- The ultrasound was reviewed with the patient. At 23 weeks 2 days, intra amniotic debris was seen overlying the internal cervical os. The patient was counseled that this finding can be associated with hemorrhage or intra amniotic infection. The patient is being followed for cervical insufficiency. Transvaginal imaging was repeated on 2022 at 23 weeks 6 days. A defined area of debris/sludge was not seen. The amniotic fluid within the cervical canal appeared slightly increased in echogenicity. Counseling was previously provided. Counseling was reviewed. The patient did not have any additional questions or concerns. The patient was counseled that the finding of intrauterine debris or sludge can be associated with hemorrhage or infection. Some studies have shown that treatment with intravenous or oral antibiotics can decrease the risk for complications associated with the finding of intra amniotic debris/sludge including chorioamnionitis, endometritis (23.1%),  infection (61.1%), poor fetal growth, and  birth (46.2%).   Additionally, treatment has been shown to eliminate the sonographic presence of debris/sludge in some cases. The risks and benefits of antibiotics were previously discussed with the patient. After this conversation, the patient opted for treatment. Antibiotics were ordered including: Ceftriaxone 1 g every 12 hours x3 days then Omnicef 300 mg twice daily x5 days; Flagyl 500 mg IV every 8 hours x3 days then 500 mg p.o. twice daily x5 days; clarithromycin 500 mg twice daily x7 days. -- The patient is tolerating the antibiotics well. The regimen is based off a paper by Landon Shi et al., Evidence that antibiotic administration is effective in the treatment of a subset of patient with intra-amniotic infection/inflammation presenting with cervical insufficiency. This regimen has also been shown to be effective in prolonging latency in the setting of PPROM . 4. Anemia -- The patient's H/H at admission was noted to be low at 10.9/32.9  --Baseline nutrition panel, TFTs, hemoglobin electrophoresis, reticulocyte count, and peripheral smear ordered  --Testing completed 7/15/2022, the patient results included: H/H10.9/32.9, MCV 88.2, platelet count 484,656, potassium 4, creatinine 0.6, calcium 9.7, ALT 10, AST 9, uric acid 4.2, , ferritin 20, folate 14.1, vitamin B12 321, vitamin D 25, magnesium 1.7, TSH 1.12, free T4 0.9, free T3 2.6, TPO pending, antithyroglobulin antibody pending, hemoglobin electrophoresis pending, hemoglobin A1c 5.7%, urine protein creatinine ratio 0.1, GC chlamydia pending, wet prep negative, genital culture pending, urinalysis positive for glucose, urine culture pending. --Additional recommendations are below     5. Obesity in pregnancy -- Counseling was previously provided. She did not have any additional questions or concerns today. The patient has gained 12 pounds during this pregnancy as of 7/12/2022. Her BMI was 56.23 at that time.      Given the increased risks previously described, additional testing is recommended. Fetal growth should be monitored every 3-4 weeks starting at 24-26 weeks' gestation. Fetal growth was appropriate for the gestational age today. The patient should monitor fetal kick counts daily, starting at 28 weeks' gestation. She should be scheduled for increased fetal surveillance with twice weekly fetal testing after 32 weeks' gestation. In the absence of any complications, delivery is recommended at 39 weeks' gestation. Given the increased risk for hypertensive disorders of pregnancy,  the potential utility of a low dose aspirin in reducing her risk for hypertensive disorders of pregnancy was reviewed. The risks and benefits of low dose aspirin were discussed. She was counseled that she could take 81 mg of aspirin, daily. She should continue this for the remainder of pregnancy and 6-8 weeks postpartum. Additional maternal evaluation was recommended with a nutrition panel, thyroid function studies, and a hemoglobin A1c. --Testing completed 7/15/2022, results summarized above. Additional recommendations are below. 6.  Elevated glucose/hemoglobin A1c -- The patient's random glucose was elevated at 192. Her urinalysis was positive for glucose. The patient reports that she completed an early glucose screen that was slightly abnormal.  She has been monitoring fasting blood sugars at home. She has not required treatment. The patient was counseled that her blood sugars may be elevated secondary to the recent steroid administration. -- Blood sugars were reviewed. The patient is receiving sliding scale insulin coverage. -- Orders were again clarified with nursing. Although the order said that the sliding scale was for postprandial coverage, the patient had been receiving preprandial coverage. At this time, the patient should be having blood sugars checked fasting and 2 hours postprandial.  Sliding scale insulin coverage as ordered for her postprandial values. Recommendations:  Monitor blood sugar fasting and 2 hours postprandial  Sliding scale insulin coverage as needed for postprandial elevations  If the patient's blood sugars remain elevated 5 to 7 days after betamethasone, will start scheduled insulin     7. Hypothyroxinemia -- The patient's lab results were reviewed following her consultation. Her free T4 was mildly decreased at 0.9. Her TSH was normal at 1.12 and free T3 normal at 2.6. Screening for thyroid peroxidase antibody and antithyroglobulin antibody was pending.     --Counseling was previously provided. Counseling was reviewed. The patient did not have any additional questions or concerns. Per the American thyroid Association, treatment is not recommended for women with isolated hypothyroxinemia. However, thyroid function study should be monitored closely, every 4 weeks throughout the pregnancy. --Recommend repeat thyroid function studies in 4 weeks, on/after 8/12/2022  --Long-term follow-up with PCP for monitoring management     8. Low ferritin -- The patient's ferritin was low at 20 (considered low if <15 in absence of anemia or <40 in setting of anemia)  --Ferrous sulfate 325 mg BID prescribed  --Monitor levels serially  --Monitor nutrition panel q4-6 weeks (CBC, CMP, magnesium, ferritin, folate, vitamin B12, vitamin D25OH), 8/12/2022  --Follow up with PCP for long term monitoring and management     9. Low vitamin D -- The patient's vitamin D was low at 25  --Recommend vitamin D3 2000 IU daily  --Monitor levels serially  --Monitor nutrition panel q4-6 weeks (CBC, CMP, magnesium, ferritin, folate, vitamin B12, vitamin D25OH), on/after 8/12/2022  --Follow up with PCP for long term monitoring and management     10. Low vitamin B12 -- The patient's vitamin B12 level was borderline at 381. Individuals with vitamin B12 level between 200 and 400 are increased risk for anemia and side effects related to low vitamin B12.   Thus, supplementation is recommended  --Recommend vitamin B12, 1000 mcg daily  --Monitor levels serially  --Repeat nutrition panel (CBC, CMP, magnesium, ferritin, folate, vitamin B12, vitamin D 25 OH) in 4 weeks, 8/12/2022  --Long-term follow-up with PCP for monitoring and management     11. 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 She was counseled that she may be at increased risk for viral exposures such as CMV and parvovirus. -- Baseline screening recommended with her next set of labs        12. Routine OB -- FHTs q shift, start NST TID at 24 wga  --GBS unknown     13. Fetal well-being --  Continuous toco monitoring and FHTs with magnesium  --Continue growth scans q 3 wks. Last growth US: 23w2d, 43rd percentile  --Fetal heart tones q shift. --Continue BPP twice weekly, will order for tomorrow  --BMZ 7/14-7/15  --Candidate for rescue steroids on/after 7/28/22  --Magnesium sulfate for neuro protection 7/16-7/17     14. DVT prophylaxis -- Continue SHAHRZAD hose       --The total time spent on today's visit was 25 minutes. This included preparation for the consultation (i.e. reviewing prior external notes and test results), performance of a medically appropriate history and examination, counseling, orders for medications, tests or other procedures, and coordination of care. Greater than 50% of the time was spent face-to-face with the patient and with counseling and coordination of care. This time is exclusive of procedures performed. I answered all of  the patient's questions to her satisfaction. --If you have any questions regarding her management, please contact me at your convenience and thank you for allowing me to participate in her care.         Natalya Felix MD, [address]  [phone]        *All or parts of this note may have been generated using a voice recognition program. There may be typo, grammar, or Word substitution errors that have escaped my review of this note.

## 2022-07-19 NOTE — PROGRESS NOTES
Pt resting in bed. Denies any complaints at this time. Pt BP and BS taken. Meds given.  No needs at this time

## 2022-07-20 ENCOUNTER — ANCILLARY PROCEDURE (OUTPATIENT)
Dept: OBGYN CLINIC | Age: 32
DRG: 547 | End: 2022-07-20
Payer: MEDICAID

## 2022-07-20 LAB
C TRACH DNA GENITAL QL NAA+PROBE: NEGATIVE
GLUCOSE BLD-MCNC: 144 MG/DL (ref 74–99)
METER GLUCOSE: 127 MG/DL (ref 74–99)
METER GLUCOSE: 141 MG/DL (ref 74–99)
METER GLUCOSE: 148 MG/DL (ref 74–99)
METER GLUCOSE: 152 MG/DL (ref 74–99)
METER GLUCOSE: >500 MG/DL (ref 74–99)
N. GONORRHOEAE DNA: NEGATIVE
SOURCE: NORMAL

## 2022-07-20 PROCEDURE — 1220000000 HC SEMI PRIVATE OB R&B

## 2022-07-20 PROCEDURE — 36415 COLL VENOUS BLD VENIPUNCTURE: CPT

## 2022-07-20 PROCEDURE — 82962 GLUCOSE BLOOD TEST: CPT

## 2022-07-20 PROCEDURE — 82947 ASSAY GLUCOSE BLOOD QUANT: CPT

## 2022-07-20 PROCEDURE — 76819 FETAL BIOPHYS PROFIL W/O NST: CPT | Performed by: OBSTETRICS & GYNECOLOGY

## 2022-07-20 PROCEDURE — 99232 SBSQ HOSP IP/OBS MODERATE 35: CPT | Performed by: OBSTETRICS & GYNECOLOGY

## 2022-07-20 PROCEDURE — 76820 UMBILICAL ARTERY ECHO: CPT | Performed by: OBSTETRICS & GYNECOLOGY

## 2022-07-20 PROCEDURE — 6370000000 HC RX 637 (ALT 250 FOR IP): Performed by: OBSTETRICS & GYNECOLOGY

## 2022-07-20 PROCEDURE — 76821 MIDDLE CEREBRAL ARTERY ECHO: CPT | Performed by: OBSTETRICS & GYNECOLOGY

## 2022-07-20 PROCEDURE — 76815 OB US LIMITED FETUS(S): CPT | Performed by: OBSTETRICS & GYNECOLOGY

## 2022-07-20 RX ADMIN — FERROUS SULFATE TAB 325 MG (65 MG ELEMENTAL FE) 325 MG: 325 (65 FE) TAB at 08:22

## 2022-07-20 RX ADMIN — NIFEDIPINE 30 MG: 30 TABLET, EXTENDED RELEASE ORAL at 20:21

## 2022-07-20 RX ADMIN — METRONIDAZOLE 500 MG: 500 TABLET ORAL at 22:12

## 2022-07-20 RX ADMIN — CLARITHROMYCIN 500 MG: 500 TABLET ORAL at 08:32

## 2022-07-20 RX ADMIN — METRONIDAZOLE 500 MG: 500 TABLET ORAL at 15:03

## 2022-07-20 RX ADMIN — CEFDINIR 300 MG: 300 CAPSULE ORAL at 08:33

## 2022-07-20 RX ADMIN — CLARITHROMYCIN 500 MG: 500 TABLET ORAL at 21:23

## 2022-07-20 RX ADMIN — INSULIN LISPRO 2 UNITS: 100 INJECTION, SOLUTION INTRAVENOUS; SUBCUTANEOUS at 20:33

## 2022-07-20 RX ADMIN — CYANOCOBALAMIN TAB 1000 MCG 1000 MCG: 1000 TAB at 08:33

## 2022-07-20 RX ADMIN — CEFDINIR 300 MG: 300 CAPSULE ORAL at 21:23

## 2022-07-20 RX ADMIN — INSULIN LISPRO 2 UNITS: 100 INJECTION, SOLUTION INTRAVENOUS; SUBCUTANEOUS at 10:25

## 2022-07-20 RX ADMIN — ASPIRIN 81 MG CHEWABLE TABLET 81 MG: 81 TABLET CHEWABLE at 08:22

## 2022-07-20 RX ADMIN — DOCUSATE SODIUM 100 MG: 100 CAPSULE, LIQUID FILLED ORAL at 20:21

## 2022-07-20 RX ADMIN — NIFEDIPINE 30 MG: 30 TABLET, EXTENDED RELEASE ORAL at 08:33

## 2022-07-20 RX ADMIN — DOCUSATE SODIUM 100 MG: 100 CAPSULE, LIQUID FILLED ORAL at 08:22

## 2022-07-20 RX ADMIN — INSULIN LISPRO 1 UNITS: 100 INJECTION, SOLUTION INTRAVENOUS; SUBCUTANEOUS at 22:17

## 2022-07-20 RX ADMIN — Medication 2000 UNITS: at 08:32

## 2022-07-20 NOTE — PROGRESS NOTES
Enter patient room for fasting sugar screen and assessment and patient already eating breakfast including regular syrup. Sugar screen performed and registering HI on glucometer.

## 2022-07-20 NOTE — PROGRESS NOTES
Nutrition Assessment     Type and Reason for Visit: Initial, RD Nutrition Re-Screen/LOS    Nutrition Recommendations/Plan:   Continue current diet as tolerated  If pt blood sugars remain elevated, recommend consider Carb Controlled diet to promote BGL control     Nutrition Assessment:  Pt currently 23 wks gestation admit d/t persistent high BP. S/p cerclage 6/16 d/t incompetent cervix. Pt has had some elevated BGL during admit with Hgb A1c 5.7% (7/15). Recommend consider Carb Controlled diet restriction, if blood sugars are not controlled. Pt obesity status may contribute to insulin resistance/elevated blood sugars. Will continue to monitor and follow up    Nutrition Related Findings:   gravid abd w/+BS, trace edema, -I/O 2L,  (7/20) Wound Type: None    Current Nutrition Therapies:    ADULT DIET;  Regular    Anthropometric Measures:  Height: 5' 7\" (170.2 cm)  Current Body Wt: 359 lb 6.4 oz (163 kg)   BMI: 56.3    Nutrition Interventions:   Food and/or Nutrient Delivery: Continue Current Diet  Nutrition Education/Counseling: No recommendation at this time  Coordination of Nutrition Care: Continue to monitor while inpatient       Goals:     Goals: PO intake 75% or greater       Nutrition Monitoring and Evaluation:   Behavioral-Environmental Outcomes: None Identified  Food/Nutrient Intake Outcomes: Food and Nutrient Intake  Physical Signs/Symptoms Outcomes: Biochemical Data, GI Status, Fluid Status or Edema, Nutrition Focused Physical Findings, Skin, Weight    Discharge Planning:    No discharge needs at this time     [facility] ELISEO RODRIGUEZ, CNSC, LD  Contact: [phone]

## 2022-07-20 NOTE — PROGRESS NOTES
Progress Note    SUBJECTIVE:   Pt comfortable; +void; +flatus; denies leaking or bleeding    OBJECTIVE:    VITALS:  BP (!) 141/83   Pulse (!) 108   Temp 98.5 °F (36.9 °C) (Oral)   Resp 14   Ht 5' 7\" (1.702 m)   Wt (!) 359 lb (162.8 kg)   LMP 2022   SpO2 99%   BMI 56.23 kg/m²   Physical Exam  ABDOMEN:  normal bowel sounds, , non-tender and gravid  Ext nt    DATA:  Hemoglobin/Hematocrit:    Lab Results   Component Value Date/Time    HGB 11.8 07/15/2022 06:20 PM    HCT 35.6 07/15/2022 06:20 PM       ASSESSMENT AND PLAN:  Principal Problem:    23 weeks gestation of pregnancy  Active Problems:    Incompetent cervix in pregnancy, antepartum, second trimester    Morbid obesity with body mass index of 50.0-59.9 in adult ([address]    Abnormal ultrasonic finding on  screening of mother    Anemia    Elevated hemoglobin A1c    Low ferritin    Low maternal serum vitamin B12    Low vitamin D level  Resolved Problems:    * No resolved hospital problems.  *    Plan discharge home per [facility]    Electronically signed by Eve Alvarenga DO on 2022 at 1:47 PM

## 2022-07-20 NOTE — PROGRESS NOTES
Assumed care of pt. Pt resting comfortably in bed with no complaints of pain at this time. Denies any LOF, VB or ctx. Reports good fetal movement. Denies headache, visual disturbances or epigastric pain. Pt states she does experience a mild headache throughout the night into the early morning that goes away once she gets up, this is not new for her. Call light within reach.

## 2022-07-20 NOTE — PROGRESS NOTES
Lab called and spoke with galileo regarding result of confirmation glucose. Still pending at this time.

## 2022-07-20 NOTE — PROGRESS NOTES
Pt states she just had her sugar checked and it does not need checked again for a nightly sliding scale. States maybe she will call out later for it but she does not want it checked at this time.

## 2022-07-21 ENCOUNTER — ANCILLARY PROCEDURE (OUTPATIENT)
Dept: OBGYN CLINIC | Age: 32
DRG: 547 | End: 2022-07-21
Payer: MEDICAID

## 2022-07-21 LAB
METER GLUCOSE: 113 MG/DL (ref 74–99)
METER GLUCOSE: 175 MG/DL (ref 74–99)
METER GLUCOSE: 84 MG/DL (ref 74–99)
METER GLUCOSE: 96 MG/DL (ref 74–99)

## 2022-07-21 PROCEDURE — 76820 UMBILICAL ARTERY ECHO: CPT | Performed by: OBSTETRICS & GYNECOLOGY

## 2022-07-21 PROCEDURE — 6370000000 HC RX 637 (ALT 250 FOR IP): Performed by: OBSTETRICS & GYNECOLOGY

## 2022-07-21 PROCEDURE — 76815 OB US LIMITED FETUS(S): CPT | Performed by: OBSTETRICS & GYNECOLOGY

## 2022-07-21 PROCEDURE — 99231 SBSQ HOSP IP/OBS SF/LOW 25: CPT | Performed by: OBSTETRICS & GYNECOLOGY

## 2022-07-21 PROCEDURE — 2580000003 HC RX 258: Performed by: ADVANCED PRACTICE MIDWIFE

## 2022-07-21 PROCEDURE — 76821 MIDDLE CEREBRAL ARTERY ECHO: CPT | Performed by: OBSTETRICS & GYNECOLOGY

## 2022-07-21 PROCEDURE — 82962 GLUCOSE BLOOD TEST: CPT

## 2022-07-21 PROCEDURE — 1220000000 HC SEMI PRIVATE OB R&B

## 2022-07-21 PROCEDURE — 76819 FETAL BIOPHYS PROFIL W/O NST: CPT | Performed by: OBSTETRICS & GYNECOLOGY

## 2022-07-21 RX ADMIN — METRONIDAZOLE 500 MG: 500 TABLET ORAL at 15:09

## 2022-07-21 RX ADMIN — SODIUM CHLORIDE, PRESERVATIVE FREE 10 ML: 5 INJECTION INTRAVENOUS at 09:16

## 2022-07-21 RX ADMIN — CLARITHROMYCIN 500 MG: 500 TABLET ORAL at 20:56

## 2022-07-21 RX ADMIN — INSULIN LISPRO 1 UNITS: 100 INJECTION, SOLUTION INTRAVENOUS; SUBCUTANEOUS at 21:06

## 2022-07-21 RX ADMIN — METRONIDAZOLE 500 MG: 500 TABLET ORAL at 21:46

## 2022-07-21 RX ADMIN — CEFDINIR 300 MG: 300 CAPSULE ORAL at 09:16

## 2022-07-21 RX ADMIN — FERROUS SULFATE TAB 325 MG (65 MG ELEMENTAL FE) 325 MG: 325 (65 FE) TAB at 09:15

## 2022-07-21 RX ADMIN — CLARITHROMYCIN 500 MG: 500 TABLET ORAL at 09:16

## 2022-07-21 RX ADMIN — NIFEDIPINE 30 MG: 30 TABLET, EXTENDED RELEASE ORAL at 21:47

## 2022-07-21 RX ADMIN — DOCUSATE SODIUM 100 MG: 100 CAPSULE, LIQUID FILLED ORAL at 20:56

## 2022-07-21 RX ADMIN — NIFEDIPINE 30 MG: 30 TABLET, EXTENDED RELEASE ORAL at 09:16

## 2022-07-21 RX ADMIN — DOCUSATE SODIUM 100 MG: 100 CAPSULE, LIQUID FILLED ORAL at 09:15

## 2022-07-21 RX ADMIN — CYANOCOBALAMIN TAB 1000 MCG 1000 MCG: 1000 TAB at 09:16

## 2022-07-21 RX ADMIN — Medication 2000 UNITS: at 09:16

## 2022-07-21 RX ADMIN — CEFDINIR 300 MG: 300 CAPSULE ORAL at 20:56

## 2022-07-21 RX ADMIN — METRONIDAZOLE 500 MG: 500 TABLET ORAL at 06:18

## 2022-07-21 RX ADMIN — ASPIRIN 81 MG CHEWABLE TABLET 81 MG: 81 TABLET CHEWABLE at 09:15

## 2022-07-21 RX ADMIN — ACETAMINOPHEN 650 MG: 325 TABLET ORAL at 21:05

## 2022-07-21 RX ADMIN — FERROUS SULFATE TAB 325 MG (65 MG ELEMENTAL FE) 325 MG: 325 (65 FE) TAB at 15:15

## 2022-07-21 NOTE — PROGRESS NOTES
Progress Note    SUBJECTIVE:   Pt comfortable; +void; +flatus; +ambulation; denies cramping    OBJECTIVE:    VITALS:  BP (!) 101/55   Pulse 99   Temp 98.4 °F (36.9 °C) (Oral)   Resp 16   Ht 5' 7\" (1.702 m)   Wt (!) 359 lb (162.8 kg)   LMP 2022   SpO2 99%   BMI 56.23 kg/m²   Physical Exam  ABDOMEN:  soft, gravid, nt  Ext nt  Cervix: 3 mm    DATA:  Hemoglobin/Hematocrit:    Lab Results   Component Value Date/Time    HGB 11.8 07/15/2022 06:20 PM    HCT 35.6 07/15/2022 06:20 PM       ASSESSMENT AND PLAN:    Principal Problem:    23 weeks gestation of pregnancy  Active Problems:    Incompetent cervix in pregnancy, antepartum, second trimester    Morbid obesity with body mass index of 50.0-59.9 in adult ([facility])    Abnormal ultrasonic finding on  screening of mother    Anemia    Elevated hemoglobin A1c    Low ferritin    Low maternal serum vitamin B12    Low vitamin D level  Resolved Problems:    * No resolved hospital problems.  *  Continue to monitor    Electronically signed by Edgardo Lau DO on 2022 at 7:37 PM

## 2022-07-21 NOTE — PROGRESS NOTES
Pt placed on EFM for BID NST. Pt denies vaginal bleeding, leaking of fluid or contractions. +FM. Call light within reach.

## 2022-07-21 NOTE — PROGRESS NOTES
Patient called out and stated when she was showering her IV fell out. Patient eating and drinking and has no IV medications due. I will notify her doctor to see if I need to replace the IV or keep it out.

## 2022-07-22 LAB
METER GLUCOSE: 144 MG/DL (ref 74–99)
METER GLUCOSE: 169 MG/DL (ref 74–99)
METER GLUCOSE: 178 MG/DL (ref 74–99)
METER GLUCOSE: 178 MG/DL (ref 74–99)
METER GLUCOSE: 210 MG/DL (ref 74–99)

## 2022-07-22 PROCEDURE — 87070 CULTURE OTHR SPECIMN AEROBIC: CPT

## 2022-07-22 PROCEDURE — 6370000000 HC RX 637 (ALT 250 FOR IP): Performed by: OBSTETRICS & GYNECOLOGY

## 2022-07-22 PROCEDURE — 99232 SBSQ HOSP IP/OBS MODERATE 35: CPT | Performed by: OBSTETRICS & GYNECOLOGY

## 2022-07-22 PROCEDURE — 1220000000 HC SEMI PRIVATE OB R&B

## 2022-07-22 PROCEDURE — 82962 GLUCOSE BLOOD TEST: CPT

## 2022-07-22 RX ORDER — INSULIN GLARGINE 100 [IU]/ML
10 INJECTION, SOLUTION SUBCUTANEOUS NIGHTLY
Status: DISCONTINUED | OUTPATIENT
Start: 2022-07-22 | End: 2022-07-23

## 2022-07-22 RX ADMIN — CEFDINIR 300 MG: 300 CAPSULE ORAL at 21:47

## 2022-07-22 RX ADMIN — FERROUS SULFATE TAB 325 MG (65 MG ELEMENTAL FE) 325 MG: 325 (65 FE) TAB at 08:32

## 2022-07-22 RX ADMIN — NIFEDIPINE 30 MG: 30 TABLET, EXTENDED RELEASE ORAL at 08:32

## 2022-07-22 RX ADMIN — INSULIN LISPRO 2 UNITS: 100 INJECTION, SOLUTION INTRAVENOUS; SUBCUTANEOUS at 08:33

## 2022-07-22 RX ADMIN — DOCUSATE SODIUM 100 MG: 100 CAPSULE, LIQUID FILLED ORAL at 21:46

## 2022-07-22 RX ADMIN — DOCUSATE SODIUM 100 MG: 100 CAPSULE, LIQUID FILLED ORAL at 08:32

## 2022-07-22 RX ADMIN — METRONIDAZOLE 500 MG: 500 TABLET ORAL at 14:19

## 2022-07-22 RX ADMIN — ASPIRIN 81 MG CHEWABLE TABLET 81 MG: 81 TABLET CHEWABLE at 08:32

## 2022-07-22 RX ADMIN — Medication 2000 UNITS: at 09:12

## 2022-07-22 RX ADMIN — INSULIN LISPRO 2 UNITS: 100 INJECTION, SOLUTION INTRAVENOUS; SUBCUTANEOUS at 13:00

## 2022-07-22 RX ADMIN — CYANOCOBALAMIN TAB 1000 MCG 1000 MCG: 1000 TAB at 09:12

## 2022-07-22 RX ADMIN — INSULIN GLARGINE 10 UNITS: 100 INJECTION, SOLUTION SUBCUTANEOUS at 22:07

## 2022-07-22 RX ADMIN — CEFDINIR 300 MG: 300 CAPSULE ORAL at 08:32

## 2022-07-22 RX ADMIN — FERROUS SULFATE TAB 325 MG (65 MG ELEMENTAL FE) 325 MG: 325 (65 FE) TAB at 16:49

## 2022-07-22 RX ADMIN — CLARITHROMYCIN 500 MG: 500 TABLET ORAL at 08:32

## 2022-07-22 RX ADMIN — METRONIDAZOLE 500 MG: 500 TABLET ORAL at 22:08

## 2022-07-22 RX ADMIN — INSULIN LISPRO 4 UNITS: 100 INJECTION, SOLUTION INTRAVENOUS; SUBCUTANEOUS at 16:50

## 2022-07-22 RX ADMIN — NIFEDIPINE 30 MG: 30 TABLET, EXTENDED RELEASE ORAL at 21:48

## 2022-07-22 RX ADMIN — INSULIN LISPRO 1 UNITS: 100 INJECTION, SOLUTION INTRAVENOUS; SUBCUTANEOUS at 22:08

## 2022-07-22 RX ADMIN — METRONIDAZOLE 500 MG: 500 TABLET ORAL at 06:11

## 2022-07-22 NOTE — PROGRESS NOTES
Patient Active Problem List   Diagnosis    Maternal morbid obesity, antepartum ([facility] [address]    Incompetent cervix during second trimester, with cervical cerclage    Hyperglycemia    Benign essential hypertension, antepartum    Morbid obesity with BMI of 50.0-59.9, adult ([facility] [address]      Lelia Hernandez is a 27 y.o. female, who is G3(0,0,2,0). She has an Estimated Date of Delivery: 22 based on her previous ultrasound assessment. She is currently 24 weeks 5 days gestation based on that assessment. The patient had no new complaints today. She states that her fetal movement has been good. She has had no vaginal bleeding or leaking of amniotic fluid. The patient is currently prescribed Procardia XL 30 mg twice daily for management of her blood pressures. Her blood pressures have improved with this medication. The patient is currently prescribed insulin for management of her hyperglycemia. Her blood sugars remain elevated but are improving. OB History    Para Term  AB Living   3 0 0 0 2 0   SAB IAB Ectopic Molar Multiple Live Births    2 0 0 0 0 0       # Outcome Date GA Lbr Skyler/2nd Weight Sex Delivery Anes PTL Lv   3 Current                     2 SAB  6w0d                 1 SAB  5w0d                    PAST GYNECOLOGICAL  HISTORY:  Negative for abnormal pap smears. Negative for sexually transmitted diseases. Negative for cervical LEEP / conization /cryosurgery. Negative for uterine surgery. Negative for ovarian or tubal surgery. Past Medical History        Past Medical History:   Diagnosis Date    Hypertension              Past Surgical History:   Procedure Laterality Date    WISDOM TOOTH EXTRACTION                No Known Allergies     Current Medication   No current outpatient medications on file.         Social History      Tobacco Use    Smoking status: Former Smoker    Smokeless tobacco: Never Used   Substance Use Topics    Alcohol use: Not Currently       Comment: social FAMILY MEDICAL HISTORY:  Negative for congenital abnormalities, autism, genetic disease and mental retardation, not listed above. Review of Systems :   CONSTITUTIONAL : No fever, no chills  HEENT : No headache, no visual changes, no rhinorrhea, no sore throat  CARDIOVASCULAR : No pain, no palpitations, no edema  RESPIRATORY : No pain, no shortness of breath  GASTROINTESTINAL : No N/V, no D/C, no abdominal pain  GENITOURINARY : No dysuria, hematuria and no incontinence  MUSCULOSKELETAL : No myalgia, No back pain  NEUROLOGICAL : No numbness, no tingling, no tremors. No history of seizures  ALL OTHER SYSTEMS WERE REPORTED AS NEGATIVE. PERTINENT PHYSICAL EXAMINATION:   Patient Vitals for the past 24 hrs:   BP Temp Temp src Pulse Resp   07/22/22 0839 139/64 98 °F (36.7 °C) Oral (!) 106 16   07/22/22 0614 134/67 98.1 °F (36.7 °C) Oral 96 18   07/21/22 2344 (!) 118/53 98.3 °F (36.8 °C) Oral (!) 106 18   07/21/22 2055 121/62 98.1 °F (36.7 °C) Oral (!) 107 18      GENERAL:   The patient is a well developed, female who is alert cooperative and oriented times three in no acute distress. HEENT:  Normo cephalic and atraumatic. No facial edema. ABDOMEN:  Her uterus is gravid. She had no complaint of abdominal pain or tenderness. The fetal heart rate is 150 bpm.      EXTREMITIES:  1+ peripheral edema is noted. PELVIC EXAMINATION:  Not repeated at this time. IMPRESSION:    1.  IUP at 24 weeks 5 days gestation based on her Estimated Date of Delivery: 11/6/22    2. Incompetent cervix, with the amniotic membranes extending to the external cervical os    3. Morbid obesity    4. Fully vaccinated for COVID-19 January 2022. 5.  Two previous first trimester pregnancy losses    6. Hypertension, taking Procardia XL 30 mg twice daily     7. O positive blood type    8. Taking 81 mg of aspirin daily    9. Status post cervical cerclage placement 6/16/2022  10.   Cervical length was 3.5 mm 2022 using vaginal progesterone  11. Receiving antibiotics as indicated in Dr. Drew Faster note from 2022  12. Received Celestone on 714 on 7/15/2022  13. Received magnesium sulfate for fetal neuro protection completed 2022  14. Hyperglycemia prescribed insulin     PLAN:  The patient is currently admitted secondary to  dilation of the cervix with funneling of the amniotic membranes into the endocervical canal secondary to the immediate need for  intervention if the patient would deliver. I recommended beginning insulin glargine 10 units each evening secondary to her persistently elevated fasting blood sugars and suboptimal postprandial blood sugar control. The sliding scale for Humalog insulin will continue to be followed for now. The patient is to continue taking Procardia XL as prescribed for management of her hypertension. DVT prophylaxis should be utilized throughout her admission. Consultation was obtained with neonatology during this admission. Further evaluation and management will be dependent on the results of the patient's testing and her clinical presentation. If you have any questions regarding her management, please contact me at your convenience and thank you for allowing me to participate in her care.      Timoteo Kapoor MD, MS, Wade Berrios, DAX, RDMS, RVT  Director [address] [place] [address]  [phone]

## 2022-07-22 NOTE — PROGRESS NOTES
[facility] Follow-Up Consultation/Antepartum Note    S:  The patient again reports that she feels well today. She notes fetal movement. She denies having any leaking of fluid, vaginal bleeding, and/or cramping. She reports having occasional contractions. She denies having any headache, vision change, chest pain, shortness of breath, nausea, vomiting, and or right upper quadrant pain. O:    07/19/22 2007 98.5 (36.9) 14 108 141/83 -- Supine -- -- -- -- 0 -- --     07/19/22 1826 -- -- 100 127/60 -- -- -- -- -- -- -- -- --     07/19/22 0925 98.4 (36.9) 14 91 133/60 -- -- -- -- -- -- -- -- --     07/19/22 0618 -- -- -- -- -- -- -- -- -- -- 5 -- --       Gen NAD  CV RRR  Lungs CTA B  Abd Gravid/soft/NTTP/NABS  Ext no edema BLE, no calf TTP BLE, +1 patellar reflexes BLE, no clonus BLE    Ultrasound 23 weeks 2 days:  S IUP, cephalic, heart rate 199, anterior placenta, composite gestational age 21 weeks 0 days, EFW 1 pound 3 ounces, 43rd percentile, transvaginal cervical length 10 mm with funneling to the level of the cerclage, intra amniotic debris and sludge noted     Ultrasound 23 wk 5 days:  S IUP, breech, heart rate 153, anterior placenta, JOSE ANTONIO normal, the cervix appears dilated transabdominally. US 23 wk 6 days:  S IUP, breech, fetal heart rate 150, anterior placenta, JOSE ANTONIO 13 cm, transvaginal cervical length 3.5 mm with funneling seen measuring 2.1 cm wide by 3 cm long. The cerclage was visualized. Ultrasound 24 weeks 0 days:  S IUP, cephalic, heart rate 905, anterior placenta, JOSE ANTONIO appears normal, BPP 8/8     Ultrasound 24 weeks 1 day:  S IUP, footling breech, heart rate 157, anterior placenta, negative fluid normal, BPP 8/8.   Umbilical artery Doppler normal, MCA PSV normal, CPR PI normal.    Ultrasound 24 weeks 2 days:  S IUP, transverse, heart rate 147, JOSE ANTONIO 18.4 cm, anterior placenta, BPP 8/8, umbilical artery PI normal, MCA PSV normal    Ultrasound 24 weeks 3 days:  S IUP, breech, heart rate 155, anterior placenta, JOSE ANTONIO 14.5 cm, BPP 8/8, umbilical artery PI normal, MCA PSV normal.        LABS:  2022 glucose: >500, 148, 127, 141, 152      A/P:  27 y.o.  at 24w3d (LMP = 7 WK US) with:    1. Chronic versus Gestational hypertension -- The patient is a 79-year-old  3 para 0-0-2-0 who was sent to the hospital on 2022 at 23 weeks 2 days secondary to blood pressure elevations that were noted during a routine obstetric visit. She had persistent diastolic elevation in the mid 90s. The patient was admitted on 2022. She was seen and evaluated by maternal-fetal medicine and started on nifedipine XL 30 mg daily. A transvaginal ultrasound was performed. The patient's cervix measured 10 mm with funneling seen to the level of the cerclage. Intra amniotic debris was noted in the cervical canal.  Betamethasone for fetal benefit was recommended at that time. Magnesium sulfate was also recommended. The patient received her first dose of betamethasone on 2022. The patient's blood pressures continued to be labile. She denied having any symptoms of preeclampsia. The Procardia XL was increased to BID on 7/15/22. The patient's blood pressures have been stable on Procardia XL, 30 mg twice daily. Today, she again denied having any symptoms of headache, vision change, chest pain, shortness of breath, nausea, vomiting, and the right upper quadrant pain. The patient's prenatal records were previously reviewed. Her blood pressure has been elevated since 6 weeks gestation. Thus there is concern for underlying chronic hypertension. --Counseling was previously provided. Counseling was reviewed. The patient did not have any additional questions or concerns. --The risks of hypertension in pregnancy were previously reviewed including poor fetal growth, placental abruption, preeclampsia (25-50%),  delivery, and/or fetal loss. --A daily low-dose aspirin was recommended. She should continue this for the remainder of pregnancy and 6 to 8 weeks postpartum. --She should monitor fetal kick counts daily, instructions were reviewed. --Preeclampsia labs should be checked weekly and prn symptoms  --In the absence of any additional complications, delivery can be planned for 37-39 weeks' gestation  --Delivery should be considered at 40 to 39 weeks gestation, or sooner with any additional complications. Delivery timing will be readdressed as the patient's pregnancy progresses. Delivery should be considered sooner with any nonreassuring fetal testing, persistent severe gestational hypertension, the need for  blood pressure medication, and/or the development of preeclampsia. -- Blood pressure is stable, continue Procardia XL 30 mg twice daily  -- Continue daily LDASA for remainder of pregnancy and 6-8 week postpartum  --Continue inpatient care  --Continue close monitoring for the development of preeclampsia. 2.  Cervical insufficiency status post cerclage placement -- The patient's prenatal records were previously reviewed. Is seen for a consultation by maternal-fetal medicine on 6/15/2022. Patient's cervix was noted to be open with the amniotic membranes tunneled to the level of the external cervical os. She was sent to the hospital for additional evaluation and possible cerclage placement. The patient underwent an exam/ultrasound indicated cerclage on 1/08/1123 without complication. She had a follow-up visit with Dr. Erendira Holguin on 6/23/2022. Per that note, the cerclage stitch was visualized and appeared to be intact. The patient appeared to have yeast on a speculum exam.     The patient's cervix was reevaluated on 7/12/2022. The cervix appeared to be completely funneled to the level of the external os. Is unclear if the cervix is dilated. The recommendation was made for betamethasone for fetal benefit and magnesium sulfate for neuro protection.      Today, the patient again denies any cramping. She has occasional contractions. A transvaginal ultrasound was repeated on 2022. The patient's cervix is approximately 3.5 mm in length. The cerclage appeared to be intact. Funneling was seen measuring 2 cm in width by 3 cm in length. Status post betamethasone -7/15  Status post magnesium sulfate for fetal benefit -   labor and PPROM precautions were reviewed  Infection screening recommended. Screening completed 7/15/2022 and includes: Wet prep negative, GC chlamydia negative, genital culture negative, urine culture mixed  Recommend vaginal progesterone given there is residual cervical length. The patient indicated that she has started the medication. She is tolerating it well. Continue vaginal progesterone until 36-37 weeks gestation  Status post NICU consult  Continued inpatient care given the early gestational age and increased risk for  premature rupture membranes, infection, and/or  delivery based on the appearance of the cervix     3. Intra amniotic debris/sludge, improving  -- The ultrasound was reviewed with the patient. At 23 weeks 2 days, intra amniotic debris was seen overlying the internal cervical os. The patient was counseled that this finding can be associated with hemorrhage or intra amniotic infection. The patient is being followed for cervical insufficiency. Transvaginal imaging was repeated on 2022 at 23 weeks 6 days. A defined area of debris/sludge was not seen. The amniotic fluid within the cervical canal appeared slightly increased in echogenicity. Counseling was previously provided. Counseling was reviewed. The patient did not have any additional questions or concerns. The patient was counseled that the finding of intrauterine debris or sludge can be associated with hemorrhage or infection.   Some studies have shown that treatment with intravenous or oral antibiotics can decrease the risk for complications associated with the finding of intra amniotic debris/sludge including chorioamnionitis, endometritis (23.1%),  infection (61.1%), poor fetal growth, and  birth (46.2%). Additionally, treatment has been shown to eliminate the sonographic presence of debris/sludge in some cases. The risks and benefits of antibiotics were previously discussed with the patient. After this conversation, the patient opted for treatment. Antibiotics were ordered including: Ceftriaxone 1 g every 12 hours x3 days then Omnicef 300 mg twice daily x5 days; Flagyl 500 mg IV every 8 hours x3 days then 500 mg p.o. twice daily x5 days; clarithromycin 500 mg twice daily x7 days. -- The patient is tolerating the antibiotics well. The antibiotics are scheduled to end on 2022. The regimen is based off a paper by Irwin Petersen et al., Evidence that antibiotic administration is effective in the treatment of a subset of patient with intra-amniotic infection/inflammation presenting with cervical insufficiency. This regimen has also been shown to be effective in prolonging latency in the setting of PPROM . 4. Anemia -- The patient's H/H at admission was noted to be low at 10.9/32.9  --Baseline nutrition panel, TFTs, hemoglobin electrophoresis, reticulocyte count, and peripheral smear ordered  --Testing completed 7/15/2022, the patient results included: H/H10.9/32.9, MCV 88.2, platelet count 698,349, potassium 4, creatinine 0.6, calcium 9.7, ALT 10, AST 9, uric acid 4.2, , ferritin 20, folate 14.1, vitamin B12 321, vitamin D 25, magnesium 1.7, TSH 1.12, free T4 0.9, free T3 2.6, TPO pending, antithyroglobulin antibody pending, hemoglobin electrophoresis pending, hemoglobin A1c 5.7%, urine protein creatinine ratio 0.1, GC chlamydia pending, wet prep negative, genital culture pending, urinalysis positive for glucose, urine culture pending.   --Additional recommendations are Nursing was unable to clarify the result or provide explanation for the result. It does not appear that a provider was contacted with the result. The remainder of the patient's blood sugars have been mildly elevated today. --If the patient's blood sugars continue to be elevated, will order scheduled insulin. -- Orders were again clarified with nursing. Although the order states that the sliding scale is for postprandial coverage, the patient had been receiving preprandial coverage. At this time, the patient should be having blood sugars checked fasting and 2 hours postprandial.  Sliding scale insulin coverage is ordered for her postprandial values. Recommendations:  Continue to monitor blood sugar fasting and 2 hours postprandial  Sliding scale insulin coverage as needed for postprandial elevations  If the patient's blood sugars are again elevated tomorrow, will start scheduled insulin     7. Hypothyroxinemia -- The patient's lab results were reviewed following her consultation. Her free T4 was mildly decreased at 0.9. Her TSH was normal at 1.12 and free T3 normal at 2.6. Screening for thyroid peroxidase antibody and antithyroglobulin antibody was pending.     --Counseling was previously provided. Counseling was reviewed. The patient did not have any additional questions or concerns. Per the American thyroid Association, treatment is not recommended for women with isolated hypothyroxinemia. However, thyroid function study should be monitored closely, every 4 weeks throughout the pregnancy. --Recommend repeat thyroid function studies in 4 weeks, on/after 8/12/2022  --Long-term follow-up with PCP for monitoring management     8.   Low ferritin -- The patient's ferritin was low at 20 (considered low if <15 in absence of anemia or <40 in setting of anemia)  --Ferrous sulfate 325 mg BID prescribed  --Monitor levels serially  --Monitor nutrition panel q4-6 weeks (CBC, CMP, magnesium, ferritin, folate, vitamin B12, vitamin D25OH), 8/12/2022  --Follow up with PCP for long term monitoring and management     9. Low vitamin D -- The patient's vitamin D was low at 25  --Recommend vitamin D3 2000 IU daily  --Monitor levels serially  --Monitor nutrition panel q4-6 weeks (CBC, CMP, magnesium, ferritin, folate, vitamin B12, vitamin D25OH), on/after 8/12/2022  --Follow up with PCP for long term monitoring and management     10. Low vitamin B12 -- The patient's vitamin B12 level was borderline at 381. Individuals with vitamin B12 level between 200 and 400 are increased risk for anemia and side effects related to low vitamin B12. Thus, supplementation is recommended  --Recommend vitamin B12, 1000 mcg daily  --Monitor levels serially  --Repeat nutrition panel (CBC, CMP, magnesium, ferritin, folate, vitamin B12, vitamin D 25 OH) in 4 weeks, 8/12/2022  --Long-term follow-up with PCP for monitoring and management     11. 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 She was counseled that she may be at increased risk for viral exposures such as CMV and parvovirus. -- Baseline screening recommended with her next set of labs        12. Vaginal itching -- The patient had complaints of some vaginal itching and discomfort today. She denied having any significant discharge. Patient was counseled that she may have a yeast infection secondary to the antibiotic use. --Nursing was asked to collect a genital culture  --If the patient is positive for yeast, will treat with Diflucan, 150 mg p.o. x1    13. Routine OB -- FHTs q shift, start NST TID at 24 wga  --GBS unknown     14. Fetal well-being --  Continuous toco monitoring and FHTs with magnesium  --Continue growth scans q 3 wks. Last growth US: 23w2d, 43rd percentile  --Fetal heart tones q shift.   --Continue BPP twice weekly, will order for tomorrow  --BMZ 7/14-7/15  --Candidate for rescue steroids on/after 7/28/22  --Magnesium sulfate for neuro protection 7/16-7/17     15. DVT prophylaxis -- Continue SHAHRZAD templebridgette       --The total time spent on today's visit was 25 minutes. This included preparation for the consultation (i.e. reviewing prior external notes and test results), performance of a medically appropriate history and examination, counseling, orders for medications, tests or other procedures, and coordination of care. Greater than 50% of the time was spent face-to-face with the patient and with counseling and coordination of care. This time is exclusive of procedures performed. I answered all of  the patient's questions to her satisfaction. --If you have any questions regarding her management, please contact me at your convenience and thank you for allowing me to participate in her care. Demi Suggs MD, [address] [facility]  [phone]        *All or parts of this note may have been generated using a voice recognition program. There may be typo, grammar, or Word substitution errors that have escaped my review of this note.

## 2022-07-22 NOTE — PROGRESS NOTES
Patient sitting up in bed, denies pain, discomfort, VB or LOF. Reports +FM. Educated patient on new order for 10 of Lantus at night. Patient stated understanding. Call light within reach.

## 2022-07-22 NOTE — PROGRESS NOTES
Progress Note    SUBJECTIVE:   Pt comfortable; +void; +flatus; +ambulation-minimum; denies cramping    OBJECTIVE:    VITALS:  /64   Pulse (!) 106   Temp 98 °F (36.7 °C) (Oral)   Resp 16   Ht 5' 7\" (1.702 m)   Wt (!) 359 lb (162.8 kg)   LMP 2022   SpO2 99%   BMI 56.23 kg/m²   Physical Exam  ABDOMEN:  normal bowel sounds, gravid, nt  Ext nt    DATA:  Hemoglobin/Hematocrit:    Lab Results   Component Value Date/Time    HGB 11.8 07/15/2022 06:20 PM    HCT 35.6 07/15/2022 06:20 PM       ASSESSMENT AND PLAN:    Principal Problem:    23 weeks gestation of pregnancy  Active Problems:    Incompetent cervix in pregnancy, antepartum, second trimester    Morbid obesity with body mass index of 50.0-59.9 in adult ([address]    Abnormal ultrasonic finding on  screening of mother    Anemia    Elevated hemoglobin A1c    Low ferritin    Low maternal serum vitamin B12    Low vitamin D level  Resolved Problems:    * No resolved hospital problems.  *    Continue to monitor    Electronically signed by Marci Diaz DO on 2022 at 4:12 PM

## 2022-07-22 NOTE — PROGRESS NOTES
Dr. Schroeder Never called updated that when the pt is taking progesterone vaginally it has started bothering her, and asked if she was able to take it orally. Orders received that she is able to take it vaginally but would still like the the genital culture.

## 2022-07-23 LAB
METER GLUCOSE: 101 MG/DL (ref 74–99)
METER GLUCOSE: 106 MG/DL (ref 74–99)
METER GLUCOSE: 112 MG/DL (ref 74–99)
METER GLUCOSE: 125 MG/DL (ref 74–99)
METER GLUCOSE: 184 MG/DL (ref 74–99)

## 2022-07-23 PROCEDURE — 82962 GLUCOSE BLOOD TEST: CPT

## 2022-07-23 PROCEDURE — 99231 SBSQ HOSP IP/OBS SF/LOW 25: CPT | Performed by: OBSTETRICS & GYNECOLOGY

## 2022-07-23 PROCEDURE — 6370000000 HC RX 637 (ALT 250 FOR IP): Performed by: OBSTETRICS & GYNECOLOGY

## 2022-07-23 PROCEDURE — 1220000000 HC SEMI PRIVATE OB R&B

## 2022-07-23 RX ORDER — INSULIN GLARGINE 100 [IU]/ML
14 INJECTION, SOLUTION SUBCUTANEOUS NIGHTLY
Status: DISCONTINUED | OUTPATIENT
Start: 2022-07-23 | End: 2022-07-24

## 2022-07-23 RX ADMIN — NIFEDIPINE 30 MG: 30 TABLET, EXTENDED RELEASE ORAL at 08:04

## 2022-07-23 RX ADMIN — DOCUSATE SODIUM 100 MG: 100 CAPSULE, LIQUID FILLED ORAL at 08:01

## 2022-07-23 RX ADMIN — METRONIDAZOLE 500 MG: 500 TABLET ORAL at 18:59

## 2022-07-23 RX ADMIN — Medication 2000 UNITS: at 08:01

## 2022-07-23 RX ADMIN — CEFDINIR 300 MG: 300 CAPSULE ORAL at 08:02

## 2022-07-23 RX ADMIN — FERROUS SULFATE TAB 325 MG (65 MG ELEMENTAL FE) 325 MG: 325 (65 FE) TAB at 18:59

## 2022-07-23 RX ADMIN — NIFEDIPINE 30 MG: 30 TABLET, EXTENDED RELEASE ORAL at 21:49

## 2022-07-23 RX ADMIN — FERROUS SULFATE TAB 325 MG (65 MG ELEMENTAL FE) 325 MG: 325 (65 FE) TAB at 08:01

## 2022-07-23 RX ADMIN — DOCUSATE SODIUM 100 MG: 100 CAPSULE, LIQUID FILLED ORAL at 21:49

## 2022-07-23 RX ADMIN — INSULIN LISPRO 2 UNITS: 100 INJECTION, SOLUTION INTRAVENOUS; SUBCUTANEOUS at 19:23

## 2022-07-23 RX ADMIN — ACETAMINOPHEN 650 MG: 325 TABLET ORAL at 13:24

## 2022-07-23 RX ADMIN — METRONIDAZOLE 500 MG: 500 TABLET ORAL at 06:52

## 2022-07-23 RX ADMIN — MICONAZOLE NITRATE 1 APPLICATOR: 20 CREAM VAGINAL at 22:47

## 2022-07-23 RX ADMIN — INSULIN GLARGINE 14 UNITS: 100 INJECTION, SOLUTION SUBCUTANEOUS at 22:47

## 2022-07-23 RX ADMIN — MICONAZOLE NITRATE 100 MG: 100 SUPPOSITORY VAGINAL at 22:47

## 2022-07-23 RX ADMIN — CYANOCOBALAMIN TAB 1000 MCG 1000 MCG: 1000 TAB at 08:01

## 2022-07-23 RX ADMIN — ASPIRIN 81 MG CHEWABLE TABLET 81 MG: 81 TABLET CHEWABLE at 08:01

## 2022-07-23 ASSESSMENT — PAIN DESCRIPTION - LOCATION: LOCATION: HEAD

## 2022-07-23 ASSESSMENT — PAIN DESCRIPTION - ORIENTATION: ORIENTATION: ANTERIOR

## 2022-07-23 ASSESSMENT — PAIN SCALES - GENERAL: PAINLEVEL_OUTOF10: 5

## 2022-07-23 ASSESSMENT — PAIN - FUNCTIONAL ASSESSMENT: PAIN_FUNCTIONAL_ASSESSMENT: ACTIVITIES ARE NOT PREVENTED

## 2022-07-23 ASSESSMENT — PAIN DESCRIPTION - DESCRIPTORS: DESCRIPTORS: THROBBING

## 2022-07-23 NOTE — PROGRESS NOTES
Dr Micki Singh updated on patients  and BP reading. New orders to give patient 14 units of lantus nightly.

## 2022-07-23 NOTE — PROGRESS NOTES
Pt sitting up in bed eating. States good fetal movement, no cramping loss of fluid or bleeding. Admits vaginal irritation. VSS afebrile  Fundus nontender  Abdomen soft, nontender  LE without calf tenderness. IUP at 24w6d  Incompetant cervix S/P cerclage  S/P magnesium, celestone and antibiotics  GDMA2  Chronic hypertension    Continue inpatient management.

## 2022-07-23 NOTE — PROGRESS NOTES
Patient Active Problem List   Diagnosis    Maternal morbid obesity, antepartum ([facility] [address]    Incompetent cervix during second trimester, with cervical cerclage    Hyperglycemia    Benign essential hypertension, antepartum    Morbid obesity with BMI of 50.0-59.9, adult ([facility] [address]      Mark Arriola is a 27 y.o. female, who is G3(0,0,2,0). She has an Estimated Date of Delivery: 22 based on her previous ultrasound assessment. She is currently 24 weeks 6 days gestation based on that assessment. The patient had no new complaints today. She states that her fetal movement has been good. She has had no vaginal bleeding or leaking of amniotic fluid. The fetal heart rate tracing is reassuring with moderate variability and accelerations. Occasional variable decelerations were noted. The tracing is category 2. The patient is currently prescribed Procardia XL 30 mg twice daily for management of her blood pressures. Her blood pressures have improved with this medication. The patient is currently prescribed insulin for management of her hyperglycemia. Her blood sugars remain elevated but are improving with the addition of insulin glargine each evening. OB History    Para Term  AB Living   3 0 0 0 2 0   SAB IAB Ectopic Molar Multiple Live Births    2 0 0 0 0 0       # Outcome Date GA Lbr Skyler/2nd Weight Sex Delivery Anes PTL Lv   3 Current                     2 2021 6w0d                 1 2020 5w0d                    PAST GYNECOLOGICAL  HISTORY:  Negative for abnormal pap smears. Negative for sexually transmitted diseases. Negative for cervical LEEP / conization /cryosurgery. Negative for uterine surgery. Negative for ovarian or tubal surgery.      Past Medical History        Past Medical History:   Diagnosis Date    Hypertension              Past Surgical History:   Procedure Laterality Date    WISDOM TOOTH EXTRACTION                No Known Allergies     Current Medication   No current outpatient medications on file. Social History      Tobacco Use    Smoking status: Former Smoker    Smokeless tobacco: Never Used   Substance Use Topics    Alcohol use: Not Currently       Comment: social      FAMILY MEDICAL HISTORY:  Negative for congenital abnormalities, autism, genetic disease and mental retardation, not listed above. Review of Systems :   CONSTITUTIONAL : No fever, no chills  HEENT : No headache, no visual changes, no rhinorrhea, no sore throat  CARDIOVASCULAR : No pain, no palpitations, no edema  RESPIRATORY : No pain, no shortness of breath  GASTROINTESTINAL : No N/V, no D/C, no abdominal pain  GENITOURINARY : No dysuria, hematuria and no incontinence  MUSCULOSKELETAL : No myalgia, No back pain  NEUROLOGICAL : No numbness, no tingling, no tremors. No history of seizures  ALL OTHER SYSTEMS WERE REPORTED AS NEGATIVE. PERTINENT PHYSICAL EXAMINATION:   Patient Vitals for the past 24 hrs:   BP Temp Temp src Pulse Resp SpO2   07/23/22 0804 (!) 107/57 98.1 °F (36.7 °C) Oral 94 16 99 %   07/22/22 2149 126/74 -- -- 98 -- --   07/22/22 2148 126/74 -- -- -- -- --   07/22/22 1949 137/81 98.2 °F (36.8 °C) Oral (!) 111 18 --      GENERAL:   The patient is a well developed, female who is alert cooperative and oriented times three in no acute distress. HEENT:  Normo cephalic and atraumatic. No facial edema. ABDOMEN:  Her uterus is gravid. She had no complaint of abdominal pain or tenderness. The fetal heart rate is 152 bpm.      EXTREMITIES:  1+ peripheral edema is noted. PELVIC EXAMINATION:  Not repeated at this time. IMPRESSION:    1.  IUP at 24 weeks 6 days gestation based on her Estimated Date of Delivery: 11/6/22    2. Incompetent cervix, with the amniotic membranes extending to the external cervical os    3. Morbid obesity    4. Fully vaccinated for COVID-19 January 2022. 5.  Two previous first trimester pregnancy losses    6. Hypertension, taking Procardia XL 30 mg twice daily     7. O positive blood type    8. Taking 81 mg of aspirin daily    9. Status post cervical cerclage placement 2022  10. Cervical length was 3.5 mm 2022 using vaginal progesterone  11. Receiving antibiotics as indicated in Dr. Kitty Rangel note from 2022  12. Received Celestone on 714 on 7/15/2022  13. Received magnesium sulfate for fetal neuro protection completed 2022  14. Hyperglycemia prescribed insulin  15. Reassuring fetal heart rate tracing for gestational age. Occasional variables noted common at this gestational age. PLAN:  The patient is currently admitted secondary to  dilation of the cervix with funneling of the amniotic membranes into the endocervical canal secondary to the immediate need for  intervention if the patient would deliver. The insulin glargine dose was increased to 14 units each evening, secondary to her persistently elevated fasting blood sugars and suboptimal postprandial blood sugar control. The sliding scale for Humalog insulin will continue to be followed for now. The patient is to continue taking Procardia XL as prescribed for management of her hypertension. DVT prophylaxis should be utilized throughout her admission. Consultation was obtained with neonatology during this admission. Further evaluation and management will be dependent on the results of the patient's testing and her clinical presentation. If you have any questions regarding her management, please contact me at your convenience and thank you for allowing me to participate in her care.      Андрей King MD, MS, Eduardo Vaca, RDCS, RDMS, RVT  Director [address] [place] [address]  [phone]

## 2022-07-24 LAB
GENITAL CULTURE, ROUTINE: ABNORMAL
GENITAL CULTURE, ROUTINE: ABNORMAL
METER GLUCOSE: 142 MG/DL (ref 74–99)
METER GLUCOSE: 144 MG/DL (ref 74–99)
METER GLUCOSE: 155 MG/DL (ref 74–99)
ORGANISM: ABNORMAL

## 2022-07-24 PROCEDURE — 6370000000 HC RX 637 (ALT 250 FOR IP): Performed by: OBSTETRICS & GYNECOLOGY

## 2022-07-24 PROCEDURE — 82962 GLUCOSE BLOOD TEST: CPT

## 2022-07-24 PROCEDURE — 99231 SBSQ HOSP IP/OBS SF/LOW 25: CPT | Performed by: OBSTETRICS & GYNECOLOGY

## 2022-07-24 PROCEDURE — 1220000000 HC SEMI PRIVATE OB R&B

## 2022-07-24 RX ORDER — INSULIN GLARGINE 100 [IU]/ML
18 INJECTION, SOLUTION SUBCUTANEOUS NIGHTLY
Status: DISCONTINUED | OUTPATIENT
Start: 2022-07-24 | End: 2022-07-25

## 2022-07-24 RX ORDER — INSULIN LISPRO 100 [IU]/ML
6 INJECTION, SOLUTION INTRAVENOUS; SUBCUTANEOUS
Status: DISCONTINUED | OUTPATIENT
Start: 2022-07-24 | End: 2022-07-26

## 2022-07-24 RX ADMIN — ASPIRIN 81 MG CHEWABLE TABLET 81 MG: 81 TABLET CHEWABLE at 09:43

## 2022-07-24 RX ADMIN — INSULIN LISPRO 2 UNITS: 100 INJECTION, SOLUTION INTRAVENOUS; SUBCUTANEOUS at 10:07

## 2022-07-24 RX ADMIN — MICONAZOLE NITRATE 100 MG: 100 SUPPOSITORY VAGINAL at 21:00

## 2022-07-24 RX ADMIN — FERROUS SULFATE TAB 325 MG (65 MG ELEMENTAL FE) 325 MG: 325 (65 FE) TAB at 18:28

## 2022-07-24 RX ADMIN — NIFEDIPINE 30 MG: 30 TABLET, EXTENDED RELEASE ORAL at 20:59

## 2022-07-24 RX ADMIN — INSULIN GLARGINE 18 UNITS: 100 INJECTION, SOLUTION SUBCUTANEOUS at 21:00

## 2022-07-24 RX ADMIN — DOCUSATE SODIUM 100 MG: 100 CAPSULE, LIQUID FILLED ORAL at 20:59

## 2022-07-24 RX ADMIN — INSULIN LISPRO 2 UNITS: 100 INJECTION, SOLUTION INTRAVENOUS; SUBCUTANEOUS at 14:33

## 2022-07-24 RX ADMIN — Medication 2000 UNITS: at 09:43

## 2022-07-24 RX ADMIN — NIFEDIPINE 30 MG: 30 TABLET, EXTENDED RELEASE ORAL at 09:43

## 2022-07-24 RX ADMIN — FERROUS SULFATE TAB 325 MG (65 MG ELEMENTAL FE) 325 MG: 325 (65 FE) TAB at 09:44

## 2022-07-24 RX ADMIN — DOCUSATE SODIUM 100 MG: 100 CAPSULE, LIQUID FILLED ORAL at 09:43

## 2022-07-24 RX ADMIN — CYANOCOBALAMIN TAB 1000 MCG 1000 MCG: 1000 TAB at 09:43

## 2022-07-24 NOTE — PROGRESS NOTES
Assumed care of patient. Pt. Sitting on bed, no complaints. Assessment as charted. Call light in reach.

## 2022-07-24 NOTE — PROGRESS NOTES
Patient Active Problem List   Diagnosis    Maternal morbid obesity, antepartum ([facility] [address]    Incompetent cervix during second trimester, with cervical cerclage    Hyperglycemia    Benign essential hypertension, antepartum    Morbid obesity with BMI of 50.0-59.9, adult ([facility] [address]      Pascale Duncan is a 27 y.o. female, who is G3(0,0,2,0). She has an Estimated Date of Delivery: 22 based on her previous ultrasound assessment. She is currently 25 weeks 0 days gestation based on that assessment. The patient had no new complaints today. She states that her fetal movement has been good. She has had no vaginal bleeding or leaking of amniotic fluid. The fetal heart rate tracing is reassuring with moderate variability and accelerations. Occasional variable decelerations were noted. The tracing is category 2. The patient is currently prescribed Procardia XL 30 mg twice daily for management of her blood pressures. Her blood pressures have improved with this medication. The patient is currently prescribed insulin for management of her hyperglycemia. Her blood sugars remain elevated. Her insulin dosages are being adjusted in order to attain euglycemia. The patient was advised of the importance of continuing to follow a carbohydrate controlled diet. OB History    Para Term  AB Living   3 0 0 0 2 0   SAB IAB Ectopic Molar Multiple Live Births    2 0 0 0 0 0       # Outcome Date GA Lbr Skyler/2nd Weight Sex Delivery Anes PTL Lv   3 Current                     2 2021 6w0d                 1 2020 5w0d                    PAST GYNECOLOGICAL  HISTORY:  Negative for abnormal pap smears. Negative for sexually transmitted diseases. Negative for cervical LEEP / conization /cryosurgery. Negative for uterine surgery. Negative for ovarian or tubal surgery.      Past Medical History        Past Medical History:   Diagnosis Date    Hypertension              Past Surgical History:   Procedure (A) 34.0 - 48.0 % Final    MCV 07/12/2022 88.2  80.0 - 99.9 fL Final    MCH 07/12/2022 29.2  26.0 - 35.0 pg Final    MCHC 07/12/2022 33.1  32.0 - 34.5 % Final    RDW 07/12/2022 13.8  11.5 - 15.0 fL Final    Platelets 09/11/6123 390  130 - 450 E9/L Final    MPV 07/12/2022 9.2  7.0 - 12.0 fL Final    BUN 07/12/2022 7  6 - 20 mg/dL Final    Creatinine 07/12/2022 0.6  0.5 - 1.0 mg/dL Final    GFR Non- 07/12/2022 >60  >=60 mL/min/1.73 Final    GFR  07/12/2022 >60   Final    Total Protein 07/12/2022 6.2 (A) 6.4 - 8.3 g/dL Final    Albumin 07/12/2022 3.2 (A) 3.5 - 5.2 g/dL Final    Alkaline Phosphatase 07/12/2022 59  35 - 104 U/L Final    ALT 07/12/2022 10  0 - 32 U/L Final    AST 07/12/2022 14  0 - 31 U/L Final    Total Bilirubin 07/12/2022 <0.2  0.0 - 1.2 mg/dL Final    Bilirubin, Direct 07/12/2022 <0.2  0.0 - 0.3 mg/dL Final    Bilirubin, Indirect 07/12/2022 see below  0.0 - 1.0 mg/dL Final    Protein, Ur 07/12/2022 15 (A) 0 - 12 mg/dL Final    Creatinine, Ur 07/12/2022 226  29 - 226 mg/dL Final    Protein/Creat Ratio 07/12/2022 0.1  0.0 - 0.2 Final    Protein/Creat Ratio 07/12/2022 0.1   Final    Uric Acid, Serum 07/12/2022 5.1  2.4 - 5.7 mg/dL Final    Fibrinogen 07/12/2022 497 (A) 200 - 400 mg/dL Final    LD 07/12/2022 153  135 - 214 U/L Final    Color, UA 07/12/2022 Yellow  Straw/Yellow Final    Clarity, UA 07/12/2022 Clear  Clear Final    Glucose, Ur 07/12/2022 Negative  Negative mg/dL Final    Bilirubin Urine 07/12/2022 Negative  Negative Final    Ketones, Urine 07/12/2022 Negative  Negative mg/dL Final    Specific Gravity, UA 07/12/2022 1.020  1.005 - 1.030 Final    Blood, Urine 07/12/2022 Negative  Negative Final    pH, UA 07/12/2022 7.0  5.0 - 9.0 Final    Protein, UA 07/12/2022 Negative  Negative mg/dL Final    Urobilinogen, Urine 07/12/2022 1.0  <2.0 E.U./dL Final    Nitrite, Urine 07/12/2022 Negative  Negative Final    Leukocyte Esterase, Urine 07/12/2022 SMALL (A) Negative Final    WBC, UA 07/12/2022 1-3  0 - 5 /HPF Final    RBC, UA 07/12/2022 NONE  0 - 2 /HPF Final    Epithelial Cells, UA 07/12/2022 MANY  /HPF Final    Bacteria, UA 07/12/2022 MANY (A) None Seen /HPF Final    Rejected Test 07/13/2022 BMP CBC   Final    Reason for Rejection 07/13/2022 see below   Final    SARS-CoV-2, NAAT 07/13/2022 SEE BELOW  Not Detected Corrected    TSH 07/15/2022 1.120  0.270 - 4.200 uIU/mL Final    Thyroid Peroxidase (TPO) Abs 07/15/2022 <4.0  0.0 - 25.0 IU/mL Final    T4 Free 07/15/2022 0.90 (A) 0.93 - 1.70 ng/dL Final    T3, Free 07/15/2022 2.6  2.0 - 4.4 pg/mL Final    Thyroglobulin Antibody 07/15/2022 <12.0  0.0 - 40.0 IU/mL Final    LD 07/15/2022 141  135 - 214 U/L Final    Uric Acid, Serum 07/15/2022 4.2  2.4 - 5.7 mg/dL Final    WBC 07/15/2022 17.5 (A) 4.5 - 11.5 E9/L Final    RBC 07/15/2022 4.00  3.50 - 5.50 E12/L Final    Hemoglobin 07/15/2022 11.8  11.5 - 15.5 g/dL Final    Hematocrit 07/15/2022 35.6  34.0 - 48.0 % Final    MCV 07/15/2022 89.0  80.0 - 99.9 fL Final    MCH 07/15/2022 29.5  26.0 - 35.0 pg Final    MCHC 07/15/2022 33.1  32.0 - 34.5 % Final    RDW 07/15/2022 13.7  11.5 - 15.0 fL Final    Platelets 56/66/3501 440  130 - 450 E9/L Final    MPV 07/15/2022 9.3  7.0 - 12.0 fL Final    Neutrophils % 07/15/2022 82.3 (A) 43.0 - 80.0 % Final    Immature Granulocytes % 07/15/2022 0.9  0.0 - 5.0 % Final    Lymphocytes % 07/15/2022 10.7 (A) 20.0 - 42.0 % Final    Monocytes % 07/15/2022 5.9  2.0 - 12.0 % Final    Eosinophils % 07/15/2022 0.1  0.0 - 6.0 % Final    Basophils % 07/15/2022 0.1  0.0 - 2.0 % Final    Neutrophils Absolute 07/15/2022 14.37 (A) 1.80 - 7.30 E9/L Final    Immature Granulocytes # 07/15/2022 0.16  E9/L Final    Lymphocytes Absolute 07/15/2022 1.86  1.50 - 4.00 E9/L Final    Monocytes Absolute 07/15/2022 1.03 (A) 0.10 - 0.95 E9/L Final    Eosinophils Absolute 07/15/2022 0.01 (A) 0.05 - 0.50 E9/L Final    Basophils Absolute 07/15/2022 0.02  0.00 - 0.20 E9/L Final Sodium 07/15/2022 134  132 - 146 mmol/L Final    Potassium 07/15/2022 4.0  3.5 - 5.0 mmol/L Final    Chloride 07/15/2022 100  98 - 107 mmol/L Final    CO2 07/15/2022 17 (A) 22 - 29 mmol/L Final    Anion Gap 07/15/2022 17 (A) 7 - 16 mmol/L Final    Glucose 07/15/2022 192 (A) 74 - 99 mg/dL Final    BUN 07/15/2022 9  6 - 20 mg/dL Final    Creatinine 07/15/2022 0.6  0.5 - 1.0 mg/dL Final    GFR Non- 07/15/2022 >60  >=60 mL/min/1.73 Final    GFR  07/15/2022 >60   Final    Calcium 07/15/2022 9.7  8.6 - 10.2 mg/dL Final    Total Protein 07/15/2022 7.1  6.4 - 8.3 g/dL Final    Albumin 07/15/2022 3.9  3.5 - 5.2 g/dL Final    Total Bilirubin 07/15/2022 <0.2  0.0 - 1.2 mg/dL Final    Alkaline Phosphatase 07/15/2022 65  35 - 104 U/L Final    ALT 07/15/2022 10  0 - 32 U/L Final    AST 07/15/2022 9  0 - 31 U/L Final    Ferritin 07/15/2022 20  ng/mL Final    Folate 07/15/2022 14.1  4.8 - 24.2 ng/mL Final    Vitamin B-12 07/15/2022 381  211 - 946 pg/mL Final    Vit D, 25-Hydroxy 07/15/2022 25 (A) 30 - 100 ng/mL Final    Magnesium 07/15/2022 1.7  1.6 - 2.6 mg/dL Final    Hemoglobin A1C 07/15/2022 5.7 (A) 4.0 - 5.6 % Final    Urine Culture, Routine 07/15/2022    Final                    Value:<10,000 CFU/mL  Mixed gram positive organisms      Color, UA 07/15/2022 Yellow  Straw/Yellow Final    Clarity, UA 07/15/2022 Clear  Clear Final    Glucose, Ur 07/15/2022 >=1000 (A) Negative mg/dL Final    Bilirubin Urine 07/15/2022 Negative  Negative Final    Ketones, Urine 07/15/2022 TRACE (A) Negative mg/dL Final    Specific Gravity, UA 07/15/2022 1.025  1.005 - 1.030 Final    Blood, Urine 07/15/2022 Negative  Negative Final    pH, UA 07/15/2022 6.0  5.0 - 9.0 Final    Protein, UA 07/15/2022 Negative  Negative mg/dL Final    Urobilinogen, Urine 07/15/2022 0.2  <2.0 E.U./dL Final    Nitrite, Urine 07/15/2022 Negative  Negative Final    Leukocyte Esterase, Urine 07/15/2022 Negative  Negative Final    Mucus, UA 07/15/2022 Present (A) None Seen /LPF Final    WBC, UA 07/15/2022 NONE  0 - 5 /HPF Final    RBC, UA 07/15/2022 NONE  0 - 2 /HPF Final    Bacteria, UA 07/15/2022 NONE SEEN  None Seen /HPF Final    Protein, Ur 07/15/2022 7  0 - 12 mg/dL Final    Creatinine, Ur 07/15/2022 115  29 - 226 mg/dL Final    Protein/Creat Ratio 07/15/2022 0.1  0.0 - 0.2 Final    Protein/Creat Ratio 07/15/2022 0.1   Final    Genital Culture, Routine 07/15/2022    Final                    Value:Vaginal jennifer isolated, including:  Streptococcus species  Corynebacterium  Group B beta hemolytic strep not isolated  Neisseria gonorrhoeae not isolated  Gardnerella vaginalis not isolated  Yeast not isolated      Trichomonas Prep 07/15/2022 None Seen   Final    Yeast, Wet Prep 07/15/2022 None Seen   Final    Clue Cells, Wet Prep 07/15/2022 None Seen   Final    Source Wet Prep 07/15/2022 VAGINAL   Final    Source 07/15/2022 Cervix/Vagina   Final    C. trachomatis DNA 07/15/2022 NEGATIVE  NEG Final    N. gonorrhoeae DNA 07/15/2022 NEGATIVE  NEG Final    Meter Glucose 07/16/2022 166 (A) 74 - 99 mg/dL Final    Meter Glucose 07/16/2022 180 (A) 74 - 99 mg/dL Final    Meter Glucose 07/17/2022 102 (A) 74 - 99 mg/dL Final    Meter Glucose 07/17/2022 149 (A) 74 - 99 mg/dL Final    Meter Glucose 07/17/2022 126 (A) 74 - 99 mg/dL Final    Meter Glucose 07/17/2022 161 (A) 74 - 99 mg/dL Final    Meter Glucose 07/18/2022 95  74 - 99 mg/dL Final    Meter Glucose 07/18/2022 97  74 - 99 mg/dL Final    Meter Glucose 07/18/2022 99  74 - 99 mg/dL Final    Meter Glucose 07/18/2022 156 (A) 74 - 99 mg/dL Final    Meter Glucose 07/19/2022 147 (A) 74 - 99 mg/dL Final    Meter Glucose 07/19/2022 114 (A) 74 - 99 mg/dL Final    Meter Glucose 07/19/2022 114 (A) 74 - 99 mg/dL Final    Meter Glucose 07/19/2022 144 (A) 74 - 99 mg/dL Final    Meter Glucose 07/19/2022 177 (A) 74 - 99 mg/dL Final    Meter Glucose 07/20/2022 >500 (A) 74 - 99 mg/dL Final    Glucose 07/20/2022 144 (A) 74 - 99 mg/dL Final    Meter Glucose 07/20/2022 148 (A) 74 - 99 mg/dL Final    Meter Glucose 07/20/2022 127 (A) 74 - 99 mg/dL Final    Meter Glucose 07/20/2022 141 (A) 74 - 99 mg/dL Final    Meter Glucose 07/20/2022 152 (A) 74 - 99 mg/dL Final    Meter Glucose 07/21/2022 96  74 - 99 mg/dL Final    Meter Glucose 07/21/2022 84  74 - 99 mg/dL Final    Meter Glucose 07/21/2022 113 (A) 74 - 99 mg/dL Final    Meter Glucose 07/21/2022 175 (A) 74 - 99 mg/dL Final    Meter Glucose 07/22/2022 144 (A) 74 - 99 mg/dL Final    Genital Culture, Routine 07/22/2022  (A)  Final                    Value:Group B beta hemolytic strep not isolated  Neisseria gonorrhoeae not isolated  Gardnerella vaginalis not isolated      Organism 07/22/2022 Candida albicans (A)  Final    Genital Culture, Routine 07/22/2022 Moderate growth   Final    Meter Glucose 07/22/2022 178 (A) 74 - 99 mg/dL Final    Meter Glucose 07/22/2022 210 (A) 74 - 99 mg/dL Final    Meter Glucose 07/22/2022 178 (A) 74 - 99 mg/dL Final    Meter Glucose 07/22/2022 169 (A) 74 - 99 mg/dL Final    Meter Glucose 07/23/2022 101 (A) 74 - 99 mg/dL Final    Meter Glucose 07/23/2022 106 (A) 74 - 99 mg/dL Final    Meter Glucose 07/23/2022 125 (A) 74 - 99 mg/dL Final    Meter Glucose 07/23/2022 184 (A) 74 - 99 mg/dL Final    Meter Glucose 07/23/2022 112 (A) 74 - 99 mg/dL Final    Meter Glucose 07/24/2022 142 (A) 74 - 99 mg/dL Final    Meter Glucose 07/24/2022 144 (A) 74 - 99 mg/dL Final                         IMPRESSION:    1.  IUP at 25 weeks 0 days gestation based on her Estimated Date of Delivery: 11/6/22    2. Incompetent cervix, with the amniotic membranes extending to the external cervical os    3. Morbid obesity    4. Fully vaccinated for COVID-19 January 2022. 5.  Two previous first trimester pregnancy losses    6. Hypertension, taking Procardia XL 30 mg twice daily     7. O positive blood type    8. Taking 81 mg of aspirin daily    9.   Status post cervical cerclage placement 2022  10. Cervical length was 3.5 mm 2022 using vaginal progesterone  11. Receiving antibiotics as indicated in Dr. Johnson Officer note from 2022  12. Received Celestone on 714 on 7/15/2022  13. Received magnesium sulfate for fetal neuro protection completed 2022  14. Hyperglycemia prescribed insulin  15. Reassuring fetal heart rate tracing for gestational age. Occasional variables noted common at this gestational age. 16.  HbA1c 5.7% 7/15/2022     PLAN:  The patient is currently admitted secondary to  dilation of the cervix with funneling of the amniotic membranes into the endocervical canal secondary to the immediate need for  intervention if the patient would deliver. The insulin glargine dose was increased to 18 units each evening, secondary to her persistently elevated fasting blood sugars and suboptimal postprandial blood sugar control. Her Humalog insulin dose will be changed to 6 units with each meal with the dose adjusted as necessary in order to attain euglycemia. The patient is to continue taking Procardia XL as prescribed for management of her hypertension. DVT prophylaxis should be utilized throughout her admission. Consultation was obtained with neonatology during this admission. Further evaluation and management will be dependent on the results of the patient's testing and her clinical presentation. If you have any questions regarding her management, please contact me at your convenience and thank you for allowing me to participate in her care.      Wes Marie MD, MS, Esther Abbott, RDCS, RDMS, RVT  Director [address] [place] [address]  [phone]

## 2022-07-24 NOTE — PROGRESS NOTES
Dr. Ashvin Núñez at bedside. New orders to discontinue Humalog sliding scale and night time sliding scale.  Humalog 6 units to be given with meals and Lantus 18 units at night

## 2022-07-25 ENCOUNTER — ANCILLARY PROCEDURE (OUTPATIENT)
Dept: OBGYN CLINIC | Age: 32
DRG: 547 | End: 2022-07-25
Payer: MEDICAID

## 2022-07-25 LAB
METER GLUCOSE: 112 MG/DL (ref 74–99)
METER GLUCOSE: 121 MG/DL (ref 74–99)
METER GLUCOSE: 153 MG/DL (ref 74–99)
METER GLUCOSE: 163 MG/DL (ref 74–99)

## 2022-07-25 PROCEDURE — 76820 UMBILICAL ARTERY ECHO: CPT | Performed by: OBSTETRICS & GYNECOLOGY

## 2022-07-25 PROCEDURE — 6370000000 HC RX 637 (ALT 250 FOR IP): Performed by: OBSTETRICS & GYNECOLOGY

## 2022-07-25 PROCEDURE — 99231 SBSQ HOSP IP/OBS SF/LOW 25: CPT | Performed by: OBSTETRICS & GYNECOLOGY

## 2022-07-25 PROCEDURE — 76819 FETAL BIOPHYS PROFIL W/O NST: CPT | Performed by: OBSTETRICS & GYNECOLOGY

## 2022-07-25 PROCEDURE — 76817 TRANSVAGINAL US OBSTETRIC: CPT | Performed by: OBSTETRICS & GYNECOLOGY

## 2022-07-25 PROCEDURE — 82962 GLUCOSE BLOOD TEST: CPT

## 2022-07-25 PROCEDURE — 1220000000 HC SEMI PRIVATE OB R&B

## 2022-07-25 RX ORDER — INSULIN GLARGINE 100 [IU]/ML
22 INJECTION, SOLUTION SUBCUTANEOUS NIGHTLY
Status: DISCONTINUED | OUTPATIENT
Start: 2022-07-25 | End: 2022-08-01 | Stop reason: HOSPADM

## 2022-07-25 RX ADMIN — INSULIN LISPRO 6 UNITS: 100 INJECTION, SOLUTION INTRAVENOUS; SUBCUTANEOUS at 08:07

## 2022-07-25 RX ADMIN — ACETAMINOPHEN 650 MG: 325 TABLET ORAL at 20:27

## 2022-07-25 RX ADMIN — INSULIN LISPRO 6 UNITS: 100 INJECTION, SOLUTION INTRAVENOUS; SUBCUTANEOUS at 17:09

## 2022-07-25 RX ADMIN — MICONAZOLE NITRATE 1 APPLICATOR: 20 CREAM VAGINAL at 09:55

## 2022-07-25 RX ADMIN — DOCUSATE SODIUM 100 MG: 100 CAPSULE, LIQUID FILLED ORAL at 21:09

## 2022-07-25 RX ADMIN — Medication 2000 UNITS: at 09:53

## 2022-07-25 RX ADMIN — MICONAZOLE NITRATE 100 MG: 100 SUPPOSITORY VAGINAL at 21:09

## 2022-07-25 RX ADMIN — FERROUS SULFATE TAB 325 MG (65 MG ELEMENTAL FE) 325 MG: 325 (65 FE) TAB at 09:50

## 2022-07-25 RX ADMIN — FERROUS SULFATE TAB 325 MG (65 MG ELEMENTAL FE) 325 MG: 325 (65 FE) TAB at 18:50

## 2022-07-25 RX ADMIN — INSULIN GLARGINE 22 UNITS: 100 INJECTION, SOLUTION SUBCUTANEOUS at 21:11

## 2022-07-25 RX ADMIN — NIFEDIPINE 30 MG: 30 TABLET, EXTENDED RELEASE ORAL at 09:50

## 2022-07-25 RX ADMIN — CYANOCOBALAMIN TAB 1000 MCG 1000 MCG: 1000 TAB at 09:53

## 2022-07-25 RX ADMIN — DOCUSATE SODIUM 100 MG: 100 CAPSULE, LIQUID FILLED ORAL at 09:50

## 2022-07-25 RX ADMIN — NIFEDIPINE 30 MG: 30 TABLET, EXTENDED RELEASE ORAL at 21:09

## 2022-07-25 RX ADMIN — INSULIN LISPRO 6 UNITS: 100 INJECTION, SOLUTION INTRAVENOUS; SUBCUTANEOUS at 14:05

## 2022-07-25 RX ADMIN — ASPIRIN 81 MG CHEWABLE TABLET 81 MG: 81 TABLET CHEWABLE at 09:50

## 2022-07-25 NOTE — PROGRESS NOTES
1 SAB 2020 5w0d                    PAST GYNECOLOGICAL  HISTORY:  Negative for abnormal pap smears. Negative for sexually transmitted diseases. Negative for cervical LEEP / conization /cryosurgery. Negative for uterine surgery. Negative for ovarian or tubal surgery. Past Medical History        Past Medical History:   Diagnosis Date    Hypertension              Past Surgical History:   Procedure Laterality Date    WISDOM TOOTH EXTRACTION                No Known Allergies     Current Medication   No current outpatient medications on file. Social History      Tobacco Use    Smoking status: Former Smoker    Smokeless tobacco: Never Used   Substance Use Topics    Alcohol use: Not Currently       Comment: social      FAMILY MEDICAL HISTORY:  Negative for congenital abnormalities, autism, genetic disease and mental retardation, not listed above. Review of Systems :   CONSTITUTIONAL : No fever, no chills  HEENT : No headache, no visual changes, no rhinorrhea, no sore throat  CARDIOVASCULAR : No pain, no palpitations, no edema  RESPIRATORY : No pain, no shortness of breath  GASTROINTESTINAL : No N/V, no D/C, no abdominal pain  GENITOURINARY : No dysuria, hematuria and no incontinence  MUSCULOSKELETAL : No myalgia, No back pain  NEUROLOGICAL : No numbness, no tingling, no tremors. No history of seizures  ALL OTHER SYSTEMS WERE REPORTED AS NEGATIVE. PERTINENT PHYSICAL EXAMINATION:   Patient Vitals for the past 24 hrs:   BP Temp Temp src Pulse Resp   07/25/22 0950 (!) 149/89 98.4 °F (36.9 °C) Oral (!) 111 14   07/24/22 2059 138/83 98 °F (36.7 °C) -- 80 --   07/24/22 2058 138/86 98 °F (36.7 °C) -- (!) 103 --      GENERAL:   The patient is a well developed, female who is alert cooperative and oriented times three in no acute distress. HEENT:  Normo cephalic and atraumatic. No facial edema. ABDOMEN:  Her uterus is gravid. She had no complaint of abdominal pain or tenderness.  The fetal heart rate is 152 bpm.      EXTREMITIES:  1+ peripheral edema is noted. PELVIC EXAMINATION:  Transvaginal ultrasound assessment of the cervix was performed. The cervical cerclage appeared to be intact. The amniotic membranes funneling to the cervical cerclage level.     Admission on 07/12/2022   Component Date Value Ref Range Status    WBC 07/12/2022 11.9 (A) 4.5 - 11.5 E9/L Final    RBC 07/12/2022 3.73  3.50 - 5.50 E12/L Final    Hemoglobin 07/12/2022 10.9 (A) 11.5 - 15.5 g/dL Final    Hematocrit 07/12/2022 32.9 (A) 34.0 - 48.0 % Final    MCV 07/12/2022 88.2  80.0 - 99.9 fL Final    MCH 07/12/2022 29.2  26.0 - 35.0 pg Final    MCHC 07/12/2022 33.1  32.0 - 34.5 % Final    RDW 07/12/2022 13.8  11.5 - 15.0 fL Final    Platelets 65/08/5476 390  130 - 450 E9/L Final    MPV 07/12/2022 9.2  7.0 - 12.0 fL Final    BUN 07/12/2022 7  6 - 20 mg/dL Final    Creatinine 07/12/2022 0.6  0.5 - 1.0 mg/dL Final    GFR Non- 07/12/2022 >60  >=60 mL/min/1.73 Final    GFR  07/12/2022 >60   Final    Total Protein 07/12/2022 6.2 (A) 6.4 - 8.3 g/dL Final    Albumin 07/12/2022 3.2 (A) 3.5 - 5.2 g/dL Final    Alkaline Phosphatase 07/12/2022 59  35 - 104 U/L Final    ALT 07/12/2022 10  0 - 32 U/L Final    AST 07/12/2022 14  0 - 31 U/L Final    Total Bilirubin 07/12/2022 <0.2  0.0 - 1.2 mg/dL Final    Bilirubin, Direct 07/12/2022 <0.2  0.0 - 0.3 mg/dL Final    Bilirubin, Indirect 07/12/2022 see below  0.0 - 1.0 mg/dL Final    Protein, Ur 07/12/2022 15 (A) 0 - 12 mg/dL Final    Creatinine, Ur 07/12/2022 226  29 - 226 mg/dL Final    Protein/Creat Ratio 07/12/2022 0.1  0.0 - 0.2 Final    Protein/Creat Ratio 07/12/2022 0.1   Final    Uric Acid, Serum 07/12/2022 5.1  2.4 - 5.7 mg/dL Final    Fibrinogen 07/12/2022 497 (A) 200 - 400 mg/dL Final    LD 07/12/2022 153  135 - 214 U/L Final    Color, UA 07/12/2022 Yellow  Straw/Yellow Final    Clarity, UA 07/12/2022 Clear  Clear Final    Glucose, Ur 07/12/2022 Negative Negative mg/dL Final    Bilirubin Urine 07/12/2022 Negative  Negative Final    Ketones, Urine 07/12/2022 Negative  Negative mg/dL Final    Specific Gravity, UA 07/12/2022 1.020  1.005 - 1.030 Final    Blood, Urine 07/12/2022 Negative  Negative Final    pH, UA 07/12/2022 7.0  5.0 - 9.0 Final    Protein, UA 07/12/2022 Negative  Negative mg/dL Final    Urobilinogen, Urine 07/12/2022 1.0  <2.0 E.U./dL Final    Nitrite, Urine 07/12/2022 Negative  Negative Final    Leukocyte Esterase, Urine 07/12/2022 SMALL (A) Negative Final    WBC, UA 07/12/2022 1-3  0 - 5 /HPF Final    RBC, UA 07/12/2022 NONE  0 - 2 /HPF Final    Epithelial Cells, UA 07/12/2022 MANY  /HPF Final    Bacteria, UA 07/12/2022 MANY (A) None Seen /HPF Final    Rejected Test 07/13/2022 BMP CBC   Final    Reason for Rejection 07/13/2022 see below   Final    SARS-CoV-2, NAAT 07/13/2022 SEE BELOW  Not Detected Corrected    TSH 07/15/2022 1.120  0.270 - 4.200 uIU/mL Final    Thyroid Peroxidase (TPO) Abs 07/15/2022 <4.0  0.0 - 25.0 IU/mL Final    T4 Free 07/15/2022 0.90 (A) 0.93 - 1.70 ng/dL Final    T3, Free 07/15/2022 2.6  2.0 - 4.4 pg/mL Final    Thyroglobulin Antibody 07/15/2022 <12.0  0.0 - 40.0 IU/mL Final    LD 07/15/2022 141  135 - 214 U/L Final    Uric Acid, Serum 07/15/2022 4.2  2.4 - 5.7 mg/dL Final    WBC 07/15/2022 17.5 (A) 4.5 - 11.5 E9/L Final    RBC 07/15/2022 4.00  3.50 - 5.50 E12/L Final    Hemoglobin 07/15/2022 11.8  11.5 - 15.5 g/dL Final    Hematocrit 07/15/2022 35.6  34.0 - 48.0 % Final    MCV 07/15/2022 89.0  80.0 - 99.9 fL Final    MCH 07/15/2022 29.5  26.0 - 35.0 pg Final    MCHC 07/15/2022 33.1  32.0 - 34.5 % Final    RDW 07/15/2022 13.7  11.5 - 15.0 fL Final    Platelets 89/88/8410 440  130 - 450 E9/L Final    MPV 07/15/2022 9.3  7.0 - 12.0 fL Final    Neutrophils % 07/15/2022 82.3 (A) 43.0 - 80.0 % Final    Immature Granulocytes % 07/15/2022 0.9  0.0 - 5.0 % Final    Lymphocytes % 07/15/2022 10.7 (A) 20.0 - 42.0 % Final    Monocytes % 07/15/2022 5.9  2.0 - 12.0 % Final    Eosinophils % 07/15/2022 0.1  0.0 - 6.0 % Final    Basophils % 07/15/2022 0.1  0.0 - 2.0 % Final    Neutrophils Absolute 07/15/2022 14.37 (A) 1.80 - 7.30 E9/L Final    Immature Granulocytes # 07/15/2022 0.16  E9/L Final    Lymphocytes Absolute 07/15/2022 1.86  1.50 - 4.00 E9/L Final    Monocytes Absolute 07/15/2022 1.03 (A) 0.10 - 0.95 E9/L Final    Eosinophils Absolute 07/15/2022 0.01 (A) 0.05 - 0.50 E9/L Final    Basophils Absolute 07/15/2022 0.02  0.00 - 0.20 E9/L Final    Sodium 07/15/2022 134  132 - 146 mmol/L Final    Potassium 07/15/2022 4.0  3.5 - 5.0 mmol/L Final    Chloride 07/15/2022 100  98 - 107 mmol/L Final    CO2 07/15/2022 17 (A) 22 - 29 mmol/L Final    Anion Gap 07/15/2022 17 (A) 7 - 16 mmol/L Final    Glucose 07/15/2022 192 (A) 74 - 99 mg/dL Final    BUN 07/15/2022 9  6 - 20 mg/dL Final    Creatinine 07/15/2022 0.6  0.5 - 1.0 mg/dL Final    GFR Non- 07/15/2022 >60  >=60 mL/min/1.73 Final    GFR  07/15/2022 >60   Final    Calcium 07/15/2022 9.7  8.6 - 10.2 mg/dL Final    Total Protein 07/15/2022 7.1  6.4 - 8.3 g/dL Final    Albumin 07/15/2022 3.9  3.5 - 5.2 g/dL Final    Total Bilirubin 07/15/2022 <0.2  0.0 - 1.2 mg/dL Final    Alkaline Phosphatase 07/15/2022 65  35 - 104 U/L Final    ALT 07/15/2022 10  0 - 32 U/L Final    AST 07/15/2022 9  0 - 31 U/L Final    Ferritin 07/15/2022 20  ng/mL Final    Folate 07/15/2022 14.1  4.8 - 24.2 ng/mL Final    Vitamin B-12 07/15/2022 381  211 - 946 pg/mL Final    Vit D, 25-Hydroxy 07/15/2022 25 (A) 30 - 100 ng/mL Final    Magnesium 07/15/2022 1.7  1.6 - 2.6 mg/dL Final    Hemoglobin A1C 07/15/2022 5.7 (A) 4.0 - 5.6 % Final    Urine Culture, Routine 07/15/2022    Final                    Value:<10,000 CFU/mL  Mixed gram positive organisms      Color, UA 07/15/2022 Yellow  Straw/Yellow Final    Clarity, UA 07/15/2022 Clear  Clear Final    Glucose, Ur 07/15/2022 >=1000 (A) Negative mg/dL Final Bilirubin Urine 07/15/2022 Negative  Negative Final    Ketones, Urine 07/15/2022 TRACE (A) Negative mg/dL Final    Specific Gravity, UA 07/15/2022 1.025  1.005 - 1.030 Final    Blood, Urine 07/15/2022 Negative  Negative Final    pH, UA 07/15/2022 6.0  5.0 - 9.0 Final    Protein, UA 07/15/2022 Negative  Negative mg/dL Final    Urobilinogen, Urine 07/15/2022 0.2  <2.0 E.U./dL Final    Nitrite, Urine 07/15/2022 Negative  Negative Final    Leukocyte Esterase, Urine 07/15/2022 Negative  Negative Final    Mucus, UA 07/15/2022 Present (A) None Seen /LPF Final    WBC, UA 07/15/2022 NONE  0 - 5 /HPF Final    RBC, UA 07/15/2022 NONE  0 - 2 /HPF Final    Bacteria, UA 07/15/2022 NONE SEEN  None Seen /HPF Final    Protein, Ur 07/15/2022 7  0 - 12 mg/dL Final    Creatinine, Ur 07/15/2022 115  29 - 226 mg/dL Final    Protein/Creat Ratio 07/15/2022 0.1  0.0 - 0.2 Final    Protein/Creat Ratio 07/15/2022 0.1   Final    Genital Culture, Routine 07/15/2022    Final                    Value:Vaginal jennifer isolated, including:  Streptococcus species  Corynebacterium  Group B beta hemolytic strep not isolated  Neisseria gonorrhoeae not isolated  Gardnerella vaginalis not isolated  Yeast not isolated      Trichomonas Prep 07/15/2022 None Seen   Final    Yeast, Wet Prep 07/15/2022 None Seen   Final    Clue Cells, Wet Prep 07/15/2022 None Seen   Final    Source Wet Prep 07/15/2022 VAGINAL   Final    Source 07/15/2022 Cervix/Vagina   Final    C. trachomatis DNA 07/15/2022 NEGATIVE  NEG Final    N. gonorrhoeae DNA 07/15/2022 NEGATIVE  NEG Final    Meter Glucose 07/16/2022 166 (A) 74 - 99 mg/dL Final    Meter Glucose 07/16/2022 180 (A) 74 - 99 mg/dL Final    Meter Glucose 07/17/2022 102 (A) 74 - 99 mg/dL Final    Meter Glucose 07/17/2022 149 (A) 74 - 99 mg/dL Final    Meter Glucose 07/17/2022 126 (A) 74 - 99 mg/dL Final    Meter Glucose 07/17/2022 161 (A) 74 - 99 mg/dL Final    Meter Glucose 07/18/2022 95  74 - 99 mg/dL Final    Meter Glucose 07/18/2022 97  74 - 99 mg/dL Final    Meter Glucose 07/18/2022 99  74 - 99 mg/dL Final    Meter Glucose 07/18/2022 156 (A) 74 - 99 mg/dL Final    Meter Glucose 07/19/2022 147 (A) 74 - 99 mg/dL Final    Meter Glucose 07/19/2022 114 (A) 74 - 99 mg/dL Final    Meter Glucose 07/19/2022 114 (A) 74 - 99 mg/dL Final    Meter Glucose 07/19/2022 144 (A) 74 - 99 mg/dL Final    Meter Glucose 07/19/2022 177 (A) 74 - 99 mg/dL Final    Meter Glucose 07/20/2022 >500 (A) 74 - 99 mg/dL Final    Glucose 07/20/2022 144 (A) 74 - 99 mg/dL Final    Meter Glucose 07/20/2022 148 (A) 74 - 99 mg/dL Final    Meter Glucose 07/20/2022 127 (A) 74 - 99 mg/dL Final    Meter Glucose 07/20/2022 141 (A) 74 - 99 mg/dL Final    Meter Glucose 07/20/2022 152 (A) 74 - 99 mg/dL Final    Meter Glucose 07/21/2022 96  74 - 99 mg/dL Final    Meter Glucose 07/21/2022 84  74 - 99 mg/dL Final    Meter Glucose 07/21/2022 113 (A) 74 - 99 mg/dL Final    Meter Glucose 07/21/2022 175 (A) 74 - 99 mg/dL Final    Meter Glucose 07/22/2022 144 (A) 74 - 99 mg/dL Final    Genital Culture, Routine 07/22/2022  (A)  Final                    Value:Group B beta hemolytic strep not isolated  Neisseria gonorrhoeae not isolated  Gardnerella vaginalis not isolated      Organism 07/22/2022 Candida albicans (A)  Final    Genital Culture, Routine 07/22/2022 Moderate growth   Final    Meter Glucose 07/22/2022 178 (A) 74 - 99 mg/dL Final    Meter Glucose 07/22/2022 210 (A) 74 - 99 mg/dL Final    Meter Glucose 07/22/2022 178 (A) 74 - 99 mg/dL Final    Meter Glucose 07/22/2022 169 (A) 74 - 99 mg/dL Final    Meter Glucose 07/23/2022 101 (A) 74 - 99 mg/dL Final    Meter Glucose 07/23/2022 106 (A) 74 - 99 mg/dL Final    Meter Glucose 07/23/2022 125 (A) 74 - 99 mg/dL Final    Meter Glucose 07/23/2022 184 (A) 74 - 99 mg/dL Final    Meter Glucose 07/23/2022 112 (A) 74 - 99 mg/dL Final    Meter Glucose 07/24/2022 142 (A) 74 - 99 mg/dL Final    Meter Glucose 07/24/2022 144 (A) 74 - 99 mg/dL Final Meter Glucose 2022 155 (A) 74 - 99 mg/dL Final    Meter Glucose 2022 112 (A) 74 - 99 mg/dL Final    Meter Glucose 2022 121 (A) 74 - 99 mg/dL Final                      IMPRESSION:    1.  IUP at 25 weeks 1 days gestation based on her Estimated Date of Delivery: 22    2. Incompetent cervix, with the amniotic membranes extending to the external cervical os    3. Morbid obesity    4. Fully vaccinated for COVID-2022. 5.  Two previous first trimester pregnancy losses    6. Hypertension, taking Procardia XL 30 mg twice daily     7. O positive blood type    8. Taking 81 mg of aspirin daily    9. Status post cervical cerclage placement 2022  10. Cervical length was 3.5 mm 2022 using vaginal progesterone  11. Receiving antibiotics as indicated in Dr. King Ramírez note from 2022  12. Received Celestone on 714 on 7/15/2022  13. Received magnesium sulfate for fetal neuro protection completed 2022  14. Hyperglycemia prescribed insulin  15. Reassuring fetal heart rate tracing for gestational age. Occasional variables noted common at this gestational age. 16.  HbA1c 5.7% 7/15/2022  17. Elevated blood pressure at 0950 hrs. 2022  18. Reassuring biophysical profile and cord Doppler testing 2022    PLAN:  The patient is currently admitted secondary to  dilation of the cervix with funneling of the amniotic membranes into the endocervical canal secondary to the immediate need for  intervention if the patient would deliver. The insulin glargine dose was increased to 18 units each evening, secondary to her persistently elevated fasting blood sugars and suboptimal postprandial blood sugar control. Her Humalog insulin dose will be changed to 6 units with each meal with the dose adjusted as necessary in order to attain euglycemia. The patient is to continue taking Procardia XL as prescribed for management of her hypertension.     DVT prophylaxis should be utilized throughout her admission. Consultation was obtained with neonatology during this admission. Further evaluation and management will be dependent on the results of the patient's testing and her clinical presentation. If you have any questions regarding her management, please contact me at your convenience and thank you for allowing me to participate in her care.      Blake Haile MD, MS, Renetta Ventura, RDCS, RDMS, RVT  Director [address] [place] [address]  [phone]

## 2022-07-25 NOTE — PROGRESS NOTES
Dr. Lester Navarrete notified of pt not calling for insulin before she ate, states to go ahead and give dose now.

## 2022-07-26 LAB
METER GLUCOSE: 129 MG/DL (ref 74–99)
METER GLUCOSE: 168 MG/DL (ref 74–99)
METER GLUCOSE: 78 MG/DL (ref 74–99)
METER GLUCOSE: 92 MG/DL (ref 74–99)

## 2022-07-26 PROCEDURE — 6370000000 HC RX 637 (ALT 250 FOR IP): Performed by: OBSTETRICS & GYNECOLOGY

## 2022-07-26 PROCEDURE — 1220000000 HC SEMI PRIVATE OB R&B

## 2022-07-26 PROCEDURE — 99231 SBSQ HOSP IP/OBS SF/LOW 25: CPT | Performed by: OBSTETRICS & GYNECOLOGY

## 2022-07-26 PROCEDURE — 82962 GLUCOSE BLOOD TEST: CPT

## 2022-07-26 RX ORDER — INSULIN LISPRO 100 [IU]/ML
10 INJECTION, SOLUTION INTRAVENOUS; SUBCUTANEOUS
Status: DISCONTINUED | OUTPATIENT
Start: 2022-07-26 | End: 2022-07-28

## 2022-07-26 RX ADMIN — NIFEDIPINE 30 MG: 30 TABLET, EXTENDED RELEASE ORAL at 22:41

## 2022-07-26 RX ADMIN — INSULIN LISPRO 10 UNITS: 100 INJECTION, SOLUTION INTRAVENOUS; SUBCUTANEOUS at 12:13

## 2022-07-26 RX ADMIN — FERROUS SULFATE TAB 325 MG (65 MG ELEMENTAL FE) 325 MG: 325 (65 FE) TAB at 08:11

## 2022-07-26 RX ADMIN — INSULIN LISPRO 10 UNITS: 100 INJECTION, SOLUTION INTRAVENOUS; SUBCUTANEOUS at 08:10

## 2022-07-26 RX ADMIN — INSULIN LISPRO 10 UNITS: 100 INJECTION, SOLUTION INTRAVENOUS; SUBCUTANEOUS at 17:18

## 2022-07-26 RX ADMIN — MICONAZOLE NITRATE 100 MG: 100 SUPPOSITORY VAGINAL at 22:41

## 2022-07-26 RX ADMIN — INSULIN GLARGINE 22 UNITS: 100 INJECTION, SOLUTION SUBCUTANEOUS at 21:33

## 2022-07-26 RX ADMIN — Medication 2000 UNITS: at 08:11

## 2022-07-26 RX ADMIN — CYANOCOBALAMIN TAB 1000 MCG 1000 MCG: 1000 TAB at 08:11

## 2022-07-26 RX ADMIN — ASPIRIN 81 MG CHEWABLE TABLET 81 MG: 81 TABLET CHEWABLE at 08:11

## 2022-07-26 RX ADMIN — NIFEDIPINE 30 MG: 30 TABLET, EXTENDED RELEASE ORAL at 09:14

## 2022-07-26 RX ADMIN — DOCUSATE SODIUM 100 MG: 100 CAPSULE, LIQUID FILLED ORAL at 08:11

## 2022-07-26 RX ADMIN — DOCUSATE SODIUM 100 MG: 100 CAPSULE, LIQUID FILLED ORAL at 22:41

## 2022-07-26 NOTE — PROGRESS NOTES
Progress Note    SUBJECTIVE:   Pt comfortable; +void; +flatus; +FM; denies cramping    OBJECTIVE:    VITALS:  /75   Pulse (!) 109   Temp 98.1 °F (36.7 °C) (Oral)   Resp 16   Ht 5' 7\" (1.702 m)   Wt (!) 359 lb (162.8 kg)   LMP 2022   SpO2 98%   BMI 56.23 kg/m²   Physical Exam  ABDOMEN:  soft, gravid, nt  Ext: nt    DATA:  Hemoglobin/Hematocrit:    Lab Results   Component Value Date/Time    HGB 11.8 07/15/2022 06:20 PM    HCT 35.6 07/15/2022 06:20 PM       ASSESSMENT AND PLAN:  Principal Problem:    23 weeks gestation of pregnancy  Active Problems:    Incompetent cervix in pregnancy, antepartum, second trimester    Morbid obesity with body mass index of 50.0-59.9 in adult ([address]    Abnormal ultrasonic finding on  screening of mother    Anemia    Elevated hemoglobin A1c    Low ferritin    Low maternal serum vitamin B12    Low vitamin D level  Resolved Problems:    * No resolved hospital problems.  *    Continue to monitor    Electronically signed by Victoria Stauffer DO on 2022 at 3:27 PM

## 2022-07-26 NOTE — PROGRESS NOTES
Dr Ashvin Núñez called in for an update. Notified pt's BG was 163 2 hours after dinner last night. Order received to increase her insulin with meals to 10 units.

## 2022-07-26 NOTE — PROGRESS NOTES
Patient Active Problem List   Diagnosis    Maternal morbid obesity, antepartum ([facility] [address]    Incompetent cervix during second trimester, with cervical cerclage    Hyperglycemia    Benign essential hypertension, antepartum    Morbid obesity with BMI of 50.0-59.9, adult ([facility] [address]      Cori Felty is a 27 y.o. female, who is G3(0,0,2,0). She has an Estimated Date of Delivery: 22 based on her previous ultrasound assessment. She is currently 25 weeks 2 days gestation based on that assessment. The patient had no new complaints today. She states that her fetal movement has been good. She has had no vaginal bleeding or leaking of amniotic fluid. The fetal heart rate tracing is reassuring with moderate variability and accelerations. Occasional variable decelerations were noted. The tracing is category 2. The patient is currently prescribed Procardia XL 30 mg twice daily for management of her blood pressures. Her blood pressures have improved with this medication. The patient is currently prescribed insulin for management of her hyperglycemia. Her blood sugars remain elevated. Her insulin dosages are being adjusted in order to attain euglycemia. The patient was advised of the importance of continuing to follow a carbohydrate controlled diet. A fetal ultrasound evaluation was performed on 2022. A detailed report included in the EMR under the imaging tab. A living ziegler intrauterine fetus was identified in the footling breech presentation. The placenta was anterior. The amniotic fluid index was 18.2 cm. The biophysical profile and cord Doppler studies were both reassuring. There was no evidence of absence, or reversal of end-diastolic flow.      OB History    Para Term  AB Living   3 0 0 0 2 0   SAB IAB Ectopic Molar Multiple Live Births    2 0 0 0 0 0       # Outcome Date GA Lbr Skyler/2nd Weight Sex Delivery Anes PTL Lv   3 Current                     2 SAB  6w0d 1 SAB 2020 5w0d                    PAST GYNECOLOGICAL  HISTORY:  Negative for abnormal pap smears. Negative for sexually transmitted diseases. Negative for cervical LEEP / conization /cryosurgery. Negative for uterine surgery. Negative for ovarian or tubal surgery. Past Medical History        Past Medical History:   Diagnosis Date    Hypertension              Past Surgical History:   Procedure Laterality Date    WISDOM TOOTH EXTRACTION                No Known Allergies     Current Medication   No current outpatient medications on file. Social History      Tobacco Use    Smoking status: Former Smoker    Smokeless tobacco: Never Used   Substance Use Topics    Alcohol use: Not Currently       Comment: social      FAMILY MEDICAL HISTORY:  Negative for congenital abnormalities, autism, genetic disease and mental retardation, not listed above. Review of Systems :   CONSTITUTIONAL : No fever, no chills  HEENT : No headache, no visual changes, no rhinorrhea, no sore throat  CARDIOVASCULAR : No pain, no palpitations, no edema  RESPIRATORY : No pain, no shortness of breath  GASTROINTESTINAL : No N/V, no D/C, no abdominal pain  GENITOURINARY : No dysuria, hematuria and no incontinence  MUSCULOSKELETAL : No myalgia, No back pain  NEUROLOGICAL : No numbness, no tingling, no tremors. No history of seizures  ALL OTHER SYSTEMS WERE REPORTED AS NEGATIVE. PERTINENT PHYSICAL EXAMINATION:   Patient Vitals for the past 24 hrs:   BP Temp Temp src Pulse Resp SpO2   07/26/22 0922 132/75 98.1 °F (36.7 °C) Oral (!) 109 16 --   07/25/22 2019 (!) 143/72 98.1 °F (36.7 °C) Oral (!) 106 16 98 %      GENERAL:   The patient is a well developed, female who is alert cooperative and oriented times three in no acute distress. HEENT:  Normo cephalic and atraumatic. No facial edema. ABDOMEN:  Her uterus is gravid. She had no complaint of abdominal pain or tenderness.  The fetal heart rate is 156 bpm. EXTREMITIES:  1+ peripheral edema is noted. PELVIC EXAMINATION:  Transvaginal ultrasound assessment of the cervix was performed. The cervical cerclage appeared to be intact. The amniotic membranes funneling to the cervical cerclage level.     Admission on 07/12/2022   Component Date Value Ref Range Status    WBC 07/12/2022 11.9 (A) 4.5 - 11.5 E9/L Final    RBC 07/12/2022 3.73  3.50 - 5.50 E12/L Final    Hemoglobin 07/12/2022 10.9 (A) 11.5 - 15.5 g/dL Final    Hematocrit 07/12/2022 32.9 (A) 34.0 - 48.0 % Final    MCV 07/12/2022 88.2  80.0 - 99.9 fL Final    MCH 07/12/2022 29.2  26.0 - 35.0 pg Final    MCHC 07/12/2022 33.1  32.0 - 34.5 % Final    RDW 07/12/2022 13.8  11.5 - 15.0 fL Final    Platelets 39/74/0708 390  130 - 450 E9/L Final    MPV 07/12/2022 9.2  7.0 - 12.0 fL Final    BUN 07/12/2022 7  6 - 20 mg/dL Final    Creatinine 07/12/2022 0.6  0.5 - 1.0 mg/dL Final    GFR Non- 07/12/2022 >60  >=60 mL/min/1.73 Final    GFR  07/12/2022 >60   Final    Total Protein 07/12/2022 6.2 (A) 6.4 - 8.3 g/dL Final    Albumin 07/12/2022 3.2 (A) 3.5 - 5.2 g/dL Final    Alkaline Phosphatase 07/12/2022 59  35 - 104 U/L Final    ALT 07/12/2022 10  0 - 32 U/L Final    AST 07/12/2022 14  0 - 31 U/L Final    Total Bilirubin 07/12/2022 <0.2  0.0 - 1.2 mg/dL Final    Bilirubin, Direct 07/12/2022 <0.2  0.0 - 0.3 mg/dL Final    Bilirubin, Indirect 07/12/2022 see below  0.0 - 1.0 mg/dL Final    Protein, Ur 07/12/2022 15 (A) 0 - 12 mg/dL Final    Creatinine, Ur 07/12/2022 226  29 - 226 mg/dL Final    Protein/Creat Ratio 07/12/2022 0.1  0.0 - 0.2 Final    Protein/Creat Ratio 07/12/2022 0.1   Final    Uric Acid, Serum 07/12/2022 5.1  2.4 - 5.7 mg/dL Final    Fibrinogen 07/12/2022 497 (A) 200 - 400 mg/dL Final    LD 07/12/2022 153  135 - 214 U/L Final    Color, UA 07/12/2022 Yellow  Straw/Yellow Final    Clarity, UA 07/12/2022 Clear  Clear Final    Glucose, Ur 07/12/2022 Negative  Negative mg/dL Final Bilirubin Urine 07/12/2022 Negative  Negative Final    Ketones, Urine 07/12/2022 Negative  Negative mg/dL Final    Specific Gravity, UA 07/12/2022 1.020  1.005 - 1.030 Final    Blood, Urine 07/12/2022 Negative  Negative Final    pH, UA 07/12/2022 7.0  5.0 - 9.0 Final    Protein, UA 07/12/2022 Negative  Negative mg/dL Final    Urobilinogen, Urine 07/12/2022 1.0  <2.0 E.U./dL Final    Nitrite, Urine 07/12/2022 Negative  Negative Final    Leukocyte Esterase, Urine 07/12/2022 SMALL (A) Negative Final    WBC, UA 07/12/2022 1-3  0 - 5 /HPF Final    RBC, UA 07/12/2022 NONE  0 - 2 /HPF Final    Epithelial Cells, UA 07/12/2022 MANY  /HPF Final    Bacteria, UA 07/12/2022 MANY (A) None Seen /HPF Final    Rejected Test 07/13/2022 BMP CBC   Final    Reason for Rejection 07/13/2022 see below   Final    SARS-CoV-2, NAAT 07/13/2022 SEE BELOW  Not Detected Corrected    TSH 07/15/2022 1.120  0.270 - 4.200 uIU/mL Final    Thyroid Peroxidase (TPO) Abs 07/15/2022 <4.0  0.0 - 25.0 IU/mL Final    T4 Free 07/15/2022 0.90 (A) 0.93 - 1.70 ng/dL Final    T3, Free 07/15/2022 2.6  2.0 - 4.4 pg/mL Final    Thyroglobulin Antibody 07/15/2022 <12.0  0.0 - 40.0 IU/mL Final    LD 07/15/2022 141  135 - 214 U/L Final    Uric Acid, Serum 07/15/2022 4.2  2.4 - 5.7 mg/dL Final    WBC 07/15/2022 17.5 (A) 4.5 - 11.5 E9/L Final    RBC 07/15/2022 4.00  3.50 - 5.50 E12/L Final    Hemoglobin 07/15/2022 11.8  11.5 - 15.5 g/dL Final    Hematocrit 07/15/2022 35.6  34.0 - 48.0 % Final    MCV 07/15/2022 89.0  80.0 - 99.9 fL Final    MCH 07/15/2022 29.5  26.0 - 35.0 pg Final    MCHC 07/15/2022 33.1  32.0 - 34.5 % Final    RDW 07/15/2022 13.7  11.5 - 15.0 fL Final    Platelets 92/10/8016 440  130 - 450 E9/L Final    MPV 07/15/2022 9.3  7.0 - 12.0 fL Final    Neutrophils % 07/15/2022 82.3 (A) 43.0 - 80.0 % Final    Immature Granulocytes % 07/15/2022 0.9  0.0 - 5.0 % Final    Lymphocytes % 07/15/2022 10.7 (A) 20.0 - 42.0 % Final    Monocytes % 07/15/2022 5.9  2.0 - 12.0 % Final    Eosinophils % 07/15/2022 0.1  0.0 - 6.0 % Final    Basophils % 07/15/2022 0.1  0.0 - 2.0 % Final    Neutrophils Absolute 07/15/2022 14.37 (A) 1.80 - 7.30 E9/L Final    Immature Granulocytes # 07/15/2022 0.16  E9/L Final    Lymphocytes Absolute 07/15/2022 1.86  1.50 - 4.00 E9/L Final    Monocytes Absolute 07/15/2022 1.03 (A) 0.10 - 0.95 E9/L Final    Eosinophils Absolute 07/15/2022 0.01 (A) 0.05 - 0.50 E9/L Final    Basophils Absolute 07/15/2022 0.02  0.00 - 0.20 E9/L Final    Sodium 07/15/2022 134  132 - 146 mmol/L Final    Potassium 07/15/2022 4.0  3.5 - 5.0 mmol/L Final    Chloride 07/15/2022 100  98 - 107 mmol/L Final    CO2 07/15/2022 17 (A) 22 - 29 mmol/L Final    Anion Gap 07/15/2022 17 (A) 7 - 16 mmol/L Final    Glucose 07/15/2022 192 (A) 74 - 99 mg/dL Final    BUN 07/15/2022 9  6 - 20 mg/dL Final    Creatinine 07/15/2022 0.6  0.5 - 1.0 mg/dL Final    GFR Non- 07/15/2022 >60  >=60 mL/min/1.73 Final    GFR  07/15/2022 >60   Final    Calcium 07/15/2022 9.7  8.6 - 10.2 mg/dL Final    Total Protein 07/15/2022 7.1  6.4 - 8.3 g/dL Final    Albumin 07/15/2022 3.9  3.5 - 5.2 g/dL Final    Total Bilirubin 07/15/2022 <0.2  0.0 - 1.2 mg/dL Final    Alkaline Phosphatase 07/15/2022 65  35 - 104 U/L Final    ALT 07/15/2022 10  0 - 32 U/L Final    AST 07/15/2022 9  0 - 31 U/L Final    Ferritin 07/15/2022 20  ng/mL Final    Folate 07/15/2022 14.1  4.8 - 24.2 ng/mL Final    Vitamin B-12 07/15/2022 381  211 - 946 pg/mL Final    Vit D, 25-Hydroxy 07/15/2022 25 (A) 30 - 100 ng/mL Final    Magnesium 07/15/2022 1.7  1.6 - 2.6 mg/dL Final    Hemoglobin A1C 07/15/2022 5.7 (A) 4.0 - 5.6 % Final    Urine Culture, Routine 07/15/2022    Final                    Value:<10,000 CFU/mL  Mixed gram positive organisms      Color, UA 07/15/2022 Yellow  Straw/Yellow Final    Clarity, UA 07/15/2022 Clear  Clear Final    Glucose, Ur 07/15/2022 >=1000 (A) Negative mg/dL Final    Bilirubin Urine 07/15/2022 Negative  Negative Final    Ketones, Urine 07/15/2022 TRACE (A) Negative mg/dL Final    Specific Gravity, UA 07/15/2022 1.025  1.005 - 1.030 Final    Blood, Urine 07/15/2022 Negative  Negative Final    pH, UA 07/15/2022 6.0  5.0 - 9.0 Final    Protein, UA 07/15/2022 Negative  Negative mg/dL Final    Urobilinogen, Urine 07/15/2022 0.2  <2.0 E.U./dL Final    Nitrite, Urine 07/15/2022 Negative  Negative Final    Leukocyte Esterase, Urine 07/15/2022 Negative  Negative Final    Mucus, UA 07/15/2022 Present (A) None Seen /LPF Final    WBC, UA 07/15/2022 NONE  0 - 5 /HPF Final    RBC, UA 07/15/2022 NONE  0 - 2 /HPF Final    Bacteria, UA 07/15/2022 NONE SEEN  None Seen /HPF Final    Protein, Ur 07/15/2022 7  0 - 12 mg/dL Final    Creatinine, Ur 07/15/2022 115  29 - 226 mg/dL Final    Protein/Creat Ratio 07/15/2022 0.1  0.0 - 0.2 Final    Protein/Creat Ratio 07/15/2022 0.1   Final    Genital Culture, Routine 07/15/2022    Final                    Value:Vaginal jennifer isolated, including:  Streptococcus species  Corynebacterium  Group B beta hemolytic strep not isolated  Neisseria gonorrhoeae not isolated  Gardnerella vaginalis not isolated  Yeast not isolated      Trichomonas Prep 07/15/2022 None Seen   Final    Yeast, Wet Prep 07/15/2022 None Seen   Final    Clue Cells, Wet Prep 07/15/2022 None Seen   Final    Source Wet Prep 07/15/2022 VAGINAL   Final    Source 07/15/2022 Cervix/Vagina   Final    C. trachomatis DNA 07/15/2022 NEGATIVE  NEG Final    N. gonorrhoeae DNA 07/15/2022 NEGATIVE  NEG Final    Meter Glucose 07/16/2022 166 (A) 74 - 99 mg/dL Final    Meter Glucose 07/16/2022 180 (A) 74 - 99 mg/dL Final    Meter Glucose 07/17/2022 102 (A) 74 - 99 mg/dL Final    Meter Glucose 07/17/2022 149 (A) 74 - 99 mg/dL Final    Meter Glucose 07/17/2022 126 (A) 74 - 99 mg/dL Final    Meter Glucose 07/17/2022 161 (A) 74 - 99 mg/dL Final    Meter Glucose 07/18/2022 95  74 - 99 mg/dL Final    Meter Glucose 07/18/2022 97  74 - 99 mg/dL Final    Meter Glucose 07/18/2022 99  74 - 99 mg/dL Final    Meter Glucose 07/18/2022 156 (A) 74 - 99 mg/dL Final    Meter Glucose 07/19/2022 147 (A) 74 - 99 mg/dL Final    Meter Glucose 07/19/2022 114 (A) 74 - 99 mg/dL Final    Meter Glucose 07/19/2022 114 (A) 74 - 99 mg/dL Final    Meter Glucose 07/19/2022 144 (A) 74 - 99 mg/dL Final    Meter Glucose 07/19/2022 177 (A) 74 - 99 mg/dL Final    Meter Glucose 07/20/2022 >500 (A) 74 - 99 mg/dL Final    Glucose 07/20/2022 144 (A) 74 - 99 mg/dL Final    Meter Glucose 07/20/2022 148 (A) 74 - 99 mg/dL Final    Meter Glucose 07/20/2022 127 (A) 74 - 99 mg/dL Final    Meter Glucose 07/20/2022 141 (A) 74 - 99 mg/dL Final    Meter Glucose 07/20/2022 152 (A) 74 - 99 mg/dL Final    Meter Glucose 07/21/2022 96  74 - 99 mg/dL Final    Meter Glucose 07/21/2022 84  74 - 99 mg/dL Final    Meter Glucose 07/21/2022 113 (A) 74 - 99 mg/dL Final    Meter Glucose 07/21/2022 175 (A) 74 - 99 mg/dL Final    Meter Glucose 07/22/2022 144 (A) 74 - 99 mg/dL Final    Genital Culture, Routine 07/22/2022  (A)  Final                    Value:Group B beta hemolytic strep not isolated  Neisseria gonorrhoeae not isolated  Gardnerella vaginalis not isolated      Organism 07/22/2022 Candida albicans (A)  Final    Genital Culture, Routine 07/22/2022 Moderate growth   Final    Meter Glucose 07/22/2022 178 (A) 74 - 99 mg/dL Final    Meter Glucose 07/22/2022 210 (A) 74 - 99 mg/dL Final    Meter Glucose 07/22/2022 178 (A) 74 - 99 mg/dL Final    Meter Glucose 07/22/2022 169 (A) 74 - 99 mg/dL Final    Meter Glucose 07/23/2022 101 (A) 74 - 99 mg/dL Final    Meter Glucose 07/23/2022 106 (A) 74 - 99 mg/dL Final    Meter Glucose 07/23/2022 125 (A) 74 - 99 mg/dL Final    Meter Glucose 07/23/2022 184 (A) 74 - 99 mg/dL Final    Meter Glucose 07/23/2022 112 (A) 74 - 99 mg/dL Final    Meter Glucose 07/24/2022 142 (A) 74 - 99 mg/dL Final    Meter Glucose 07/24/2022 144 (A) 74 - 99 mg/dL Final    Meter Glucose 07/24/2022 155 (A) 74 - 99 mg/dL Final    Meter Glucose 2022 112 (A) 74 - 99 mg/dL Final    Meter Glucose 2022 121 (A) 74 - 99 mg/dL Final    Meter Glucose 2022 153 (A) 74 - 99 mg/dL Final    Meter Glucose 2022 163 (A) 74 - 99 mg/dL Final    Meter Glucose 2022 92  74 - 99 mg/dL Final    Meter Glucose 2022 168 (A) 74 - 99 mg/dL Final    Meter Glucose 2022 129 (A) 74 - 99 mg/dL Final                      IMPRESSION:    1.  IUP at 25 weeks 2 days gestation based on her Estimated Date of Delivery: 22    2. Incompetent cervix, with the amniotic membranes extending to the external cervical os    3. Morbid obesity    4. Fully vaccinated for COVID-2022. 5.  Two previous first trimester pregnancy losses    6. Hypertension, taking Procardia XL 30 mg twice daily     7. O positive blood type    8. Taking 81 mg of aspirin daily    9. Status post cervical cerclage placement 2022  10. Cervical length was 3.5 mm 2022 using vaginal progesterone  11. Receiving antibiotics as indicated in Dr. Eboni Cr note from 2022  12. Received Celestone on  and 7/15/2022  13. Received magnesium sulfate for fetal neuro protection completed 2022  14. Hyperglycemia prescribed insulin  15. Reassuring fetal heart rate tracing for gestational age. Occasional variables noted common at this gestational age. 16.  HbA1c 5.7% 7/15/2022  17. Elevated blood pressure at 2019 hrs. 2022  18. Reassuring biophysical profile and cord Doppler testing 2022    PLAN:  The patient is currently admitted secondary to  dilation of the cervix with funneling of the amniotic membranes into the endocervical canal secondary to the immediate need for  intervention if the patient would deliver.     The insulin glargine dose was increased to 22 units each evening, secondary to her persistently elevated fasting blood sugars and suboptimal postprandial blood sugar control. Her Humalog insulin dose will be increased to 10 units with each meal with the dose adjusted as necessary in order to attain euglycemia. The patient is to continue taking Procardia XL as prescribed for management of her hypertension. DVT prophylaxis should be utilized throughout her admission. Consultation was obtained with neonatology during this admission. Further evaluation and management will be dependent on the results of the patient's testing and her clinical presentation. If you have any questions regarding her management, please contact me at your convenience and thank you for allowing me to participate in her care.      Murray Dutta MD, MS, Fernanda Prakash RDCS, RDMS, RVT  Director [address] [place] [address]  [phone]

## 2022-07-26 NOTE — PROGRESS NOTES
Progress Note    SUBJECTIVE:   Pt comfortable; +void; +flatus; +FM: denies cramping    OBJECTIVE:    VITALS:  BP (!) 143/72   Pulse (!) 106   Temp 98.1 °F (36.7 °C) (Oral)   Resp 16   Ht 5' 7\" (1.702 m)   Wt (!) 359 lb (162.8 kg)   LMP 2022   SpO2 98%   BMI 56.23 kg/m²   Physical Exam  ABDOMEN:  obese, gravid, nt  Ext nt    DATA:  Hemoglobin/Hematocrit:    Lab Results   Component Value Date/Time    HGB 11.8 07/15/2022 06:20 PM    HCT 35.6 07/15/2022 06:20 PM       ASSESSMENT AND PLAN:    Principal Problem:    23 weeks gestation of pregnancy  Active Problems:    Incompetent cervix in pregnancy, antepartum, second trimester    Morbid obesity with body mass index of 50.0-59.9 in adult ([address]    Abnormal ultrasonic finding on  screening of mother    Anemia    Elevated hemoglobin A1c    Low ferritin    Low maternal serum vitamin B12    Low vitamin D level  Resolved Problems:    * No resolved hospital problems.  *  Continue to monitor    Electronically signed by Amanda Noel DO on 2022 at 9:18 PM

## 2022-07-26 NOTE — PROGRESS NOTES
Assumed care of pt. Pt resting in bed. Denies LOF or VB. States +FM. Pt c/o back pain and is requesting tylenol. VSS. FHR 160s. No needs voiced at this time. Call light within reach. S/O at the bedside.

## 2022-07-26 NOTE — PROGRESS NOTES
Pt resting in bed. Denies any cramping, contractions, lof or vb. Reports good fetal movement. Afebrile, call light within reach, vitals stable.      On monitor for 1 hour after breakfast.

## 2022-07-27 ENCOUNTER — ANCILLARY PROCEDURE (OUTPATIENT)
Dept: OBGYN CLINIC | Age: 32
DRG: 547 | End: 2022-07-27
Payer: MEDICAID

## 2022-07-27 LAB
METER GLUCOSE: 105 MG/DL (ref 74–99)
METER GLUCOSE: 122 MG/DL (ref 74–99)
METER GLUCOSE: 77 MG/DL (ref 74–99)
METER GLUCOSE: 89 MG/DL (ref 74–99)

## 2022-07-27 PROCEDURE — 1220000000 HC SEMI PRIVATE OB R&B

## 2022-07-27 PROCEDURE — 99231 SBSQ HOSP IP/OBS SF/LOW 25: CPT | Performed by: OBSTETRICS & GYNECOLOGY

## 2022-07-27 PROCEDURE — 6370000000 HC RX 637 (ALT 250 FOR IP): Performed by: OBSTETRICS & GYNECOLOGY

## 2022-07-27 PROCEDURE — 76819 FETAL BIOPHYS PROFIL W/O NST: CPT | Performed by: OBSTETRICS & GYNECOLOGY

## 2022-07-27 PROCEDURE — 82962 GLUCOSE BLOOD TEST: CPT

## 2022-07-27 PROCEDURE — 76820 UMBILICAL ARTERY ECHO: CPT | Performed by: OBSTETRICS & GYNECOLOGY

## 2022-07-27 RX ADMIN — INSULIN LISPRO 10 UNITS: 100 INJECTION, SOLUTION INTRAVENOUS; SUBCUTANEOUS at 18:00

## 2022-07-27 RX ADMIN — CYANOCOBALAMIN TAB 1000 MCG 1000 MCG: 1000 TAB at 09:03

## 2022-07-27 RX ADMIN — DOCUSATE SODIUM 100 MG: 100 CAPSULE, LIQUID FILLED ORAL at 21:12

## 2022-07-27 RX ADMIN — DOCUSATE SODIUM 100 MG: 100 CAPSULE, LIQUID FILLED ORAL at 09:04

## 2022-07-27 RX ADMIN — INSULIN GLARGINE 22 UNITS: 100 INJECTION, SOLUTION SUBCUTANEOUS at 21:11

## 2022-07-27 RX ADMIN — FERROUS SULFATE TAB 325 MG (65 MG ELEMENTAL FE) 325 MG: 325 (65 FE) TAB at 09:03

## 2022-07-27 RX ADMIN — INSULIN LISPRO 10 UNITS: 100 INJECTION, SOLUTION INTRAVENOUS; SUBCUTANEOUS at 08:34

## 2022-07-27 RX ADMIN — NIFEDIPINE 30 MG: 30 TABLET, EXTENDED RELEASE ORAL at 21:12

## 2022-07-27 RX ADMIN — NIFEDIPINE 30 MG: 30 TABLET, EXTENDED RELEASE ORAL at 09:04

## 2022-07-27 RX ADMIN — INSULIN LISPRO 10 UNITS: 100 INJECTION, SOLUTION INTRAVENOUS; SUBCUTANEOUS at 12:18

## 2022-07-27 RX ADMIN — Medication 2000 UNITS: at 09:04

## 2022-07-27 RX ADMIN — MICONAZOLE NITRATE 100 MG: 100 SUPPOSITORY VAGINAL at 21:12

## 2022-07-27 RX ADMIN — ASPIRIN 81 MG CHEWABLE TABLET 81 MG: 81 TABLET CHEWABLE at 09:03

## 2022-07-27 ASSESSMENT — PAIN SCALES - GENERAL: PAINLEVEL_OUTOF10: 0

## 2022-07-27 NOTE — PROGRESS NOTES
Progress Note    SUBJECTIVE:   Pt comfortable; +void; +flatus; +FM; denies cramping or bleeding    OBJECTIVE:    VITALS:  /68   Pulse (!) 103   Temp 98.4 °F (36.9 °C) (Oral)   Resp 17   Ht 5' 7\" (1.702 m)   Wt (!) 359 lb (162.8 kg)   LMP 2022   SpO2 98%   BMI 56.23 kg/m²   Physical Exam  ABDOMEN:  soft, gravid, obese  Ext nt    DATA:  Hemoglobin/Hematocrit:    Lab Results   Component Value Date/Time    HGB 11.8 07/15/2022 06:20 PM    HCT 35.6 07/15/2022 06:20 PM       ASSESSMENT AND PLAN:    Principal Problem:    23 weeks gestation of pregnancy  Active Problems:    Incompetent cervix in pregnancy, antepartum, second trimester    Morbid obesity with body mass index of 50.0-59.9 in adult ([address]    Abnormal ultrasonic finding on  screening of mother    Anemia    Elevated hemoglobin A1c    Low ferritin    Low maternal serum vitamin B12    Low vitamin D level  Resolved Problems:    * No resolved hospital problems.  *    Continue to follow     Electronically signed by Jose Thomas DO on 2022 at 11:07 AM

## 2022-07-27 NOTE — PROGRESS NOTES
Assumed care of patient. Patient sleeping soundly upon entering room. Patient denies complaints at this time, endorses good fetal movement. Fasting blood sugar obtained. Patient requesting to rest for a while longer. Will call out for insulin when ready to eat.

## 2022-07-27 NOTE — PROGRESS NOTES
Patient Active Problem List   Diagnosis    Maternal morbid obesity, antepartum ([facility] [address]    Incompetent cervix during second trimester, with cervical cerclage    Hyperglycemia    Benign essential hypertension, antepartum    Morbid obesity with BMI of 50.0-59.9, adult ([facility] [address]      Mark Arriola is a 27 y.o. female, who is G3(0,0,2,0). She has an Estimated Date of Delivery: 22 based on her previous ultrasound assessment. She is currently 25 weeks 3 days gestation based on that assessment. The patient had no new complaints today. She states that her fetal movement has been good. She has had no vaginal bleeding or leaking of amniotic fluid. The fetal heart rate tracing is reassuring with moderate variability and accelerations. Occasional variable decelerations were noted. The tracing is category 2. The patient is currently prescribed Procardia XL 30 mg twice daily for management of her blood pressures. Her blood pressures have improved with this medication. The patient is currently prescribed insulin for management of her hyperglycemia. Her blood sugars have significantly improved on her current insulin dosage. A fetal ultrasound evaluation was performed on 2022. A detailed report included in the EMR under the imaging tab. A living ziegler intrauterine fetus was identified in the cephalic presentation. The placenta was anterior. The amniotic fluid index was 17.3 cm. The biophysical profile and cord Doppler studies were both reassuring. There was no evidence of absence, or reversal of end-diastolic flow. OB History    Para Term  AB Living   3 0 0 0 2 0   SAB IAB Ectopic Molar Multiple Live Births    2 0 0 0 0 0       # Outcome Date GA Lbr Skyler/2nd Weight Sex Delivery Anes PTL Lv   3 Current                     2 2021 6w0d                 1 2020 5w0d                    PAST GYNECOLOGICAL  HISTORY:  Negative for abnormal pap smears.   Negative for sexually 07/12/2022 1.020  1.005 - 1.030 Final    Blood, Urine 07/12/2022 Negative  Negative Final    pH, UA 07/12/2022 7.0  5.0 - 9.0 Final    Protein, UA 07/12/2022 Negative  Negative mg/dL Final    Urobilinogen, Urine 07/12/2022 1.0  <2.0 E.U./dL Final    Nitrite, Urine 07/12/2022 Negative  Negative Final    Leukocyte Esterase, Urine 07/12/2022 SMALL (A) Negative Final    WBC, UA 07/12/2022 1-3  0 - 5 /HPF Final    RBC, UA 07/12/2022 NONE  0 - 2 /HPF Final    Epithelial Cells, UA 07/12/2022 MANY  /HPF Final    Bacteria, UA 07/12/2022 MANY (A) None Seen /HPF Final    Rejected Test 07/13/2022 BMP CBC   Final    Reason for Rejection 07/13/2022 see below   Final    SARS-CoV-2, NAAT 07/13/2022 SEE BELOW  Not Detected Corrected    TSH 07/15/2022 1.120  0.270 - 4.200 uIU/mL Final    Thyroid Peroxidase (TPO) Abs 07/15/2022 <4.0  0.0 - 25.0 IU/mL Final    T4 Free 07/15/2022 0.90 (A) 0.93 - 1.70 ng/dL Final    T3, Free 07/15/2022 2.6  2.0 - 4.4 pg/mL Final    Thyroglobulin Antibody 07/15/2022 <12.0  0.0 - 40.0 IU/mL Final    LD 07/15/2022 141  135 - 214 U/L Final    Uric Acid, Serum 07/15/2022 4.2  2.4 - 5.7 mg/dL Final    WBC 07/15/2022 17.5 (A) 4.5 - 11.5 E9/L Final    RBC 07/15/2022 4.00  3.50 - 5.50 E12/L Final    Hemoglobin 07/15/2022 11.8  11.5 - 15.5 g/dL Final    Hematocrit 07/15/2022 35.6  34.0 - 48.0 % Final    MCV 07/15/2022 89.0  80.0 - 99.9 fL Final    MCH 07/15/2022 29.5  26.0 - 35.0 pg Final    MCHC 07/15/2022 33.1  32.0 - 34.5 % Final    RDW 07/15/2022 13.7  11.5 - 15.0 fL Final    Platelets 92/68/8617 440  130 - 450 E9/L Final    MPV 07/15/2022 9.3  7.0 - 12.0 fL Final    Neutrophils % 07/15/2022 82.3 (A) 43.0 - 80.0 % Final    Immature Granulocytes % 07/15/2022 0.9  0.0 - 5.0 % Final    Lymphocytes % 07/15/2022 10.7 (A) 20.0 - 42.0 % Final    Monocytes % 07/15/2022 5.9  2.0 - 12.0 % Final    Eosinophils % 07/15/2022 0.1  0.0 - 6.0 % Final    Basophils % 07/15/2022 0.1  0.0 - 2.0 % Final    Neutrophils Absolute 07/15/2022 14.37 (A) 1.80 - 7.30 E9/L Final    Immature Granulocytes # 07/15/2022 0.16  E9/L Final    Lymphocytes Absolute 07/15/2022 1.86  1.50 - 4.00 E9/L Final    Monocytes Absolute 07/15/2022 1.03 (A) 0.10 - 0.95 E9/L Final    Eosinophils Absolute 07/15/2022 0.01 (A) 0.05 - 0.50 E9/L Final    Basophils Absolute 07/15/2022 0.02  0.00 - 0.20 E9/L Final    Sodium 07/15/2022 134  132 - 146 mmol/L Final    Potassium 07/15/2022 4.0  3.5 - 5.0 mmol/L Final    Chloride 07/15/2022 100  98 - 107 mmol/L Final    CO2 07/15/2022 17 (A) 22 - 29 mmol/L Final    Anion Gap 07/15/2022 17 (A) 7 - 16 mmol/L Final    Glucose 07/15/2022 192 (A) 74 - 99 mg/dL Final    BUN 07/15/2022 9  6 - 20 mg/dL Final    Creatinine 07/15/2022 0.6  0.5 - 1.0 mg/dL Final    GFR Non- 07/15/2022 >60  >=60 mL/min/1.73 Final    GFR  07/15/2022 >60   Final    Calcium 07/15/2022 9.7  8.6 - 10.2 mg/dL Final    Total Protein 07/15/2022 7.1  6.4 - 8.3 g/dL Final    Albumin 07/15/2022 3.9  3.5 - 5.2 g/dL Final    Total Bilirubin 07/15/2022 <0.2  0.0 - 1.2 mg/dL Final    Alkaline Phosphatase 07/15/2022 65  35 - 104 U/L Final    ALT 07/15/2022 10  0 - 32 U/L Final    AST 07/15/2022 9  0 - 31 U/L Final    Ferritin 07/15/2022 20  ng/mL Final    Folate 07/15/2022 14.1  4.8 - 24.2 ng/mL Final    Vitamin B-12 07/15/2022 381  211 - 946 pg/mL Final    Vit D, 25-Hydroxy 07/15/2022 25 (A) 30 - 100 ng/mL Final    Magnesium 07/15/2022 1.7  1.6 - 2.6 mg/dL Final    Hemoglobin A1C 07/15/2022 5.7 (A) 4.0 - 5.6 % Final    Urine Culture, Routine 07/15/2022    Final                    Value:<10,000 CFU/mL  Mixed gram positive organisms      Color, UA 07/15/2022 Yellow  Straw/Yellow Final    Clarity, UA 07/15/2022 Clear  Clear Final    Glucose, Ur 07/15/2022 >=1000 (A) Negative mg/dL Final    Bilirubin Urine 07/15/2022 Negative  Negative Final    Ketones, Urine 07/15/2022 TRACE (A) Negative mg/dL Final    Specific Gravity, UA 07/15/2022 1.025 1.005 - 1.030 Final    Blood, Urine 07/15/2022 Negative  Negative Final    pH, UA 07/15/2022 6.0  5.0 - 9.0 Final    Protein, UA 07/15/2022 Negative  Negative mg/dL Final    Urobilinogen, Urine 07/15/2022 0.2  <2.0 E.U./dL Final    Nitrite, Urine 07/15/2022 Negative  Negative Final    Leukocyte Esterase, Urine 07/15/2022 Negative  Negative Final    Mucus, UA 07/15/2022 Present (A) None Seen /LPF Final    WBC, UA 07/15/2022 NONE  0 - 5 /HPF Final    RBC, UA 07/15/2022 NONE  0 - 2 /HPF Final    Bacteria, UA 07/15/2022 NONE SEEN  None Seen /HPF Final    Protein, Ur 07/15/2022 7  0 - 12 mg/dL Final    Creatinine, Ur 07/15/2022 115  29 - 226 mg/dL Final    Protein/Creat Ratio 07/15/2022 0.1  0.0 - 0.2 Final    Protein/Creat Ratio 07/15/2022 0.1   Final    Genital Culture, Routine 07/15/2022    Final                    Value:Vaginal jennifer isolated, including:  Streptococcus species  Corynebacterium  Group B beta hemolytic strep not isolated  Neisseria gonorrhoeae not isolated  Gardnerella vaginalis not isolated  Yeast not isolated      Trichomonas Prep 07/15/2022 None Seen   Final    Yeast, Wet Prep 07/15/2022 None Seen   Final    Clue Cells, Wet Prep 07/15/2022 None Seen   Final    Source Wet Prep 07/15/2022 VAGINAL   Final    Source 07/15/2022 Cervix/Vagina   Final    C. trachomatis DNA 07/15/2022 NEGATIVE  NEG Final    N. gonorrhoeae DNA 07/15/2022 NEGATIVE  NEG Final    Meter Glucose 07/16/2022 166 (A) 74 - 99 mg/dL Final    Meter Glucose 07/16/2022 180 (A) 74 - 99 mg/dL Final    Meter Glucose 07/17/2022 102 (A) 74 - 99 mg/dL Final    Meter Glucose 07/17/2022 149 (A) 74 - 99 mg/dL Final    Meter Glucose 07/17/2022 126 (A) 74 - 99 mg/dL Final    Meter Glucose 07/17/2022 161 (A) 74 - 99 mg/dL Final    Meter Glucose 07/18/2022 95  74 - 99 mg/dL Final    Meter Glucose 07/18/2022 97  74 - 99 mg/dL Final    Meter Glucose 07/18/2022 99  74 - 99 mg/dL Final    Meter Glucose 07/18/2022 156 (A) 74 - 99 mg/dL Final    Meter Glucose 07/19/2022 147 (A) 74 - 99 mg/dL Final    Meter Glucose 07/19/2022 114 (A) 74 - 99 mg/dL Final    Meter Glucose 07/19/2022 114 (A) 74 - 99 mg/dL Final    Meter Glucose 07/19/2022 144 (A) 74 - 99 mg/dL Final    Meter Glucose 07/19/2022 177 (A) 74 - 99 mg/dL Final    Meter Glucose 07/20/2022 >500 (A) 74 - 99 mg/dL Final    Glucose 07/20/2022 144 (A) 74 - 99 mg/dL Final    Meter Glucose 07/20/2022 148 (A) 74 - 99 mg/dL Final    Meter Glucose 07/20/2022 127 (A) 74 - 99 mg/dL Final    Meter Glucose 07/20/2022 141 (A) 74 - 99 mg/dL Final    Meter Glucose 07/20/2022 152 (A) 74 - 99 mg/dL Final    Meter Glucose 07/21/2022 96  74 - 99 mg/dL Final    Meter Glucose 07/21/2022 84  74 - 99 mg/dL Final    Meter Glucose 07/21/2022 113 (A) 74 - 99 mg/dL Final    Meter Glucose 07/21/2022 175 (A) 74 - 99 mg/dL Final    Meter Glucose 07/22/2022 144 (A) 74 - 99 mg/dL Final    Genital Culture, Routine 07/22/2022  (A)  Final                    Value:Group B beta hemolytic strep not isolated  Neisseria gonorrhoeae not isolated  Gardnerella vaginalis not isolated      Organism 07/22/2022 Candida albicans (A)  Final    Genital Culture, Routine 07/22/2022 Moderate growth   Final    Meter Glucose 07/22/2022 178 (A) 74 - 99 mg/dL Final    Meter Glucose 07/22/2022 210 (A) 74 - 99 mg/dL Final    Meter Glucose 07/22/2022 178 (A) 74 - 99 mg/dL Final    Meter Glucose 07/22/2022 169 (A) 74 - 99 mg/dL Final    Meter Glucose 07/23/2022 101 (A) 74 - 99 mg/dL Final    Meter Glucose 07/23/2022 106 (A) 74 - 99 mg/dL Final    Meter Glucose 07/23/2022 125 (A) 74 - 99 mg/dL Final    Meter Glucose 07/23/2022 184 (A) 74 - 99 mg/dL Final    Meter Glucose 07/23/2022 112 (A) 74 - 99 mg/dL Final    Meter Glucose 07/24/2022 142 (A) 74 - 99 mg/dL Final    Meter Glucose 07/24/2022 144 (A) 74 - 99 mg/dL Final    Meter Glucose 07/24/2022 155 (A) 74 - 99 mg/dL Final    Meter Glucose 07/25/2022 112 (A) 74 - 99 mg/dL Final    Meter Glucose 07/25/2022 121 (A) 74 - 99 mg/dL Final    Meter Glucose 2022 153 (A) 74 - 99 mg/dL Final    Meter Glucose 2022 163 (A) 74 - 99 mg/dL Final    Meter Glucose 2022 92  74 - 99 mg/dL Final    Meter Glucose 2022 168 (A) 74 - 99 mg/dL Final    Meter Glucose 2022 129 (A) 74 - 99 mg/dL Final    Meter Glucose 2022 78  74 - 99 mg/dL Final    Meter Glucose 2022 89  74 - 99 mg/dL Final    Meter Glucose 2022 77  74 - 99 mg/dL Final    Meter Glucose 2022 122 (A) 74 - 99 mg/dL Final                    IMPRESSION:    1.  IUP at 25 weeks 3 days gestation based on her Estimated Date of Delivery: 22    2. Incompetent cervix, with the amniotic membranes extending to the external cervical os. 3.  Morbid obesity    4. Fully vaccinated for COVID-2022. 5.  Two previous first trimester pregnancy losses    6. Hypertension, taking Procardia XL 30 mg twice daily     7. O positive blood type    8. Taking 81 mg of aspirin daily    9. Status post cervical cerclage placement 2022  10. Cervical length was 3.5 mm 2022 using vaginal progesterone  11. Receiving antibiotics as indicated in Dr. Milian Dates note from 2022  12. Received Celestone on  and 7/15/2022  13. Received magnesium sulfate for fetal neuro protection completed 2022  14. Hyperglycemia prescribed insulin  15. Reassuring fetal heart rate tracing for gestational age. Occasional variables noted common at this gestational age. 16.  HbA1c 5.7% 7/15/2022  17. Elevated blood pressure at 2019 hrs. 2022  18. Reassuring biophysical profile and cord Doppler testing 2022    PLAN:  The patient is currently admitted secondary to  dilation of the cervix with funneling of the amniotic membranes into the endocervical canal secondary to the immediate need for  intervention if the patient would deliver. The insulin glargine dose is to be continued at 22 units each evening.   Her Humalog insulin dose will be continued at 10 units with each meal with the dose adjusted as necessary in order to attain euglycemia. The patient is to continue taking Procardia XL as prescribed for management of her hypertension. DVT prophylaxis should be utilized throughout her admission. Consultation was obtained with neonatology during this admission. Further evaluation and management will be dependent on the results of the patient's testing and her clinical presentation. If you have any questions regarding her management, please contact me at your convenience and thank you for allowing me to participate in her care.      Santos Heredia MD, MS, Otto Rojas, ELISEOCS, RDMS, RVT  Director [address] [place] [address]  [phone]

## 2022-07-27 NOTE — PROGRESS NOTES
Assumed patient care. Patient called out for night time monitoring. RN remained at bedside for 20 minutes to ensure continuous tracing. Patient denies pain or any further needs at this time.  Call light within reach

## 2022-07-28 LAB
METER GLUCOSE: 104 MG/DL (ref 74–99)
METER GLUCOSE: 134 MG/DL (ref 74–99)
METER GLUCOSE: 69 MG/DL (ref 74–99)
METER GLUCOSE: 93 MG/DL (ref 74–99)
METER GLUCOSE: 99 MG/DL (ref 74–99)

## 2022-07-28 PROCEDURE — 82962 GLUCOSE BLOOD TEST: CPT

## 2022-07-28 PROCEDURE — 1220000000 HC SEMI PRIVATE OB R&B

## 2022-07-28 PROCEDURE — 99231 SBSQ HOSP IP/OBS SF/LOW 25: CPT | Performed by: OBSTETRICS & GYNECOLOGY

## 2022-07-28 PROCEDURE — 6370000000 HC RX 637 (ALT 250 FOR IP): Performed by: OBSTETRICS & GYNECOLOGY

## 2022-07-28 RX ORDER — INSULIN LISPRO 100 [IU]/ML
6 INJECTION, SOLUTION INTRAVENOUS; SUBCUTANEOUS ONCE
Status: COMPLETED | OUTPATIENT
Start: 2022-07-28 | End: 2022-07-28

## 2022-07-28 RX ORDER — INSULIN LISPRO 100 [IU]/ML
6 INJECTION, SOLUTION INTRAVENOUS; SUBCUTANEOUS
Status: DISCONTINUED | OUTPATIENT
Start: 2022-07-28 | End: 2022-08-01 | Stop reason: HOSPADM

## 2022-07-28 RX ADMIN — CYANOCOBALAMIN TAB 1000 MCG 1000 MCG: 1000 TAB at 08:17

## 2022-07-28 RX ADMIN — DOCUSATE SODIUM 100 MG: 100 CAPSULE, LIQUID FILLED ORAL at 21:12

## 2022-07-28 RX ADMIN — INSULIN LISPRO 6 UNITS: 100 INJECTION, SOLUTION INTRAVENOUS; SUBCUTANEOUS at 13:02

## 2022-07-28 RX ADMIN — FERROUS SULFATE TAB 325 MG (65 MG ELEMENTAL FE) 325 MG: 325 (65 FE) TAB at 08:17

## 2022-07-28 RX ADMIN — INSULIN LISPRO 6 UNITS: 100 INJECTION, SOLUTION INTRAVENOUS; SUBCUTANEOUS at 17:30

## 2022-07-28 RX ADMIN — INSULIN LISPRO 10 UNITS: 100 INJECTION, SOLUTION INTRAVENOUS; SUBCUTANEOUS at 08:21

## 2022-07-28 RX ADMIN — NIFEDIPINE 30 MG: 30 TABLET, EXTENDED RELEASE ORAL at 21:12

## 2022-07-28 RX ADMIN — ASPIRIN 81 MG CHEWABLE TABLET 81 MG: 81 TABLET CHEWABLE at 08:18

## 2022-07-28 RX ADMIN — INSULIN GLARGINE 22 UNITS: 100 INJECTION, SOLUTION SUBCUTANEOUS at 21:11

## 2022-07-28 RX ADMIN — DOCUSATE SODIUM 100 MG: 100 CAPSULE, LIQUID FILLED ORAL at 08:17

## 2022-07-28 RX ADMIN — NIFEDIPINE 30 MG: 30 TABLET, EXTENDED RELEASE ORAL at 08:17

## 2022-07-28 RX ADMIN — MICONAZOLE NITRATE 100 MG: 100 SUPPOSITORY VAGINAL at 21:12

## 2022-07-28 RX ADMIN — Medication 2000 UNITS: at 08:18

## 2022-07-28 ASSESSMENT — PAIN DESCRIPTION - DESCRIPTORS: DESCRIPTORS: CRAMPING

## 2022-07-28 NOTE — PROGRESS NOTES
Patient Active Problem List   Diagnosis    Maternal morbid obesity, antepartum ([facility] [address]    Incompetent cervix during second trimester, with cervical cerclage    Hyperglycemia    Benign essential hypertension, antepartum    Morbid obesity with BMI of 50.0-59.9, adult ([facility] [address]      Any Barnes is a 27 y.o. female, who is G3(0,0,2,0). She has an Estimated Date of Delivery: 22 based on her previous ultrasound assessment. She is currently 25 weeks 4 days gestation based on that assessment. The patient had no new complaints today. She states that her fetal movement has been good. She has had no vaginal bleeding or leaking of amniotic fluid. The fetal heart rate tracing is reassuring with moderate variability and accelerations. Occasional variable decelerations were noted. The tracing is category 2. The patient is currently prescribed Procardia XL 30 mg twice daily for management of her blood pressures. Her blood pressures have improved with this medication. The patient is currently prescribed insulin for management of her hyperglycemia. Her blood sugars have significantly improved on her current insulin dosage. A fetal ultrasound evaluation was performed on 2022. A detailed report included in the EMR under the imaging tab. A living ziegler intrauterine fetus was identified in the cephalic presentation. The placenta was anterior. The amniotic fluid index was 17.3 cm. The biophysical profile and cord Doppler studies were both reassuring. There was no evidence of absence, or reversal of end-diastolic flow. OB History    Para Term  AB Living   3 0 0 0 2 0   SAB IAB Ectopic Molar Multiple Live Births    2 0 0 0 0 0       # Outcome Date GA Lbr Skyler/2nd Weight Sex Delivery Anes PTL Lv   3 Current                     2 2021 6w0d                 1 2020 5w0d                    PAST GYNECOLOGICAL  HISTORY:  Negative for abnormal pap smears.   Negative for sexually transmitted diseases. Negative for cervical LEEP / conization /cryosurgery. Negative for uterine surgery. Negative for ovarian or tubal surgery. Past Medical History        Past Medical History:   Diagnosis Date    Hypertension              Past Surgical History:   Procedure Laterality Date    WISDOM TOOTH EXTRACTION                No Known Allergies     Current Medication   No current outpatient medications on file. Social History      Tobacco Use    Smoking status: Former Smoker    Smokeless tobacco: Never Used   Substance Use Topics    Alcohol use: Not Currently       Comment: social      FAMILY MEDICAL HISTORY:  Negative for congenital abnormalities, autism, genetic disease and mental retardation, not listed above. Review of Systems :   CONSTITUTIONAL : No fever, no chills  HEENT : No headache, no visual changes, no rhinorrhea, no sore throat  CARDIOVASCULAR : No pain, no palpitations, no edema  RESPIRATORY : No pain, no shortness of breath  GASTROINTESTINAL : No N/V, no D/C, no abdominal pain  GENITOURINARY : No dysuria, hematuria and no incontinence  MUSCULOSKELETAL : No myalgia, No back pain  NEUROLOGICAL : No numbness, no tingling, no tremors. No history of seizures  ALL OTHER SYSTEMS WERE REPORTED AS NEGATIVE. PERTINENT PHYSICAL EXAMINATION:   Patient Vitals for the past 24 hrs:   BP Temp Temp src Pulse Resp SpO2   07/28/22 0817 (!) 103/54 98 °F (36.7 °C) Oral 90 14 --   07/28/22 0100 (!) 102/54 98.4 °F (36.9 °C) Oral 90 16 99 %   07/27/22 2123 (!) 123/56 -- -- 99 -- --   07/27/22 2112 (!) 123/56 -- -- -- -- --      GENERAL:   The patient is a well developed, female who is alert cooperative and oriented times three in no acute distress. HEENT:  Normo cephalic and atraumatic. No facial edema. ABDOMEN:  Her uterus is gravid. She had no complaint of abdominal pain or tenderness. The fetal heart rate is 148 bpm.      EXTREMITIES:  1+ peripheral edema is noted.      PELVIC EXAMINATION: 7/26/2022  Transvaginal ultrasound assessment of the cervix was performed. The cervical cerclage appeared to be intact. The amniotic membranes funneling to the cervical cerclage level.     Admission on 07/12/2022   Component Date Value Ref Range Status    WBC 07/12/2022 11.9 (A) 4.5 - 11.5 E9/L Final    RBC 07/12/2022 3.73  3.50 - 5.50 E12/L Final    Hemoglobin 07/12/2022 10.9 (A) 11.5 - 15.5 g/dL Final    Hematocrit 07/12/2022 32.9 (A) 34.0 - 48.0 % Final    MCV 07/12/2022 88.2  80.0 - 99.9 fL Final    MCH 07/12/2022 29.2  26.0 - 35.0 pg Final    MCHC 07/12/2022 33.1  32.0 - 34.5 % Final    RDW 07/12/2022 13.8  11.5 - 15.0 fL Final    Platelets 70/01/7011 390  130 - 450 E9/L Final    MPV 07/12/2022 9.2  7.0 - 12.0 fL Final    BUN 07/12/2022 7  6 - 20 mg/dL Final    Creatinine 07/12/2022 0.6  0.5 - 1.0 mg/dL Final    GFR Non- 07/12/2022 >60  >=60 mL/min/1.73 Final    GFR  07/12/2022 >60   Final    Total Protein 07/12/2022 6.2 (A) 6.4 - 8.3 g/dL Final    Albumin 07/12/2022 3.2 (A) 3.5 - 5.2 g/dL Final    Alkaline Phosphatase 07/12/2022 59  35 - 104 U/L Final    ALT 07/12/2022 10  0 - 32 U/L Final    AST 07/12/2022 14  0 - 31 U/L Final    Total Bilirubin 07/12/2022 <0.2  0.0 - 1.2 mg/dL Final    Bilirubin, Direct 07/12/2022 <0.2  0.0 - 0.3 mg/dL Final    Bilirubin, Indirect 07/12/2022 see below  0.0 - 1.0 mg/dL Final    Protein, Ur 07/12/2022 15 (A) 0 - 12 mg/dL Final    Creatinine, Ur 07/12/2022 226  29 - 226 mg/dL Final    Protein/Creat Ratio 07/12/2022 0.1  0.0 - 0.2 Final    Protein/Creat Ratio 07/12/2022 0.1   Final    Uric Acid, Serum 07/12/2022 5.1  2.4 - 5.7 mg/dL Final    Fibrinogen 07/12/2022 497 (A) 200 - 400 mg/dL Final    LD 07/12/2022 153  135 - 214 U/L Final    Color, UA 07/12/2022 Yellow  Straw/Yellow Final    Clarity, UA 07/12/2022 Clear  Clear Final    Glucose, Ur 07/12/2022 Negative  Negative mg/dL Final    Bilirubin Urine 07/12/2022 Negative  Negative Final    Ketones, Urine 07/12/2022 Negative  Negative mg/dL Final    Specific Gravity, UA 07/12/2022 1.020  1.005 - 1.030 Final    Blood, Urine 07/12/2022 Negative  Negative Final    pH, UA 07/12/2022 7.0  5.0 - 9.0 Final    Protein, UA 07/12/2022 Negative  Negative mg/dL Final    Urobilinogen, Urine 07/12/2022 1.0  <2.0 E.U./dL Final    Nitrite, Urine 07/12/2022 Negative  Negative Final    Leukocyte Esterase, Urine 07/12/2022 SMALL (A) Negative Final    WBC, UA 07/12/2022 1-3  0 - 5 /HPF Final    RBC, UA 07/12/2022 NONE  0 - 2 /HPF Final    Epithelial Cells, UA 07/12/2022 MANY  /HPF Final    Bacteria, UA 07/12/2022 MANY (A) None Seen /HPF Final    Rejected Test 07/13/2022 BMP CBC   Final    Reason for Rejection 07/13/2022 see below   Final    SARS-CoV-2, NAAT 07/13/2022 SEE BELOW  Not Detected Corrected    TSH 07/15/2022 1.120  0.270 - 4.200 uIU/mL Final    Thyroid Peroxidase (TPO) Abs 07/15/2022 <4.0  0.0 - 25.0 IU/mL Final    T4 Free 07/15/2022 0.90 (A) 0.93 - 1.70 ng/dL Final    T3, Free 07/15/2022 2.6  2.0 - 4.4 pg/mL Final    Thyroglobulin Antibody 07/15/2022 <12.0  0.0 - 40.0 IU/mL Final    LD 07/15/2022 141  135 - 214 U/L Final    Uric Acid, Serum 07/15/2022 4.2  2.4 - 5.7 mg/dL Final    WBC 07/15/2022 17.5 (A) 4.5 - 11.5 E9/L Final    RBC 07/15/2022 4.00  3.50 - 5.50 E12/L Final    Hemoglobin 07/15/2022 11.8  11.5 - 15.5 g/dL Final    Hematocrit 07/15/2022 35.6  34.0 - 48.0 % Final    MCV 07/15/2022 89.0  80.0 - 99.9 fL Final    MCH 07/15/2022 29.5  26.0 - 35.0 pg Final    MCHC 07/15/2022 33.1  32.0 - 34.5 % Final    RDW 07/15/2022 13.7  11.5 - 15.0 fL Final    Platelets 75/93/1112 440  130 - 450 E9/L Final    MPV 07/15/2022 9.3  7.0 - 12.0 fL Final    Neutrophils % 07/15/2022 82.3 (A) 43.0 - 80.0 % Final    Immature Granulocytes % 07/15/2022 0.9  0.0 - 5.0 % Final    Lymphocytes % 07/15/2022 10.7 (A) 20.0 - 42.0 % Final    Monocytes % 07/15/2022 5.9  2.0 - 12.0 % Final    Eosinophils % 07/15/2022 0.1  0.0 - 6.0 % Final    Basophils % 07/15/2022 0.1  0.0 - 2.0 % Final    Neutrophils Absolute 07/15/2022 14.37 (A) 1.80 - 7.30 E9/L Final    Immature Granulocytes # 07/15/2022 0.16  E9/L Final    Lymphocytes Absolute 07/15/2022 1.86  1.50 - 4.00 E9/L Final    Monocytes Absolute 07/15/2022 1.03 (A) 0.10 - 0.95 E9/L Final    Eosinophils Absolute 07/15/2022 0.01 (A) 0.05 - 0.50 E9/L Final    Basophils Absolute 07/15/2022 0.02  0.00 - 0.20 E9/L Final    Sodium 07/15/2022 134  132 - 146 mmol/L Final    Potassium 07/15/2022 4.0  3.5 - 5.0 mmol/L Final    Chloride 07/15/2022 100  98 - 107 mmol/L Final    CO2 07/15/2022 17 (A) 22 - 29 mmol/L Final    Anion Gap 07/15/2022 17 (A) 7 - 16 mmol/L Final    Glucose 07/15/2022 192 (A) 74 - 99 mg/dL Final    BUN 07/15/2022 9  6 - 20 mg/dL Final    Creatinine 07/15/2022 0.6  0.5 - 1.0 mg/dL Final    GFR Non- 07/15/2022 >60  >=60 mL/min/1.73 Final    GFR  07/15/2022 >60   Final    Calcium 07/15/2022 9.7  8.6 - 10.2 mg/dL Final    Total Protein 07/15/2022 7.1  6.4 - 8.3 g/dL Final    Albumin 07/15/2022 3.9  3.5 - 5.2 g/dL Final    Total Bilirubin 07/15/2022 <0.2  0.0 - 1.2 mg/dL Final    Alkaline Phosphatase 07/15/2022 65  35 - 104 U/L Final    ALT 07/15/2022 10  0 - 32 U/L Final    AST 07/15/2022 9  0 - 31 U/L Final    Ferritin 07/15/2022 20  ng/mL Final    Folate 07/15/2022 14.1  4.8 - 24.2 ng/mL Final    Vitamin B-12 07/15/2022 381  211 - 946 pg/mL Final    Vit D, 25-Hydroxy 07/15/2022 25 (A) 30 - 100 ng/mL Final    Magnesium 07/15/2022 1.7  1.6 - 2.6 mg/dL Final    Hemoglobin A1C 07/15/2022 5.7 (A) 4.0 - 5.6 % Final    Urine Culture, Routine 07/15/2022    Final                    Value:<10,000 CFU/mL  Mixed gram positive organisms      Color, UA 07/15/2022 Yellow  Straw/Yellow Final    Clarity, UA 07/15/2022 Clear  Clear Final    Glucose, Ur 07/15/2022 >=1000 (A) Negative mg/dL Final    Bilirubin Urine 07/15/2022 Negative  Negative Final    Ketones, Urine 07/15/2022 TRACE (A) Negative mg/dL Final    Specific Gravity, UA 07/15/2022 1.025  1.005 - 1.030 Final    Blood, Urine 07/15/2022 Negative  Negative Final    pH, UA 07/15/2022 6.0  5.0 - 9.0 Final    Protein, UA 07/15/2022 Negative  Negative mg/dL Final    Urobilinogen, Urine 07/15/2022 0.2  <2.0 E.U./dL Final    Nitrite, Urine 07/15/2022 Negative  Negative Final    Leukocyte Esterase, Urine 07/15/2022 Negative  Negative Final    Mucus, UA 07/15/2022 Present (A) None Seen /LPF Final    WBC, UA 07/15/2022 NONE  0 - 5 /HPF Final    RBC, UA 07/15/2022 NONE  0 - 2 /HPF Final    Bacteria, UA 07/15/2022 NONE SEEN  None Seen /HPF Final    Protein, Ur 07/15/2022 7  0 - 12 mg/dL Final    Creatinine, Ur 07/15/2022 115  29 - 226 mg/dL Final    Protein/Creat Ratio 07/15/2022 0.1  0.0 - 0.2 Final    Protein/Creat Ratio 07/15/2022 0.1   Final    Genital Culture, Routine 07/15/2022    Final                    Value:Vaginal jennifer isolated, including:  Streptococcus species  Corynebacterium  Group B beta hemolytic strep not isolated  Neisseria gonorrhoeae not isolated  Gardnerella vaginalis not isolated  Yeast not isolated      Trichomonas Prep 07/15/2022 None Seen   Final    Yeast, Wet Prep 07/15/2022 None Seen   Final    Clue Cells, Wet Prep 07/15/2022 None Seen   Final    Source Wet Prep 07/15/2022 VAGINAL   Final    Source 07/15/2022 Cervix/Vagina   Final    C. trachomatis DNA 07/15/2022 NEGATIVE  NEG Final    N. gonorrhoeae DNA 07/15/2022 NEGATIVE  NEG Final    Meter Glucose 07/16/2022 166 (A) 74 - 99 mg/dL Final    Meter Glucose 07/16/2022 180 (A) 74 - 99 mg/dL Final    Meter Glucose 07/17/2022 102 (A) 74 - 99 mg/dL Final    Meter Glucose 07/17/2022 149 (A) 74 - 99 mg/dL Final    Meter Glucose 07/17/2022 126 (A) 74 - 99 mg/dL Final    Meter Glucose 07/17/2022 161 (A) 74 - 99 mg/dL Final    Meter Glucose 07/18/2022 95  74 - 99 mg/dL Final    Meter Glucose 07/18/2022 97  74 - 99 mg/dL Final    Meter Glucose 07/18/2022 99  74 - 99 mg/dL Final    Meter Glucose 07/18/2022 156 (A) 74 - 99 mg/dL Final    Meter Glucose 07/19/2022 147 (A) 74 - 99 mg/dL Final    Meter Glucose 07/19/2022 114 (A) 74 - 99 mg/dL Final    Meter Glucose 07/19/2022 114 (A) 74 - 99 mg/dL Final    Meter Glucose 07/19/2022 144 (A) 74 - 99 mg/dL Final    Meter Glucose 07/19/2022 177 (A) 74 - 99 mg/dL Final    Meter Glucose 07/20/2022 >500 (A) 74 - 99 mg/dL Final    Glucose 07/20/2022 144 (A) 74 - 99 mg/dL Final    Meter Glucose 07/20/2022 148 (A) 74 - 99 mg/dL Final    Meter Glucose 07/20/2022 127 (A) 74 - 99 mg/dL Final    Meter Glucose 07/20/2022 141 (A) 74 - 99 mg/dL Final    Meter Glucose 07/20/2022 152 (A) 74 - 99 mg/dL Final    Meter Glucose 07/21/2022 96  74 - 99 mg/dL Final    Meter Glucose 07/21/2022 84  74 - 99 mg/dL Final    Meter Glucose 07/21/2022 113 (A) 74 - 99 mg/dL Final    Meter Glucose 07/21/2022 175 (A) 74 - 99 mg/dL Final    Meter Glucose 07/22/2022 144 (A) 74 - 99 mg/dL Final    Genital Culture, Routine 07/22/2022  (A)  Final                    Value:Group B beta hemolytic strep not isolated  Neisseria gonorrhoeae not isolated  Gardnerella vaginalis not isolated      Organism 07/22/2022 Candida albicans (A)  Final    Genital Culture, Routine 07/22/2022 Moderate growth   Final    Meter Glucose 07/22/2022 178 (A) 74 - 99 mg/dL Final    Meter Glucose 07/22/2022 210 (A) 74 - 99 mg/dL Final    Meter Glucose 07/22/2022 178 (A) 74 - 99 mg/dL Final    Meter Glucose 07/22/2022 169 (A) 74 - 99 mg/dL Final    Meter Glucose 07/23/2022 101 (A) 74 - 99 mg/dL Final    Meter Glucose 07/23/2022 106 (A) 74 - 99 mg/dL Final    Meter Glucose 07/23/2022 125 (A) 74 - 99 mg/dL Final    Meter Glucose 07/23/2022 184 (A) 74 - 99 mg/dL Final    Meter Glucose 07/23/2022 112 (A) 74 - 99 mg/dL Final    Meter Glucose 07/24/2022 142 (A) 74 - 99 mg/dL Final    Meter Glucose 07/24/2022 144 (A) 74 - 99 mg/dL Final    Meter Glucose 07/24/2022 155 (A) 74 - 99 mg/dL Final    Meter Glucose 07/25/2022 112 (A) 74 - 99 mg/dL Final    Meter Glucose 07/25/2022 121 (A) 74 - 99 mg/dL Final    Meter Glucose 07/25/2022 153 (A) 74 - 99 mg/dL Final    Meter Glucose 07/25/2022 163 (A) 74 - 99 mg/dL Final    Meter Glucose 07/26/2022 92  74 - 99 mg/dL Final    Meter Glucose 07/26/2022 168 (A) 74 - 99 mg/dL Final    Meter Glucose 07/26/2022 129 (A) 74 - 99 mg/dL Final    Meter Glucose 07/26/2022 78  74 - 99 mg/dL Final    Meter Glucose 07/27/2022 89  74 - 99 mg/dL Final    Meter Glucose 07/27/2022 77  74 - 99 mg/dL Final    Meter Glucose 07/27/2022 122 (A) 74 - 99 mg/dL Final    Meter Glucose 07/27/2022 105 (A) 74 - 99 mg/dL Final    Meter Glucose 07/28/2022 93  74 - 99 mg/dL Final    Meter Glucose 07/28/2022 69 (A) 74 - 99 mg/dL Final    Meter Glucose 07/28/2022 134 (A) 74 - 99 mg/dL Final                    IMPRESSION:    1.  IUP at 25 weeks 4 days gestation based on her Estimated Date of Delivery: 11/6/22    2. Incompetent cervix, with the amniotic membranes extending to the external cervical os. 3.  Morbid obesity    4. Fully vaccinated for COVID-19 January 2022. 5.  Two previous first trimester pregnancy losses    6. Hypertension, taking Procardia XL 30 mg twice daily     7. O positive blood type    8. Taking 81 mg of aspirin daily    9. Status post cervical cerclage placement 6/16/2022  10. Cervical length was 3.5 mm 7/16/2022 using vaginal progesterone  11. Received antibiotics as indicated in Dr. Reanna Ortega note from 7/21/2022  12. Received Celestone on 7/14 and 7/15/2022  13. Received magnesium sulfate for fetal neuro protection completed 7/17/2022  14. Hyperglycemia prescribed insulin  15. Reassuring fetal heart rate tracing for gestational age. Occasional variables noted common at this gestational age. 16.  HbA1c 5.7% 7/15/2022  17. Elevated blood pressure at 2019 hrs. 7/25/2022  18.   Reassuring biophysical profile and cord Doppler testing 7/27/2022    PLAN:  The patient is currently admitted secondary to  dilation of the cervix with funneling of the amniotic membranes into the endocervical canal secondary to the immediate need for  intervention if the patient would deliver. The insulin glargine dose is to be continued at 22 units each evening. Her Humalog insulin dose will be continued at 6 units with each meal with the dose adjusted as necessary in order to attain euglycemia. The patient is to continue taking Procardia XL as prescribed for management of her hypertension. DVT prophylaxis should be utilized throughout her admission. Consultation was obtained with neonatology during this admission. Further evaluation and management will be dependent on the results of the patient's testing and her clinical presentation. If you have any questions regarding her management, please contact me at your convenience and thank you for allowing me to participate in her care.      Trish Murguia MD, MS, Ainsley Hollingsworth RDCS, RDMS, RVT  Director [address] [place] [address]  [phone]

## 2022-07-28 NOTE — PROGRESS NOTES
Dr. Sosa Avilez called and notified pt blood sugar 134 after lunch.  Orders received to give 6 units of humalog now and change insulin order from 10 units of humalog with meals to 6 units with meals

## 2022-07-28 NOTE — PROGRESS NOTES
Tracing from [id number]. Pt arnaldo, RN at bedside to adjust efm, pt states she will have it done later.  Taken off efm at this time per pt request.

## 2022-07-28 NOTE — PROGRESS NOTES
Dr. Gopal Cr called and notified pt blood sugar  69 and 10 units of humalog ordered at this time. Orders received to not give humalog and recheck sugar when pt is finished with lunch and notify of results.

## 2022-07-28 NOTE — PROGRESS NOTES
Assumed care of pt. Pt resting in bed comfortably at this time. Denies and LOF, VB or contractions. No complaints of HA, epigastric pain or visual changes. Call light within reach.

## 2022-07-29 ENCOUNTER — ANCILLARY PROCEDURE (OUTPATIENT)
Dept: OBGYN CLINIC | Age: 32
DRG: 547 | End: 2022-07-29
Payer: MEDICAID

## 2022-07-29 LAB
METER GLUCOSE: 110 MG/DL (ref 74–99)
METER GLUCOSE: 158 MG/DL (ref 74–99)
METER GLUCOSE: 176 MG/DL (ref 74–99)
METER GLUCOSE: 87 MG/DL (ref 74–99)

## 2022-07-29 PROCEDURE — 6370000000 HC RX 637 (ALT 250 FOR IP): Performed by: OBSTETRICS & GYNECOLOGY

## 2022-07-29 PROCEDURE — 1220000000 HC SEMI PRIVATE OB R&B

## 2022-07-29 PROCEDURE — 99232 SBSQ HOSP IP/OBS MODERATE 35: CPT | Performed by: OBSTETRICS & GYNECOLOGY

## 2022-07-29 PROCEDURE — 82962 GLUCOSE BLOOD TEST: CPT

## 2022-07-29 PROCEDURE — 76819 FETAL BIOPHYS PROFIL W/O NST: CPT | Performed by: OBSTETRICS & GYNECOLOGY

## 2022-07-29 RX ADMIN — NIFEDIPINE 30 MG: 30 TABLET, EXTENDED RELEASE ORAL at 21:04

## 2022-07-29 RX ADMIN — DOCUSATE SODIUM 100 MG: 100 CAPSULE, LIQUID FILLED ORAL at 09:27

## 2022-07-29 RX ADMIN — INSULIN LISPRO 6 UNITS: 100 INJECTION, SOLUTION INTRAVENOUS; SUBCUTANEOUS at 07:45

## 2022-07-29 RX ADMIN — DOCUSATE SODIUM 100 MG: 100 CAPSULE, LIQUID FILLED ORAL at 21:04

## 2022-07-29 RX ADMIN — CYANOCOBALAMIN TAB 1000 MCG 1000 MCG: 1000 TAB at 09:27

## 2022-07-29 RX ADMIN — FERROUS SULFATE TAB 325 MG (65 MG ELEMENTAL FE) 325 MG: 325 (65 FE) TAB at 16:59

## 2022-07-29 RX ADMIN — Medication 2000 UNITS: at 09:27

## 2022-07-29 RX ADMIN — FERROUS SULFATE TAB 325 MG (65 MG ELEMENTAL FE) 325 MG: 325 (65 FE) TAB at 07:46

## 2022-07-29 RX ADMIN — NIFEDIPINE 30 MG: 30 TABLET, EXTENDED RELEASE ORAL at 09:27

## 2022-07-29 RX ADMIN — INSULIN LISPRO 6 UNITS: 100 INJECTION, SOLUTION INTRAVENOUS; SUBCUTANEOUS at 17:17

## 2022-07-29 RX ADMIN — INSULIN GLARGINE 22 UNITS: 100 INJECTION, SOLUTION SUBCUTANEOUS at 21:02

## 2022-07-29 RX ADMIN — MICONAZOLE NITRATE 100 MG: 100 SUPPOSITORY VAGINAL at 21:04

## 2022-07-29 RX ADMIN — INSULIN LISPRO 6 UNITS: 100 INJECTION, SOLUTION INTRAVENOUS; SUBCUTANEOUS at 12:13

## 2022-07-29 RX ADMIN — ASPIRIN 81 MG CHEWABLE TABLET 81 MG: 81 TABLET CHEWABLE at 09:27

## 2022-07-29 NOTE — PROGRESS NOTES
MATERNAL FETAL MEDICINE PROGRESS NOTE    NAME: Doug Harada  MRN: [id number]            SERVICE DATE: 2022  SERVICE TIME: 11:13 AM  REQUESTING PROVIDER: Genevieve Gonzalez MD          Doug Harada is a 27 y.o. female,  at 25w5d with an NILESH of 2022, by Last Menstrual Period dating method. Patient is here with the following problems:  Principal Problem:    23 weeks gestation of pregnancy  Active Problems:    Incompetent cervix in pregnancy, antepartum, second trimester    Morbid obesity with body mass index of 50.0-59.9 in adult ([facility] [address]    Abnormal ultrasonic finding on  screening of mother    Anemia    Elevated hemoglobin A1c    Low ferritin    Low maternal serum vitamin B12    Low vitamin D level  Resolved Problems:    * No resolved hospital problems. *      SUBJECTIVE:  HISTORY OF THE PRESENT ILLNESS:  HISTORY REVIEW  Past Medical History:   Diagnosis Date    Anemia 2022    Hypertension      Past Surgical History:   Procedure Laterality Date    CERVICAL CERCLAGE N/A 2022    CERVIX CERCLAGE PLACEMENT performed by Felix Acevedo MD at [facility]    WISDOM TOOTH EXTRACTION       History reviewed. No pertinent family history.   Social History     Socioeconomic History    Marital status: Single     Spouse name: Not on file    Number of children: Not on file    Years of education: Not on file    Highest education level: Not on file   Occupational History    Not on file   Tobacco Use    Smoking status: Former    Smokeless tobacco: Never   Vaping Use    Vaping Use: Never used   Substance and Sexual Activity    Alcohol use: Not Currently     Comment: social    Drug use: No    Sexual activity: Not on file   Other Topics Concern    Not on file   Social History Narrative    Not on file     Social Determinants of Health     Financial Resource Strain: Not on file   Food Insecurity: Not on file   Transportation Needs: Not on file   Physical Activity: Not on file   Stress: Not on file Social Connections: Not on file   Intimate Partner Violence: Not on file   Housing Stability: Not on file     OB History    Para Term  AB Living   3 0 0 0 2 0   SAB IAB Ectopic Molar Multiple Live Births   2 0 0 0 0 0      # Outcome Date GA Lbr Skyler/2nd Weight Sex Delivery Anes PTL Lv   3 Current            2 SAB  6w0d          1 SAB  5w0d                ALLERGIES: Patient has no known allergies. CURRENT MEDS:   insulin lispro  6 Units SubCUTAneous TID WC    insulin glargine  22 Units SubCUTAneous Nightly    miconazole  100 mg Vaginal Nightly    vitamin D  2,000 Units Oral Daily    vitamin B-12  1,000 mcg Oral Daily    ferrous sulfate  325 mg Oral BID WC    docusate sodium  100 mg Oral BID    NIFEdipine  30 mg Oral Q12H    aspirin  81 mg Oral Daily      dextrose       glucose, dextrose bolus **OR** dextrose bolus, glucagon (rDNA), dextrose, acetaminophen    PRIOR TO ADMISSION MEDICATIONS:  Prior to Admission medications    Medication Sig Start Date End Date Taking?  Authorizing Provider   progesterone (PROMETRIUM) 200 MG CAPS capsule Place 1 capsule into the vagina at bedtime 7/15/22  Yes Auther Person, MD   indomethacin (INDOCIN) 50 MG capsule Take 50 mg by mouth 2 times daily (with meals)   Yes Historical Provider, MD   indomethacin (INDOCIN) 50 MG capsule Take 1 capsule by mouth every 8 hours for 6 doses 22  Stevie Pope MD   Blood Glucose Monitoring Suppl (ONE TOUCH ULTRA 2) w/Device KIT USE AS DIRECTED 22   Historical Provider, MD   ONETOUCH ULTRA strip TEST AS DIRECTED FOUR TIMES DAILY 22   Historical Provider, MD   Lancets ([address]) [facility] 22   Historical Provider, MD   Prenatal Vit-Fe Fumarate-FA (PRENATAL VITAMIN) 27-1 MG TABS tablet Take 1 tablet by mouth daily 3/15/22   Maria Isabel Kaminski MD   ibuprofen (IBU) 800 MG tablet Take 1 tablet by mouth every 8 hours as needed for Pain 7/7/20 3/27/21  [facility]ita, APRN - CNP       OBJECTIVE:    REVIEW OF SYSTEMS:    Fetal Movement good  Vaginal Bleeding denies  Contractions denies  LOF/ROM denies  S/Sx Pre-eclampsia denies    PHYSICAL EXAM    Well Developed well-nourished, -American female in no apparent distress  Lungs/Breathing regular and nonlabored  Heart regular rhythm and rate  Abdomen Gravid, soft, nontender, no rebound, no guarding, no contractions palpated  Extremities full range of motion in all        LAST VITALS:  BP (!) 96/51   Pulse 86   Temp 98.3 °F (36.8 °C) (Oral)   Resp 18   Ht 5' 7\" (1.702 m)   Wt (!) 359 lb (162.8 kg)   LMP 2022   SpO2 99%   BMI 56.23 kg/m²        PRENATAL LABS  Diagnostic tests reviewed for today's visit:   Recent Results (from the past 24 hour(s))   POCT Glucose    Collection Time: 22 11:54 AM   Result Value Ref Range    Meter Glucose 69 (L) 74 - 99 mg/dL   POCT Glucose    Collection Time: 22 12:41 PM   Result Value Ref Range    Meter Glucose 134 (H) 74 - 99 mg/dL   POCT Glucose    Collection Time: 22  5:26 PM   Result Value Ref Range    Meter Glucose 99 74 - 99 mg/dL   POCT Glucose    Collection Time: 22  8:42 PM   Result Value Ref Range    Meter Glucose 104 (H) 74 - 99 mg/dL   POCT Glucose    Collection Time: 22  7:40 AM   Result Value Ref Range    Meter Glucose 87 74 - 99 mg/dL   POCT Glucose    Collection Time: 22 10:07 AM   Result Value Ref Range    Meter Glucose 158 (H) 74 - 99 mg/dL         IMP:  Lelia Hernandez is a 27 y.o. female  at 25w10d with incompetent cervix with membranes prolapsing through the external os  She is very stable. Testing is reassuring. PLAN:  Continue current management. Expectant management. Testing as clinically indicated.       I spent 25 minutes of direct contact time with the patient of which greater than 50% of the time was used to  the patient, discuss complications and problems related to her pregnancy, or coordinating her care. I answered all of her questions to her satisfaction.     SIGNATURE: Swathi Pittman MD  DATE: July 29, 2022  TIME: 11:13 AM

## 2022-07-29 NOTE — PROGRESS NOTES
Progress Note    SUBJECTIVE:   Pt comfortable; +void; +flatus; +FM; denies leaking or bleeding    OBJECTIVE:    VITALS:  BP (!) 96/51   Pulse 86   Temp 98.3 °F (36.8 °C) (Oral)   Resp 18   Ht 5' 7\" (1.702 m)   Wt (!) 359 lb (162.8 kg)   LMP 2022   SpO2 99%   BMI 56.23 kg/m²   Physical Exam  ABDOMEN:  soft, nt, gravid      DATA:  Hemoglobin/Hematocrit:    Lab Results   Component Value Date/Time    HGB 11.8 07/15/2022 06:20 PM    HCT 35.6 07/15/2022 06:20 PM       ASSESSMENT AND PLAN:  Principal Problem:    23 weeks gestation of pregnancy  Active Problems:    Incompetent cervix in pregnancy, antepartum, second trimester    Morbid obesity with body mass index of 50.0-59.9 in adult (HCC)    Abnormal ultrasonic finding on  screening of mother    Anemia    Elevated hemoglobin A1c    Low ferritin    Low maternal serum vitamin B12    Low vitamin D level  Resolved Problems:    * No resolved hospital problems.  *  Continue to follow    Electronically signed by Amanda Noel DO on 2022 at 3:54 PM

## 2022-07-30 LAB
METER GLUCOSE: 100 MG/DL (ref 74–99)
METER GLUCOSE: 113 MG/DL (ref 74–99)
METER GLUCOSE: 144 MG/DL (ref 74–99)
METER GLUCOSE: 290 MG/DL (ref 74–99)
METER GLUCOSE: 83 MG/DL (ref 74–99)
METER GLUCOSE: 93 MG/DL (ref 74–99)

## 2022-07-30 PROCEDURE — 99232 SBSQ HOSP IP/OBS MODERATE 35: CPT | Performed by: OBSTETRICS & GYNECOLOGY

## 2022-07-30 PROCEDURE — 6370000000 HC RX 637 (ALT 250 FOR IP): Performed by: OBSTETRICS & GYNECOLOGY

## 2022-07-30 PROCEDURE — 1220000000 HC SEMI PRIVATE OB R&B

## 2022-07-30 PROCEDURE — 82962 GLUCOSE BLOOD TEST: CPT

## 2022-07-30 RX ADMIN — NIFEDIPINE 30 MG: 30 TABLET, EXTENDED RELEASE ORAL at 09:17

## 2022-07-30 RX ADMIN — INSULIN LISPRO 6 UNITS: 100 INJECTION, SOLUTION INTRAVENOUS; SUBCUTANEOUS at 12:36

## 2022-07-30 RX ADMIN — INSULIN LISPRO 6 UNITS: 100 INJECTION, SOLUTION INTRAVENOUS; SUBCUTANEOUS at 09:39

## 2022-07-30 RX ADMIN — NIFEDIPINE 30 MG: 30 TABLET, EXTENDED RELEASE ORAL at 21:23

## 2022-07-30 RX ADMIN — INSULIN GLARGINE 22 UNITS: 100 INJECTION, SOLUTION SUBCUTANEOUS at 21:24

## 2022-07-30 RX ADMIN — DOCUSATE SODIUM 100 MG: 100 CAPSULE, LIQUID FILLED ORAL at 21:23

## 2022-07-30 RX ADMIN — FERROUS SULFATE TAB 325 MG (65 MG ELEMENTAL FE) 325 MG: 325 (65 FE) TAB at 09:18

## 2022-07-30 RX ADMIN — INSULIN LISPRO 6 UNITS: 100 INJECTION, SOLUTION INTRAVENOUS; SUBCUTANEOUS at 18:34

## 2022-07-30 RX ADMIN — FERROUS SULFATE TAB 325 MG (65 MG ELEMENTAL FE) 325 MG: 325 (65 FE) TAB at 18:36

## 2022-07-30 RX ADMIN — DOCUSATE SODIUM 100 MG: 100 CAPSULE, LIQUID FILLED ORAL at 09:18

## 2022-07-30 RX ADMIN — Medication 2000 UNITS: at 09:18

## 2022-07-30 RX ADMIN — CYANOCOBALAMIN TAB 1000 MCG 1000 MCG: 1000 TAB at 09:18

## 2022-07-30 RX ADMIN — ASPIRIN 81 MG CHEWABLE TABLET 81 MG: 81 TABLET CHEWABLE at 09:17

## 2022-07-30 RX ADMIN — MICONAZOLE NITRATE 100 MG: 100 SUPPOSITORY VAGINAL at 21:23

## 2022-07-30 NOTE — PROGRESS NOTES
Dr. Camryn Zaldivar on unit. Notified of MFM making patient TID monitoring. RN continuously at bedside holding and adjusting US. Only able to obtain 20 minutes of continuous monitoring due to maternal habitus and fetal movement. MD aware. No new orders.

## 2022-07-30 NOTE — PROGRESS NOTES
Social Connections: Not on file   Intimate Partner Violence: Not on file   Housing Stability: Not on file     OB History    Para Term  AB Living   3 0 0 0 2 0   SAB IAB Ectopic Molar Multiple Live Births   2 0 0 0 0 0      # Outcome Date GA Lbr Skyler/2nd Weight Sex Delivery Anes PTL Lv   3 Current            2 2021 6w0d          1 2020 5w0d                ALLERGIES: Patient has no known allergies. CURRENT MEDS:   insulin lispro  6 Units SubCUTAneous TID WC    insulin glargine  22 Units SubCUTAneous Nightly    miconazole  100 mg Vaginal Nightly    vitamin D  2,000 Units Oral Daily    vitamin B-12  1,000 mcg Oral Daily    ferrous sulfate  325 mg Oral BID WC    docusate sodium  100 mg Oral BID    NIFEdipine  30 mg Oral Q12H    aspirin  81 mg Oral Daily      dextrose       glucose, dextrose bolus **OR** dextrose bolus, glucagon (rDNA), dextrose, acetaminophen    PRIOR TO ADMISSION MEDICATIONS:  Prior to Admission medications    Medication Sig Start Date End Date Taking?  Authorizing Provider   progesterone (PROMETRIUM) 200 MG CAPS capsule Place 1 capsule into the vagina at bedtime 7/15/22  Yes Андрей Quach MD   indomethacin (INDOCIN) 50 MG capsule Take 50 mg by mouth 2 times daily (with meals)   Yes Historical Provider, MD   indomethacin (INDOCIN) 50 MG capsule Take 1 capsule by mouth every 8 hours for 6 doses 22  Olaf Chadwick MD   Blood Glucose Monitoring Suppl (ONE TOUCH ULTRA 2) w/Device KIT USE AS DIRECTED 22   Historical Provider, MD   ONETOUCH ULTRA strip TEST AS DIRECTED FOUR TIMES DAILY 22   Historical Provider, MD   Lancets ([address]) [facility] 22   Historical Provider, MD   Prenatal Vit-Fe Fumarate-FA (PRENATAL VITAMIN) 27-1 MG TABS tablet Take 1 tablet by mouth daily 3/15/22   Enedina Contreras MD   ibuprofen (IBU) 800 MG tablet Take 1 tablet by mouth every 8 hours as needed for Pain 7/7/20 3/27/21  [facility] Tita, APRN - CNP       OBJECTIVE:    REVIEW OF SYSTEMS:    Fetal Movement normal  Vaginal Bleeding denies  Contractions denies  LOF/ROM denies  S/Sx Pre-eclampsia denies    PHYSICAL EXAM    Well Developed well-nourished -American female in no apparent distress  Lungs/Breathing regular unlabored  Heart regular rhythm and rate  Abdomen Gravid, soft, nontender, no rebound, no guarding, no contractions palpated  Extremities full range of motion in all        LAST VITALS:  /72   Pulse 94   Temp 97.7 °F (36.5 °C) (Oral)   Resp 18   Ht 5' 7\" (1.702 m)   Wt (!) 359 lb (162.8 kg)   LMP 2022   SpO2 99%   BMI 56.23 kg/m²        PRENATAL LABS  Diagnostic tests reviewed for today's visit:   Recent Results (from the past 24 hour(s))   POCT Glucose    Collection Time: 22  2:29 PM   Result Value Ref Range    Meter Glucose 176 (H) 74 - 99 mg/dL   POCT Glucose    Collection Time: 22  7:40 PM   Result Value Ref Range    Meter Glucose 110 (H) 74 - 99 mg/dL   POCT Glucose    Collection Time: 22  9:15 AM   Result Value Ref Range    Meter Glucose 100 (H) 74 - 99 mg/dL   POCT Glucose    Collection Time: 22 11:20 AM   Result Value Ref Range    Meter Glucose 93 74 - 99 mg/dL         IMP:  Pascale Duncan is a 27 y.o. female  at 23w6d with incompetent cervix and membranes protruding through the os  Fetal heart rate tracing is reassuring. She is currently stable. PLAN:  Continue current management. Ultrasounds as clinically indicated. I think it safe for the patient to go outside in a wheelchair 2 hours a day. Monitoring 1 hour a day 3 times a day. I spent 20 minutes of direct contact time with the patient of which greater than 50% of the time was used to  the patient, discuss complications and problems related to her pregnancy, or coordinating her care. I answered all of her questions to her satisfaction.     SIGNATURE: Gladys Tidwell MD  DATE:  2022  TIME: 11:46 AM

## 2022-07-30 NOTE — PROGRESS NOTES
Pt resting in chair at this time. 2 hour PP BS taken. Pt will call out for last monitoring of the day.  No complaints at this time

## 2022-07-30 NOTE — PROGRESS NOTES
Progress Note    SUBJECTIVE:   Pt comfortable; +void; +flatus; +FM; denies leaking or bleeding    OBJECTIVE:    VITALS:  /72   Pulse 94   Temp 97.7 °F (36.5 °C) (Oral)   Resp 18   Ht 5' 7\" (1.702 m)   Wt (!) 359 lb (162.8 kg)   LMP 2022   SpO2 99%   BMI 56.23 kg/m²   Physical Exam  ABDOMEN:  obese, soft, gravid  Ext nt    DATA:  Hemoglobin/Hematocrit:    Lab Results   Component Value Date/Time    HGB 11.8 07/15/2022 06:20 PM    HCT 35.6 07/15/2022 06:20 PM       ASSESSMENT AND PLAN:    Principal Problem:    23 weeks gestation of pregnancy  Active Problems:    Incompetent cervix in pregnancy, antepartum, second trimester    Morbid obesity with body mass index of 50.0-59.9 in adult ([address]    Abnormal ultrasonic finding on  screening of mother    Anemia    Elevated hemoglobin A1c    Low ferritin    Low maternal serum vitamin B12    Low vitamin D level  Resolved Problems:    * No resolved hospital problems.  *    Continue to follow    Electronically signed by Marci Diaz DO on 2022 at 2:34 PM

## 2022-07-30 NOTE — PROGRESS NOTES
Assumed care of patient. Pt resting comfortably in bed. Fasting BS completed. Pt denies LOF or VB at this time. Perceives positive fetal movement. Call light within reach.

## 2022-07-30 NOTE — PROGRESS NOTES
Assumed care of pt. Pt resting in bed, denies any pain,LOF or VB. Pt state +FM. VSS. All needs met at this time.

## 2022-07-31 LAB
METER GLUCOSE: 100 MG/DL (ref 74–99)
METER GLUCOSE: 112 MG/DL (ref 74–99)
METER GLUCOSE: 91 MG/DL (ref 74–99)
METER GLUCOSE: 93 MG/DL (ref 74–99)

## 2022-07-31 PROCEDURE — 6370000000 HC RX 637 (ALT 250 FOR IP): Performed by: OBSTETRICS & GYNECOLOGY

## 2022-07-31 PROCEDURE — 1220000000 HC SEMI PRIVATE OB R&B

## 2022-07-31 PROCEDURE — 99232 SBSQ HOSP IP/OBS MODERATE 35: CPT | Performed by: OBSTETRICS & GYNECOLOGY

## 2022-07-31 PROCEDURE — 82962 GLUCOSE BLOOD TEST: CPT

## 2022-07-31 PROCEDURE — 6370000000 HC RX 637 (ALT 250 FOR IP)

## 2022-07-31 RX ORDER — PRENATAL WITH FERROUS FUM AND FOLIC ACID 3080; 920; 120; 400; 22; 1.84; 3; 20; 10; 1; 12; 200; 27; 25; 2 [IU]/1; [IU]/1; MG/1; [IU]/1; MG/1; MG/1; MG/1; MG/1; MG/1; MG/1; UG/1; MG/1; MG/1; MG/1; MG/1
TABLET ORAL
Status: COMPLETED
Start: 2022-07-31 | End: 2022-07-31

## 2022-07-31 RX ORDER — PRENATAL WITH FERROUS FUM AND FOLIC ACID 3080; 920; 120; 400; 22; 1.84; 3; 20; 10; 1; 12; 200; 27; 25; 2 [IU]/1; [IU]/1; MG/1; [IU]/1; MG/1; MG/1; MG/1; MG/1; MG/1; MG/1; UG/1; MG/1; MG/1; MG/1; MG/1
1 TABLET ORAL DAILY
Status: DISCONTINUED | OUTPATIENT
Start: 2022-07-31 | End: 2022-08-01 | Stop reason: HOSPADM

## 2022-07-31 RX ADMIN — METFORMIN HYDROCHLORIDE 1 TABLET: 500 TABLET, EXTENDED RELEASE ORAL at 10:13

## 2022-07-31 RX ADMIN — INSULIN LISPRO 6 UNITS: 100 INJECTION, SOLUTION INTRAVENOUS; SUBCUTANEOUS at 14:22

## 2022-07-31 RX ADMIN — FERROUS SULFATE TAB 325 MG (65 MG ELEMENTAL FE) 325 MG: 325 (65 FE) TAB at 10:06

## 2022-07-31 RX ADMIN — CYANOCOBALAMIN TAB 1000 MCG 1000 MCG: 1000 TAB at 10:06

## 2022-07-31 RX ADMIN — NIFEDIPINE 30 MG: 30 TABLET, EXTENDED RELEASE ORAL at 10:06

## 2022-07-31 RX ADMIN — ASPIRIN 81 MG CHEWABLE TABLET 81 MG: 81 TABLET CHEWABLE at 10:06

## 2022-07-31 RX ADMIN — INSULIN LISPRO 6 UNITS: 100 INJECTION, SOLUTION INTRAVENOUS; SUBCUTANEOUS at 11:16

## 2022-07-31 RX ADMIN — DOCUSATE SODIUM 100 MG: 100 CAPSULE, LIQUID FILLED ORAL at 21:56

## 2022-07-31 RX ADMIN — INSULIN GLARGINE 22 UNITS: 100 INJECTION, SOLUTION SUBCUTANEOUS at 21:56

## 2022-07-31 RX ADMIN — FERROUS SULFATE TAB 325 MG (65 MG ELEMENTAL FE) 325 MG: 325 (65 FE) TAB at 18:08

## 2022-07-31 RX ADMIN — NIFEDIPINE 30 MG: 30 TABLET, EXTENDED RELEASE ORAL at 21:56

## 2022-07-31 RX ADMIN — Medication 2000 UNITS: at 10:06

## 2022-07-31 RX ADMIN — DOCUSATE SODIUM 100 MG: 100 CAPSULE, LIQUID FILLED ORAL at 10:06

## 2022-07-31 NOTE — PROGRESS NOTES
MATERNAL FETAL MEDICINE PROGRESS NOTE    NAME: Vick Winter  MRN: [id number]            SERVICE DATE: 2022  SERVICE TIME: 11:08 AM  REQUESTING PROVIDER: Quinton Valdivia MD          Vick Winter is a 27 y.o. female,  at 34w0d with an NILESH of 2022, by Last Menstrual Period dating method. Patient is here with the following problems:  Principal Problem:    23 weeks gestation of pregnancy  Active Problems:    Incompetent cervix in pregnancy, antepartum, second trimester    Morbid obesity with body mass index of 50.0-59.9 in adult ([facility] [address]    Abnormal ultrasonic finding on  screening of mother    Anemia    Elevated hemoglobin A1c    Low ferritin    Low maternal serum vitamin B12    Low vitamin D level  Resolved Problems:    * No resolved hospital problems. *      SUBJECTIVE:  HISTORY OF THE PRESENT ILLNESS:  HISTORY REVIEW  Past Medical History:   Diagnosis Date    Anemia 2022    Hypertension      Past Surgical History:   Procedure Laterality Date    CERVICAL CERCLAGE N/A 2022    CERVIX CERCLAGE PLACEMENT performed by Shun Burnette MD at [facility] OR    WISDOM TOOTH EXTRACTION       History reviewed. No pertinent family history.   Social History     Socioeconomic History    Marital status: Single     Spouse name: Not on file    Number of children: Not on file    Years of education: Not on file    Highest education level: Not on file   Occupational History    Not on file   Tobacco Use    Smoking status: Former    Smokeless tobacco: Never   Vaping Use    Vaping Use: Never used   Substance and Sexual Activity    Alcohol use: Not Currently     Comment: social    Drug use: No    Sexual activity: Not on file   Other Topics Concern    Not on file   Social History Narrative    Not on file     Social Determinants of Health     Financial Resource Strain: Not on file   Food Insecurity: Not on file   Transportation Needs: Not on file   Physical Activity: Not on file   Stress: Not on file Social Connections: Not on file   Intimate Partner Violence: Not on file   Housing Stability: Not on file     OB History    Para Term  AB Living   3 0 0 0 2 0   SAB IAB Ectopic Molar Multiple Live Births   2 0 0 0 0 0      # Outcome Date GA Lbr Skyler/2nd Weight Sex Delivery Anes PTL Lv   3 Current            2 SAB  6w0d          1 SAB  5w0d                ALLERGIES: Patient has no known allergies. CURRENT MEDS:   prenatal vitamin  1 tablet Oral Daily    insulin lispro  6 Units SubCUTAneous TID     insulin glargine  22 Units SubCUTAneous Nightly    miconazole  100 mg Vaginal Nightly    vitamin D  2,000 Units Oral Daily    vitamin B-12  1,000 mcg Oral Daily    ferrous sulfate  325 mg Oral BID WC    docusate sodium  100 mg Oral BID    NIFEdipine  30 mg Oral Q12H    aspirin  81 mg Oral Daily      dextrose       glucose, dextrose bolus **OR** dextrose bolus, glucagon (rDNA), dextrose, acetaminophen    PRIOR TO ADMISSION MEDICATIONS:  Prior to Admission medications    Medication Sig Start Date End Date Taking?  Authorizing Provider   progesterone (PROMETRIUM) 200 MG CAPS capsule Place 1 capsule into the vagina at bedtime 7/15/22  Yes Alexei Emery MD   indomethacin (INDOCIN) 50 MG capsule Take 50 mg by mouth 2 times daily (with meals)   Yes Historical Provider, MD   indomethacin (INDOCIN) 50 MG capsule Take 1 capsule by mouth every 8 hours for 6 doses 22  Katya Fritz MD   Blood Glucose Monitoring Suppl (ONE TOUCH ULTRA 2) w/Device KIT USE AS DIRECTED 22   Historical Provider, MD   ONETOUCH ULTRA strip TEST AS DIRECTED FOUR TIMES DAILY 22   Historical Provider, MD   Lancets ([address] [place]) [address] [facility] 22   Historical Provider, MD   Prenatal Vit-Fe Fumarate-FA (PRENATAL VITAMIN) 27-1 MG TABS tablet Take 1 tablet by mouth daily 3/15/22   Constantino Cameron MD   ibuprofen (IBU) 800 MG tablet Take 1 tablet by mouth every 8 hours as heart tones to once a day for an hour as there is nothing obstetrical going on at this point in time  Follow-up would be otherwise as indicated      I spent 20 minutes of direct contact time with the patient of which greater than 50% of the time was used to  the patient, discuss complications and problems related to her pregnancy, or coordinating her care. I answered all of her questions to her satisfaction.     SIGNATURE: Raina Barba MD  DATE: July 31, 2022  TIME: 11:08 AM

## 2022-08-01 ENCOUNTER — ANCILLARY PROCEDURE (OUTPATIENT)
Dept: OBGYN CLINIC | Age: 32
DRG: 547 | End: 2022-08-01
Payer: MEDICAID

## 2022-08-01 ENCOUNTER — TELEPHONE (OUTPATIENT)
Dept: OBGYN | Age: 32
End: 2022-08-01

## 2022-08-01 VITALS
OXYGEN SATURATION: 100 % | BODY MASS INDEX: 45.99 KG/M2 | HEIGHT: 67 IN | HEART RATE: 106 BPM | DIASTOLIC BLOOD PRESSURE: 79 MMHG | WEIGHT: 293 LBS | SYSTOLIC BLOOD PRESSURE: 130 MMHG | RESPIRATION RATE: 18 BRPM | TEMPERATURE: 98.4 F

## 2022-08-01 LAB
METER GLUCOSE: 106 MG/DL (ref 74–99)
METER GLUCOSE: 94 MG/DL (ref 74–99)

## 2022-08-01 PROCEDURE — 99232 SBSQ HOSP IP/OBS MODERATE 35: CPT | Performed by: OBSTETRICS & GYNECOLOGY

## 2022-08-01 PROCEDURE — 82962 GLUCOSE BLOOD TEST: CPT

## 2022-08-01 PROCEDURE — 76819 FETAL BIOPHYS PROFIL W/O NST: CPT | Performed by: OBSTETRICS & GYNECOLOGY

## 2022-08-01 PROCEDURE — 6370000000 HC RX 637 (ALT 250 FOR IP): Performed by: OBSTETRICS & GYNECOLOGY

## 2022-08-01 RX ORDER — NIFEDIPINE 30 MG/1
30 TABLET, FILM COATED, EXTENDED RELEASE ORAL EVERY 12 HOURS
Qty: 30 TABLET | Refills: 3 | Status: SHIPPED | OUTPATIENT
Start: 2022-08-01 | End: 2022-08-15 | Stop reason: SDUPTHER

## 2022-08-01 RX ORDER — BLOOD-GLUCOSE METER
1 KIT MISCELLANEOUS 4 TIMES DAILY
Qty: 1 KIT | Refills: 0 | Status: SHIPPED | OUTPATIENT
Start: 2022-08-01

## 2022-08-01 RX ORDER — FERROUS SULFATE 325(65) MG
325 TABLET ORAL 2 TIMES DAILY WITH MEALS
Qty: 30 TABLET | Refills: 3 | Status: ON HOLD | OUTPATIENT
Start: 2022-08-01 | End: 2022-10-08 | Stop reason: HOSPADM

## 2022-08-01 RX ORDER — INSULIN LISPRO 100 [IU]/ML
6 INJECTION, SOLUTION INTRAVENOUS; SUBCUTANEOUS
Qty: 1 EACH | Refills: 1 | Status: SHIPPED | OUTPATIENT
Start: 2022-08-01 | End: 2022-08-19 | Stop reason: SDUPTHER

## 2022-08-01 RX ORDER — ASPIRIN 81 MG/1
81 TABLET, CHEWABLE ORAL DAILY
Qty: 30 TABLET | Refills: 3 | Status: SHIPPED | OUTPATIENT
Start: 2022-08-02

## 2022-08-01 RX ORDER — PSEUDOEPHEDRINE HCL 30 MG
100 TABLET ORAL 2 TIMES DAILY
Qty: 30 CAPSULE | Refills: 1 | Status: ON HOLD | OUTPATIENT
Start: 2022-08-01 | End: 2022-10-08 | Stop reason: HOSPADM

## 2022-08-01 RX ORDER — INSULIN GLARGINE 100 [IU]/ML
22 INJECTION, SOLUTION SUBCUTANEOUS NIGHTLY
Qty: 10 ML | Refills: 3 | Status: ON HOLD | OUTPATIENT
Start: 2022-08-01 | End: 2022-10-08 | Stop reason: HOSPADM

## 2022-08-01 RX ADMIN — INSULIN LISPRO 6 UNITS: 100 INJECTION, SOLUTION INTRAVENOUS; SUBCUTANEOUS at 12:30

## 2022-08-01 RX ADMIN — METFORMIN HYDROCHLORIDE 1 TABLET: 500 TABLET, EXTENDED RELEASE ORAL at 08:07

## 2022-08-01 RX ADMIN — INSULIN LISPRO 6 UNITS: 100 INJECTION, SOLUTION INTRAVENOUS; SUBCUTANEOUS at 08:01

## 2022-08-01 RX ADMIN — FERROUS SULFATE TAB 325 MG (65 MG ELEMENTAL FE) 325 MG: 325 (65 FE) TAB at 08:07

## 2022-08-01 RX ADMIN — DOCUSATE SODIUM 100 MG: 100 CAPSULE, LIQUID FILLED ORAL at 08:07

## 2022-08-01 RX ADMIN — Medication 2000 UNITS: at 08:07

## 2022-08-01 RX ADMIN — CYANOCOBALAMIN TAB 1000 MCG 1000 MCG: 1000 TAB at 08:07

## 2022-08-01 RX ADMIN — ASPIRIN 81 MG CHEWABLE TABLET 81 MG: 81 TABLET CHEWABLE at 08:07

## 2022-08-01 NOTE — PROGRESS NOTES
Assumed patient care. Patient resting comfortably in bed at this time. Patient denies any vb or lof. States +fm. Patient rates pain 0/10. Patient reports having a late lunch at approximately 1500, therefore did not think she would be eating dinner. Patient instructed to call out is she did want to eat dinner. Fasting .

## 2022-08-01 NOTE — PROGRESS NOTES
Social Connections: Not on file   Intimate Partner Violence: Not on file   Housing Stability: Not on file     OB History    Para Term  AB Living   3 0 0 0 2 0   SAB IAB Ectopic Molar Multiple Live Births   2 0 0 0 0 0      # Outcome Date GA Lbr Skyler/2nd Weight Sex Delivery Anes PTL Lv   3 Current            2 SAB  6w0d          1 SAB  5w0d                ALLERGIES: Patient has no known allergies. CURRENT MEDS:   prenatal vitamin  1 tablet Oral Daily    insulin lispro  6 Units SubCUTAneous TID WC    insulin glargine  22 Units SubCUTAneous Nightly    miconazole  100 mg Vaginal Nightly    vitamin D  2,000 Units Oral Daily    vitamin B-12  1,000 mcg Oral Daily    ferrous sulfate  325 mg Oral BID WC    docusate sodium  100 mg Oral BID    NIFEdipine  30 mg Oral Q12H    aspirin  81 mg Oral Daily      dextrose       glucose, dextrose bolus **OR** dextrose bolus, glucagon (rDNA), dextrose, acetaminophen    PRIOR TO ADMISSION MEDICATIONS:  Prior to Admission medications    Medication Sig Start Date End Date Taking?  Authorizing Provider   progesterone (PROMETRIUM) 200 MG CAPS capsule Place 1 capsule into the vagina at bedtime 7/15/22  Yes Harley Rock MD   indomethacin (INDOCIN) 50 MG capsule Take 50 mg by mouth 2 times daily (with meals)   Yes Historical Provider, MD   indomethacin (INDOCIN) 50 MG capsule Take 1 capsule by mouth every 8 hours for 6 doses 22  Ab Paul MD   Blood Glucose Monitoring Suppl (ONE TOUCH ULTRA 2) w/Device KIT USE AS DIRECTED 22   Historical Provider, MD   ONETOUCH ULTRA strip TEST AS DIRECTED FOUR TIMES DAILY 22   Historical Provider, MD   Lancets ([address]) [address] [facility] 22   Historical Provider, MD   Prenatal Vit-Fe Fumarate-FA (PRENATAL VITAMIN) 27-1 MG TABS tablet Take 1 tablet by mouth daily 3/15/22   Vince Cooper MD   ibuprofen (IBU) 800 MG tablet Take 1 tablet by mouth every 8 hours as needed for Pain 7/7/20 3/27/21  Burt Yu APRN - CNP       OBJECTIVE:    REVIEW OF SYSTEMS:    Fetal Movement normal  Vaginal Bleeding denies  Contractions denies  LOF/ROM denies  S/Sx Pre-eclampsia denies    PHYSICAL EXAM    Well Developed well-nourished -American female in no apparent distress  Lungs/Breathing regular unlabored  Heart regular rhythm and rate  Abdomen Gravid, soft, nontender, no rebound, no guarding, no contractions palpated  Extremities full range of motion in all        LAST VITALS:  /87   Pulse 96   Temp 98 °F (36.7 °C) (Oral)   Resp 16   Ht 5' 7\" (1.702 m)   Wt (!) 359 lb (162.8 kg)   LMP 2022   SpO2 100%   BMI 56.23 kg/m²        PRENATAL LABS  Diagnostic tests reviewed for today's visit:   Recent Results (from the past 24 hour(s))   POCT Glucose    Collection Time: 22  1:19 PM   Result Value Ref Range    Meter Glucose 91 74 - 99 mg/dL   POCT Glucose    Collection Time: 22  4:28 PM   Result Value Ref Range    Meter Glucose 112 (H) 74 - 99 mg/dL   POCT Glucose    Collection Time: 22  7:45 PM   Result Value Ref Range    Meter Glucose 100 (H) 74 - 99 mg/dL         IMP:  Mark Arriola is a 27 y.o. female  at 26w1d with incompetent cervix with membranes protruding through the os who is very stable  We are currently not providing any medical interventions that can be done at home. Biophysical profile is reassuring today. Needs diabetic teaching. PLAN:  Will discharged home with weekly follow-up. Will obtain diabetic teaching prior to discharge      I spent 25 minutes of direct contact time with the patient of which greater than 50% of the time was used to  the patient, discuss complications and problems related to her pregnancy, or coordinating her care. I answered all of her questions to her satisfaction.     SIGNATURE: Maryann Velez MD  DATE: 2022  TIME: 9:55 AM

## 2022-08-01 NOTE — PROGRESS NOTES
Reason for consult: newly diagnosed GDM-insulin requiring, possible discharge home    A1C: 5/7%     [] Not available                     Patient states the following concerns/barriers to diabetes self-management:     [x] None       [] Medication cost   [] Food cost/availability        [] Reading  [] Hearing   [] Vision                [] Work    [] Transportation  [] No insurance  [] Physical limitations    [] Other:        Patient states the following about their diabetes/health:   [x]  Has never had GDM or taken insulin before  [x]  Feels comfortable with self injecting insulin upon discharge  [x]  Willing to meet with diabetes educator as outpatient to review carb counting in depth and develop meal plan for optimal BG control        Diabetes survival packet provided to:   [x] Patient     [] Other:    Information reviewed:   Definition of diabetes   Target glucose ranges/A1C   Self-monitoring of blood glucose   Prevention/symptoms/treatment of hypo-/hyperglycemia   Medication adherence   The plate method/meal planning guidelines   The benefits of exercise and recommendations   Reducing the risk of chronic complications      Diabetes medications reviewed (use, purpose, action): Lantus and Humalog use purpose and action discussed. Patient was shown how to use insulin pen and demonstrated proper use. Patient will call diabetes education with questions. Patient aware that doctor will select dose and patient is responsible for monitoring BG and bringing them to her appointments so the doctor can adjust medications as needed.             Post-education Assessment  [x]  Attentive to teaching  [x]  Answered questions appropriately when asked   [x]  Seems able to apply concepts to daily lifestyle  [x]  Seems motivated to do well  [x]  Verbalized an understanding of the plate method for portion control   [x]  Verbalized an understanding of prescribed antidiabetes medications   [x]  Verbalized an understanding of target glucose ranges/A1C level  [x]  Expresses an intent to comply with treatment plan   []  Showed very little interest in complying with treatment plan   []  Seems to have trouble applying concepts to daily lifestyle       COMMENTS:  RN notified of diabetes education consult completion, insulin instruction, and recommendation for outpatient diabetes referral to be placed so patient can receive nutrition counseling on GDM meal plan. Recommendations:   [x] Carbohydrate-controlled diet    [] Script for glucometer and supplies (per preference of patient's insurance)  [x] Script for insulin pens and pen needles (if insulin is ordered at discharge for home use)   [] Consult to social work: patient has no insurance or has financial hardship  [] Inpatient consult to endocrinologist   [] Follow up with endocrinologist as an outpatient   [] Home healthcare nursing to increase compliance to treatment plan   [x] Script for outpatient diabetes education classes (from doctor)        Thank you for this consult.          Abraham Romero MS, RD, LD

## 2022-08-02 NOTE — TELEPHONE ENCOUNTER
Call from patient, some of the meds that were ordered haven't been received by [facility]. I double checked the orders, [facility] I was not able to get through to the pharmacist and had to leave a voice message. I called the patient back and offered to print scripts but she states she can't come until tomorrow.   Awaiting return call from [employer] pharmacist

## 2022-08-08 ENCOUNTER — ROUTINE PRENATAL (OUTPATIENT)
Dept: OBGYN CLINIC | Age: 32
End: 2022-08-08
Payer: MEDICAID

## 2022-08-08 ENCOUNTER — ANCILLARY PROCEDURE (OUTPATIENT)
Dept: OBGYN CLINIC | Age: 32
End: 2022-08-08
Payer: MEDICAID

## 2022-08-08 VITALS
SYSTOLIC BLOOD PRESSURE: 137 MMHG | HEART RATE: 95 BPM | DIASTOLIC BLOOD PRESSURE: 83 MMHG | WEIGHT: 293 LBS | BODY MASS INDEX: 45.99 KG/M2 | HEIGHT: 67 IN

## 2022-08-08 DIAGNOSIS — O34.32 INCOMPETENT CERVIX IN PREGNANCY, ANTEPARTUM, SECOND TRIMESTER: ICD-10-CM

## 2022-08-08 DIAGNOSIS — E66.01 MORBID OBESITY WITH BODY MASS INDEX OF 50.0-59.9 IN ADULT (HCC): ICD-10-CM

## 2022-08-08 DIAGNOSIS — R73.09 ELEVATED HEMOGLOBIN A1C: ICD-10-CM

## 2022-08-08 DIAGNOSIS — O10.019 BENIGN ESSENTIAL HYPERTENSION, ANTEPARTUM: Primary | ICD-10-CM

## 2022-08-08 DIAGNOSIS — O99.210 MATERNAL MORBID OBESITY, ANTEPARTUM (HCC): ICD-10-CM

## 2022-08-08 DIAGNOSIS — E66.01 MATERNAL MORBID OBESITY, ANTEPARTUM (HCC): ICD-10-CM

## 2022-08-08 LAB
GLUCOSE URINE, POC: NORMAL
PROTEIN UA: NEGATIVE

## 2022-08-08 PROCEDURE — 1111F DSCHRG MED/CURRENT MED MERGE: CPT | Performed by: OBSTETRICS & GYNECOLOGY

## 2022-08-08 PROCEDURE — G8427 DOCREV CUR MEDS BY ELIG CLIN: HCPCS | Performed by: OBSTETRICS & GYNECOLOGY

## 2022-08-08 PROCEDURE — 99213 OFFICE O/P EST LOW 20 MIN: CPT | Performed by: OBSTETRICS & GYNECOLOGY

## 2022-08-08 PROCEDURE — 81002 URINALYSIS NONAUTO W/O SCOPE: CPT | Performed by: OBSTETRICS & GYNECOLOGY

## 2022-08-08 PROCEDURE — 76817 TRANSVAGINAL US OBSTETRIC: CPT | Performed by: OBSTETRICS & GYNECOLOGY

## 2022-08-08 PROCEDURE — 76820 UMBILICAL ARTERY ECHO: CPT | Performed by: OBSTETRICS & GYNECOLOGY

## 2022-08-08 PROCEDURE — 76819 FETAL BIOPHYS PROFIL W/O NST: CPT | Performed by: OBSTETRICS & GYNECOLOGY

## 2022-08-08 PROCEDURE — 99214 OFFICE O/P EST MOD 30 MIN: CPT | Performed by: OBSTETRICS & GYNECOLOGY

## 2022-08-08 PROCEDURE — 1036F TOBACCO NON-USER: CPT | Performed by: OBSTETRICS & GYNECOLOGY

## 2022-08-08 PROCEDURE — G8417 CALC BMI ABV UP PARAM F/U: HCPCS | Performed by: OBSTETRICS & GYNECOLOGY

## 2022-08-08 PROCEDURE — 76805 OB US >/= 14 WKS SNGL FETUS: CPT | Performed by: OBSTETRICS & GYNECOLOGY

## 2022-08-08 NOTE — PROGRESS NOTES
22    Heladio Brown MD  [address] [facility]     RE:  Kurt John  : 1990   AGE: 27 y.o. This report has been created using voice recognition software. It may contain errors which are inherent in voice recognition technology. Dear Dr. Abigail Arriola:    Hitesh Londono had an appointment today for the following indications:    Patient Active Problem List   Diagnosis    Maternal morbid obesity, antepartum ([address]    Incompetent cervix in pregnancy, antepartum, with cervical cerclage. Benign essential hypertension, antepartum    Morbid obesity with body mass index of 50.0-59.9 in adult (HCC)    Anemia    Elevated hemoglobin A1c    Low ferritin    Low maternal serum vitamin B12    Low vitamin D level     Hitesh Londono is a 27 y.o. female, who is G3(0,0,2,0). She has an Estimated Date of Delivery: 22 based on her previous ultrasound assessment. She is currently 27 weeks 1 days gestation based on that assessment. The patient was seen today for follow-up assessment following discharge from the hospital after admission for shortening of the cervix with funneling of the amniotic membranes. She denied any vaginal bleeding or leaking of amniotic fluid. She is having no uterine contraction activity. The cervix was noted to be open with the amniotic membranes funneling into the cervical cerclage on 2022. The patient has hypertension. She currently takes no antihypertensive medication. Her initial blood pressure today was 144/85. This normalized to 137/83 after she rested. She had no albuminuria. She had no symptoms of hypertension. I discussed the [place] screen with the patient today. I advised her that this a screening test, not a diagnostic test. I advised her that the test screens only for trisomy 21, 25, 15, and sex chromosome aneuploidy.  We discussed the sensitivity of the test which I advised the patient is as follows:      99.99% for detection of trisomy 24 with a specificity of 99.99%     99.99% for trisomy 25 with a specificity of 99.99%     99.99% for trisomy 15 with a specificity of 99.99%     99.99% for triploidy with a specificity of 99.99%    >99.9% for fetal sex determination    89% of XXX, XXY and XYY    She understands that the [place] testing does not replace diagnostic genetic testing. She understands the limitations of this testing, including, but not limited to, not detecting partial fetal karyotype abnormalities, and in some cases, limited information regarding unbalanced translocations. The test is also limited in that the results may not reflect chromosomes of the fetus due to confined placental mosaicism or chromosomal changes in the mother. The test is validated for single and twin pregnancies with a gestational age of at least 9 weeks. Chromosomal mosaicism cannot be distinguished, and in some cases, not detected by this method. A negative test does not eliminate the possibility of fetal chromosome abnormalities in regions tested or and other areas of the genome. The tests cannot rule out other genetic diseases or birth defects, including open neural tube defects. The patient had her NIPT drawn on 6/14/2022. The results are not yet available for review. A fetal ultrasound evaluation was performed and a detailed report included in the EMR under the imaging tab. A living ziegler intrauterine fetus was identified in the breech presentation, with normal fetal heart motion and normal fetal motion noted. The amniotic fluid index was 19.5 cm. The placenta was anterior. The biometric measurements were consistent with an estimated fetal weight at the 58th growth percentile for gestational age. Visualization of the fetal anatomy was limited secondary to poor acoustic transmission to the patient's anterior abdominal wall in the fetal position. The biophysical profile and cord Doppler studies were both reassuring. There was no evidence of absence, or reversal of end-diastolic flow. GENETIC SCREENING/TERATOLOGY COUNSELING                  (Includes patient, FTB, and any affected family members)    Patient Age > 35 Years NO   Thalassemia ( MVC<80) NO   Congential Heart Defect NO   Neural Tube Defect NO   Cisco-Sachs NO   Sickle Cell Disease NO   Sickle Cell Trait NO   Sickle C Disease or Trait NO   Hemophilia NO   Muscular Dystrophy NO   Cystic Fibrosis NO   [place] Disease NO   Autism NO   Mental Retardation NO   History of Fragile X NO   Maternal Diabetes NO   Other Genetic Disease or Syndrome NO   Previous Child With Congenital Abnormality Not Listed NO   Recreational Drugs NO                                        INFECTION HISTORY     HEPATITIS IMMUNIZED:  YES   HEPATITIS INFECTION:  NO   EXPOSURE TO TB NO   PARVOVIRUS B-19 NO   CHICKEN POX  YES   MEASLES NO   HIV NO   OTHER RASH OR VIRAL ILLNESS SINCE LMP NO   UTI RECURRENT NO   HPV NO     OB History    Para Term  AB Living   3 0 0 0 2 0   SAB IAB Ectopic Molar Multiple Live Births   2 0 0 0 0 0      # Outcome Date GA Lbr Skyler/2nd Weight Sex Delivery Anes PTL Lv   3 Current            2 2021 6w0d          1 2020 5w0d            PAST GYNECOLOGICAL  HISTORY:  Negative for abnormal pap smears. Negative for sexually transmitted diseases. Negative for cervical LEEP / conization /cryosurgery. Negative for uterine surgery. Negative for ovarian or tubal surgery.      Past Medical History:   Diagnosis Date    Anemia 2022    Hypertension        Past Surgical History:   Procedure Laterality Date    CERVICAL CERCLAGE N/A 2022    CERVIX CERCLAGE PLACEMENT performed by Olaf Chadwick MD at [facility] OR    WISDOM TOOTH EXTRACTION         No Known Allergies      Current Outpatient Medications:     aspirin 81 MG chewable tablet, Take 1 tablet by mouth in the morning., Disp: 30 tablet, Rfl: 3    glucose 4 g chewable tablet, Take 4 tablets by mouth as needed for Low blood sugar, Disp: 60 tablet, Rfl: 3    insulin glargine (LANTUS) 100 UNIT/ML injection vial, Inject 22 Units into the skin nightly, Disp: 10 mL, Rfl: 3    insulin lispro (HUMALOG) 100 UNIT/ML SOLN injection vial, Inject 6 Units into the skin in the morning and 6 Units at noon and 6 Units in the evening. Inject with meals. , Disp: 1 each, Rfl: 1    NIFEdipine (ADALAT CC) 30 MG extended release tablet, Take 1 tablet by mouth in the morning and 1 tablet in the evening., Disp: 30 tablet, Rfl: 3    glucagon, rDNA, 1 MG injection, Inject 1 mg into the muscle as needed for Low blood sugar (Blood glucose less than 70 mg/dL and patient NOT ALERT or NPO and does not have IV access.), Disp: 1 each, Rfl: 1    ferrous sulfate (IRON 325) 325 (65 Fe) MG tablet, Take 1 tablet by mouth in the morning and 1 tablet in the evening. Take with meals. , Disp: 30 tablet, Rfl: 3    vitamin B-12 1000 MCG tablet, Take 1 tablet by mouth in the morning., Disp: 30 tablet, Rfl: 3    docusate sodium (COLACE, DULCOLAX) 100 MG CAPS, Take 100 mg by mouth in the morning and 100 mg before bedtime. , Disp: 30 capsule, Rfl: 1    glucose monitoring (FREESTYLE FREEDOM) kit, 1 kit by Does not apply route 4 times daily, Disp: 1 kit, Rfl: 0    progesterone (PROMETRIUM) 200 MG CAPS capsule, Place 1 capsule into the vagina at bedtime, Disp: 30 capsule, Rfl: 3    Blood Glucose Monitoring Suppl (ONE TOUCH ULTRA 2) w/Device KIT, USE AS DIRECTED, Disp: , Rfl:     ONETOUCH ULTRA strip, TEST AS DIRECTED FOUR TIMES DAILY, Disp: , Rfl:     Lancets (ONETOUCH DELICA PLUS [id number]) MISC, TEST FOUR TIMES DAILY, Disp: , Rfl:     Prenatal Vit-Fe Fumarate-FA (PRENATAL VITAMIN) 27-1 MG TABS tablet, Take 1 tablet by mouth daily, Disp: 30 tablet, Rfl: 11    Social History     Tobacco Use    Smoking status: Former    Smokeless tobacco: Never   Substance Use Topics    Alcohol use: Not Currently     Comment: social     FAMILY MEDICAL HISTORY: Negative for congenital abnormalities, autism, genetic disease and mental retardation, not listed above. Review of Systems :   CONSTITUTIONAL : No fever, no chills   HEENT : No headache, no visual changes, no rhinorrhea, no sore throat   CARDIOVASCULAR : No pain, no palpitations, no edema   RESPIRATORY : No pain, no shortness of breath   GASTROINTESTINAL : No N/V, no D/C, no abdominal pain   GENITOURINARY : No dysuria, hematuria and no incontinence   MUSCULOSKELETAL : No myalgia, No back pain  NEUROLOGICAL : No numbness, no tingling, no tremors. No history of seizures  ALL OTHER SYSTEMS WERE REPORTED AS NEGATIVE. PERTINENT PHYSICAL EXAMINATION:   BP (!) 144/85   Pulse (!) 108   Ht 5' 7\" (1.702 m)   Wt (!) 355 lb (161 kg)   LMP 01/30/2022   BMI 55.60 kg/m²   Urine dipstick:   Negative for Glucose    Negative for Albumin    GENERAL:   The patient is a well developed, female who is alert cooperative and oriented times three in no acute distress. HEENT:  Normo cephalic and atraumatic. No facial edema. ABDOMEN:   Her uterus is gravid. She had no complaint of abdominal pain or tenderness. The fetal heart rate is 157 bpm. The fetus is in the cephalic presentation which was confirmed by the ultrasound assessment. EXTREMITIES:  No peripheral edema is noted. PELVIC EXAMINATION:  Transvaginal ultrasound assessment of the cervix was performed. The amniotic membranes extending through the cervical cerclage. IMPRESSION:    1.  IUP at 27 weeks 1 days gestation based on her Estimated Date of Delivery: 11/6/22    2. Incompetent cervix, with the amniotic membranes extending through the cervical cerclage 8/8/2022    3. Morbid obesity    4. Fully vaccinated for COVID-19 January 2022. 5.  Two previous first trimester pregnancy losses    6. MSAFP 0.87 MoM, (drawn 6/14/2022)    7. Limited visualization of the fetal anatomy    8.   History of essential hypertension, currently not requiring medication for management    9. O positive blood type  10. Breech presentation 8/8/2022  11. Completed Celestone course on 7/16/2022  12. Completed magnesium sulfate course on 7/16/2022    PLAN:  The patient is to remain sexually abstinent through the balance of her pregnancy. I advised her to immediately come to the hospital if she had any uterine contractions, rupture of the amniotic membranes, vaginal bleeding, abdominal pain or tenderness, or any new symptomatology she was concerned about. The patient is to continue to follow with you in your office for ongoing prenatal care. The patient is scheduled to return to our office for follow-up assessment in 1 week unless she has a clinical indication to return prior to that time. Magnesium sulfate should be administered if the patient appears likely to deliver prior to 32 weeks gestational age. A rescue dose of Celestone should be administered if the patient appears likely to deliver within 7 days of the administration of Celestone. Further evaluation and management will be dependent on the results of the patient's testing and her clinical presentation. The total time in minutes spent with the patient, reviewing medical records, reviewing imaging studies, performing ultrasonic imaging, reviewing laboratory testing, and documenting information was 31 minutes, of which, 50% of the time was spent in patient education, counseling, and coordinating care with the patient, her provider, and/or her family. Available paper and electronic medical records were reviewed. I reviewed with the patient the results of the fetal ultrasound evaluation today including the finding of an incompetent cervix with the amniotic membranes funneling through the cervical cerclage suture. I discussed with her limitations in her physical and sexual activity recommended after placement of the cervical cerclage.   I stressed to her the importance of coming immediately to the hospital if she had any vaginal bleeding, abdominal pain or tenderness, leaking amniotic fluid, or any new symptomatology she was concerned about. I answered all of her questions to her satisfaction. I asked her to call if she had any additional questions prior to her next visit. If you have any questions regarding her management, please contact me at your convenience and thank you for allowing me to participate in her care.     Sincerely,        René Cross MD, MS, Joslyn Bueno RDCS, RDMS, RVT  Director [address] [place] [address]  [phone]

## 2022-08-15 ENCOUNTER — ROUTINE PRENATAL (OUTPATIENT)
Dept: OBGYN CLINIC | Age: 32
End: 2022-08-15
Payer: MEDICAID

## 2022-08-15 ENCOUNTER — ANCILLARY PROCEDURE (OUTPATIENT)
Dept: OBGYN CLINIC | Age: 32
End: 2022-08-15
Payer: MEDICAID

## 2022-08-15 VITALS
BODY MASS INDEX: 55.29 KG/M2 | WEIGHT: 293 LBS | SYSTOLIC BLOOD PRESSURE: 130 MMHG | DIASTOLIC BLOOD PRESSURE: 89 MMHG | HEART RATE: 109 BPM

## 2022-08-15 DIAGNOSIS — E66.01 MATERNAL MORBID OBESITY, ANTEPARTUM (HCC): ICD-10-CM

## 2022-08-15 DIAGNOSIS — O10.019 BENIGN ESSENTIAL HYPERTENSION, ANTEPARTUM: ICD-10-CM

## 2022-08-15 DIAGNOSIS — E66.01 MORBID OBESITY WITH BODY MASS INDEX OF 50.0-59.9 IN ADULT (HCC): ICD-10-CM

## 2022-08-15 DIAGNOSIS — Z3A.23 23 WEEKS GESTATION OF PREGNANCY: ICD-10-CM

## 2022-08-15 DIAGNOSIS — O99.210 MATERNAL MORBID OBESITY, ANTEPARTUM (HCC): ICD-10-CM

## 2022-08-15 DIAGNOSIS — O34.32 INCOMPETENT CERVIX IN PREGNANCY, ANTEPARTUM, SECOND TRIMESTER: Primary | ICD-10-CM

## 2022-08-15 DIAGNOSIS — O24.414 INSULIN CONTROLLED GESTATIONAL DIABETES MELLITUS (GDM) IN THIRD TRIMESTER: ICD-10-CM

## 2022-08-15 LAB
GLUCOSE URINE, POC: NEGATIVE
PROTEIN UA: NEGATIVE

## 2022-08-15 PROCEDURE — 76818 FETAL BIOPHYS PROFILE W/NST: CPT | Performed by: OBSTETRICS & GYNECOLOGY

## 2022-08-15 PROCEDURE — 99213 OFFICE O/P EST LOW 20 MIN: CPT | Performed by: OBSTETRICS & GYNECOLOGY

## 2022-08-15 PROCEDURE — G8427 DOCREV CUR MEDS BY ELIG CLIN: HCPCS | Performed by: OBSTETRICS & GYNECOLOGY

## 2022-08-15 PROCEDURE — 1036F TOBACCO NON-USER: CPT | Performed by: OBSTETRICS & GYNECOLOGY

## 2022-08-15 PROCEDURE — 76817 TRANSVAGINAL US OBSTETRIC: CPT | Performed by: OBSTETRICS & GYNECOLOGY

## 2022-08-15 PROCEDURE — 1111F DSCHRG MED/CURRENT MED MERGE: CPT | Performed by: OBSTETRICS & GYNECOLOGY

## 2022-08-15 PROCEDURE — G8417 CALC BMI ABV UP PARAM F/U: HCPCS | Performed by: OBSTETRICS & GYNECOLOGY

## 2022-08-15 PROCEDURE — 81002 URINALYSIS NONAUTO W/O SCOPE: CPT | Performed by: OBSTETRICS & GYNECOLOGY

## 2022-08-15 PROCEDURE — 99214 OFFICE O/P EST MOD 30 MIN: CPT | Performed by: OBSTETRICS & GYNECOLOGY

## 2022-08-15 PROCEDURE — 76820 UMBILICAL ARTERY ECHO: CPT | Performed by: OBSTETRICS & GYNECOLOGY

## 2022-08-15 RX ORDER — NIFEDIPINE 30 MG/1
30 TABLET, FILM COATED, EXTENDED RELEASE ORAL EVERY 12 HOURS
Qty: 30 TABLET | Refills: 3 | Status: ON HOLD
Start: 2022-08-15 | End: 2022-10-08 | Stop reason: HOSPADM

## 2022-08-15 NOTE — PROGRESS NOTES
8/15/22    Leonides Mixon MD  [address] [facility]     RE:  Jhony Osullivan  : 1990   AGE: 27 y.o. This report has been created using voice recognition software. It may contain errors which are inherent in voice recognition technology. Dear Dr. Tessa Miller:    Katlyn Wharton had an appointment today for the following indications:    Patient Active Problem List   Diagnosis    Maternal morbid obesity, antepartum ([address]    Incompetent cervix in pregnancy, antepartum, with cervical cerclage. Benign essential hypertension, antepartum    Morbid obesity with body mass index of 50.0-59.9 in adult (HCC)    Anemia    Elevated hemoglobin A1c    Low ferritin    Low maternal serum vitamin B12    Low vitamin D level     Katlyn Wharton is a 27 y.o. female, who is G3(0,0,2,0). She has an Estimated Date of Delivery: 22 based on her previous ultrasound assessment. She is currently 28 weeks 2 days gestation based on that assessment. The patient was seen today for follow-up assessment following discharge from the hospital after admission for shortening of the cervix with funneling of the amniotic membranes. She denied any vaginal bleeding or leaking of amniotic fluid. She is having no uterine contraction activity. The cervix was noted to be open with the amniotic membranes funneling into the cervical cerclage on 8/15/2022. The patient has hypertension. She currently takes Procardia for management of her hypertension. Her blood pressure today was 130/89. She had no symptoms related to hypertension. I discussed the [place] screen with the patient today. I advised her that this a screening test, not a diagnostic test. I advised her that the test screens only for trisomy 21, 25, 15, and sex chromosome aneuploidy.  We discussed the sensitivity of the test which I advised the patient is as follows:      99.99% for detection of trisomy 21 with a specificity of 99.99% 99.99% for trisomy 25 with a specificity of 99.99%     99.99% for trisomy 15 with a specificity of 99.99%     99.99% for triploidy with a specificity of 99.99%    >99.9% for fetal sex determination    89% of XXX, XXY and XYY    She understands that the [place] testing does not replace diagnostic genetic testing. She understands the limitations of this testing, including, but not limited to, not detecting partial fetal karyotype abnormalities, and in some cases, limited information regarding unbalanced translocations. The test is also limited in that the results may not reflect chromosomes of the fetus due to confined placental mosaicism or chromosomal changes in the mother. The test is validated for single and twin pregnancies with a gestational age of at least 9 weeks. Chromosomal mosaicism cannot be distinguished, and in some cases, not detected by this method. A negative test does not eliminate the possibility of fetal chromosome abnormalities in regions tested or and other areas of the genome. The tests cannot rule out other genetic diseases or birth defects, including open neural tube defects. The patient had her NIPT drawn on 6/14/2022. The results are not yet available for review. The nonstress test performed today was reactive for the patient's gestational age. Moderate variability was present. An occasional variable deceleration was noted. No uterine contraction activity was recorded. A fetal ultrasound evaluation was performed and a detailed report included in the EMR under the imaging tab. A living ziegler intrauterine fetus was identified in the cephalic presentation, with normal fetal heart motion and normal fetal motion noted. The amniotic fluid index was 13.3 cm. The placenta was anterior. The biophysical profile and cord Doppler studies were both reassuring. There was no evidence of absence, or reversal of end-diastolic flow.                      GENETIC SCREENING/TERATOLOGY COUNSELING                  (Includes patient, FTB, and any affected family members)    Patient Age > 35 Years NO   Thalassemia ( MVC<80) NO   Congential Heart Defect NO   Neural Tube Defect NO   Cisco-Sachs NO   Sickle Cell Disease NO   Sickle Cell Trait NO   Sickle C Disease or Trait NO   Hemophilia NO   Muscular Dystrophy NO   Cystic Fibrosis NO   [place] Disease NO   Autism NO   Mental Retardation NO   History of Fragile X NO   Maternal Diabetes NO   Other Genetic Disease or Syndrome NO   Previous Child With Congenital Abnormality Not Listed NO   Recreational Drugs NO                                        INFECTION HISTORY     HEPATITIS IMMUNIZED:  YES   HEPATITIS INFECTION:  NO   EXPOSURE TO TB NO   PARVOVIRUS B-19 NO   CHICKEN POX  YES   MEASLES NO   HIV NO   OTHER RASH OR VIRAL ILLNESS SINCE LMP NO   UTI RECURRENT NO   HPV NO     OB History    Para Term  AB Living   3 0 0 0 2 0   SAB IAB Ectopic Molar Multiple Live Births   2 0 0 0 0 0      # Outcome Date GA Lbr Skyler/2nd Weight Sex Delivery Anes PTL Lv   3 Current            2 2021 6w0d          1 2020 5w0d            PAST GYNECOLOGICAL  HISTORY:  Negative for abnormal pap smears. Negative for sexually transmitted diseases. Negative for cervical LEEP / conization /cryosurgery. Negative for uterine surgery. Negative for ovarian or tubal surgery.      Past Medical History:   Diagnosis Date    Anemia 2022    Hypertension        Past Surgical History:   Procedure Laterality Date    CERVICAL CERCLAGE N/A 2022    CERVIX CERCLAGE PLACEMENT performed by Satinder Jones MD at [facility]    WISDOM TOOTH EXTRACTION         No Known Allergies      Current Outpatient Medications:     NIFEdipine (ADALAT CC) 30 MG extended release tablet, Take 1 tablet by mouth in the morning and 1 tablet in the evening., Disp: 30 tablet, Rfl: 3    aspirin 81 MG chewable tablet, Take 1 tablet by mouth in the morning., Disp: 30 tablet, Rfl: 3 glucose 4 g chewable tablet, Take 4 tablets by mouth as needed for Low blood sugar, Disp: 60 tablet, Rfl: 3    insulin glargine (LANTUS) 100 UNIT/ML injection vial, Inject 22 Units into the skin nightly, Disp: 10 mL, Rfl: 3    insulin lispro (HUMALOG) 100 UNIT/ML SOLN injection vial, Inject 6 Units into the skin in the morning and 6 Units at noon and 6 Units in the evening. Inject with meals. , Disp: 1 each, Rfl: 1    glucagon, rDNA, 1 MG injection, Inject 1 mg into the muscle as needed for Low blood sugar (Blood glucose less than 70 mg/dL and patient NOT ALERT or NPO and does not have IV access.), Disp: 1 each, Rfl: 1    ferrous sulfate (IRON 325) 325 (65 Fe) MG tablet, Take 1 tablet by mouth in the morning and 1 tablet in the evening. Take with meals. , Disp: 30 tablet, Rfl: 3    vitamin B-12 1000 MCG tablet, Take 1 tablet by mouth in the morning., Disp: 30 tablet, Rfl: 3    docusate sodium (COLACE, DULCOLAX) 100 MG CAPS, Take 100 mg by mouth in the morning and 100 mg before bedtime. , Disp: 30 capsule, Rfl: 1    glucose monitoring (FREESTYLE FREEDOM) kit, 1 kit by Does not apply route 4 times daily, Disp: 1 kit, Rfl: 0    progesterone (PROMETRIUM) 200 MG CAPS capsule, Place 1 capsule into the vagina at bedtime, Disp: 30 capsule, Rfl: 3    ONETOUCH ULTRA strip, TEST AS DIRECTED FOUR TIMES DAILY, Disp: , Rfl:     Lancets (ONETOUCH DELICA PLUS [id number]) MISC, TEST FOUR TIMES DAILY, Disp: , Rfl:     Prenatal Vit-Fe Fumarate-FA (PRENATAL VITAMIN) 27-1 MG TABS tablet, Take 1 tablet by mouth daily, Disp: 30 tablet, Rfl: 11    Blood Glucose Monitoring Suppl (ONE TOUCH ULTRA 2) w/Device KIT, USE AS DIRECTED, Disp: , Rfl:     Social History     Tobacco Use    Smoking status: Former    Smokeless tobacco: Never   Substance Use Topics    Alcohol use: Not Currently     Comment: social     FAMILY MEDICAL HISTORY:   Negative for congenital abnormalities, autism, genetic disease and mental retardation, not listed above.      Review of Systems :   CONSTITUTIONAL : No fever, no chills   HEENT : No headache, no visual changes, no rhinorrhea, no sore throat   CARDIOVASCULAR : No pain, no palpitations, no edema   RESPIRATORY : No pain, no shortness of breath   GASTROINTESTINAL : No N/V, no D/C, no abdominal pain   GENITOURINARY : No dysuria, hematuria and no incontinence   MUSCULOSKELETAL : No myalgia, No back pain  NEUROLOGICAL : No numbness, no tingling, no tremors. No history of seizures  ALL OTHER SYSTEMS WERE REPORTED AS NEGATIVE. PERTINENT PHYSICAL EXAMINATION:   /89   Pulse (!) 109   Wt (!) 353 lb (160.1 kg)   LMP 01/30/2022   BMI 55.29 kg/m²   Urine dipstick:   Negative for Glucose    Negative for Albumin    GENERAL:   The patient is a well developed, female who is alert cooperative and oriented times three in no acute distress. HEENT:  Normo cephalic and atraumatic. No facial edema. ABDOMEN:   Her uterus is gravid. She had no complaint of abdominal pain or tenderness. The fetal heart rate is 159 bpm. The fetus is in the cephalic presentation which was confirmed by the ultrasound assessment. EXTREMITIES:  No peripheral edema is noted. PELVIC EXAMINATION:  Transvaginal ultrasound assessment of the cervix was performed. The amniotic membranes extending through the cervical cerclage. IMPRESSION:    1.  IUP at 28 weeks 2 days gestation based on her Estimated Date of Delivery: 11/6/22    2. Incompetent cervix, with the amniotic membranes extending through the cervical cerclage 8/8/2022    3. Morbid obesity    4. Fully vaccinated for COVID-19 January 2022. 5.  Two previous first trimester pregnancy losses    6. MSAFP 0.87 MoM, (drawn 6/14/2022)    7. Limited visualization of the fetal anatomy    8. Essential hypertension taking Procardia    9. O positive blood type  10. Cephalic presentation 3/08/1950  11. Completed Celestone course on 7/16/2022  12.   Completed magnesium sulfate course on 2022  13. Reassuring BPP and cord Doppler rtesting 8/15/2022    PLAN:  The patient is to remain sexually abstinent through the balance of her pregnancy. I advised her to immediately come to the hospital if she had any uterine contractions, rupture of the amniotic membranes, vaginal bleeding, abdominal pain or tenderness, or any new symptomatology she was concerned about. I would recommend that the patient count fetal movements and call if she notices any subjective decrease in fetal movements, particularly if there are less than 10 major movements in an hour. Non-stress testing should be performed every 3 to 4 days through the balance of the pregnancy. Serial ultrasounds to assess fetal anatomy and growth should be performed. The patient is at increase risk for  morbidity and mortality secondary to her history. Weekly BPP and cord Doppler testing should be performed, unless there is a clinical indication to perform the testing more frequently. The patient was advised to call if she has any increased vaginal discharge, vaginal bleeding, contractions, abdominal pain, back pain or any new significant symptomatology prior to her next visit. I advised her that these are signs and symptoms of cervical change and require follow-up assessment when they occur. The patient is to continue to follow with you in your office for ongoing prenatal care. The patient is scheduled to return to our office for follow-up assessment in 4 days, unless she has a clinical indication to return prior to that time. Magnesium sulfate should be administered if the patient appears likely to deliver prior to 32 weeks gestational age. A rescue dose of Celestone should be administered if the patient appears likely to deliver within 7 days of the administration of Celestone. Further evaluation and management will be dependent on the results of the patient's testing and her clinical presentation.     The total time in minutes spent with the patient, reviewing medical records, reviewing imaging studies, performing ultrasonic imaging, reviewing laboratory testing, and documenting information was 31 minutes, of which, 50% of the time was spent in patient education, counseling, and coordinating care with the patient, her provider, and/or her family. Available paper and electronic medical records were reviewed. I reviewed with the patient the results of the fetal ultrasound evaluation today including the finding of an incompetent cervix with the amniotic membranes funneling through the cervical cerclage suture. I discussed with her limitations in her physical and sexual activity recommended after placement of the cervical cerclage. I stressed to her the importance of coming immediately to the hospital if she had any vaginal bleeding, abdominal pain or tenderness, leaking amniotic fluid, or any new symptomatology she was concerned about. I answered all of her questions to her satisfaction. I asked her to call if she had any additional questions prior to her next visit. If you have any questions regarding her management, please contact me at your convenience and thank you for allowing me to participate in her care.     Sincerely,        Andres Rodriguez MD, MS, Veronica Arciniega, DAX, RDMS, RVT  Director [address] [place] [address]  [phone]

## 2022-08-19 ENCOUNTER — ROUTINE PRENATAL (OUTPATIENT)
Dept: OBGYN CLINIC | Age: 32
End: 2022-08-19
Payer: MEDICAID

## 2022-08-19 VITALS
HEART RATE: 94 BPM | WEIGHT: 293 LBS | BODY MASS INDEX: 45.99 KG/M2 | HEIGHT: 67 IN | SYSTOLIC BLOOD PRESSURE: 130 MMHG | DIASTOLIC BLOOD PRESSURE: 84 MMHG

## 2022-08-19 DIAGNOSIS — R79.89 LOW VITAMIN D LEVEL: ICD-10-CM

## 2022-08-19 DIAGNOSIS — R73.09 ELEVATED HEMOGLOBIN A1C: ICD-10-CM

## 2022-08-19 DIAGNOSIS — O99.210 MATERNAL MORBID OBESITY, ANTEPARTUM (HCC): ICD-10-CM

## 2022-08-19 DIAGNOSIS — E66.01 MATERNAL MORBID OBESITY, ANTEPARTUM (HCC): ICD-10-CM

## 2022-08-19 DIAGNOSIS — O10.019 BENIGN ESSENTIAL HYPERTENSION, ANTEPARTUM: Primary | ICD-10-CM

## 2022-08-19 DIAGNOSIS — O24.414 INSULIN CONTROLLED GESTATIONAL DIABETES MELLITUS (GDM) IN THIRD TRIMESTER: ICD-10-CM

## 2022-08-19 DIAGNOSIS — E66.01 MORBID OBESITY WITH BODY MASS INDEX OF 50.0-59.9 IN ADULT (HCC): ICD-10-CM

## 2022-08-19 DIAGNOSIS — O34.32 INCOMPETENT CERVIX IN PREGNANCY, ANTEPARTUM, SECOND TRIMESTER: ICD-10-CM

## 2022-08-19 DIAGNOSIS — R79.0 LOW FERRITIN: ICD-10-CM

## 2022-08-19 DIAGNOSIS — O28.0 LOW MATERNAL SERUM VITAMIN B12: ICD-10-CM

## 2022-08-19 LAB
GLUCOSE URINE, POC: NORMAL
PROTEIN UA: NEGATIVE

## 2022-08-19 PROCEDURE — 81002 URINALYSIS NONAUTO W/O SCOPE: CPT | Performed by: OBSTETRICS & GYNECOLOGY

## 2022-08-19 PROCEDURE — 59025 FETAL NON-STRESS TEST: CPT | Performed by: OBSTETRICS & GYNECOLOGY

## 2022-08-19 PROCEDURE — 99213 OFFICE O/P EST LOW 20 MIN: CPT | Performed by: OBSTETRICS & GYNECOLOGY

## 2022-08-19 PROCEDURE — 99999 PR OFFICE/OUTPT VISIT,PROCEDURE ONLY: CPT | Performed by: OBSTETRICS & GYNECOLOGY

## 2022-08-19 RX ORDER — INSULIN LISPRO 100 [IU]/ML
6 INJECTION, SOLUTION INTRAVENOUS; SUBCUTANEOUS
Qty: 1 EACH | Refills: 1 | Status: ON HOLD
Start: 2022-08-19 | End: 2022-10-08 | Stop reason: HOSPADM

## 2022-08-19 RX ORDER — PRENATAL WITH FERROUS FUM AND FOLIC ACID 3080; 920; 120; 400; 22; 1.84; 3; 20; 10; 1; 12; 200; 27; 25; 2 [IU]/1; [IU]/1; MG/1; [IU]/1; MG/1; MG/1; MG/1; MG/1; MG/1; MG/1; UG/1; MG/1; MG/1; MG/1; MG/1
1 TABLET ORAL DAILY
Qty: 30 TABLET | Refills: 11 | Status: SHIPPED | OUTPATIENT
Start: 2022-08-19

## 2022-08-19 NOTE — PROGRESS NOTES
hospital if she had any uterine contractions, rupture of the amniotic membranes, vaginal bleeding, abdominal pain or tenderness, or any new symptomatology she was concerned about. I would recommend that the patient count fetal movements and call if she notices any subjective decrease in fetal movements, particularly if there are less than 10 major movements in an hour. Non-stress testing should be performed every 3 to 4 days through the balance of the pregnancy. Serial ultrasounds to assess fetal anatomy and growth should be performed. The patient is at increase risk for  morbidity and mortality secondary to her history. Weekly BPP and cord Doppler testing should be performed, unless there is a clinical indication to perform the testing more frequently. The patient was advised to call if she has any increased vaginal discharge, vaginal bleeding, contractions, abdominal pain, back pain or any new significant symptomatology prior to her next visit. I advised her that these are signs and symptoms of cervical change and require follow-up assessment when they occur. The patient is to continue to follow with you in your office for ongoing prenatal care. The patient is scheduled to return to our office for follow-up assessment in 3 days, unless she has a clinical indication to return prior to that time. Magnesium sulfate should be administered if the patient appears likely to deliver prior to 32 weeks gestational age. A rescue dose of Celestone should be administered if the patient appears likely to deliver within 7 days of the administration of Celestone. Further evaluation and management will be dependent on the results of the patient's testing and her clinical presentation. If you have any questions regarding her management, please contact me at your convenience and thank you for allowing me to participate in her care.     Sincerely,        Chapis Chao MD, MS, FACOG, FACS, RDCS, SABINE, RVT  Director [address] [place] [address]  [phone]

## 2022-08-19 NOTE — PROGRESS NOTES
Patient is here today for NST only. Denies any vaginal bleeding, or leakage of fluids. Mild cramping on and off. Patient reports good fetal movement. High Risk (score 12 or above)

## 2022-08-22 ENCOUNTER — HOSPITAL ENCOUNTER (OUTPATIENT)
Age: 32
Discharge: HOME | DRG: 566 | End: 2022-08-22
Attending: OBSTETRICS & GYNECOLOGY | Admitting: OBSTETRICS & GYNECOLOGY
Payer: MEDICAID

## 2022-08-22 ENCOUNTER — ANCILLARY PROCEDURE (OUTPATIENT)
Dept: OBGYN CLINIC | Age: 32
DRG: 566 | End: 2022-08-22
Payer: MEDICAID

## 2022-08-22 ENCOUNTER — ROUTINE PRENATAL (OUTPATIENT)
Dept: OBGYN CLINIC | Age: 32
DRG: 566 | End: 2022-08-22
Payer: MEDICAID

## 2022-08-22 VITALS
HEART RATE: 84 BPM | SYSTOLIC BLOOD PRESSURE: 127 MMHG | HEIGHT: 67 IN | DIASTOLIC BLOOD PRESSURE: 87 MMHG | BODY MASS INDEX: 45.99 KG/M2 | WEIGHT: 293 LBS

## 2022-08-22 DIAGNOSIS — O34.32 INCOMPETENT CERVIX IN PREGNANCY, ANTEPARTUM, SECOND TRIMESTER: ICD-10-CM

## 2022-08-22 DIAGNOSIS — E66.01 MATERNAL MORBID OBESITY, ANTEPARTUM (HCC): ICD-10-CM

## 2022-08-22 DIAGNOSIS — R79.0 LOW FERRITIN: ICD-10-CM

## 2022-08-22 DIAGNOSIS — O28.3 ABNORMAL ULTRASONIC FINDING ON ANTENATAL SCREENING OF MOTHER: ICD-10-CM

## 2022-08-22 DIAGNOSIS — O24.414 INSULIN CONTROLLED GESTATIONAL DIABETES MELLITUS (GDM) IN THIRD TRIMESTER: ICD-10-CM

## 2022-08-22 DIAGNOSIS — O99.210 MATERNAL MORBID OBESITY, ANTEPARTUM (HCC): ICD-10-CM

## 2022-08-22 DIAGNOSIS — E66.01 MORBID OBESITY WITH BODY MASS INDEX OF 50.0-59.9 IN ADULT (HCC): ICD-10-CM

## 2022-08-22 DIAGNOSIS — R79.89 LOW VITAMIN D LEVEL: ICD-10-CM

## 2022-08-22 DIAGNOSIS — R73.09 ELEVATED HEMOGLOBIN A1C: ICD-10-CM

## 2022-08-22 DIAGNOSIS — O10.019 BENIGN ESSENTIAL HYPERTENSION, ANTEPARTUM: Primary | ICD-10-CM

## 2022-08-22 DIAGNOSIS — O28.0 LOW MATERNAL SERUM VITAMIN B12: ICD-10-CM

## 2022-08-22 PROBLEM — Z3A.29 29 WEEKS GESTATION OF PREGNANCY: Status: ACTIVE | Noted: 2022-08-22

## 2022-08-22 LAB
GLUCOSE URINE, POC: NORMAL
PROTEIN UA: NEGATIVE

## 2022-08-22 PROCEDURE — 1111F DSCHRG MED/CURRENT MED MERGE: CPT | Performed by: OBSTETRICS & GYNECOLOGY

## 2022-08-22 PROCEDURE — 99211 OFF/OP EST MAY X REQ PHY/QHP: CPT

## 2022-08-22 PROCEDURE — G8427 DOCREV CUR MEDS BY ELIG CLIN: HCPCS | Performed by: OBSTETRICS & GYNECOLOGY

## 2022-08-22 PROCEDURE — 6360000002 HC RX W HCPCS

## 2022-08-22 PROCEDURE — 81002 URINALYSIS NONAUTO W/O SCOPE: CPT | Performed by: OBSTETRICS & GYNECOLOGY

## 2022-08-22 PROCEDURE — 76818 FETAL BIOPHYS PROFILE W/NST: CPT | Performed by: OBSTETRICS & GYNECOLOGY

## 2022-08-22 PROCEDURE — 76817 TRANSVAGINAL US OBSTETRIC: CPT | Performed by: OBSTETRICS & GYNECOLOGY

## 2022-08-22 PROCEDURE — 99213 OFFICE O/P EST LOW 20 MIN: CPT | Performed by: OBSTETRICS & GYNECOLOGY

## 2022-08-22 PROCEDURE — 96372 THER/PROPH/DIAG INJ SC/IM: CPT

## 2022-08-22 PROCEDURE — G8417 CALC BMI ABV UP PARAM F/U: HCPCS | Performed by: OBSTETRICS & GYNECOLOGY

## 2022-08-22 PROCEDURE — 76820 UMBILICAL ARTERY ECHO: CPT | Performed by: OBSTETRICS & GYNECOLOGY

## 2022-08-22 PROCEDURE — 1036F TOBACCO NON-USER: CPT | Performed by: OBSTETRICS & GYNECOLOGY

## 2022-08-22 RX ORDER — BETAMETHASONE SODIUM PHOSPHATE AND BETAMETHASONE ACETATE 3; 3 MG/ML; MG/ML
12 INJECTION, SUSPENSION INTRA-ARTICULAR; INTRALESIONAL; INTRAMUSCULAR; SOFT TISSUE ONCE
Status: COMPLETED | OUTPATIENT
Start: 2022-08-22 | End: 2022-08-22

## 2022-08-22 RX ORDER — BETAMETHASONE SODIUM PHOSPHATE AND BETAMETHASONE ACETATE 3; 3 MG/ML; MG/ML
INJECTION, SUSPENSION INTRA-ARTICULAR; INTRALESIONAL; INTRAMUSCULAR; SOFT TISSUE
Status: COMPLETED
Start: 2022-08-22 | End: 2022-08-22

## 2022-08-22 RX ADMIN — BETAMETHASONE SODIUM PHOSPHATE AND BETAMETHASONE ACETATE 12 MG: 3; 3 INJECTION, SUSPENSION INTRA-ARTICULAR; INTRALESIONAL; INTRAMUSCULAR at 16:39

## 2022-08-22 RX ADMIN — BETAMETHASONE SODIUM PHOSPHATE AND BETAMETHASONE ACETATE 12 MG: 3; 3 INJECTION, SUSPENSION INTRA-ARTICULAR; INTRALESIONAL; INTRAMUSCULAR; SOFT TISSUE at 16:39

## 2022-08-22 NOTE — PROGRESS NOTES
22    Loi Raines MD  [address] [facility]     RE:  Nikhil Mackey  : 1990   AGE: 27 y.o. This report has been created using voice recognition software. It may contain errors which are inherent in voice recognition technology. Dear Dr. Jeffries Car:    Mian Meade had an appointment today for the following indications:    Patient Active Problem List   Diagnosis    Maternal morbid obesity, antepartum ([address]    Incompetent cervix in pregnancy, antepartum, with cervical cerclage. Benign essential hypertension, antepartum    Morbid obesity with body mass index of 50.0-59.9 in adult (HCC)    Anemia    Elevated hemoglobin A1c    Low ferritin    Low maternal serum vitamin B12    Low vitamin D level     Mian Meade is a 27 y.o. female, who is G3(0,0,2,0). She has an Estimated Date of Delivery: 22 based on her previous ultrasound assessment. She is currently 29 weeks 1 days gestation based on that assessment. The patient was seen today for follow-up assessment for incompetent cervix with shortening of the cervix with funneling of the amniotic membranes through the cervical cerclage. She denied any vaginal bleeding or leaking of amniotic fluid. She is having no uterine contraction activity. The cervix was noted to be open with the amniotic membranes funneling into the cervical cerclage on 2022. The patient has hypertension. She currently takes Procardia for management of her hypertension. Her blood pressure today was 127/87. She had no symptoms related to hypertension. The nonstress test performed today was reactive for the patient's gestational age. Moderate variability was present. A fetal ultrasound evaluation was performed and a detailed report included in the EMR under the imaging tab. A living ziegler intrauterine fetus was identified in the cephalic presentation, with normal fetal heart motion and normal fetal motion noted. The amniotic fluid index was 15.1 cm. The placenta was anterior. The biophysical profile and cord Doppler studies were both reassuring. There was no evidence of absence, or reversal of end-diastolic flow. GENETIC SCREENING/TERATOLOGY COUNSELING                  (Includes patient, FTB, and any affected family members)    Patient Age > 35 Years NO   Thalassemia ( MVC<80) NO   Congential Heart Defect NO   Neural Tube Defect NO   Cisco-Sachs NO   Sickle Cell Disease NO   Sickle Cell Trait NO   Sickle C Disease or Trait NO   Hemophilia NO   Muscular Dystrophy NO   Cystic Fibrosis NO   Larimer Disease NO   Autism NO   Mental Retardation NO   History of Fragile X NO   Maternal Diabetes NO   Other Genetic Disease or Syndrome NO   Previous Child With Congenital Abnormality Not Listed NO   Recreational Drugs NO                                        INFECTION HISTORY     HEPATITIS IMMUNIZED:  YES   HEPATITIS INFECTION:  NO   EXPOSURE TO TB NO   PARVOVIRUS B-19 NO   CHICKEN POX  YES   MEASLES NO   HIV NO   OTHER RASH OR VIRAL ILLNESS SINCE LMP NO   UTI RECURRENT NO   HPV NO     OB History    Para Term  AB Living   3 0 0 0 2 0   SAB IAB Ectopic Molar Multiple Live Births   2 0 0 0 0 0      # Outcome Date GA Lbr Skyler/2nd Weight Sex Delivery Anes PTL Lv   3 Current            2 2021 6w0d          1 2020 5w0d            PAST GYNECOLOGICAL  HISTORY:  Negative for abnormal pap smears. Negative for sexually transmitted diseases. Negative for cervical LEEP / conization /cryosurgery. Negative for uterine surgery. Negative for ovarian or tubal surgery.      Past Medical History:   Diagnosis Date    Anemia 2022    Hypertension        Past Surgical History:   Procedure Laterality Date    CERVICAL CERCLAGE N/A 2022    CERVIX CERCLAGE PLACEMENT performed by Shantel Chiu MD at [facility] L&D OR    WISDOM TOOTH EXTRACTION         No Known Allergies      Current Outpatient Medications:     insulin lispro (HUMALOG) 100 UNIT/ML SOLN injection vial, Inject 6 Units into the skin 3 times daily (with meals), Disp: 1 each, Rfl: 1    Prenatal Vit-Fe Fumarate-FA (PRENATAL VITAMIN) 27-1 MG TABS tablet, Take 1 tablet by mouth daily, Disp: 30 tablet, Rfl: 11    NIFEdipine (ADALAT CC) 30 MG extended release tablet, Take 1 tablet by mouth in the morning and 1 tablet in the evening., Disp: 30 tablet, Rfl: 3    aspirin 81 MG chewable tablet, Take 1 tablet by mouth in the morning., Disp: 30 tablet, Rfl: 3    glucose 4 g chewable tablet, Take 4 tablets by mouth as needed for Low blood sugar, Disp: 60 tablet, Rfl: 3    insulin glargine (LANTUS) 100 UNIT/ML injection vial, Inject 22 Units into the skin nightly, Disp: 10 mL, Rfl: 3    glucagon, rDNA, 1 MG injection, Inject 1 mg into the muscle as needed for Low blood sugar (Blood glucose less than 70 mg/dL and patient NOT ALERT or NPO and does not have IV access.), Disp: 1 each, Rfl: 1    ferrous sulfate (IRON 325) 325 (65 Fe) MG tablet, Take 1 tablet by mouth in the morning and 1 tablet in the evening. Take with meals. , Disp: 30 tablet, Rfl: 3    vitamin B-12 1000 MCG tablet, Take 1 tablet by mouth in the morning., Disp: 30 tablet, Rfl: 3    docusate sodium (COLACE, DULCOLAX) 100 MG CAPS, Take 100 mg by mouth in the morning and 100 mg before bedtime. , Disp: 30 capsule, Rfl: 1    glucose monitoring (FREESTYLE FREEDOM) kit, 1 kit by Does not apply route 4 times daily, Disp: 1 kit, Rfl: 0    progesterone (PROMETRIUM) 200 MG CAPS capsule, Place 1 capsule into the vagina at bedtime, Disp: 30 capsule, Rfl: 3    Blood Glucose Monitoring Suppl (ONE TOUCH ULTRA 2) w/Device KIT, USE AS DIRECTED, Disp: , Rfl:     ONETOUCH ULTRA strip, TEST AS DIRECTED FOUR TIMES DAILY, Disp: , Rfl:     Lancets (ONETOUCH DELICA PLUS HUSUTP30M) MISC, TEST FOUR TIMES DAILY, Disp: , Rfl:     Social History     Tobacco Use    Smoking status: Former    Smokeless tobacco: Never Substance Use Topics    Alcohol use: Not Currently     Comment: social     FAMILY MEDICAL HISTORY:   Negative for congenital abnormalities, autism, genetic disease and mental retardation, not listed above. Review of Systems :   CONSTITUTIONAL : No fever, no chills   HEENT : No headache, no visual changes, no rhinorrhea, no sore throat   CARDIOVASCULAR : No pain, no palpitations, no edema   RESPIRATORY : No pain, no shortness of breath   GASTROINTESTINAL : No N/V, no D/C, no abdominal pain   GENITOURINARY : No dysuria, hematuria and no incontinence   MUSCULOSKELETAL : No myalgia, No back pain  NEUROLOGICAL : No numbness, no tingling, no tremors. No history of seizures  ALL OTHER SYSTEMS WERE REPORTED AS NEGATIVE. PERTINENT PHYSICAL EXAMINATION:   /87   Pulse 84   Ht 5' 7\" (1.702 m)   Wt (!) 353 lb (160.1 kg)   LMP 01/30/2022   BMI 55.29 kg/m²   Urine dipstick:   Negative for Glucose    Negative for Albumin    GENERAL:   The patient is a well developed, female who is alert cooperative and oriented times three in no acute distress. HEENT:  Normo cephalic and atraumatic. No facial edema. ABDOMEN:   Her uterus is gravid. She had no complaint of abdominal pain or tenderness. The fetal heart rate is 148 bpm. The fetus was in the cephalic presentation which was confirmed by the ultrasound assessment. EXTREMITIES:  No peripheral edema is noted. PELVIC EXAMINATION:  Transvaginal ultrasound assessment of the cervix was performed. The amniotic membranes extending through the cervical cerclage. IMPRESSION:    1.  IUP at 29 weeks 1 days gestation based on her Estimated Date of Delivery: 11/6/22    2. Incompetent cervix, with the amniotic membranes extending through the cervical cerclage 8/8/2022    3. Morbid obesity    4. Fully vaccinated for COVID-19 January 2022. 5.  Two previous first trimester pregnancy losses    6. MSAFP 0.87 MoM, (drawn 6/14/2022)    7.   Limited visualization of the fetal anatomy    8. Essential hypertension taking Procardia    9. O positive blood type  10. Cephalic presentation 4/10/6011  11. Completed Celestone course on 2022 with rescue dose administered 2022  12. Completed magnesium sulfate course on 2022  13. Reassuring BPP and cord Doppler rtesting 2022  14. Reactive nonstress test 2022     PLAN:  The patient is to receive a rescue dose of Celestone today, 2022. The patient is to remain sexually abstinent through the balance of her pregnancy. I advised her to immediately come to the hospital if she had any uterine contractions, rupture of the amniotic membranes, vaginal bleeding, abdominal pain or tenderness, or any new symptomatology she was concerned about. I would recommend that the patient count fetal movements and call if she notices any subjective decrease in fetal movements, particularly if there are less than 10 major movements in an hour. Non-stress testing should be performed every 3 to 4 days through the balance of the pregnancy. Serial ultrasounds to assess fetal anatomy and growth should be performed. The patient is at increase risk for  morbidity and mortality secondary to her history. Weekly BPP and cord Doppler testing should be performed, unless there is a clinical indication to perform the testing more frequently. The patient was advised to call if she has any increased vaginal discharge, vaginal bleeding, contractions, abdominal pain, back pain or any new significant symptomatology prior to her next visit. I advised her that these are signs and symptoms of cervical change and require follow-up assessment when they occur. The patient is to continue to follow with you in your office for ongoing prenatal care. The patient is scheduled to return to our office for follow-up assessment in 4 days, unless she has a clinical indication to return prior to that time.     Magnesium sulfate should

## 2022-08-22 NOTE — PROGRESS NOTES
Patient is here today for BPP/NST. Denies any vaginal bleeding, or leakage of fluids. Mild cramping. Patient reports good fetal movement.

## 2022-08-23 ENCOUNTER — ANCILLARY PROCEDURE (OUTPATIENT)
Dept: OBGYN CLINIC | Age: 32
DRG: 566 | End: 2022-08-23
Payer: MEDICAID

## 2022-08-23 ENCOUNTER — HOSPITAL ENCOUNTER (INPATIENT)
Age: 32
LOS: 1 days | Discharge: HOME | DRG: 566 | End: 2022-08-23
Attending: OBSTETRICS & GYNECOLOGY | Admitting: OBSTETRICS & GYNECOLOGY
Payer: MEDICAID

## 2022-08-23 VITALS
TEMPERATURE: 98.2 F | WEIGHT: 293 LBS | SYSTOLIC BLOOD PRESSURE: 119 MMHG | HEART RATE: 80 BPM | HEIGHT: 67 IN | RESPIRATION RATE: 18 BRPM | DIASTOLIC BLOOD PRESSURE: 58 MMHG | BODY MASS INDEX: 45.99 KG/M2

## 2022-08-23 DIAGNOSIS — R10.9 CRAMPING AFFECTING PREGNANCY, ANTEPARTUM: ICD-10-CM

## 2022-08-23 DIAGNOSIS — O99.210 MATERNAL MORBID OBESITY, ANTEPARTUM (HCC): ICD-10-CM

## 2022-08-23 DIAGNOSIS — R79.89 LOW VITAMIN D LEVEL: ICD-10-CM

## 2022-08-23 DIAGNOSIS — N93.9 VAGINAL SPOTTING: ICD-10-CM

## 2022-08-23 DIAGNOSIS — E66.01 MATERNAL MORBID OBESITY, ANTEPARTUM (HCC): ICD-10-CM

## 2022-08-23 DIAGNOSIS — R79.0 LOW FERRITIN: ICD-10-CM

## 2022-08-23 DIAGNOSIS — O24.414 INSULIN CONTROLLED GESTATIONAL DIABETES MELLITUS (GDM) IN THIRD TRIMESTER: ICD-10-CM

## 2022-08-23 DIAGNOSIS — Z3A.29 29 WEEKS GESTATION OF PREGNANCY: Primary | ICD-10-CM

## 2022-08-23 DIAGNOSIS — O10.019 BENIGN ESSENTIAL HYPERTENSION, ANTEPARTUM: ICD-10-CM

## 2022-08-23 DIAGNOSIS — D64.9 ANEMIA, UNSPECIFIED TYPE: ICD-10-CM

## 2022-08-23 DIAGNOSIS — O28.0 LOW MATERNAL SERUM VITAMIN B12: ICD-10-CM

## 2022-08-23 DIAGNOSIS — O26.899 CRAMPING AFFECTING PREGNANCY, ANTEPARTUM: ICD-10-CM

## 2022-08-23 DIAGNOSIS — R73.09 ELEVATED HEMOGLOBIN A1C: ICD-10-CM

## 2022-08-23 LAB
ALBUMIN SERPL-MCNC: 3.7 G/DL (ref 3.5–5.2)
ALP BLD-CCNC: 75 U/L (ref 35–104)
ALT SERPL-CCNC: 11 U/L (ref 0–32)
AMNISURE, POC: POSITIVE
ANION GAP SERPL CALCULATED.3IONS-SCNC: 15 MMOL/L (ref 7–16)
AST SERPL-CCNC: 19 U/L (ref 0–31)
BACTERIA: ABNORMAL /HPF
BASOPHILS ABSOLUTE: 0.02 E9/L (ref 0–0.2)
BASOPHILS RELATIVE PERCENT: 0.1 % (ref 0–2)
BILIRUB SERPL-MCNC: 0.2 MG/DL (ref 0–1.2)
BILIRUBIN URINE: ABNORMAL
BLOOD, URINE: NEGATIVE
BUN BLDV-MCNC: 5 MG/DL (ref 6–20)
CALCIUM SERPL-MCNC: 9.5 MG/DL (ref 8.6–10.2)
CHLORIDE BLD-SCNC: 102 MMOL/L (ref 98–107)
CLARITY: CLEAR
CO2: 19 MMOL/L (ref 22–29)
COLOR: YELLOW
CREAT SERPL-MCNC: 0.5 MG/DL (ref 0.5–1)
CREATININE URINE: 282 MG/DL (ref 29–226)
EOSINOPHILS ABSOLUTE: 0.01 E9/L (ref 0.05–0.5)
EOSINOPHILS RELATIVE PERCENT: 0.1 % (ref 0–6)
FERRITIN: 37 NG/ML
FOLATE: 13.3 NG/ML (ref 4.8–24.2)
GFR AFRICAN AMERICAN: >60
GFR NON-AFRICAN AMERICAN: >60 ML/MIN/1.73
GLUCOSE BLD-MCNC: 163 MG/DL (ref 74–99)
GLUCOSE URINE: NEGATIVE MG/DL
HBA1C MFR BLD: 5.8 % (ref 4–5.6)
HCT VFR BLD CALC: 36.1 % (ref 34–48)
HEMOGLOBIN: 11.7 G/DL (ref 11.5–15.5)
IMMATURE GRANULOCYTES #: 0.11 E9/L
IMMATURE GRANULOCYTES %: 0.8 % (ref 0–5)
KETONES, URINE: 40 MG/DL
KLEIHAUER BETKE: NORMAL
LACTATE DEHYDROGENASE: 182 U/L (ref 135–214)
LEUKOCYTE ESTERASE, URINE: NEGATIVE
LYMPHOCYTES ABSOLUTE: 2.11 E9/L (ref 1.5–4)
LYMPHOCYTES RELATIVE PERCENT: 14.6 % (ref 20–42)
Lab: NORMAL
MAGNESIUM: 1.9 MG/DL (ref 1.6–2.6)
MCH RBC QN AUTO: 29.4 PG (ref 26–35)
MCHC RBC AUTO-ENTMCNC: 32.4 % (ref 32–34.5)
MCV RBC AUTO: 90.7 FL (ref 80–99.9)
MONOCYTES ABSOLUTE: 0.43 E9/L (ref 0.1–0.95)
MONOCYTES RELATIVE PERCENT: 3 % (ref 2–12)
NEGATIVE QC PASS/FAIL: NORMAL
NEUTROPHILS ABSOLUTE: 11.79 E9/L (ref 1.8–7.3)
NEUTROPHILS RELATIVE PERCENT: 81.4 % (ref 43–80)
NITRITE, URINE: NEGATIVE
PDW BLD-RTO: 14 FL (ref 11.5–15)
PH UA: 6 (ref 5–9)
PLATELET # BLD: 396 E9/L (ref 130–450)
PMV BLD AUTO: 9.4 FL (ref 7–12)
POSITIVE QC PASS/FAIL: NORMAL
POTASSIUM SERPL-SCNC: 3.8 MMOL/L (ref 3.5–5)
PROTEIN PROTEIN: 24 MG/DL (ref 0–12)
PROTEIN UA: ABNORMAL MG/DL
PROTEIN/CREAT RATIO: 0.1
PROTEIN/CREAT RATIO: 0.1 (ref 0–0.2)
RBC # BLD: 3.98 E12/L (ref 3.5–5.5)
RBC UA: ABNORMAL /HPF (ref 0–2)
SODIUM BLD-SCNC: 136 MMOL/L (ref 132–146)
SPECIFIC GRAVITY UA: >=1.03 (ref 1–1.03)
T3 FREE: 3 PG/ML (ref 2–4.4)
T4 FREE: 1 NG/DL (ref 0.93–1.7)
TOTAL PROTEIN: 7 G/DL (ref 6.4–8.3)
TSH SERPL DL<=0.05 MIU/L-ACNC: 1.81 UIU/ML (ref 0.27–4.2)
URIC ACID, SERUM: 5.4 MG/DL (ref 2.4–5.7)
UROBILINOGEN, URINE: 0.2 E.U./DL
VITAMIN B-12: 847 PG/ML (ref 211–946)
VITAMIN D 25-HYDROXY: 22 NG/ML (ref 30–100)
WBC # BLD: 14.5 E9/L (ref 4.5–11.5)
WBC UA: ABNORMAL /HPF (ref 0–5)

## 2022-08-23 PROCEDURE — 81001 URINALYSIS AUTO W/SCOPE: CPT

## 2022-08-23 PROCEDURE — 76819 FETAL BIOPHYS PROFIL W/O NST: CPT | Performed by: OBSTETRICS & GYNECOLOGY

## 2022-08-23 PROCEDURE — 84439 ASSAY OF FREE THYROXINE: CPT

## 2022-08-23 PROCEDURE — G0378 HOSPITAL OBSERVATION PER HR: HCPCS

## 2022-08-23 PROCEDURE — 99233 SBSQ HOSP IP/OBS HIGH 50: CPT | Performed by: OBSTETRICS & GYNECOLOGY

## 2022-08-23 PROCEDURE — 84481 FREE ASSAY (FT-3): CPT

## 2022-08-23 PROCEDURE — 36415 COLL VENOUS BLD VENIPUNCTURE: CPT

## 2022-08-23 PROCEDURE — 86645 CMV ANTIBODY IGM: CPT

## 2022-08-23 PROCEDURE — 76820 UMBILICAL ARTERY ECHO: CPT | Performed by: OBSTETRICS & GYNECOLOGY

## 2022-08-23 PROCEDURE — 82607 VITAMIN B-12: CPT

## 2022-08-23 PROCEDURE — 86644 CMV ANTIBODY: CPT

## 2022-08-23 PROCEDURE — 86800 THYROGLOBULIN ANTIBODY: CPT

## 2022-08-23 PROCEDURE — 76817 TRANSVAGINAL US OBSTETRIC: CPT | Performed by: OBSTETRICS & GYNECOLOGY

## 2022-08-23 PROCEDURE — 83735 ASSAY OF MAGNESIUM: CPT

## 2022-08-23 PROCEDURE — 83036 HEMOGLOBIN GLYCOSYLATED A1C: CPT

## 2022-08-23 PROCEDURE — 76821 MIDDLE CEREBRAL ARTERY ECHO: CPT | Performed by: OBSTETRICS & GYNECOLOGY

## 2022-08-23 PROCEDURE — 83615 LACTATE (LD) (LDH) ENZYME: CPT

## 2022-08-23 PROCEDURE — 1220000000 HC SEMI PRIVATE OB R&B

## 2022-08-23 PROCEDURE — 84550 ASSAY OF BLOOD/URIC ACID: CPT

## 2022-08-23 PROCEDURE — 87088 URINE BACTERIA CULTURE: CPT

## 2022-08-23 PROCEDURE — 86747 PARVOVIRUS ANTIBODY: CPT

## 2022-08-23 PROCEDURE — 99222 1ST HOSP IP/OBS MODERATE 55: CPT | Performed by: ADVANCED PRACTICE MIDWIFE

## 2022-08-23 PROCEDURE — 84443 ASSAY THYROID STIM HORMONE: CPT

## 2022-08-23 PROCEDURE — 82728 ASSAY OF FERRITIN: CPT

## 2022-08-23 PROCEDURE — 82746 ASSAY OF FOLIC ACID SERUM: CPT

## 2022-08-23 PROCEDURE — 83020 HEMOGLOBIN ELECTROPHORESIS: CPT

## 2022-08-23 PROCEDURE — 84156 ASSAY OF PROTEIN URINE: CPT

## 2022-08-23 PROCEDURE — 82306 VITAMIN D 25 HYDROXY: CPT

## 2022-08-23 PROCEDURE — 76815 OB US LIMITED FETUS(S): CPT | Performed by: OBSTETRICS & GYNECOLOGY

## 2022-08-23 PROCEDURE — 80053 COMPREHEN METABOLIC PANEL: CPT

## 2022-08-23 PROCEDURE — 85025 COMPLETE CBC W/AUTO DIFF WBC: CPT

## 2022-08-23 PROCEDURE — 85460 HEMOGLOBIN FETAL: CPT

## 2022-08-23 PROCEDURE — 86376 MICROSOMAL ANTIBODY EACH: CPT

## 2022-08-23 PROCEDURE — 87070 CULTURE OTHR SPECIMN AEROBIC: CPT

## 2022-08-23 PROCEDURE — 82570 ASSAY OF URINE CREATININE: CPT

## 2022-08-23 PROCEDURE — 83021 HEMOGLOBIN CHROMOTOGRAPHY: CPT

## 2022-08-23 NOTE — PROGRESS NOTES
29.2 of Dr Sabino Dumont sent in for c/o abdominal cramping, lower back pain and some vaginal pressure. Pt states since last night feels like leaking fluid after using the bathroom. Denies vb,  +fetal movement. Hx IDDM and htn and cerclage 2022. Follow with [facility].

## 2022-08-23 NOTE — PROGRESS NOTES
Verbal and written discharge instructions given, voiced understanding. Ambulatory at discharge with mother.

## 2022-08-23 NOTE — DISCHARGE INSTRUCTIONS
Home Undelivered Discharge Instructions    After Discharge Orders:    Future Appointments   Date Time Provider Kunal Niesha   8/26/2022  1:00 PM Flaco Cowart MD [facility]   8/30/2022  3:00 PM Marion Lakhani MD [facility]   9/2/2022  1:00 PM Marion Lakhani MD [facility]   9/7/2022 11:00 AM Bhavna Loredo, APRN - CNP [facility]   9/7/2022  2:45 PM Flaco Cowart MD [facility]   9/9/2022 10:45 AM Flaco Cowart MD [facility]   9/13/2022 10:30 AM Liberty Alvares MD [facility]   9/16/2022 10:45 AM Flaco Cowart MD [facility]   9/20/2022 11:15 AM Flaco Cowart MD [facility]   9/23/2022 10:45 AM Flaco Cowart MD [facility]   9/27/2022 10:45 AM Flaco Cowart MD [facility]   9/30/2022 10:45 AM Flaco Cowart MD [facility]   10/4/2022 10:45 AM Flaco Cowart MD [facility]   10/7/2022 10:45 AM Flaco Cowart MD [facility]   10/11/2022 10:45 AM Flaco Cowart MD [facility]   10/14/2022 10:45 AM Flaco Cowart MD [facility]       Call physician or midwife's office on *** for instructions. Diet:  normal diet as tolerated    Rest: normal activity as tolerated    Other instructions: Do kick counts once a day on your baby. Choose the time of day your baby is most active. Get in a comfortable lying or sitting position and time how long it takes to feel 10 kicks, twists, turns, swishes, or rolls.  Call your physician or midwife if there have not been 10 kicks in 2 hours    Call physician or midwife, return to Labor and Delivery, call 911, or go to the nearest Emergency Room if: contractions more than  6 per  1 hour

## 2022-08-23 NOTE — H&P
(!) 119/58    Pulse: 80    Resp: 18    Temp: 98.2 °F (36.8 °C)    TempSrc: Oral    Weight:  (!) 352 lb (159.7 kg)   Height:  5' 7\" (1.702 m)     General appearance:  awake, alert, cooperative, no apparent distress, and appears stated age  Neurologic:  Awake, alert, oriented to name, place and time. Lungs:  No increased work of breathing, good air exchange  Abdomen:  Soft, non tender, gravid, consistent with her gestational age, EFW by Leopald's manouever was aga   Fetal heart rate:  Reassuring. Pelvis:  Adequate pelvis  Cervix:    SSE ffn and amniosure obtianed cervix appears closed, cerclage intact, small amount of blood on q tip.   Contraction frequency:  none    Membranes:  Intact amnisoure negative    ASSESSMENT AND PLAN:    Discussed with Dr Tom Cerrato [facility]  Notified Dr Nikki Napier of consult will be in to see her later    Gopi Hock, Bon Short

## 2022-08-23 NOTE — CONSULTS
A consult was requested by Dr. Anneliese Mead       RE:  Camila Marie  : 1990   AGE: 27 y.o. Dear Dr. Judy Beard:    I saw your patient Edgardo Carpenter today for the following indications: cramping, back pain    Patient Active Problem List   Diagnosis    Maternal morbid obesity, antepartum ([facility] [address]    Incompetent cervix in pregnancy, antepartum, second trimester    Benign essential hypertension, antepartum    Morbid obesity with body mass index of 50.0-59.9 in adult ([address]    Abnormal ultrasonic finding on  screening of mother    Anemia    Elevated hemoglobin A1c    Low ferritin    Low maternal serum vitamin B12    Low vitamin D level    Insulin controlled gestational diabetes mellitus (GDM) in third trimester    29 weeks gestation of pregnancy    Cramping affecting pregnancy, antepartum       As you know, Ms. Que Fagan is a 27 y.o.  at 29w2d  (LMP = [address]) who is admitted with cramping and bilateral low back pain. The patient was at a routine obstetric visit this morning. The patient reported feeling increased cramping and lower back pain. The patient denied having any vaginal bleeding or leaking of fluid. Presently, the patient reports feeling well. She notes having good fetal movement. Denies any leaking of fluid or vaginal bleeding. She reports that her lower abdominal cramping has improved. She continues to have intermittent symptoms of low pain. She reports having chronic low back pain and her symptoms are stable. A Maternal-Fetal Medicine consult was requested by Dr. Judy Beard to address these issue(s). PAST OBSTETRICAL HISTORY:    2020 -- 5 week SAB, no D&C    2021 -- 6 week SAB, no D&C    All pregnancies are with the same partner. PAST GYNECOLOGICAL  HISTORY:  Negative for abnormal pap smears. Negative for sexually transmitted diseases. Positive for cervical LEEP / conization /cryosurgery. Rescue cerclage   Negative for uterine surgery.    Negative for ovarian or tubal surgery. PAST MEDICAL HISTORY:     MEDICATIONS:  Prenatal vitamin  Procardia XL  LDASA  Lispro 6 units TID  Lantus 22 units QHS  Ferrous sulfate 325 mg BID  Vitamin B12  Vitamin D3  Colace  Vaginal Prometrium    ILLNESSES:  CHTN  Gestational diabetes on insulin  Nutritional deficiencies    BLOOD TRANSFUSIONS:  None    IMMUNIZATIONS:   Up-to-date, no flu vaccine, s/p COVID vaccine    SURGERIES:  Cerclage, [place] teeth extraction  She denies any issues with anesthesia    HOSPITALIZATIONS:  Pregnancy    ALLERGIES:  NKDA, she denies any latex or shellfish allergies    SOCIAL HISTORY:  She denies any tobacco, alcohol, or drug use  She is on bedrest secondary to the pregnancy. 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 FAMILY HISTORY:  CANCER: None  CAD: None  CVA: None  CONGENITAL ANOMALIES: None  DIABETES: Father  DVT: None  BLEEDING DISORDERS: None  AUTOIMMUNE DISORDERS: None        PERTINENT PHYSICAL EXAMINATION:   Patient Vitals for the past 24 hrs:   BP Temp Temp src Pulse Resp Height Weight   08/23/22 1024 -- -- -- -- -- 5' 7\" (1.702 m) (!) 352 lb (159.7 kg)   08/23/22 1013 (!) 119/58 98.2 °F (36.8 °C) Oral 80 18 -- --       GENERAL: The patient is a well developed, female who is alert cooperative and oriented times three in no acute distress. CARDIOVASCULAR: RRR  LUNGS: CTA B  ABDOMEN: Her uterus is gravid. She had no complaint of abdominal pain or tenderness. EXTREMITIES:  No calf TTP BLE, no edema BLE, +1 patellar reflexes BLE, no clonus BLE    A genital culture was collected. A copious amount of discharge was noted. No bleeding was noted. A fetal ultrasound assessment was performed today. A report is enclosed for your review. Ultrasound 29 weeks 2 days:  SIUP, breech, heart rate 150, JOSE ANTONIO 11.3 cm, BPP 8/8, umbilical artery PI normal, MCA PSV normal, transvaginal cervical length 4.4 mm with funneling to the level of the cerclage.   The cerclage was visualized and appeared to be intact. Review of Systems :   CONSTITUTIONAL : No fever, no chills   HEENT : No headache, no visual changes, no rhinorrhea, no sore throat   CARDIOVASCULAR : No pain, no palpitations, no edema   RESPIRATORY : No pain, no shortness of breath   GASTROINTESTINAL : No N/V, no D/C, no abdominal pain   GENITOURINARY : No dysuria, hematuria and no incontinence   MUSCULOSKELETAL : No myalgia, No back pain  NEUROLOGICAL : No numbness, no tingling, no tremors. No history of seizures    Admission on 2022   Component Date Value Ref Range Status    Amnisure, POC 2022 Positive  Negative Final    Lot Number [id number]   Final    Positive QC Pass/Fail 2022 Pass   Final    Negative QC Pass/Fail 2022 Pass   Final          ASSESSMENT/PLAN:  27 y.o.  at 29w2d (LMP = 7 wk US) with:     Cramping, resolved -- The patient reports that she was having some lower abdominal cramping and back pain. It started this morning. She had a routine OB visit and she was told to go to the hospital to get checked. Since arrival, she reports that the cramping has resolved. She continues to have back pain, but she reports having chronic back pain. She notes FM and denies any LOF/VB. Ultrasound was repeated. The cervix appeared stable and measured 3-5 mm with funneling. The JOSE ANTONIO was normal.  Fetal testing was reassuring. No contractions were noted on tocometry. A genital culture was collected and sent to the lab. A urine culture and UA were ordered. Given the patient reports that her symptoms have resolved, the cervix appears stable, and no contractions were noted during monitoring, the patient can be discharged home with continued close outpatient follow up. Continue modified bedrest, avoid heavy lifting, prolonged standing, strenuous exercise, and intercourse. She was counseled to follow up with Dr. Demetri Real as scheduled on 22.  Precautions to call or return sooner were reviewed. PTL and PPROM precautions reviewed. 2. Spotting, vaginal -- Per the admission H&P, a spot of blood was noted on the AmniSure q-tip. The patient denies having any vaginal bleeding or spotting. --A genital culture was collected. No bleeding was noted on exam or on the swabs. SSE was not repeated. --Urine culture, UA, and KB ordered and pending  --Results were reviewed following the patient's consultation. The Kleihauer-Betke screen was negative. Urinalysis and urine culture were also negative on 2022. --The patient is RH positive  --The cervix appears stable on US  --Bleeding precautions reviewed    3. Chronic versus Gestational hypertension -- The patient is a 65-year-old  3 para 0-0-2-0 who was sent to the hospital on 2022 at 23 weeks 2 days secondary to blood pressure elevations that were noted during a routine obstetric visit. She had persistent diastolic elevation in the mid 90s. The patient was admitted on 2022. She was seen and evaluated by maternal-fetal medicine and started on nifedipine XL 30 mg daily. A transvaginal ultrasound was performed. The patient's cervix measured 10 mm with funneling seen to the level of the cerclage. Intra amniotic debris was noted in the cervical canal.  Betamethasone for fetal benefit was recommended at that time. Magnesium sulfate was also recommended. The patient received her first dose of betamethasone on 2022. The patient's blood pressures continued to be labile. She denied having any symptoms of preeclampsia. The Procardia XL was increased to BID on 7/15/22. The patient's blood pressures have been stable on Procardia XL, 30 mg twice daily. The patient's prenatal records were previously reviewed. Her blood pressure has been elevated since 6 weeks gestation. Thus there is concern for underlying chronic hypertension.     --Today, she again denied having any symptoms of headache, vision change, chest pain, shortness of breath, nausea, vomiting, and the right upper quadrant pain. --Counseling was previously provided. Counseling was reviewed. The patient did not have any additional questions or concerns. --The risks of hypertension in pregnancy were previously reviewed including poor fetal growth, placental abruption, preeclampsia (25-50%),  delivery, and/or fetal loss. --A daily low-dose aspirin was recommended. She should continue this for the remainder of pregnancy and 6 to 8 weeks postpartum. --She should monitor fetal kick counts daily, instructions were reviewed. --Preeclampsia labs should be checked weekly and prn symptoms  --In the absence of any additional complications, delivery can be planned for 37-39 weeks' gestation  --Delivery should be considered at 40 to 39 weeks gestation, or sooner with any additional complications. Delivery timing will be readdressed as the patient's pregnancy progresses. Delivery should be considered sooner with any nonreassuring fetal testing, persistent severe gestational hypertension, the need for  blood pressure medication, and/or the development of preeclampsia. -- Blood pressure is stable, continue Procardia XL 30 mg twice daily  -- Continue daily LDASA for remainder of pregnancy and 6-8 week postpartum  --Continue increased maternal and fetal surveillance as outpatient    4.   Cervical insufficiency status post cerclage placement -- The patient's prenatal records were previously reviewed. The patient was seen for a consultation by maternal-fetal medicine on 6/15/2022. The patient's cervix was noted to be open with the amniotic membranes tunneled to the level of the external cervical os. She was sent to the hospital for additional evaluation and possible cerclage placement. The patient underwent an exam/ultrasound indicated cerclage on  without complication. She had a follow-up visit with Dr. Laina Boland on 2022. amniotic fluid within the cervical canal appeared slightly increased in echogenicity. Transvaginal imaging was repeated today at 29 weeks 2 days. The debris/sludge was not seen. Counseling was previously provided. Counseling was reviewed. The patient did not have any additional questions or concerns. The patient was counseled that the finding of intrauterine debris or sludge can be associated with hemorrhage or infection. Some studies have shown that treatment with intravenous or oral antibiotics can decrease the risk for complications associated with the finding of intra amniotic debris/sludge including chorioamnionitis, endometritis (23.1%),  infection (61.1%), poor fetal growth, and  birth (46.2%). Additionally, treatment has been shown to eliminate the sonographic presence of debris/sludge in some cases. The risks and benefits of antibiotics were previously discussed with the patient. After this conversation, the patient opted for treatment. Antibiotics were ordered including: Ceftriaxone 1 g every 12 hours x3 days then Omnicef 300 mg twice daily x5 days; Flagyl 500 mg IV every 8 hours x3 days then 500 mg p.o. twice daily x5 days; clarithromycin 500 mg twice daily x7 days. -- The patient is tolerated the antibiotics well. She completed treatment on 2022. The regimen is based off a paper by Beronica Coronado et al., Evidence that antibiotic administration is effective in the treatment of a subset of patient with intra-amniotic infection/inflammation presenting with cervical insufficiency. This regimen has also been shown to be effective in prolonging latency in the setting of PPROM . 6.   Anemia -- The patient's H/H during a prior admission was noted to be low at 10.9/32.9  --Baseline nutrition panel, TFTs, hemoglobin electrophoresis, reticulocyte count, and peripheral smear ordered  --Testing completed 7/15/2022, the patient results included: H/H10.9/32.9, MCV 88.2, platelet count 049,628, potassium 4, creatinine 0.6, calcium 9.7, ALT 10, AST 9, uric acid 4.2, , ferritin 20, folate 14.1, vitamin B12 321, vitamin D 25, magnesium 1.7, TSH 1.12, free T4 0.9, free T3 2.6, TPO pending, antithyroglobulin antibody pending, hemoglobin electrophoresis pending, hemoglobin A1c 5.7%, urine protein creatinine ratio 0.1, GC chlamydia pending, wet prep negative, genital culture pending, urinalysis positive for glucose, urine culture pending. --Additional recommendations are below     7. Obesity in pregnancy -- Counseling was previously provided. She did not have any additional questions or concerns today. The patient has gained 10 pounds during this pregnancy as of 8/23/2022. Jose Shields Her BMI was 55.13 at that time. Given the increased risks previously described, additional testing is recommended. Fetal growth should be monitored every 3-4 weeks starting at 24-26 weeks' gestation. Fetal growth was appropriate for the gestational age today. The patient should monitor fetal kick counts daily, starting at 28 weeks' gestation. She should be scheduled for increased fetal surveillance with twice weekly fetal testing after 32 weeks' gestation. In the absence of any complications, delivery is recommended at 39 weeks' gestation. Given the increased risk for hypertensive disorders of pregnancy,  the potential utility of a low dose aspirin in reducing her risk for hypertensive disorders of pregnancy was reviewed. The risks and benefits of low dose aspirin were discussed. She was counseled that she could take 81 mg of aspirin, daily. She should continue this for the remainder of pregnancy and 6-8 weeks postpartum. Additional maternal evaluation was recommended with a nutrition panel, thyroid function studies, and a hemoglobin A1c. --Testing completed 7/15/2022, results summarized above. Additional recommendations are below.      8.  Elevated glucose/hemoglobin A1c/gestational diabetes on insulin -- During the patient's hospitalization in July, the patient's random glucose was elevated at 192. Her urinalysis was positive for glucose. The patient reports that she completed an early glucose screen that was slightly abnormal.  She has been monitoring fasting blood sugars at home. She has not required treatment. Since the patient's admission in July, she has been diagnosed with gestational diabetes and is managed with insulin. Her current insulin dose includes Lantus 22 units at bedtime and lispro 6 units with meals. The patient reports that her blood sugars have been well controlled on this regimen. Recommendations:  Continue to monitor blood sugar fasting and 2 hours postprandial  Continue current insulin regimen  Continue twice-weekly fetal testing as an outpatient  Continue to review blood sugars with provider 1-2 times per week  Monitor blood sugars in labor per protocol  2-hour oral glucose tolerance test 6 to 8 weeks postpartum     9. Hypothyroxinemia, improved -- The patient's lab results were reviewed following her consultation. Her free T4 was mildly decreased at 0.9. Her TSH was normal at 1.12 and free T3 normal at 2.6. Screening for thyroid peroxidase antibody and antithyroglobulin antibody was pending.     --Counseling was previously provided. Counseling was reviewed. The patient did not have any additional questions or concerns. Per the American thyroid Association, treatment is not recommended for women with isolated hypothyroxinemia. However, thyroid function study should be monitored closely, every 4 weeks throughout the pregnancy.   --Recommend repeat thyroid function studies in 4 weeks, on/after 8/12/2022    -- Repeat testing completed 8/23/2022, her results included: H/H11.7/36.1, MCV 90.7, platelet count 599,972, potassium 3.8, creatinine 0.5, calcium 9.5, ALT 11, AST 19, magnesium 1.9, ferritin 37, folate 13.3, vitamin B12 847, vitamin D 22, uric acid 5.4, , TSH 1.81, free T4 1, free T3 3, TPO negative, antithyroglobulin antibody negative, hemoglobin electrophoresis pending, hemoglobin A1c 5.8%, CMV IgG positive/IgM negative, parvovirus pending, Kleihauer-Betke screen negative, genital culture pending, urine protein creatinine ratio 0.1, urine culture mixed, urinalysis negative. -- The patient's free T4 was now normal.  Her TSH and free T3 were also normal.  --Recommend repeat thyroid function studies at 6-week postpartum visit  --Long-term follow-up with PCP for monitoring management     10. Low ferritin -- The patient's ferritin was low at 20 (considered low if <15 in absence of anemia or <40 in setting of anemia)  --Ferrous sulfate 325 mg BID prescribed  --Monitor levels serially  --Monitor nutrition panel q4-6 weeks (CBC, CMP, magnesium, ferritin, folate, vitamin B12, vitamin D25OH), 8/12/2022    -- Repeat testing completed 8/23/2022, ferritin 37  --The patient was counseled to continue her iron supplement  -- Recommend repeat testing in 4 to 6 weeks, on/after 9/20/2022  --Follow up with PCP for long term monitoring and management     11. Low vitamin D -- The patient's vitamin D was low at 25  --Recommend vitamin D3 2000 IU daily  --Monitor levels serially  --Monitor nutrition panel q4-6 weeks (CBC, CMP, magnesium, ferritin, folate, vitamin B12, vitamin D25OH), on/after 8/12/2022    -- Repeat vitamin D 8/23/2022, 22. The patient's vitamin D is again low. The patient reports that she has not been taking a vitamin D supplement. --The patient should take a vitamin D supplement, 2000 IU daily. A prescription was provided. --Recommend repeat testing in 4 weeks, on/after 9/20/2022  --Follow up with PCP for long term monitoring and management     12. Low vitamin B12 -- The patient's vitamin B12 level was borderline at 381.   Individuals with vitamin B12 level between 200 and 400 are increased risk for anemia and side effects related to low vitamin B12. Thus, supplementation is recommended  --Recommend vitamin B12, 1000 mcg daily  --Monitor levels serially  --Repeat nutrition panel (CBC, CMP, magnesium, ferritin, folate, vitamin B12, vitamin D 25 [place]) in 4 weeks, 8/12/2022    -- Repeat vitamin B12 8/23/2022, 847  --The patient was counseled that she could decrease her supplement to every other day  --Recommend repeat testing in 4 to 6 weeks, on/after 9/20/2022  --Long-term follow-up with PCP for monitoring and management     13. 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 She was counseled that she may be at increased risk for viral exposures such as CMV and parvovirus. -- Baseline screening completed 8/23/2022. CMV IgG positive and IgM negative indicative of past exposure. --Screening for parvovirus pending. 14. Routine OB -- FHTs q shift, start NST TID at 24 wga  --GBS unknown     15. Fetal well-being --  NST BID and prn  --Continue growth scans q 3 wks. Last growth US: 23w2d, 43rd percentile  --Fetal heart tones q shift. --BMZ 7/14-7/15  --Candidate for rescue steroids on/after 7/28/22  --Magnesium sulfate for neuro protection 7/16-7/17     16. DVT prophylaxis -- Recommend [id number] hose or SCDs      --The total time spent on today's visit was 45 minutes. This included preparation for the consultation (i.e. reviewing prior external notes and test results), performance of a medically appropriate history and examination, counseling, orders for medications, tests or other procedures, and coordination of care. Greater than 50% of the time was spent face-to-face with the patient. This time is exclusive of procedures performed. I answered all of  the patient's questions to her satisfaction. I requested the patient return for a follow-up assessment in 1 weeks unless there is a clinical reason for her to return prior to that time.  She is to call if she has any problems or questions prior to her next visit. Further evaluation and management will be dependent on her clinical presentation and the results of her testing. --The patient was advised to call if she has any increased vaginal discharge, vaginal bleeding, contractions, abdominal pain, back pain or any new significant symptomatology prior to her next visit. I advised her that these are signs and symptoms of cervical change and require follow-up assessment when they occur. Preeclampsia precautions were also reviewed with the patient. --The patient was also counseled to call and/or return with any concerns for decreased fetal activity. The patient is to continue to follow with you in your office for ongoing obstetric care. If you have any questions regarding her management, please contact me at your convenience and thank you for allowing me to participate in her care. Sincerely,          Robi Lowe MD, [id number] [facility]  [phone]      *All or parts of this note may have been generated using a voice recognition program. There may be typo, grammar, or Word substitution errors that have escaped my review of this note.

## 2022-08-24 LAB
CYTOMEGALOVIRUS IGG ANTIBODY: NORMAL
CYTOMEGALOVIRUS IGM ANTIBODY: NORMAL

## 2022-08-25 LAB — URINE CULTURE, ROUTINE: NORMAL

## 2022-08-26 ENCOUNTER — ROUTINE PRENATAL (OUTPATIENT)
Dept: OBGYN CLINIC | Age: 32
End: 2022-08-26
Payer: MEDICAID

## 2022-08-26 VITALS
HEART RATE: 98 BPM | SYSTOLIC BLOOD PRESSURE: 124 MMHG | BODY MASS INDEX: 55.93 KG/M2 | DIASTOLIC BLOOD PRESSURE: 80 MMHG | WEIGHT: 293 LBS

## 2022-08-26 DIAGNOSIS — E66.01 MORBID OBESITY WITH BODY MASS INDEX OF 50.0-59.9 IN ADULT (HCC): ICD-10-CM

## 2022-08-26 DIAGNOSIS — O34.32 INCOMPETENT CERVIX IN PREGNANCY, ANTEPARTUM, SECOND TRIMESTER: ICD-10-CM

## 2022-08-26 DIAGNOSIS — E66.01 MATERNAL MORBID OBESITY, ANTEPARTUM (HCC): ICD-10-CM

## 2022-08-26 DIAGNOSIS — R73.09 ELEVATED HEMOGLOBIN A1C: ICD-10-CM

## 2022-08-26 DIAGNOSIS — Z3A.29 29 WEEKS GESTATION OF PREGNANCY: ICD-10-CM

## 2022-08-26 DIAGNOSIS — O10.019 BENIGN ESSENTIAL HYPERTENSION, ANTEPARTUM: Primary | ICD-10-CM

## 2022-08-26 DIAGNOSIS — O99.210 MATERNAL MORBID OBESITY, ANTEPARTUM (HCC): ICD-10-CM

## 2022-08-26 DIAGNOSIS — O24.414 INSULIN CONTROLLED GESTATIONAL DIABETES MELLITUS (GDM) IN THIRD TRIMESTER: ICD-10-CM

## 2022-08-26 LAB
GENITAL CULTURE, ROUTINE: NORMAL
GLUCOSE URINE, POC: NEGATIVE
PROTEIN UA: NEGATIVE
THYROGLOBULIN ANTIBODY: <12 IU/ML (ref 0–40)
THYROID PEROXIDASE (TPO) ABS: <4 IU/ML (ref 0–25)

## 2022-08-26 PROCEDURE — 1036F TOBACCO NON-USER: CPT | Performed by: OBSTETRICS & GYNECOLOGY

## 2022-08-26 PROCEDURE — 81002 URINALYSIS NONAUTO W/O SCOPE: CPT | Performed by: OBSTETRICS & GYNECOLOGY

## 2022-08-26 PROCEDURE — 59025 FETAL NON-STRESS TEST: CPT | Performed by: OBSTETRICS & GYNECOLOGY

## 2022-08-26 PROCEDURE — G8427 DOCREV CUR MEDS BY ELIG CLIN: HCPCS | Performed by: OBSTETRICS & GYNECOLOGY

## 2022-08-26 PROCEDURE — 99213 OFFICE O/P EST LOW 20 MIN: CPT | Performed by: OBSTETRICS & GYNECOLOGY

## 2022-08-26 PROCEDURE — G8417 CALC BMI ABV UP PARAM F/U: HCPCS | Performed by: OBSTETRICS & GYNECOLOGY

## 2022-08-26 PROCEDURE — 1111F DSCHRG MED/CURRENT MED MERGE: CPT | Performed by: OBSTETRICS & GYNECOLOGY

## 2022-08-26 NOTE — PATIENT INSTRUCTIONS

## 2022-08-26 NOTE — PROGRESS NOTES
22    Kelsie Dejesus MD  [address] [facility]     RE:  Mehdi Galindo  : 1990   AGE: 27 y.o. This report has been created using voice recognition software. It may contain errors which are inherent in voice recognition technology. Dear Dr. Kay Diaz:    Amrit Sam had an appointment today for the following indications:    Patient Active Problem List   Diagnosis    Maternal morbid obesity, antepartum ([address]    Incompetent cervix in pregnancy, antepartum, with cervical cerclage. Benign essential hypertension, antepartum    Morbid obesity with body mass index of 50.0-59.9 in adult (HCC)    Anemia    Elevated hemoglobin A1c    Low ferritin    Low maternal serum vitamin B12    Low vitamin D level     Amrit Sam is a 27 y.o. female, who is G3(0,0,2,0). She has an Estimated Date of Delivery: 22 based on her previous ultrasound assessment. She is currently 29 weeks 5 days gestation based on that assessment. The patient was seen today for follow-up assessment for incompetent cervix with shortening of the cervix with funneling of the amniotic membranes through the cervical cerclage. She denied any vaginal bleeding or leaking of amniotic fluid. She is having no uterine contraction activity. The cervix was noted to be open with the amniotic membranes funneling into the cervical cerclage on 2022. The patient has hypertension. She currently takes Procardia for management of her hypertension. Her blood pressure today was 124/80. She had no symptoms related to hypertension. The nonstress test performed today was reactive for the patient's gestational age. Moderate variability was present. A fetal ultrasound evaluation was performed on 2022. A detailed report included in the EMR under the imaging tab.   A living ziegler intrauterine fetus was identified in the cephalic presentation, with normal fetal heart motion and normal fetal motion noted. The amniotic fluid index was 15.1 cm. The placenta was anterior. The biophysical profile and cord Doppler studies were both reassuring. There was no evidence of absence, or reversal of end-diastolic flow. GENETIC SCREENING/TERATOLOGY COUNSELING                  (Includes patient, FTB, and any affected family members)    Patient Age > 35 Years NO   Thalassemia ( MVC<80) NO   Congential Heart Defect NO   Neural Tube Defect NO   Cisco-Sachs NO   Sickle Cell Disease NO   Sickle Cell Trait NO   Sickle C Disease or Trait NO   Hemophilia NO   Muscular Dystrophy NO   Cystic Fibrosis NO   Lindsey Disease NO   Autism NO   Mental Retardation NO   History of Fragile X NO   Maternal Diabetes NO   Other Genetic Disease or Syndrome NO   Previous Child With Congenital Abnormality Not Listed NO   Recreational Drugs NO                                        INFECTION HISTORY     HEPATITIS IMMUNIZED:  YES   HEPATITIS INFECTION:  NO   EXPOSURE TO TB NO   PARVOVIRUS B-19 NO   CHICKEN POX  YES   MEASLES NO   HIV NO   OTHER RASH OR VIRAL ILLNESS SINCE LMP NO   UTI RECURRENT NO   HPV NO     OB History    Para Term  AB Living   3 0 0 0 2 0   SAB IAB Ectopic Molar Multiple Live Births   2 0 0 0 0 0      # Outcome Date GA Lbr Skyler/2nd Weight Sex Delivery Anes PTL Lv   3 Current            2 2021 6w0d          1 2020 5w0d            PAST GYNECOLOGICAL  HISTORY:  Negative for abnormal pap smears. Negative for sexually transmitted diseases. Negative for cervical LEEP / conization /cryosurgery. Negative for uterine surgery. Negative for ovarian or tubal surgery.      Past Medical History:   Diagnosis Date    Anemia 2022    Hypertension        Past Surgical History:   Procedure Laterality Date    CERVICAL CERCLAGE N/A 2022    CERVIX CERCLAGE PLACEMENT performed by Olaf Chadwick MD at [facility] L&D OR    WISDOM TOOTH EXTRACTION         No Known Allergies      Current Outpatient Medications:     insulin lispro (HUMALOG) 100 UNIT/ML SOLN injection vial, Inject 6 Units into the skin 3 times daily (with meals), Disp: 1 each, Rfl: 1    Prenatal Vit-Fe Fumarate-FA (PRENATAL VITAMIN) 27-1 MG TABS tablet, Take 1 tablet by mouth daily, Disp: 30 tablet, Rfl: 11    NIFEdipine (ADALAT CC) 30 MG extended release tablet, Take 1 tablet by mouth in the morning and 1 tablet in the evening., Disp: 30 tablet, Rfl: 3    aspirin 81 MG chewable tablet, Take 1 tablet by mouth in the morning., Disp: 30 tablet, Rfl: 3    insulin glargine (LANTUS) 100 UNIT/ML injection vial, Inject 22 Units into the skin nightly, Disp: 10 mL, Rfl: 3    glucagon, rDNA, 1 MG injection, Inject 1 mg into the muscle as needed for Low blood sugar (Blood glucose less than 70 mg/dL and patient NOT ALERT or NPO and does not have IV access.), Disp: 1 each, Rfl: 1    ferrous sulfate (IRON 325) 325 (65 Fe) MG tablet, Take 1 tablet by mouth in the morning and 1 tablet in the evening. Take with meals. , Disp: 30 tablet, Rfl: 3    vitamin B-12 1000 MCG tablet, Take 1 tablet by mouth in the morning., Disp: 30 tablet, Rfl: 3    docusate sodium (COLACE, DULCOLAX) 100 MG CAPS, Take 100 mg by mouth in the morning and 100 mg before bedtime. , Disp: 30 capsule, Rfl: 1    glucose monitoring (FREESTYLE FREEDOM) kit, 1 kit by Does not apply route 4 times daily, Disp: 1 kit, Rfl: 0    progesterone (PROMETRIUM) 200 MG CAPS capsule, Place 1 capsule into the vagina at bedtime, Disp: 30 capsule, Rfl: 3    Blood Glucose Monitoring Suppl (ONE TOUCH ULTRA 2) w/Device KIT, USE AS DIRECTED, Disp: , Rfl:     ONETOUCH ULTRA strip, TEST AS DIRECTED FOUR TIMES DAILY, Disp: , Rfl:     Lancets (ONETOUCH DELICA PLUS [id number]) MISC, TEST FOUR TIMES DAILY, Disp: , Rfl:     glucose 4 g chewable tablet, Take 4 tablets by mouth as needed for Low blood sugar (Patient not taking: Reported on 8/26/2022), Disp: 60 tablet, Rfl: 3    Social History     Tobacco Use Smoking status: Former    Smokeless tobacco: Never   Substance Use Topics    Alcohol use: Not Currently     Comment: social     FAMILY MEDICAL HISTORY:   Negative for congenital abnormalities, autism, genetic disease and mental retardation, not listed above. Review of Systems :   CONSTITUTIONAL : No fever, no chills   HEENT : No headache, no visual changes, no rhinorrhea, no sore throat   CARDIOVASCULAR : No pain, no palpitations, no edema   RESPIRATORY : No pain, no shortness of breath   GASTROINTESTINAL : No N/V, no D/C, no abdominal pain   GENITOURINARY : No dysuria, hematuria and no incontinence   MUSCULOSKELETAL : No myalgia, No back pain  NEUROLOGICAL : No numbness, no tingling, no tremors. No history of seizures  ALL OTHER SYSTEMS WERE REPORTED AS NEGATIVE. PERTINENT PHYSICAL EXAMINATION:   /80   Pulse 98   Wt (!) 357 lb 2 oz (162 kg)   LMP 01/30/2022   BMI 55.93 kg/m²   Urine dipstick:   Negative for Glucose    Negative for Albumin    GENERAL:   The patient is a well developed, female who is alert cooperative and oriented times three in no acute distress. HEENT:  Normo cephalic and atraumatic. No facial edema. ABDOMEN:   Her uterus is gravid. She had no complaint of abdominal pain or tenderness. The fetal heart rate is 144 bpm.     EXTREMITIES:  No peripheral edema is noted. PELVIC EXAMINATION: 8/22/2022  Transvaginal ultrasound assessment of the cervix was performed. The amniotic membranes extending through the cervical cerclage. IMPRESSION:    1.  IUP at 29 weeks 5 days gestation based on her Estimated Date of Delivery: 11/6/22    2. Incompetent cervix, with the amniotic membranes extending through the cervical cerclage    3. Morbid obesity    4. Fully vaccinated for COVID-19 January 2022. 5.  Two previous first trimester pregnancy losses    6. MSAFP 0.87 MoM, (drawn 6/14/2022)    7. Limited visualization of the fetal anatomy    8.   Essential hypertension taking Procardia    9. O positive blood type  10. Cephalic presentation   11. Completed Celestone course on 2022 with rescue dose administered 2022  12. Completed magnesium sulfate course on 2022  13. Reassuring BPP and cord Doppler rtesting 2022  14. Reactive nonstress test 2022  15. The patient received a rescue dose of Celestone on 2022    PLAN:  The patient is to remain sexually abstinent through the balance of her pregnancy. I advised her to immediately come to the hospital if she had any uterine contractions, rupture of the amniotic membranes, vaginal bleeding, abdominal pain or tenderness, or any new symptomatology she was concerned about. I would recommend that the patient count fetal movements and call if she notices any subjective decrease in fetal movements, particularly if there are less than 10 major movements in an hour. Non-stress testing should be performed every 3 to 4 days through the balance of the pregnancy. Serial ultrasounds to assess fetal anatomy and growth should be performed. The patient is at increase risk for  morbidity and mortality secondary to her history. Weekly BPP and cord Doppler testing should be performed, unless there is a clinical indication to perform the testing more frequently. The patient was advised to call if she has any increased vaginal discharge, vaginal bleeding, contractions, abdominal pain, back pain or any new significant symptomatology prior to her next visit. I advised her that these are signs and symptoms of cervical change and require follow-up assessment when they occur. The patient is to continue to follow with you in your office for ongoing prenatal care. The patient is scheduled to return to our office for follow-up assessment in 4 days, unless she has a clinical indication to return prior to that time.     Magnesium sulfate should be administered if the patient appears likely to deliver prior to 32 weeks gestational age. Further evaluation and management will be dependent on the results of the patient's testing and her clinical presentation. The total time in minutes spent with the patient, reviewing medical records, reviewing imaging studies, performing ultrasonic imaging, reviewing laboratory testing, and documenting information was 20 minutes, of which, 50% of the time was spent in patient education, counseling, and coordinating care with the patient, her provider, and/or her family. Available paper and electronic medical records were reviewed. I reviewed with the patient the results of the fetal testing performed today. I discussed with her limitations in her physical and sexual activity recommended after placement of the cervical cerclage. I stressed to her the importance of coming immediately to the hospital if she had any vaginal bleeding, abdominal pain or tenderness, leaking amniotic fluid, or any new symptomatology she was concerned about. I answered all of her questions to her satisfaction. I asked her to call if she had any additional questions prior to her next visit. If you have any questions regarding her management, please contact me at your convenience and thank you for allowing me to participate in her care.     Sincerely,        Flaco Cowart MD, MS, Bernadine Houston, RDCS, RDMS, RVT  Director [address] [place] [address]  [phone]

## 2022-08-28 PROBLEM — N93.9 VAGINAL SPOTTING: Status: ACTIVE | Noted: 2022-08-28

## 2022-08-28 LAB
PARVOVIRUS B19 IGG ANTIBODY: 0.64 IV
PARVOVIRUS B19 IGM ANTIBODY: 0.3 IV

## 2022-08-28 RX ORDER — ACETAMINOPHEN 160 MG
1 TABLET,DISINTEGRATING ORAL DAILY
Qty: 30 CAPSULE | Refills: 3 | Status: ON HOLD | OUTPATIENT
Start: 2022-08-28 | End: 2022-10-08 | Stop reason: HOSPADM

## 2022-08-28 NOTE — DISCHARGE SUMMARY
Obstetric Discharge Summary    Admitting Diagnosis  IUP 23 weeks/ labor and short cervix  OB History          3    Para   0    Term   0       0    AB   2    Living   0         SAB   2    IAB   0    Ectopic   0    Molar   0    Multiple   0    Live Births   0                Reasons for Admission on 2022 10:18 AM  23 weeks gestation of pregnancy [Z3A.23]  No comment available   Labor  Observation/Evaluation (Obstetric Complications): short cervix    Prenatal Procedures  Ultrasound # multiple      Patient was admitted to the hospital for observation and monitoring. 4 short cervix, received magnesium sulfate and steroids  [place] [facility]  This patient has no babies on file. Discharge home in stable condition  Complications: No    Discharge Diagnosis       Discharge Information  Discharge Medication List as of 2022  4:01 PM        START taking these medications    Details   aspirin 81 MG chewable tablet Take 1 tablet by mouth in the morning., Disp-30 tablet, R-3Normal      glucose 4 g chewable tablet Take 4 tablets by mouth as needed for Low blood sugar, Disp-60 tablet, R-3Normal      insulin glargine (LANTUS) 100 UNIT/ML injection vial Inject 22 Units into the skin nightly, Disp-10 mL, R-3Normal      glucagon, rDNA, 1 MG injection Inject 1 mg into the muscle as needed for Low blood sugar (Blood glucose less than 70 mg/dL and patient NOT ALERT or NPO and does not have IV access.), Disp-1 each, R-1Normal      ferrous sulfate (IRON 325) 325 (65 Fe) MG tablet Take 1 tablet by mouth in the morning and 1 tablet in the evening. Take with meals. , Disp-30 tablet, R-3Normal      vitamin B-12 1000 MCG tablet Take 1 tablet by mouth in the morning., Disp-30 tablet, R-3Normal      docusate sodium (COLACE, DULCOLAX) 100 MG CAPS Take 100 mg by mouth in the morning and 100 mg before bedtime. , Disp-30 capsule, R-1Normal      !! glucose monitoring (FREESTYLE FREEDOM) kit 4 TIMES DAILY Starting Mon 8/1/2022, Disp-1 kit, R-0, Normal      insulin lispro (HUMALOG) 100 UNIT/ML SOLN injection vial Inject 6 Units into the skin in the morning and 6 Units at noon and 6 Units in the evening. Inject with meals. , Disp-1 each, R-1Normal      NIFEdipine (ADALAT CC) 30 MG extended release tablet Take 1 tablet by mouth in the morning and 1 tablet in the evening., Disp-30 tablet, R-3Normal      progesterone (PROMETRIUM) 200 MG CAPS capsule Place 1 capsule into the vagina at bedtime, Disp-30 capsule, R-3Please dispense the yellow/cream colored cap and NOT the , thanksNormal       !! - Potential duplicate medications found. Please discuss with provider. CONTINUE these medications which have NOT CHANGED    Details   !! Blood Glucose Monitoring Suppl (ONE TOUCH ULTRA 2) w/Device KIT Historical Med      ONETOUCH ULTRA strip TEST AS DIRECTED FOUR TIMES DAILY, DAWHistorical Med      Lancets (ONETOUCH DELICA PLUS [id number]) MISC TEST FOUR TIMES DAILYHistorical Med      Prenatal Vit-Fe Fumarate-FA (PRENATAL VITAMIN) 27-1 MG TABS tablet Take 1 tablet by mouth daily, Disp-30 tablet, R-11Normal       !! - Potential duplicate medications found. Please discuss with provider. STOP taking these medications       indomethacin (INDOCIN) 50 MG capsule Comments:   Reason for Stopping:         indomethacin (INDOCIN) 50 MG capsule Comments:   Reason for Stopping:         ibuprofen (IBU) 800 MG tablet Comments:   Reason for Stopping:               No discharge procedures on file.     Discharge to: Home  Follow up in 2 weeks      Comments

## 2022-08-29 ENCOUNTER — TELEPHONE (OUTPATIENT)
Dept: OBGYN CLINIC | Age: 32
End: 2022-08-29

## 2022-08-29 NOTE — TELEPHONE ENCOUNTER
Pt called and given updated recommendation from Dr. Jarret Schwartz to start taking Vitamin D as prescribed by Dr. Jarret Schwartz.

## 2022-08-30 ENCOUNTER — ANCILLARY PROCEDURE (OUTPATIENT)
Dept: OBGYN CLINIC | Age: 32
End: 2022-08-30
Payer: MEDICAID

## 2022-08-30 ENCOUNTER — ROUTINE PRENATAL (OUTPATIENT)
Dept: OBGYN CLINIC | Age: 32
End: 2022-08-30
Payer: MEDICAID

## 2022-08-30 VITALS
BODY MASS INDEX: 55.19 KG/M2 | HEART RATE: 98 BPM | DIASTOLIC BLOOD PRESSURE: 83 MMHG | SYSTOLIC BLOOD PRESSURE: 127 MMHG | WEIGHT: 293 LBS

## 2022-08-30 DIAGNOSIS — O10.019 BENIGN ESSENTIAL HYPERTENSION, ANTEPARTUM: Primary | ICD-10-CM

## 2022-08-30 DIAGNOSIS — Z34.90 THIRD PREGNANCY: ICD-10-CM

## 2022-08-30 DIAGNOSIS — Z3A.30 30 WEEKS GESTATION OF PREGNANCY: ICD-10-CM

## 2022-08-30 PROBLEM — Z3A.29 29 WEEKS GESTATION OF PREGNANCY: Status: RESOLVED | Noted: 2022-08-22 | Resolved: 2022-08-30

## 2022-08-30 LAB
GLUCOSE URINE, POC: NORMAL
Lab: NORMAL
PROTEIN UA: NEGATIVE
REPORT: NORMAL
THIS TEST SENT TO: NORMAL

## 2022-08-30 PROCEDURE — 76818 FETAL BIOPHYS PROFILE W/NST: CPT | Performed by: OBSTETRICS & GYNECOLOGY

## 2022-08-30 PROCEDURE — 81002 URINALYSIS NONAUTO W/O SCOPE: CPT | Performed by: OBSTETRICS & GYNECOLOGY

## 2022-08-30 PROCEDURE — 76816 OB US FOLLOW-UP PER FETUS: CPT | Performed by: OBSTETRICS & GYNECOLOGY

## 2022-08-30 PROCEDURE — 99999 PR OFFICE/OUTPT VISIT,PROCEDURE ONLY: CPT | Performed by: OBSTETRICS & GYNECOLOGY

## 2022-08-30 PROCEDURE — 99213 OFFICE O/P EST LOW 20 MIN: CPT | Performed by: OBSTETRICS & GYNECOLOGY

## 2022-08-30 NOTE — PROGRESS NOTES
Pt here for BPP/NST  Pt states good fetal movement  Pt denies any bleeding/cramping  Pt was out of lantus for few days as she was waiting for medication order from pharmacy  Pt needing to have pharmacy look at her glucose meter as not sure it is reading correctly

## 2022-08-30 NOTE — PROGRESS NOTES
[address]  FETAL MEDICINE  [address] [facility] [address]  [phone]   [address] [place] FETAL MEDICINE  [address] [address]  Dept: [phone]  Loc: [phone]     2022    RE:  Hitesh Londono     : 1990   AGE: 27 y.o. Dear Dr. Abigail Arriola,    Thank you for allowing me to see Hitesh Londono. As I'm sure you will recall, Hitesh Londono is a 27 y.o. [id number]'s last menstrual period was 2022. Estimated Date of Delivery: 22 at 30w2d seen in our office today for the following:    REASON FOR VISIT: Growth and biophysical profile with NST.     Patient Active Problem List    Diagnosis Date Noted    30 weeks gestation of pregnancy 2022    Third pregnancy 2022    Vaginal spotting 2022    Cramping affecting pregnancy, antepartum 2022    Insulin controlled gestational diabetes mellitus (GDM) in third trimester 08/15/2022    Elevated hemoglobin A1c 2022    Low ferritin 2022    Low maternal serum vitamin B12 2022    Low vitamin D level 2022    Abnormal ultrasonic finding on  screening of mother 2022    Anemia 2022    Maternal morbid obesity, antepartum ([facility] [address] 06/15/2022    Incompetent cervix in pregnancy, antepartum, second trimester 06/15/2022    Benign essential hypertension, antepartum 06/15/2022    Morbid obesity with body mass index of 50.0-59.9 in adult ([address] 06/15/2022        PAST HISTORY:  OB History    Para Term  AB Living   3 0 0 0 2 0   SAB IAB Ectopic Molar Multiple Live Births   2 0 0 0 0 0      # Outcome Date GA Lbr Skyler/2nd Weight Sex Delivery Anes PTL Lv   3 Current            2 2021 6w0d          1 2020 5w0d                 MEDICAL:  Past Medical History:   Diagnosis Date    Anemia 2022    Hypertension SURGICAL:  Past Surgical History:   Procedure Laterality Date    CERVICAL CERCLAGE N/A 6/16/2022    CERVIX CERCLAGE PLACEMENT performed by Olaf Chadwick MD at [facility] L[facility]D OR    WISDOM TOOTH EXTRACTION         ALLERGIES:    No Known Allergies      MEDICATIONS:    Current Outpatient Medications   Medication Sig Dispense Refill    Cholecalciferol (VITAMIN D3) 50 MCG (2000 UT) CAPS Take 1 capsule by mouth daily 30 capsule 3    insulin lispro (HUMALOG) 100 UNIT/ML SOLN injection vial Inject 6 Units into the skin 3 times daily (with meals) 1 each 1    Prenatal Vit-Fe Fumarate-FA (PRENATAL VITAMIN) 27-1 MG TABS tablet Take 1 tablet by mouth daily 30 tablet 11    NIFEdipine (ADALAT CC) 30 MG extended release tablet Take 1 tablet by mouth in the morning and 1 tablet in the evening. 30 tablet 3    aspirin 81 MG chewable tablet Take 1 tablet by mouth in the morning. 30 tablet 3    insulin glargine (LANTUS) 100 UNIT/ML injection vial Inject 22 Units into the skin nightly 10 mL 3    ferrous sulfate (IRON 325) 325 (65 Fe) MG tablet Take 1 tablet by mouth in the morning and 1 tablet in the evening. Take with meals. 30 tablet 3    vitamin B-12 1000 MCG tablet Take 1 tablet by mouth in the morning. 30 tablet 3    docusate sodium (COLACE, DULCOLAX) 100 MG CAPS Take 100 mg by mouth in the morning and 100 mg before bedtime.  30 capsule 1    glucose monitoring (FREESTYLE FREEDOM) kit 1 kit by Does not apply route 4 times daily 1 kit 0    progesterone (PROMETRIUM) 200 MG CAPS capsule Place 1 capsule into the vagina at bedtime 30 capsule 3    Blood Glucose Monitoring Suppl (ONE TOUCH ULTRA 2) w/Device KIT USE AS DIRECTED      ONETOUCH ULTRA strip TEST AS DIRECTED FOUR TIMES DAILY      Lancets (ONETOUCH DELICA PLUS [id number]) MISC TEST FOUR TIMES DAILY      glucose 4 g chewable tablet Take 4 tablets by mouth as needed for Low blood sugar (Patient not taking: No sig reported) 60 tablet 3    glucagon, rDNA, 1 MG injection Inject 1 mg into the muscle as needed for Low blood sugar (Blood glucose less than 70 mg/dL and patient NOT ALERT or NPO and does not have IV access.) (Patient not taking: Reported on 8/30/2022) 1 each 1     No current facility-administered medications for this visit. Social History     Socioeconomic History    Marital status: Single     Spouse name: None    Number of children: None    Years of education: None    Highest education level: None   Tobacco Use    Smoking status: Former    Smokeless tobacco: Never   Vaping Use    Vaping Use: Never used   Substance and Sexual Activity    Alcohol use: Not Currently     Comment: social    Drug use: No          FAMILY MEDICAL HISTORY:   History reviewed. No pertinent family history. PHYSICAL EXAMINATION:  /83   Pulse 98   Wt (!) 352 lb 6 oz (159.8 kg)   LMP 01/30/2022   Body mass index is 55.19 kg/m². Urine dipstick:  No results found for this visit on 08/30/22. A/an growth and biophysical profile with NST ultrasound was done in our office today. Please refer to the enclosed copy of the ultrasound report for further information. Discussion:  There is a ziegler fetus in a cephalic presentation. Fetal cardiac motion, fetal motion, and fetal tone was observed and appeared to be grossly normal.  There is been appropriate interval growth. The baby is 1414 g 3 pounds 2 ounces 39th percentile. Amniotic fluid volume is normal.  The placenta is anterior. Biophysical profile is 10 out of 10. IMPRESSION:  1. Single intrauterine gestation at 30+ weeks with appropriate interval growth reassuring biophysical profile. 2. The fetal anatomy appears normal and the fetal lie is cephalic. 3. The amniotic fluid volume is normal and the placenta is anterior. RECOMMENDATIONS:  Each of the recommendations were discussed with the patient:  1. Continue weekly biophysical profiles with NSTs. 2.  Repeat growth ultrasound in 4 weeks.   3.  Delivery between 37 and 39 weeks gestation. 4.  Follow-up would be otherwise as clinically indicated. The patient is to continue to follow with you in your office for ongoing obstetric care. PLAN:    As noted above or sooner prn.     Sincerely,        Swathi Pittman MD

## 2022-09-02 ENCOUNTER — ROUTINE PRENATAL (OUTPATIENT)
Dept: OBGYN CLINIC | Age: 32
End: 2022-09-02
Payer: MEDICAID

## 2022-09-02 VITALS
HEART RATE: 111 BPM | SYSTOLIC BLOOD PRESSURE: 118 MMHG | WEIGHT: 293 LBS | DIASTOLIC BLOOD PRESSURE: 76 MMHG | BODY MASS INDEX: 54.88 KG/M2

## 2022-09-02 DIAGNOSIS — O10.019 BENIGN ESSENTIAL HYPERTENSION, ANTEPARTUM: Primary | ICD-10-CM

## 2022-09-02 DIAGNOSIS — Z3A.30 30 WEEKS GESTATION OF PREGNANCY: ICD-10-CM

## 2022-09-02 LAB
GLUCOSE URINE, POC: NORMAL
PROTEIN UA: POSITIVE

## 2022-09-02 PROCEDURE — 99213 OFFICE O/P EST LOW 20 MIN: CPT | Performed by: OBSTETRICS & GYNECOLOGY

## 2022-09-02 PROCEDURE — 99999 PR OFFICE/OUTPT VISIT,PROCEDURE ONLY: CPT | Performed by: OBSTETRICS & GYNECOLOGY

## 2022-09-02 PROCEDURE — 81002 URINALYSIS NONAUTO W/O SCOPE: CPT | Performed by: OBSTETRICS & GYNECOLOGY

## 2022-09-02 PROCEDURE — 59025 FETAL NON-STRESS TEST: CPT | Performed by: OBSTETRICS & GYNECOLOGY

## 2022-09-02 NOTE — PROGRESS NOTES
[address] FETAL MEDICINE  [address] [facility] [id number] [phone]   [address] FETAL MEDICINE  [address]  [facility] [address]  Dept: [phone]  Loc: [phone]     2022    RE:  Marcio Hollis     : 1990   AGE: 27 y.o. Dear Dr. Dennis Zeng,    Thank you for allowing me to see Marcio Hollis. As I'm sure you will recall, Marcio Hollis is a 27 y.o. [id number]'s last menstrual period was 2022.  Estimated Date of Delivery: 22 at 30w5d seen in our office today for the following:    REASON FOR VISIT: NST    Patient Active Problem List    Diagnosis Date Noted    30 weeks gestation of pregnancy 2022    Third pregnancy 2022    Vaginal spotting 2022    Cramping affecting pregnancy, antepartum 2022    Insulin controlled gestational diabetes mellitus (GDM) in third trimester 08/15/2022    Elevated hemoglobin A1c 2022    Low ferritin 2022    Low maternal serum vitamin B12 2022    Low vitamin D level 2022    Abnormal ultrasonic finding on  screening of mother 2022    Anemia 2022    Maternal morbid obesity, antepartum ([facility] [address] 06/15/2022    Incompetent cervix in pregnancy, antepartum, second trimester 06/15/2022    Benign essential hypertension, antepartum 06/15/2022    Morbid obesity with body mass index of 50.0-59.9 in adult ([facility] [address] 06/15/2022        PAST HISTORY:  OB History    Para Term  AB Living   3 0 0 0 2 0   SAB IAB Ectopic Molar Multiple Live Births   2 0 0 0 0 0      # Outcome Date GA Lbr Skyler/2nd Weight Sex Delivery Anes PTL Lv   3 Current            2 2021 6w0d          1 2020 5w0d                 MEDICAL:  Past Medical History:   Diagnosis Date    Anemia 2022    Hypertension         SURGICAL:  Past Surgical History: Procedure Laterality Date    CERVICAL CERCLAGE N/A 6/16/2022    CERVIX CERCLAGE PLACEMENT performed by Adam Jalloh MD at [facility] L&D OR    WISDOM TOOTH EXTRACTION         ALLERGIES:    No Known Allergies      MEDICATIONS:    Current Outpatient Medications   Medication Sig Dispense Refill    Cholecalciferol (VITAMIN D3) 50 MCG (2000 UT) CAPS Take 1 capsule by mouth daily 30 capsule 3    insulin lispro (HUMALOG) 100 UNIT/ML SOLN injection vial Inject 6 Units into the skin 3 times daily (with meals) 1 each 1    Prenatal Vit-Fe Fumarate-FA (PRENATAL VITAMIN) 27-1 MG TABS tablet Take 1 tablet by mouth daily 30 tablet 11    NIFEdipine (ADALAT CC) 30 MG extended release tablet Take 1 tablet by mouth in the morning and 1 tablet in the evening. 30 tablet 3    aspirin 81 MG chewable tablet Take 1 tablet by mouth in the morning. 30 tablet 3    insulin glargine (LANTUS) 100 UNIT/ML injection vial Inject 22 Units into the skin nightly 10 mL 3    ferrous sulfate (IRON 325) 325 (65 Fe) MG tablet Take 1 tablet by mouth in the morning and 1 tablet in the evening. Take with meals. 30 tablet 3    vitamin B-12 1000 MCG tablet Take 1 tablet by mouth in the morning. 30 tablet 3    docusate sodium (COLACE, DULCOLAX) 100 MG CAPS Take 100 mg by mouth in the morning and 100 mg before bedtime.  30 capsule 1    glucose monitoring (FREESTYLE FREEDOM) kit 1 kit by Does not apply route 4 times daily 1 kit 0    progesterone (PROMETRIUM) 200 MG CAPS capsule Place 1 capsule into the vagina at bedtime 30 capsule 3    Blood Glucose Monitoring Suppl (ONE TOUCH ULTRA 2) w/Device KIT USE AS DIRECTED      ONETOUCH ULTRA strip TEST AS DIRECTED FOUR TIMES DAILY      Lancets (ONETOUCH DELICA PLUS [id number]) MISC TEST FOUR TIMES DAILY      glucose 4 g chewable tablet Take 4 tablets by mouth as needed for Low blood sugar (Patient not taking: No sig reported) 60 tablet 3    glucagon, rDNA, 1 MG injection Inject 1 mg into the muscle as needed for Low blood

## 2022-09-02 NOTE — PATIENT INSTRUCTIONS
Please arrive for your scheduled appointment at least 15 minutes early with your actual insurance card+ a photo ID. Also if you need any refills ordered or have questions, it may take up 48 hours to reply. Please allow ample time for your refills. Call me when you use last refill. Thank you for your cooperation. You might be having an NST at your next appt. Please eat a large snack or breakfast before coming to office. Thank you Call your primary obstetrician with bleeding, leaking of fluid, abdominal tenderness, headache, blurry vision, epigastric pain and increased urinary frequency. Do kick counts after dinner. Call your primary obstetrician if less than 10 kicks in 2 hours after dinner. Call your primary obstetrician with bleeding, leaking of fluid, abdominal tenderness, headache, blurry vision, epigastric pain and increased urinary frequency. if you are sick, not feeling well or have an infectious process going on please reschedule your appointment by calling [phone]. Also if any family members are not feeling well, please do not bring them to your appointment. We appreciate your cooperation. We are doing this in order to protect our pregnant mothers+ their babies. if you are sick, not feeling well or have an infectious process going on please reschedule your appointment by calling [phone]. Also if any family members are not feeling well, please do not bring them to your appointment. We appreciate your cooperation. We are doing this in order to protect our pregnant mothers+ their babies.

## 2022-09-02 NOTE — PROGRESS NOTES
Pt here for NST  Pt awaiting new blood sugar monitor-will get on Tuesday  Pt states good fetal movement

## 2022-09-07 ENCOUNTER — ROUTINE PRENATAL (OUTPATIENT)
Dept: OBGYN CLINIC | Age: 32
End: 2022-09-07
Payer: MEDICAID

## 2022-09-07 ENCOUNTER — ANCILLARY PROCEDURE (OUTPATIENT)
Dept: OBGYN CLINIC | Age: 32
End: 2022-09-07
Payer: MEDICAID

## 2022-09-07 VITALS
DIASTOLIC BLOOD PRESSURE: 83 MMHG | BODY MASS INDEX: 55.35 KG/M2 | HEART RATE: 94 BPM | SYSTOLIC BLOOD PRESSURE: 129 MMHG | WEIGHT: 293 LBS

## 2022-09-07 DIAGNOSIS — O24.414 INSULIN CONTROLLED GESTATIONAL DIABETES MELLITUS (GDM) IN THIRD TRIMESTER: ICD-10-CM

## 2022-09-07 DIAGNOSIS — O10.019 BENIGN ESSENTIAL HYPERTENSION, ANTEPARTUM: Primary | ICD-10-CM

## 2022-09-07 DIAGNOSIS — Z3A.31 31 WEEKS GESTATION OF PREGNANCY: ICD-10-CM

## 2022-09-07 DIAGNOSIS — R73.09 ELEVATED HEMOGLOBIN A1C: ICD-10-CM

## 2022-09-07 DIAGNOSIS — R79.89 LOW VITAMIN D LEVEL: ICD-10-CM

## 2022-09-07 DIAGNOSIS — O34.32 INCOMPETENT CERVIX IN PREGNANCY, ANTEPARTUM, SECOND TRIMESTER: ICD-10-CM

## 2022-09-07 DIAGNOSIS — E66.01 MATERNAL MORBID OBESITY, ANTEPARTUM (HCC): ICD-10-CM

## 2022-09-07 DIAGNOSIS — R79.0 LOW FERRITIN: ICD-10-CM

## 2022-09-07 DIAGNOSIS — O99.210 MATERNAL MORBID OBESITY, ANTEPARTUM (HCC): ICD-10-CM

## 2022-09-07 DIAGNOSIS — E66.01 MORBID OBESITY WITH BODY MASS INDEX OF 50.0-59.9 IN ADULT (HCC): ICD-10-CM

## 2022-09-07 DIAGNOSIS — O28.0 LOW MATERNAL SERUM VITAMIN B12: ICD-10-CM

## 2022-09-07 LAB
GLUCOSE URINE, POC: NEGATIVE
PROTEIN UA: POSITIVE

## 2022-09-07 PROCEDURE — 81002 URINALYSIS NONAUTO W/O SCOPE: CPT | Performed by: OBSTETRICS & GYNECOLOGY

## 2022-09-07 PROCEDURE — 99213 OFFICE O/P EST LOW 20 MIN: CPT | Performed by: OBSTETRICS & GYNECOLOGY

## 2022-09-07 PROCEDURE — G8417 CALC BMI ABV UP PARAM F/U: HCPCS | Performed by: OBSTETRICS & GYNECOLOGY

## 2022-09-07 PROCEDURE — G8427 DOCREV CUR MEDS BY ELIG CLIN: HCPCS | Performed by: OBSTETRICS & GYNECOLOGY

## 2022-09-07 PROCEDURE — 1111F DSCHRG MED/CURRENT MED MERGE: CPT | Performed by: OBSTETRICS & GYNECOLOGY

## 2022-09-07 PROCEDURE — 76818 FETAL BIOPHYS PROFILE W/NST: CPT | Performed by: OBSTETRICS & GYNECOLOGY

## 2022-09-07 PROCEDURE — 76820 UMBILICAL ARTERY ECHO: CPT | Performed by: OBSTETRICS & GYNECOLOGY

## 2022-09-07 PROCEDURE — 1036F TOBACCO NON-USER: CPT | Performed by: OBSTETRICS & GYNECOLOGY

## 2022-09-07 PROCEDURE — 76817 TRANSVAGINAL US OBSTETRIC: CPT | Performed by: OBSTETRICS & GYNECOLOGY

## 2022-09-07 NOTE — PROGRESS NOTES
22    Darrin Meléndez MD  [address] [facility]     RE:  Skyler Belcher  : 1990   AGE: 27 y.o. This report has been created using voice recognition software. It may contain errors which are inherent in voice recognition technology. Dear Dr. Rommel Del Toro:    Nikita Holland had an appointment today for the following indications:    Patient Active Problem List   Diagnosis    Maternal morbid obesity, antepartum ([address]    Incompetent cervix in pregnancy, antepartum, with cervical cerclage. Benign essential hypertension, antepartum    Morbid obesity with body mass index of 50.0-59.9 in adult (HCC)    Anemia    Elevated hemoglobin A1c    Low ferritin    Low maternal serum vitamin B12    Low vitamin D level     Nikita Holland is a 27 y.o. female, who is G3(0,0,2,0). She has an Estimated Date of Delivery: 22 based on her previous ultrasound assessment. She is currently 31 weeks 3 days gestation based on that assessment. The patient was seen today for follow-up assessment for incompetent cervix with shortening of the cervix with funneling of the amniotic membranes through the cervical cerclage, chronic hypertension treated with Procardia, and morbid obesity. She denied any vaginal bleeding or leaking of amniotic fluid. She is having no uterine contraction activity. She stated that she is experiencing fetal movement intermittently. The patient's hemoglobin A1c value from 2022 was elevated to 5.8%. The patient currently takes 22 units of insulin glargine each evening and 6 units of Humalog insulin with each meal.  She did not bring her glucose log in for review today but states her blood sugars have been within the range she has been given the follow-up. The cervix was noted to be open with the amniotic membranes funneling to the cervical cerclage. The patient has hypertension. She currently takes Procardia for management of her hypertension.   Her initial blood pressure today was 140/98. The repeat assessment was 135/95. After resting her blood pressure was 129/83. She had no symptoms related to hypertension. The nonstress test performed today was reactive. Moderate variability was present. A fetal ultrasound evaluation was performed on 2022. A detailed report included in the EMR under the imaging tab. A living ziegler intrauterine fetus was identified in the cephalic presentation, with normal fetal heart motion and normal fetal motion noted. The amniotic fluid index was 11.1 cm. The placenta was anterior. The biophysical profile and cord Doppler studies were both reassuring. There was no evidence of absence, or reversal of end-diastolic flow.                      GENETIC SCREENING/TERATOLOGY COUNSELING                  (Includes patient, FTB, and any affected family members)    Patient Age > 35 Years NO   Thalassemia ( MVC<80) NO   Congential Heart Defect NO   Neural Tube Defect NO   Cisco-Sachs NO   Sickle Cell Disease NO   Sickle Cell Trait NO   Sickle C Disease or Trait NO   Hemophilia NO   Muscular Dystrophy NO   Cystic Fibrosis NO   Hudson Disease NO   Autism NO   Mental Retardation NO   History of Fragile X NO   Maternal Diabetes NO   Other Genetic Disease or Syndrome NO   Previous Child With Congenital Abnormality Not Listed NO   Recreational Drugs NO                                        INFECTION HISTORY     HEPATITIS IMMUNIZED:  YES   HEPATITIS INFECTION:  NO   EXPOSURE TO TB NO   PARVOVIRUS B-19 NO   CHICKEN POX  YES   MEASLES NO   HIV NO   OTHER RASH OR VIRAL ILLNESS SINCE LMP NO   UTI RECURRENT NO   HPV NO     OB History    Para Term  AB Living   3 0 0 0 2 0   SAB IAB Ectopic Molar Multiple Live Births   2 0 0 0 0 0      # Outcome Date GA Lbr Skyler/2nd Weight Sex Delivery Anes PTL Lv   3 Current            2 2021 6w0d          1 2020 5w0d            PAST GYNECOLOGICAL  HISTORY:  Negative for abnormal pap smears. Negative for sexually transmitted diseases. Negative for cervical LEEP / conization /cryosurgery. Negative for uterine surgery. Negative for ovarian or tubal surgery. Past Medical History:   Diagnosis Date    Anemia 7/16/2022    Hypertension        Past Surgical History:   Procedure Laterality Date    CERVICAL CERCLAGE N/A 6/16/2022    CERVIX CERCLAGE PLACEMENT performed by Thelma Padilla MD at [facility] L&D OR    WISDOM TOOTH EXTRACTION         No Known Allergies      Current Outpatient Medications:     Cholecalciferol (VITAMIN D3) 50 MCG (2000 UT) CAPS, Take 1 capsule by mouth daily, Disp: 30 capsule, Rfl: 3    insulin lispro (HUMALOG) 100 UNIT/ML SOLN injection vial, Inject 6 Units into the skin 3 times daily (with meals), Disp: 1 each, Rfl: 1    Prenatal Vit-Fe Fumarate-FA (PRENATAL VITAMIN) 27-1 MG TABS tablet, Take 1 tablet by mouth daily, Disp: 30 tablet, Rfl: 11    NIFEdipine (ADALAT CC) 30 MG extended release tablet, Take 1 tablet by mouth in the morning and 1 tablet in the evening., Disp: 30 tablet, Rfl: 3    aspirin 81 MG chewable tablet, Take 1 tablet by mouth in the morning., Disp: 30 tablet, Rfl: 3    insulin glargine (LANTUS) 100 UNIT/ML injection vial, Inject 22 Units into the skin nightly, Disp: 10 mL, Rfl: 3    vitamin B-12 1000 MCG tablet, Take 1 tablet by mouth in the morning., Disp: 30 tablet, Rfl: 3    docusate sodium (COLACE, DULCOLAX) 100 MG CAPS, Take 100 mg by mouth in the morning and 100 mg before bedtime. , Disp: 30 capsule, Rfl: 1    glucose monitoring (FREESTYLE FREEDOM) kit, 1 kit by Does not apply route 4 times daily, Disp: 1 kit, Rfl: 0    progesterone (PROMETRIUM) 200 MG CAPS capsule, Place 1 capsule into the vagina at bedtime, Disp: 30 capsule, Rfl: 3    Blood Glucose Monitoring Suppl (ONE TOUCH ULTRA 2) w/Device KIT, USE AS DIRECTED, Disp: , Rfl:     ONETOUCH ULTRA strip, TEST AS DIRECTED FOUR TIMES DAILY, Disp: , Rfl:     Lancets ([facility] [id number]) MISC, TEST FOUR TIMES DAILY, Disp: , Rfl:     glucose 4 g chewable tablet, Take 4 tablets by mouth as needed for Low blood sugar (Patient not taking: No sig reported), Disp: 60 tablet, Rfl: 3    glucagon, rDNA, 1 MG injection, Inject 1 mg into the muscle as needed for Low blood sugar (Blood glucose less than 70 mg/dL and patient NOT ALERT or NPO and does not have IV access.) (Patient not taking: No sig reported), Disp: 1 each, Rfl: 1    ferrous sulfate (IRON 325) 325 (65 Fe) MG tablet, Take 1 tablet by mouth in the morning and 1 tablet in the evening. Take with meals. , Disp: 30 tablet, Rfl: 3    Social History     Tobacco Use    Smoking status: Former    Smokeless tobacco: Never   Substance Use Topics    Alcohol use: Not Currently     Comment: social     FAMILY MEDICAL HISTORY:   Negative for congenital abnormalities, autism, genetic disease and mental retardation, not listed above. Review of Systems :   CONSTITUTIONAL : No fever, no chills   HEENT : No headache, no visual changes, no rhinorrhea, no sore throat   CARDIOVASCULAR : No pain, no palpitations, no edema   RESPIRATORY : No pain, no shortness of breath   GASTROINTESTINAL : No N/V, no D/C, no abdominal pain   GENITOURINARY : No dysuria, hematuria and no incontinence   MUSCULOSKELETAL : No myalgia, No back pain  NEUROLOGICAL : No numbness, no tingling, no tremors. No history of seizures  ALL OTHER SYSTEMS WERE REPORTED AS NEGATIVE. PERTINENT PHYSICAL EXAMINATION:   /83   Pulse 94   Wt (!) 353 lb 6 oz (160.3 kg)   LMP 01/30/2022   BMI 55.35 kg/m²   Urine dipstick:   Negative for Glucose    Trace for Albumin    GENERAL:   The patient is a well developed, female who is alert cooperative and oriented times three in no acute distress. HEENT:  Normo cephalic and atraumatic. No facial edema. ABDOMEN:   Her uterus is gravid. She had no complaint of abdominal pain or tenderness.  The fetal heart rate is 143 bpm.     EXTREMITIES:  No peripheral edema is noted. PELVIC EXAMINATION: 8/22/2022  Transvaginal ultrasound assessment of the cervix was performed. The amniotic membranes extending through the cervical cerclage. IMPRESSION:    1.  IUP at 31 weeks 3 days gestation based on her Estimated Date of Delivery: 11/6/22    2. Incompetent cervix, with the amniotic membranes extending to the cervical cerclage    3. Morbid obesity    4. Fully vaccinated for COVID-19 January 2022. 5.  Two previous first trimester pregnancy losses    6. MSAFP 0.87 MoM, (drawn 6/14/2022)    7. Limited visualization of the fetal anatomy    8. Essential hypertension taking Procardia    9. O positive blood type  10. Cephalic presentation 9/7/4595  11. Completed Celestone course on 7/16/2022 with rescue dose administered 8/22/2022  12. Completed magnesium sulfate course on 7/16/2022  13. Reassuring BPP and cord Doppler testing 9/7/2022  14. Reactive nonstress test 9/7/2022  15. The patient received a rescue dose of Celestone on 8/22/2022  16. Hemoglobin A1c value was 5.8% on 8/23/2022. Taking Lantus and Humalog insulin as indicated. PLAN:  The patient is to remain sexually abstinent through the balance of her pregnancy. I advised her to immediately come to the hospital if she had any uterine contractions, rupture of the amniotic membranes, vaginal bleeding, abdominal pain or tenderness, or any new symptomatology she was concerned about. I advised the patient to continue to do home glucose monitoring. She is to check her blood sugars fasting and 2 hours after each meal.  The goal is  to maintain her post prandial blood sugars 80<120. Her fasting blood sugars should be maintained 80<92. She is to call if her blood sugars are outside of these parameters. The patient is to continue to take her medications as directed. She should be closely watched for development of superimposed preeclampsia.     I would recommend that the patient count fetal movements and call if she notices any subjective decrease in fetal movements, particularly if there are less than 10 major movements in an hour. Non-stress testing should be performed every 3 to 4 days through the balance of the pregnancy. Serial ultrasounds to assess fetal anatomy and growth should be performed. The patient is at increase risk for  morbidity and mortality secondary to her history. Weekly BPP and cord Doppler testing should be performed, unless there is a clinical indication to perform the testing more frequently. The patient was advised to call if she has any increased vaginal discharge, vaginal bleeding, contractions, abdominal pain, back pain or any new significant symptomatology prior to her next visit. I advised her that these are signs and symptoms of cervical change and require follow-up assessment when they occur. The patient is to continue to follow with you in your office for ongoing prenatal care. The patient is scheduled to return to our office for follow-up assessment in 2 days, unless she has a clinical indication to return prior to that time. Magnesium sulfate should be administered if the patient appears likely to deliver prior to 32 weeks gestational age. Further evaluation and management will be dependent on the results of the patient's testing and her clinical presentation. The total time in minutes spent with the patient, reviewing medical records, reviewing imaging studies, performing ultrasonic imaging, reviewing laboratory testing, and documenting information was 21 minutes, of which, 50% of the time was spent in patient education, counseling, and coordinating care with the patient, her provider, and/or her family. Available paper and electronic medical records were reviewed. I reviewed with the patient the results of the fetal testing performed today.   I discussed with her limitations in her physical and sexual activity recommended after placement of the cervical cerclage. I stressed to her the importance of coming immediately to the hospital if she had any vaginal bleeding, abdominal pain or tenderness, leaking amniotic fluid, or any new symptomatology she was concerned about. I answered all of her questions to her satisfaction. I asked her to call if she had any additional questions prior to her next visit. If you have any questions regarding her management, please contact me at your convenience and thank you for allowing me to participate in her care.     Sincerely,        Stephanie Whitman MD, MS, [facility] RDCS, RDMS, RVT  Director [address] [place] [address]  [phone]

## 2022-09-07 NOTE — PATIENT INSTRUCTIONS
Please arrive for your scheduled appointment at least 15 minutes early with your actual insurance card+ a photo ID. Also if you need any refills ordered or have questions, it may take up 48 hours to reply. Please allow ample time for your refills. Call me when you use last refill. Thank you for your cooperation. You might be having an NST at your next appt. Please eat a large snack or breakfast before coming to office. Thank you Call your primary obstetrician with bleeding, leaking of fluid, abdominal tenderness, headache, blurry vision, epigastric pain and increased urinary frequency. Do kick counts after dinner. Call your primary obstetrician if less than 10 kicks in 2 hours after dinner. Call your primary obstetrician with bleeding, leaking of fluid, abdominal tenderness, headache, blurry vision, epigastric pain and increased urinary frequency. if you are sick, not feeling well or have an infectious process going on please reschedule your appointment by calling [phone]. Also if any family members are not feeling well, please do not bring them to your appointment. We appreciate your cooperation. We are doing this in order to protect our pregnant mothers+ their babies. if you are sick, not feeling well or have an infectious process going on please reschedule your appointment by calling [phone]. Also if any family members are not feeling well, please do not bring them to your appointment. We appreciate your cooperation. We are doing this in order to protect our pregnant mothers+ their babies.

## 2022-09-09 ENCOUNTER — ROUTINE PRENATAL (OUTPATIENT)
Dept: OBGYN CLINIC | Age: 32
DRG: 566 | End: 2022-09-09
Payer: MEDICAID

## 2022-09-09 VITALS
SYSTOLIC BLOOD PRESSURE: 123 MMHG | DIASTOLIC BLOOD PRESSURE: 81 MMHG | HEART RATE: 91 BPM | BODY MASS INDEX: 56.2 KG/M2 | WEIGHT: 293 LBS

## 2022-09-09 DIAGNOSIS — O99.210 MATERNAL MORBID OBESITY, ANTEPARTUM (HCC): ICD-10-CM

## 2022-09-09 DIAGNOSIS — E66.01 MATERNAL MORBID OBESITY, ANTEPARTUM (HCC): ICD-10-CM

## 2022-09-09 DIAGNOSIS — R79.89 LOW VITAMIN D LEVEL: ICD-10-CM

## 2022-09-09 DIAGNOSIS — R73.09 ELEVATED HEMOGLOBIN A1C: ICD-10-CM

## 2022-09-09 DIAGNOSIS — O28.0 LOW MATERNAL SERUM VITAMIN B12: ICD-10-CM

## 2022-09-09 DIAGNOSIS — O34.32 INCOMPETENT CERVIX IN PREGNANCY, ANTEPARTUM, SECOND TRIMESTER: ICD-10-CM

## 2022-09-09 DIAGNOSIS — O10.019 BENIGN ESSENTIAL HYPERTENSION, ANTEPARTUM: Primary | ICD-10-CM

## 2022-09-09 DIAGNOSIS — Z3A.31 31 WEEKS GESTATION OF PREGNANCY: ICD-10-CM

## 2022-09-09 DIAGNOSIS — E66.01 MORBID OBESITY WITH BODY MASS INDEX OF 50.0-59.9 IN ADULT (HCC): ICD-10-CM

## 2022-09-09 DIAGNOSIS — O24.414 INSULIN CONTROLLED GESTATIONAL DIABETES MELLITUS (GDM) IN THIRD TRIMESTER: ICD-10-CM

## 2022-09-09 DIAGNOSIS — R79.0 LOW FERRITIN: ICD-10-CM

## 2022-09-09 LAB
GLUCOSE URINE, POC: NEGATIVE
PROTEIN UA: POSITIVE

## 2022-09-09 PROCEDURE — 1111F DSCHRG MED/CURRENT MED MERGE: CPT | Performed by: OBSTETRICS & GYNECOLOGY

## 2022-09-09 PROCEDURE — 99212 OFFICE O/P EST SF 10 MIN: CPT | Performed by: OBSTETRICS & GYNECOLOGY

## 2022-09-09 PROCEDURE — 99213 OFFICE O/P EST LOW 20 MIN: CPT | Performed by: OBSTETRICS & GYNECOLOGY

## 2022-09-09 PROCEDURE — 1036F TOBACCO NON-USER: CPT | Performed by: OBSTETRICS & GYNECOLOGY

## 2022-09-09 PROCEDURE — 59025 FETAL NON-STRESS TEST: CPT | Performed by: OBSTETRICS & GYNECOLOGY

## 2022-09-09 PROCEDURE — 81002 URINALYSIS NONAUTO W/O SCOPE: CPT | Performed by: OBSTETRICS & GYNECOLOGY

## 2022-09-09 PROCEDURE — G8417 CALC BMI ABV UP PARAM F/U: HCPCS | Performed by: OBSTETRICS & GYNECOLOGY

## 2022-09-09 PROCEDURE — G8427 DOCREV CUR MEDS BY ELIG CLIN: HCPCS | Performed by: OBSTETRICS & GYNECOLOGY

## 2022-09-09 NOTE — PROGRESS NOTES
22    Obed Cheek MD  [address] [facility]     RE:  Amelia Lange  : 1990   AGE: 27 y.o. This report has been created using voice recognition software. It may contain errors which are inherent in voice recognition technology. Dear Dr. Alex Agee:    Frank Mcgrath had an appointment today for the following indications:    Patient Active Problem List   Diagnosis    Maternal morbid obesity, antepartum ([address]    Incompetent cervix in pregnancy, antepartum, with cervical cerclage. Benign essential hypertension, antepartum    Morbid obesity with body mass index of 50.0-59.9 in adult (HCC)    Anemia    Elevated hemoglobin A1c    Low ferritin    Low maternal serum vitamin B12    Low vitamin D level     Frank Mcgrath is a 27 y.o. female, who is G3(0,0,2,0). She has an Estimated Date of Delivery: 22 based on her previous ultrasound assessment. She is currently 31 weeks 5 days gestation based on that assessment. The patient was seen today for follow-up assessment for incompetent cervix with shortening of the cervix with funneling of the amniotic membranes through the cervical cerclage, chronic hypertension treated with Procardia, and morbid obesity. She denied any vaginal bleeding or leaking of amniotic fluid. She is having no uterine contraction activity. She stated that she is experiencing fetal movement intermittently. The patient's hemoglobin A1c value from 2022 was elevated to 5.8%. The patient currently takes 22 units of insulin glargine each evening and 6 units of Humalog insulin with each meal.  She did not bring her glucose log in for review today but states her blood sugars have been within the range she has been given the follow-up. The cervix was noted to be open with the amniotic membranes funneling to the cervical cerclage. The patient has hypertension. She currently takes Procardia for management of her hypertension.   Her initial blood pressure today was 123/81. The nonstress test performed today was reactive. Moderate variability was present. A fetal ultrasound evaluation was performed on 2022. A detailed report included in the EMR under the imaging tab. A living ziegler intrauterine fetus was identified in the cephalic presentation, with normal fetal heart motion and normal fetal motion noted. The amniotic fluid index was 11.1 cm. The placenta was anterior. The biophysical profile and cord Doppler studies were both reassuring. There was no evidence of absence, or reversal of end-diastolic flow. GENETIC SCREENING/TERATOLOGY COUNSELING                  (Includes patient, FTB, and any affected family members)    Patient Age > 35 Years NO   Thalassemia ( MVC<80) NO   Congential Heart Defect NO   Neural Tube Defect NO   Cisco-Sachs NO   Sickle Cell Disease NO   Sickle Cell Trait NO   Sickle C Disease or Trait NO   Hemophilia NO   Muscular Dystrophy NO   Cystic Fibrosis NO   [place] Disease NO   Autism NO   Mental Retardation NO   History of Fragile X NO   Maternal Diabetes NO   Other Genetic Disease or Syndrome NO   Previous Child With Congenital Abnormality Not Listed NO   Recreational Drugs NO                                        INFECTION HISTORY     HEPATITIS IMMUNIZED:  YES   HEPATITIS INFECTION:  NO   EXPOSURE TO TB NO   PARVOVIRUS B-19 NO   CHICKEN POX  YES   MEASLES NO   HIV NO   OTHER RASH OR VIRAL ILLNESS SINCE LMP NO   UTI RECURRENT NO   HPV NO     OB History    Para Term  AB Living   3 0 0 0 2 0   SAB IAB Ectopic Molar Multiple Live Births   2 0 0 0 0 0      # Outcome Date GA Lbr Skyler/2nd Weight Sex Delivery Anes PTL Lv   3 Current            2 2021 6w0d          1 2020 5w0d            PAST GYNECOLOGICAL  HISTORY:  Negative for abnormal pap smears. Negative for sexually transmitted diseases. Negative for cervical LEEP / conization /cryosurgery. Negative for uterine surgery. Negative for ovarian or tubal surgery. Past Medical History:   Diagnosis Date    Anemia 7/16/2022    Hypertension        Past Surgical History:   Procedure Laterality Date    CERVICAL CERCLAGE N/A 6/16/2022    CERVIX CERCLAGE PLACEMENT performed by Paul Tomlin MD at [facility] L&D OR    WISDOM TOOTH EXTRACTION         No Known Allergies      Current Outpatient Medications:     Cholecalciferol (VITAMIN D3) 50 MCG (2000 UT) CAPS, Take 1 capsule by mouth daily, Disp: 30 capsule, Rfl: 3    insulin lispro (HUMALOG) 100 UNIT/ML SOLN injection vial, Inject 6 Units into the skin 3 times daily (with meals), Disp: 1 each, Rfl: 1    Prenatal Vit-Fe Fumarate-FA (PRENATAL VITAMIN) 27-1 MG TABS tablet, Take 1 tablet by mouth daily, Disp: 30 tablet, Rfl: 11    NIFEdipine (ADALAT CC) 30 MG extended release tablet, Take 1 tablet by mouth in the morning and 1 tablet in the evening., Disp: 30 tablet, Rfl: 3    aspirin 81 MG chewable tablet, Take 1 tablet by mouth in the morning., Disp: 30 tablet, Rfl: 3    ferrous sulfate (IRON 325) 325 (65 Fe) MG tablet, Take 1 tablet by mouth in the morning and 1 tablet in the evening. Take with meals. , Disp: 30 tablet, Rfl: 3    vitamin B-12 1000 MCG tablet, Take 1 tablet by mouth in the morning., Disp: 30 tablet, Rfl: 3    docusate sodium (COLACE, DULCOLAX) 100 MG CAPS, Take 100 mg by mouth in the morning and 100 mg before bedtime. , Disp: 30 capsule, Rfl: 1    glucose monitoring (FREESTYLE FREEDOM) kit, 1 kit by Does not apply route 4 times daily, Disp: 1 kit, Rfl: 0    progesterone (PROMETRIUM) 200 MG CAPS capsule, Place 1 capsule into the vagina at bedtime, Disp: 30 capsule, Rfl: 3    Blood Glucose Monitoring Suppl (ONE TOUCH ULTRA 2) w/Device KIT, USE AS DIRECTED, Disp: , Rfl:     ONETOUCH ULTRA strip, TEST AS DIRECTED FOUR TIMES DAILY, Disp: , Rfl:     Lancets (ONETOUCH DELICA PLUS [id number]) MISC, 22 Units At night, Disp: , Rfl:     glucose 4 g chewable tablet, Take 4 tablets by mouth as needed for Low blood sugar (Patient not taking: No sig reported), Disp: 60 tablet, Rfl: 3    insulin glargine (LANTUS) 100 UNIT/ML injection vial, Inject 22 Units into the skin nightly, Disp: 10 mL, Rfl: 3    glucagon, rDNA, 1 MG injection, Inject 1 mg into the muscle as needed for Low blood sugar (Blood glucose less than 70 mg/dL and patient NOT ALERT or NPO and does not have IV access.) (Patient not taking: No sig reported), Disp: 1 each, Rfl: 1    Social History     Tobacco Use    Smoking status: Former    Smokeless tobacco: Never   Substance Use Topics    Alcohol use: Not Currently     Comment: social     FAMILY MEDICAL HISTORY:   Negative for congenital abnormalities, autism, genetic disease and mental retardation, not listed above. Review of Systems :   CONSTITUTIONAL : No fever, no chills   HEENT : No headache, no visual changes, no rhinorrhea, no sore throat   CARDIOVASCULAR : No pain, no palpitations, no edema   RESPIRATORY : No pain, no shortness of breath   GASTROINTESTINAL : No N/V, no D/C, no abdominal pain   GENITOURINARY : No dysuria, hematuria and no incontinence   MUSCULOSKELETAL : No myalgia, No back pain  NEUROLOGICAL : No numbness, no tingling, no tremors. No history of seizures  ALL OTHER SYSTEMS WERE REPORTED AS NEGATIVE. PERTINENT PHYSICAL EXAMINATION:   /81   Pulse 91   Wt (!) 358 lb 12.8 oz (162.8 kg)   LMP 01/30/2022   BMI 56.20 kg/m²   Urine dipstick:   Trace for Glucose    Trace for Albumin     GENERAL:   The patient is a well developed, female who is alert cooperative and oriented times three in no acute distress. HEENT:  Normo cephalic and atraumatic. No facial edema. ABDOMEN:   Her uterus is gravid. She had no complaint of abdominal pain or tenderness. The fetal heart rate is 140 bpm.     EXTREMITIES:  No peripheral edema is noted. PELVIC EXAMINATION: 9/7/2022  Transvaginal ultrasound assessment of the cervix was performed. The amniotic membranes extending to the cervical cerclage. IMPRESSION:    1.  IUP at 31 weeks 5 days gestation based on her Estimated Date of Delivery: 11/6/22    2. Incompetent cervix, with the amniotic membranes extending to the cervical cerclage    3. Morbid obesity    4. Fully vaccinated for COVID-19 January 2022. 5.  Two previous first trimester pregnancy losses    6. MSAFP 0.87 MoM, (drawn 6/14/2022)    7. Limited visualization of the fetal anatomy    8. Essential hypertension taking Procardia    9. O positive blood type  10. Cephalic presentation 0/0/5050  11. Completed Celestone course on 7/16/2022 with rescue dose administered 8/22/2022  12. Completed magnesium sulfate course on 7/16/2022  13. Reassuring BPP and cord Doppler testing 9/7/2022  14. Reactive nonstress test 9/9/2022  15. The patient received a rescue dose of Celestone on 8/22/2022  16. Hemoglobin A1c value was 5.8% on 8/23/2022. Taking Lantus and Humalog insulin as indicated. PLAN:  The patient is to remain sexually abstinent through the balance of her pregnancy. I advised her to immediately come to the hospital if she had any uterine contractions, rupture of the amniotic membranes, vaginal bleeding, abdominal pain or tenderness, or any new symptomatology she was concerned about. I advised the patient to continue to do home glucose monitoring. She is to check her blood sugars fasting and 2 hours after each meal.  The goal is  to maintain her post prandial blood sugars 80<120. Her fasting blood sugars should be maintained 80<92. She is to call if her blood sugars are outside of these parameters. The patient is to continue to take her medications as directed. She should be closely watched for development of superimposed preeclampsia.     I would recommend that the patient count fetal movements and call if she notices any subjective decrease in fetal movements, particularly if there are less than 10 major movements in an hour. Non-stress testing should be performed every 3 to 4 days through the balance of the pregnancy. Serial ultrasounds to assess fetal anatomy and growth should be performed. The patient is at increase risk for  morbidity and mortality secondary to her history. Weekly BPP and cord Doppler testing should be performed, unless there is a clinical indication to perform the testing more frequently. The patient was advised to call if she has any increased vaginal discharge, vaginal bleeding, contractions, abdominal pain, back pain or any new significant symptomatology prior to her next visit. I advised her that these are signs and symptoms of cervical change and require follow-up assessment when they occur. The patient is to continue to follow with you in your office for ongoing prenatal care. The patient is scheduled to return to our office for follow-up assessment in 4 days, unless she has a clinical indication to return prior to that time. Magnesium sulfate should be administered if the patient appears likely to deliver prior to 32 weeks gestational age. Further evaluation and management will be dependent on the results of the patient's testing and her clinical presentation. The total time in minutes spent with the patient, reviewing medical records, reviewing imaging studies, performing ultrasonic imaging, reviewing laboratory testing, and documenting information was 11 minutes, of which, 50% of the time was spent in patient education, counseling, and coordinating care with the patient, her provider, and/or her family. Available paper and electronic medical records were reviewed. I reviewed with the patient the results of the fetal testing performed today. I discussed with her limitations in her physical and sexual activity recommended after placement of the cervical cerclage.   I stressed to her the importance of coming immediately to the hospital if she had any vaginal bleeding, abdominal pain or tenderness, leaking amniotic fluid, or any new symptomatology she was concerned about. I answered all of her questions to her satisfaction. I asked her to call if she had any additional questions prior to her next visit. If you have any questions regarding her management, please contact me at your convenience and thank you for allowing me to participate in her care.     Sincerely,        Zamzam Palmer MD, MS, Yonny Kerr RDCS, RDMS, RVT  Director [address] [place] [address]  [phone]

## 2022-09-11 ENCOUNTER — HOSPITAL ENCOUNTER (INPATIENT)
Age: 32
LOS: 1 days | Discharge: HOME | DRG: 566 | End: 2022-09-11
Attending: OBSTETRICS & GYNECOLOGY | Admitting: OBSTETRICS & GYNECOLOGY
Payer: MEDICAID

## 2022-09-11 ENCOUNTER — APPOINTMENT (OUTPATIENT)
Dept: ULTRASOUND IMAGING | Age: 32
DRG: 566 | End: 2022-09-11
Payer: MEDICAID

## 2022-09-11 VITALS
HEART RATE: 76 BPM | SYSTOLIC BLOOD PRESSURE: 130 MMHG | DIASTOLIC BLOOD PRESSURE: 76 MMHG | RESPIRATION RATE: 16 BRPM | TEMPERATURE: 98.2 F

## 2022-09-11 PROBLEM — Z3A.32 32 WEEKS GESTATION OF PREGNANCY: Status: ACTIVE | Noted: 2022-09-11

## 2022-09-11 LAB
ALBUMIN SERPL-MCNC: 3.2 G/DL (ref 3.5–5.2)
ALP BLD-CCNC: 95 U/L (ref 35–104)
ALT SERPL-CCNC: 27 U/L (ref 0–32)
AMYLASE: 64 U/L (ref 20–100)
ANION GAP SERPL CALCULATED.3IONS-SCNC: 11 MMOL/L (ref 7–16)
AST SERPL-CCNC: 49 U/L (ref 0–31)
BACTERIA: ABNORMAL /HPF
BASOPHILS ABSOLUTE: 0.02 E9/L (ref 0–0.2)
BASOPHILS RELATIVE PERCENT: 0.2 % (ref 0–2)
BILIRUB SERPL-MCNC: 0.3 MG/DL (ref 0–1.2)
BILIRUBIN URINE: ABNORMAL
BLOOD, URINE: NEGATIVE
BUN BLDV-MCNC: 6 MG/DL (ref 6–20)
CALCIUM SERPL-MCNC: 9 MG/DL (ref 8.6–10.2)
CHLORIDE BLD-SCNC: 101 MMOL/L (ref 98–107)
CLARITY: CLEAR
CO2: 19 MMOL/L (ref 22–29)
COLOR: YELLOW
CREAT SERPL-MCNC: 0.5 MG/DL (ref 0.5–1)
EOSINOPHILS ABSOLUTE: 0.05 E9/L (ref 0.05–0.5)
EOSINOPHILS RELATIVE PERCENT: 0.5 % (ref 0–6)
EPITHELIAL CELLS, UA: ABNORMAL /HPF
GFR AFRICAN AMERICAN: >60
GFR NON-AFRICAN AMERICAN: >60 ML/MIN/1.73
GLUCOSE BLD-MCNC: 110 MG/DL (ref 74–99)
GLUCOSE URINE: NEGATIVE MG/DL
HCT VFR BLD CALC: 34.9 % (ref 34–48)
HEMOGLOBIN: 11.7 G/DL (ref 11.5–15.5)
IMMATURE GRANULOCYTES #: 0.04 E9/L
IMMATURE GRANULOCYTES %: 0.4 % (ref 0–5)
KETONES, URINE: ABNORMAL MG/DL
LEUKOCYTE ESTERASE, URINE: ABNORMAL
LIPASE: 22 U/L (ref 13–60)
LYMPHOCYTES ABSOLUTE: 1.56 E9/L (ref 1.5–4)
LYMPHOCYTES RELATIVE PERCENT: 14.9 % (ref 20–42)
MCH RBC QN AUTO: 29.5 PG (ref 26–35)
MCHC RBC AUTO-ENTMCNC: 33.5 % (ref 32–34.5)
MCV RBC AUTO: 87.9 FL (ref 80–99.9)
MONOCYTES ABSOLUTE: 0.69 E9/L (ref 0.1–0.95)
MONOCYTES RELATIVE PERCENT: 6.6 % (ref 2–12)
NEUTROPHILS ABSOLUTE: 8.08 E9/L (ref 1.8–7.3)
NEUTROPHILS RELATIVE PERCENT: 77.4 % (ref 43–80)
NITRITE, URINE: NEGATIVE
PDW BLD-RTO: 14.1 FL (ref 11.5–15)
PH UA: 6 (ref 5–9)
PLATELET # BLD: 372 E9/L (ref 130–450)
PMV BLD AUTO: 9.6 FL (ref 7–12)
POTASSIUM SERPL-SCNC: 4 MMOL/L (ref 3.5–5)
PROTEIN UA: NEGATIVE MG/DL
RBC # BLD: 3.97 E12/L (ref 3.5–5.5)
RBC UA: ABNORMAL /HPF (ref 0–2)
SODIUM BLD-SCNC: 131 MMOL/L (ref 132–146)
SPECIFIC GRAVITY UA: >=1.03 (ref 1–1.03)
TOTAL PROTEIN: 6.3 G/DL (ref 6.4–8.3)
TROPONIN, HIGH SENSITIVITY: 8 NG/L (ref 0–9)
UROBILINOGEN, URINE: 4 E.U./DL
WBC # BLD: 10.4 E9/L (ref 4.5–11.5)
WBC UA: ABNORMAL /HPF (ref 0–5)

## 2022-09-11 PROCEDURE — 87088 URINE BACTERIA CULTURE: CPT

## 2022-09-11 PROCEDURE — 36415 COLL VENOUS BLD VENIPUNCTURE: CPT

## 2022-09-11 PROCEDURE — 80053 COMPREHEN METABOLIC PANEL: CPT

## 2022-09-11 PROCEDURE — 82150 ASSAY OF AMYLASE: CPT

## 2022-09-11 PROCEDURE — 99211 OFF/OP EST MAY X REQ PHY/QHP: CPT

## 2022-09-11 PROCEDURE — 99222 1ST HOSP IP/OBS MODERATE 55: CPT | Performed by: STUDENT IN AN ORGANIZED HEALTH CARE EDUCATION/TRAINING PROGRAM

## 2022-09-11 PROCEDURE — 85025 COMPLETE CBC W/AUTO DIFF WBC: CPT

## 2022-09-11 PROCEDURE — 6370000000 HC RX 637 (ALT 250 FOR IP): Performed by: STUDENT IN AN ORGANIZED HEALTH CARE EDUCATION/TRAINING PROGRAM

## 2022-09-11 PROCEDURE — G0378 HOSPITAL OBSERVATION PER HR: HCPCS

## 2022-09-11 PROCEDURE — 6360000002 HC RX W HCPCS: Performed by: OBSTETRICS & GYNECOLOGY

## 2022-09-11 PROCEDURE — 84484 ASSAY OF TROPONIN QUANT: CPT

## 2022-09-11 PROCEDURE — 1220000000 HC SEMI PRIVATE OB R&B

## 2022-09-11 PROCEDURE — 76770 US EXAM ABDO BACK WALL COMP: CPT

## 2022-09-11 PROCEDURE — 2580000003 HC RX 258: Performed by: OBSTETRICS & GYNECOLOGY

## 2022-09-11 PROCEDURE — 83690 ASSAY OF LIPASE: CPT

## 2022-09-11 PROCEDURE — 81001 URINALYSIS AUTO W/SCOPE: CPT

## 2022-09-11 PROCEDURE — 96374 THER/PROPH/DIAG INJ IV PUSH: CPT

## 2022-09-11 RX ORDER — CEFTRIAXONE 1 G/1
1000 INJECTION, POWDER, FOR SOLUTION INTRAMUSCULAR; INTRAVENOUS ONCE
Status: DISCONTINUED | OUTPATIENT
Start: 2022-09-11 | End: 2022-09-11

## 2022-09-11 RX ORDER — CALCIUM CARBONATE 200(500)MG
500 TABLET,CHEWABLE ORAL ONCE
Status: COMPLETED | OUTPATIENT
Start: 2022-09-11 | End: 2022-09-11

## 2022-09-11 RX ADMIN — CEFAZOLIN 2000 MG: 2 INJECTION, POWDER, FOR SOLUTION INTRAMUSCULAR; INTRAVENOUS at 08:48

## 2022-09-11 RX ADMIN — CALCIUM CARBONATE 500 MG: 500 TABLET, CHEWABLE ORAL at 04:55

## 2022-09-11 NOTE — PROGRESS NOTES
Patient called out and wants to go home. Told patient she would have to wait for her ultrasound to come back. Ultrasound called and said they will be around to do her ultrasound around 1100. Patient does not want to wait that long because she is not having the back pain anymore. Call out to Dr Telly Sargent.

## 2022-09-11 NOTE — PROGRESS NOTES
32w0d presents to Antepartum unit with c/o R flank pain, intermittent side pain, and increased urinary frequency. Denies LOF or VB. Perceives positive fetal movement. EFM applied. VSS.

## 2022-09-11 NOTE — H&P
Obstetric History and Physical       CHIEF COMPLAINT:  abdominal and back pain    HISTORY OF PRESENT ILLNESS:      Nixon Manning is a 27 y.o., , female who presents at 32w0d with an [facility] of Estimated Date of Delivery: 22. OB History          3    Para   0    Term   0       0    AB   2    Living   0         SAB   2    IAB   0    Ectopic   0    Molar   0    Multiple   0    Live Births   0            Patient presents with a chief complaint as above and is being evaluated for back and upper abdominal pain  The pain, located in the mid epigastric area in the midline right below her breasts, awoke her up from sleep around 1 AM. It was severe. She sat up and tried walking around, but it did not help. She felt nauseous and threw up. Her mom told her to take tylenol. Currently, pain is still a 6-7/10 but the nausea has resolved. She denies urinary frequency, burning with urination, blood in urine, fever, chills  The baby is moving well. She denies contractions. She denies LOF or vaginal bleeding  She is s/p cerclage placement in  at 19 weeks and admitted in July at 23 weeks for threatened PTL s/p beta and mag       PRENATAL CARE:    Complicated by: incompetent cervix, obesity,CHTN on procardia XL, GDMA2 on insulin    PAST OB HISTORY  OB History          3    Para   0    Term   0       0    AB   2    Living   0         SAB   2    IAB   0    Ectopic   0    Molar   0    Multiple   0    Live Births   0                Past Medical History:        Diagnosis Date    Anemia 2022    Hypertension     Insulin controlled gestational diabetes mellitus (GDM) during pregnancy 2022     Past Surgical History:        Procedure Laterality Date    CERVICAL CERCLAGE N/A 2022    CERVIX CERCLAGE PLACEMENT performed by Yasir Warner MD at [facility] L&D OR    WISDOM TOOTH EXTRACTION       Allergies:  Patient has no known allergies.   Social History:    Social History     Socioeconomic History    Marital status: Single     Spouse name: Not on file    Number of children: Not on file    Years of education: Not on file    Highest education level: Not on file   Occupational History    Not on file   Tobacco Use    Smoking status: Former    Smokeless tobacco: Never   Vaping Use    Vaping Use: Never used   Substance and Sexual Activity    Alcohol use: Not Currently     Comment: social    Drug use: No    Sexual activity: Not on file   Other Topics Concern    Not on file   Social History Narrative    Not on file     Social Determinants of Health     Financial Resource Strain: Not on file   Food Insecurity: Not on file   Transportation Needs: Not on file   Physical Activity: Not on file   Stress: Not on file   Social Connections: Not on file   Intimate Partner Violence: Not on file   Housing Stability: Not on file     Family History:   No family history on file. Medications Prior to Admission:  Medications Prior to Admission: Cholecalciferol (VITAMIN D3) 50 MCG (2000 UT) CAPS, Take 1 capsule by mouth daily  insulin lispro (HUMALOG) 100 UNIT/ML SOLN injection vial, Inject 6 Units into the skin 3 times daily (with meals)  Prenatal Vit-Fe Fumarate-FA (PRENATAL VITAMIN) 27-1 MG TABS tablet, Take 1 tablet by mouth daily  NIFEdipine (ADALAT CC) 30 MG extended release tablet, Take 1 tablet by mouth in the morning and 1 tablet in the evening. aspirin 81 MG chewable tablet, Take 1 tablet by mouth in the morning.   glucose 4 g chewable tablet, Take 4 tablets by mouth as needed for Low blood sugar (Patient not taking: No sig reported)  insulin glargine (LANTUS) 100 UNIT/ML injection vial, Inject 22 Units into the skin nightly  glucagon, rDNA, 1 MG injection, Inject 1 mg into the muscle as needed for Low blood sugar (Blood glucose less than 70 mg/dL and patient NOT ALERT or NPO and does not have IV access.) (Patient not taking: No sig reported)  ferrous sulfate (IRON 325) 325 (65 Fe) MG tablet, Take 1 tablet by mouth in the morning and 1 tablet in the evening. Take with meals. vitamin B-12 1000 MCG tablet, Take 1 tablet by mouth in the morning. docusate sodium (COLACE, DULCOLAX) 100 MG CAPS, Take 100 mg by mouth in the morning and 100 mg before bedtime. glucose monitoring (FREESTYLE FREEDOM) kit, 1 kit by Does not apply route 4 times daily  progesterone (PROMETRIUM) 200 MG CAPS capsule, Place 1 capsule into the vagina at bedtime  Blood Glucose Monitoring Suppl (ONE TOUCH ULTRA 2) w/Device KIT, USE AS DIRECTED  ONETOUCH ULTRA strip, TEST AS DIRECTED FOUR TIMES DAILY  Lancets (ONETOUCH DELICA PLUS [id number]) MISC, 22 Units At night    REVIEW OF SYSTEMS:    CONSTITUTIONAL:  negative  RESPIRATORY:  negative  CARDIOVASCULAR:  negative  GASTROINTESTINAL:  negative  ALLERGIC/IMMUNOLOGIC:  negative  NEUROLOGICAL:  negative  BEHAVIOR/PSYCH:  negative    PHYSICAL EXAM:  Vitals:    09/11/22 0347   BP: 121/66   Pulse: 89   Resp: 18   Temp: 97.7 °F (36.5 °C)   TempSrc: Oral     General appearance:  awake, alert, cooperative, no apparent distress, and appears stated age  Neurologic:  Awake, alert, oriented to name, place and time. Lungs:  No increased work of breathing, good air exchange  Abdomen:  Soft, non tender, gravid, consistent with her gestational age. No CVA tenderness, no suprapubic tenderness  Fetal heart rate:  Reassuring.   Pelvis:  Adequate pelvis  Cervix: Closed 50% medium -2, cerclage strings palpated  Contraction frequency:  not edwige    Membranes:  Intact    ASSESSMENT AND PLAN:  -UA, UC - UA came back suggestive of possible UTI vs dirty catch, per Dr Walt Walker, give IM rocephin   -CBC, CMP, amylase, lipase, troponin  -IVF  -NST

## 2022-09-11 NOTE — PROGRESS NOTES
Dr Simeon Barthel aware patient wanting to go home. Okay for patient to be discharged home but needs follow up appointment. Patient explains she is scheduled for an appointment with Dr Simeon Barthel tomorrow.

## 2022-09-12 LAB — URINE CULTURE, ROUTINE: NORMAL

## 2022-09-13 ENCOUNTER — ROUTINE PRENATAL (OUTPATIENT)
Dept: OBGYN CLINIC | Age: 32
End: 2022-09-13
Payer: MEDICAID

## 2022-09-13 ENCOUNTER — ANCILLARY PROCEDURE (OUTPATIENT)
Dept: OBGYN CLINIC | Age: 32
End: 2022-09-13
Payer: MEDICAID

## 2022-09-13 VITALS
DIASTOLIC BLOOD PRESSURE: 86 MMHG | WEIGHT: 293 LBS | HEART RATE: 85 BPM | BODY MASS INDEX: 55.66 KG/M2 | SYSTOLIC BLOOD PRESSURE: 122 MMHG

## 2022-09-13 DIAGNOSIS — O24.414 INSULIN CONTROLLED GESTATIONAL DIABETES MELLITUS (GDM) IN THIRD TRIMESTER: ICD-10-CM

## 2022-09-13 DIAGNOSIS — O10.019 BENIGN ESSENTIAL HYPERTENSION, ANTEPARTUM: Primary | ICD-10-CM

## 2022-09-13 DIAGNOSIS — Z3A.32 32 WEEKS GESTATION OF PREGNANCY: ICD-10-CM

## 2022-09-13 LAB
GLUCOSE URINE, POC: NEGATIVE
PROTEIN UA: NEGATIVE

## 2022-09-13 PROCEDURE — 1036F TOBACCO NON-USER: CPT | Performed by: OBSTETRICS & GYNECOLOGY

## 2022-09-13 PROCEDURE — 99213 OFFICE O/P EST LOW 20 MIN: CPT | Performed by: OBSTETRICS & GYNECOLOGY

## 2022-09-13 PROCEDURE — 1111F DSCHRG MED/CURRENT MED MERGE: CPT | Performed by: OBSTETRICS & GYNECOLOGY

## 2022-09-13 PROCEDURE — G8417 CALC BMI ABV UP PARAM F/U: HCPCS | Performed by: OBSTETRICS & GYNECOLOGY

## 2022-09-13 PROCEDURE — 76821 MIDDLE CEREBRAL ARTERY ECHO: CPT | Performed by: OBSTETRICS & GYNECOLOGY

## 2022-09-13 PROCEDURE — 76815 OB US LIMITED FETUS(S): CPT | Performed by: OBSTETRICS & GYNECOLOGY

## 2022-09-13 PROCEDURE — G8427 DOCREV CUR MEDS BY ELIG CLIN: HCPCS | Performed by: OBSTETRICS & GYNECOLOGY

## 2022-09-13 PROCEDURE — 76818 FETAL BIOPHYS PROFILE W/NST: CPT | Performed by: OBSTETRICS & GYNECOLOGY

## 2022-09-13 PROCEDURE — 81002 URINALYSIS NONAUTO W/O SCOPE: CPT | Performed by: OBSTETRICS & GYNECOLOGY

## 2022-09-13 PROCEDURE — 76820 UMBILICAL ARTERY ECHO: CPT | Performed by: OBSTETRICS & GYNECOLOGY

## 2022-09-13 NOTE — PATIENT INSTRUCTIONS

## 2022-09-16 ENCOUNTER — ROUTINE PRENATAL (OUTPATIENT)
Dept: OBGYN CLINIC | Age: 32
End: 2022-09-16
Payer: MEDICAID

## 2022-09-16 VITALS
DIASTOLIC BLOOD PRESSURE: 84 MMHG | SYSTOLIC BLOOD PRESSURE: 127 MMHG | HEART RATE: 103 BPM | WEIGHT: 293 LBS | BODY MASS INDEX: 55.6 KG/M2

## 2022-09-16 DIAGNOSIS — O24.414 INSULIN CONTROLLED GESTATIONAL DIABETES MELLITUS (GDM) IN THIRD TRIMESTER: ICD-10-CM

## 2022-09-16 DIAGNOSIS — E66.01 MATERNAL MORBID OBESITY, ANTEPARTUM (HCC): ICD-10-CM

## 2022-09-16 DIAGNOSIS — O10.019 BENIGN ESSENTIAL HYPERTENSION, ANTEPARTUM: ICD-10-CM

## 2022-09-16 DIAGNOSIS — O99.210 MATERNAL MORBID OBESITY, ANTEPARTUM (HCC): ICD-10-CM

## 2022-09-16 DIAGNOSIS — R10.9 CRAMPING AFFECTING PREGNANCY, ANTEPARTUM: ICD-10-CM

## 2022-09-16 DIAGNOSIS — R73.09 ELEVATED HEMOGLOBIN A1C: ICD-10-CM

## 2022-09-16 DIAGNOSIS — O10.019 BENIGN ESSENTIAL HYPERTENSION, ANTEPARTUM: Primary | ICD-10-CM

## 2022-09-16 DIAGNOSIS — O34.32 INCOMPETENT CERVIX IN PREGNANCY, ANTEPARTUM, SECOND TRIMESTER: ICD-10-CM

## 2022-09-16 DIAGNOSIS — E66.01 MORBID OBESITY WITH BODY MASS INDEX OF 50.0-59.9 IN ADULT (HCC): ICD-10-CM

## 2022-09-16 DIAGNOSIS — O26.899 CRAMPING AFFECTING PREGNANCY, ANTEPARTUM: ICD-10-CM

## 2022-09-16 PROCEDURE — 99213 OFFICE O/P EST LOW 20 MIN: CPT | Performed by: OBSTETRICS & GYNECOLOGY

## 2022-09-16 PROCEDURE — 81002 URINALYSIS NONAUTO W/O SCOPE: CPT | Performed by: OBSTETRICS & GYNECOLOGY

## 2022-09-16 PROCEDURE — 59025 FETAL NON-STRESS TEST: CPT | Performed by: OBSTETRICS & GYNECOLOGY

## 2022-09-16 PROCEDURE — 99999 PR OFFICE/OUTPT VISIT,PROCEDURE ONLY: CPT | Performed by: OBSTETRICS & GYNECOLOGY

## 2022-09-17 NOTE — PROGRESS NOTES
the fetal anatomy    8. Essential hypertension taking Procardia    9. O positive blood type  10. Cephalic presentation   11. Completed Celestone course on 2022 with rescue dose administered 2022  12. Completed magnesium sulfate course on 2022  13. Reassuring BPP and cord Doppler testing 2022  14. Reactive nonstress test 2022  15. The patient received a rescue dose of Celestone on 2022  16. Hemoglobin A1c value was 5.8% on 2022. Taking Lantus and Humalog insulin as indicated. PLAN:  The patient is to remain sexually abstinent through the balance of her pregnancy. I advised her to immediately come to the hospital if she had any uterine contractions, rupture of the amniotic membranes, vaginal bleeding, abdominal pain or tenderness, or any new symptomatology she was concerned about. I advised the patient to continue to do home glucose monitoring. She is to check her blood sugars fasting and 2 hours after each meal.  The goal is  to maintain her post prandial blood sugars 80<120. Her fasting blood sugars should be maintained 80<92. She is to call if her blood sugars are outside of these parameters. The patient is to continue to take her medications as directed. She should be closely watched for development of superimposed preeclampsia. I would recommend that the patient count fetal movements and call if she notices any subjective decrease in fetal movements, particularly if there are less than 10 major movements in an hour. Non-stress testing should be performed every 3 to 4 days through the balance of the pregnancy. Serial ultrasounds to assess fetal anatomy and growth should be performed. The patient is at increase risk for  morbidity and mortality secondary to her history. Weekly BPP and cord Doppler testing should be performed, unless there is a clinical indication to perform the testing more frequently.     The patient was advised to call if she has any increased vaginal discharge, vaginal bleeding, contractions, abdominal pain, back pain or any new significant symptomatology prior to her next visit. I advised her that these are signs and symptoms of cervical change and require follow-up assessment when they occur. The patient is to continue to follow with you in your office for ongoing prenatal care. The patient is scheduled to return to our office for follow-up assessment in 4 days, unless she has a clinical indication to return prior to that time. Magnesium sulfate should be administered if the patient appears likely to deliver prior to 32 weeks gestational age. Further evaluation and management will be dependent on the results of the patient's testing and her clinical presentation. If you have any questions regarding her management, please contact me at your convenience and thank you for allowing me to participate in her care.     Sincerely,        Karena Gonzalez MD, MS, Xiao Deleon, RDCS, RDMS, RVT  Director [address] [place] [address]  [phone]

## 2022-09-19 LAB
GLUCOSE URINE, POC: NORMAL
PROTEIN UA: NEGATIVE

## 2022-09-20 ENCOUNTER — ROUTINE PRENATAL (OUTPATIENT)
Dept: OBGYN CLINIC | Age: 32
End: 2022-09-20
Payer: MEDICAID

## 2022-09-20 ENCOUNTER — HOSPITAL ENCOUNTER (OUTPATIENT)
Age: 32
Discharge: HOME | End: 2022-09-20
Attending: OBSTETRICS & GYNECOLOGY | Admitting: OBSTETRICS & GYNECOLOGY
Payer: MEDICAID

## 2022-09-20 ENCOUNTER — ANCILLARY PROCEDURE (OUTPATIENT)
Dept: OBGYN CLINIC | Age: 32
End: 2022-09-20
Payer: MEDICAID

## 2022-09-20 VITALS
SYSTOLIC BLOOD PRESSURE: 123 MMHG | HEIGHT: 67 IN | HEART RATE: 100 BPM | DIASTOLIC BLOOD PRESSURE: 81 MMHG | WEIGHT: 293 LBS | BODY MASS INDEX: 45.99 KG/M2

## 2022-09-20 VITALS
RESPIRATION RATE: 18 BRPM | TEMPERATURE: 98 F | HEART RATE: 88 BPM | OXYGEN SATURATION: 99 % | SYSTOLIC BLOOD PRESSURE: 122 MMHG | DIASTOLIC BLOOD PRESSURE: 70 MMHG

## 2022-09-20 DIAGNOSIS — O34.32 INCOMPETENT CERVIX IN PREGNANCY, ANTEPARTUM, SECOND TRIMESTER: ICD-10-CM

## 2022-09-20 DIAGNOSIS — E66.01 MATERNAL MORBID OBESITY, ANTEPARTUM (HCC): ICD-10-CM

## 2022-09-20 DIAGNOSIS — R73.09 ELEVATED HEMOGLOBIN A1C: ICD-10-CM

## 2022-09-20 DIAGNOSIS — O28.0 LOW MATERNAL SERUM VITAMIN B12: ICD-10-CM

## 2022-09-20 DIAGNOSIS — O24.414 INSULIN CONTROLLED GESTATIONAL DIABETES MELLITUS (GDM) IN THIRD TRIMESTER: ICD-10-CM

## 2022-09-20 DIAGNOSIS — O99.210 MATERNAL MORBID OBESITY, ANTEPARTUM (HCC): ICD-10-CM

## 2022-09-20 DIAGNOSIS — O36.8390 VARIABLE FETAL HEART RATE DECELERATIONS, ANTEPARTUM: ICD-10-CM

## 2022-09-20 DIAGNOSIS — O10.019 BENIGN ESSENTIAL HYPERTENSION, ANTEPARTUM: Primary | ICD-10-CM

## 2022-09-20 DIAGNOSIS — R79.89 LOW VITAMIN D LEVEL: ICD-10-CM

## 2022-09-20 DIAGNOSIS — R79.0 LOW FERRITIN: ICD-10-CM

## 2022-09-20 PROBLEM — O28.8 NON-REACTIVE NST (NON-STRESS TEST): Status: ACTIVE | Noted: 2022-09-20

## 2022-09-20 LAB
GLUCOSE URINE, POC: NORMAL
PROTEIN UA: POSITIVE

## 2022-09-20 PROCEDURE — 76818 FETAL BIOPHYS PROFILE W/NST: CPT | Performed by: OBSTETRICS & GYNECOLOGY

## 2022-09-20 PROCEDURE — 99213 OFFICE O/P EST LOW 20 MIN: CPT | Performed by: ADVANCED PRACTICE MIDWIFE

## 2022-09-20 PROCEDURE — 1036F TOBACCO NON-USER: CPT | Performed by: OBSTETRICS & GYNECOLOGY

## 2022-09-20 PROCEDURE — 59025 FETAL NON-STRESS TEST: CPT

## 2022-09-20 PROCEDURE — G8417 CALC BMI ABV UP PARAM F/U: HCPCS | Performed by: OBSTETRICS & GYNECOLOGY

## 2022-09-20 PROCEDURE — 99211 OFF/OP EST MAY X REQ PHY/QHP: CPT

## 2022-09-20 PROCEDURE — 1111F DSCHRG MED/CURRENT MED MERGE: CPT | Performed by: OBSTETRICS & GYNECOLOGY

## 2022-09-20 PROCEDURE — 99213 OFFICE O/P EST LOW 20 MIN: CPT | Performed by: OBSTETRICS & GYNECOLOGY

## 2022-09-20 PROCEDURE — 76820 UMBILICAL ARTERY ECHO: CPT | Performed by: OBSTETRICS & GYNECOLOGY

## 2022-09-20 PROCEDURE — G8427 DOCREV CUR MEDS BY ELIG CLIN: HCPCS | Performed by: OBSTETRICS & GYNECOLOGY

## 2022-09-20 PROCEDURE — 81002 URINALYSIS NONAUTO W/O SCOPE: CPT | Performed by: OBSTETRICS & GYNECOLOGY

## 2022-09-20 PROCEDURE — 76805 OB US >/= 14 WKS SNGL FETUS: CPT | Performed by: OBSTETRICS & GYNECOLOGY

## 2022-09-20 PROCEDURE — 99214 OFFICE O/P EST MOD 30 MIN: CPT | Performed by: OBSTETRICS & GYNECOLOGY

## 2022-09-20 NOTE — H&P
CHIEF COMPLAINT:  sent in from [facility] due to 2 min decl on nst there. Feeling baby move some no lof vb or ctx. HISTORY OF PRESENT ILLNESS:      The patient is a 27 y.o. female at 32w3d. OB History          3    Para   0    Term   0       0    AB   2    Living   0         SAB   2    IAB   0    Ectopic   0    Molar   0    Multiple   0    Live Births   0            Patient presents with a chief complaint as above and is being admitted for observation    Estimated Due Date: Estimated Date of Delivery: 22    PRENATAL CARE:    Complicated by: had cerclage placed at 23 weeks sees [facility], high blood pressure on procardia, GDM on insulin    PAST OB HISTORY  OB History          3    Para   0    Term   0       0    AB   2    Living   0         SAB   2    IAB   0    Ectopic   0    Molar   0    Multiple   0    Live Births   0                Past Medical History:        Diagnosis Date    Anemia 2022    Hypertension     Insulin controlled gestational diabetes mellitus (GDM) during pregnancy 2022     Past Surgical History:        Procedure Laterality Date    CERVICAL CERCLAGE N/A 2022    CERVIX CERCLAGE PLACEMENT performed by Mikayla Bright MD at [facility] L&D OR    WISDOM TOOTH EXTRACTION       Allergies:  Patient has no known allergies.   Social History:    Social History     Socioeconomic History    Marital status: Single     Spouse name: Not on file    Number of children: Not on file    Years of education: Not on file    Highest education level: Not on file   Occupational History    Not on file   Tobacco Use    Smoking status: Former    Smokeless tobacco: Never   Vaping Use    Vaping Use: Never used   Substance and Sexual Activity    Alcohol use: Not Currently     Comment: social    Drug use: No    Sexual activity: Not on file   Other Topics Concern    Not on file   Social History Narrative    Not on file     Social Determinants of Health     Financial Resource Strain: Not on file Food Insecurity: Not on file   Transportation Needs: Not on file   Physical Activity: Not on file   Stress: Not on file   Social Connections: Not on file   Intimate Partner Violence: Not on file   Housing Stability: Not on file     Family History:   No family history on file. Medications Prior to Admission:  Medications Prior to Admission: Glucose 4-6 GM-MG CHEW, CHEW AND SWALLOW 4 TABLETS BY MOUTH AS NEEDED FOR LOW BLOOD SUGAR (Patient not taking: Reported on 9/13/2022)  labetalol (NORMODYNE) 100 MG tablet,   BD INSULIN SYRINGE U/F 31G X 5/16\" 0.3 ML MISC, USE FOUR TIMES DAILY  Cholecalciferol (VITAMIN D3) 50 MCG (2000 UT) CAPS, Take 1 capsule by mouth daily  insulin lispro (HUMALOG) 100 UNIT/ML SOLN injection vial, Inject 6 Units into the skin 3 times daily (with meals)  Prenatal Vit-Fe Fumarate-FA (PRENATAL VITAMIN) 27-1 MG TABS tablet, Take 1 tablet by mouth daily  NIFEdipine (ADALAT CC) 30 MG extended release tablet, Take 1 tablet by mouth in the morning and 1 tablet in the evening. (Patient not taking: Reported on 9/16/2022)  aspirin 81 MG chewable tablet, Take 1 tablet by mouth in the morning. glucose 4 g chewable tablet, Take 4 tablets by mouth as needed for Low blood sugar (Patient not taking: No sig reported)  insulin glargine (LANTUS) 100 UNIT/ML injection vial, Inject 22 Units into the skin nightly  glucagon, rDNA, 1 MG injection, Inject 1 mg into the muscle as needed for Low blood sugar (Blood glucose less than 70 mg/dL and patient NOT ALERT or NPO and does not have IV access.) (Patient not taking: No sig reported)  ferrous sulfate (IRON 325) 325 (65 Fe) MG tablet, Take 1 tablet by mouth in the morning and 1 tablet in the evening. Take with meals. vitamin B-12 1000 MCG tablet, Take 1 tablet by mouth in the morning. docusate sodium (COLACE, DULCOLAX) 100 MG CAPS, Take 100 mg by mouth in the morning and 100 mg before bedtime.   glucose monitoring (FREESTYLE FREEDOM) kit, 1 kit by Does not apply route 4 times daily  progesterone (PROMETRIUM) 200 MG CAPS capsule, Place 1 capsule into the vagina at bedtime  Blood Glucose Monitoring Suppl (ONE TOUCH ULTRA 2) w/Device KIT, USE AS DIRECTED  ONETOUCH ULTRA strip, TEST AS DIRECTED FOUR TIMES DAILY  Lancets (ONETOUCH DELICA PLUS [id number]) MISC, 22 Units At night    REVIEW OF SYSTEMS:    CONSTITUTIONAL:  negative  DIET: diabetic  EXERCISE: limited  HEENT: negative  BREAST: negative  RESPIRATORY:  negative  CARDIOVASCULAR:  negative  GASTROINTESTINAL:  negative  : negative  ALLERGIC/IMMUNOLOGIC:  negative  NEUROLOGICAL:  negative  ENDOCRINE: negative  BEHAVIOR/PSYCH:  negative    PHYSICAL EXAM:  There were no vitals filed for this visit. General appearance:  awake, alert, cooperative, no apparent distress, and appears stated age  Skin: warm and dry. No rash observed  Breast: Warm, no redness or masses felt. No nipple discharge  Neurologic:  Awake, alert, oriented to name, place and time. Lungs:  No increased work of breathing, good air exchange  Abdomen:  Soft, non tender, gravid, consistent with her gestational age, EFW by Leopald's manouever was aga   Fetal heart rate:  Reassuring. Pelvis:  Adequate pelvis  Cervix:    deferred  Contraction frequency:  none    Membranes:  Intact    ASSESSMENT AND PLAN:    Orders per m for prolonged monitoring.   DONTAE Sequeira - CIRILO

## 2022-09-20 NOTE — PROGRESS NOTES
presents to unit from [facility] for prolonged monitoring d/t a 2 minute deceleration on her NST. Pt denies vaginal bleeding, lof, and contractions. Perceives fetal movement. Pt has insulin requiring GDM and hx of hypertension. Fasting BS this morning 101. Cerclage placed during pregnancy. Placed on efm. Vital signs taken.  Call light within reach

## 2022-09-20 NOTE — PROGRESS NOTES
Written and verbal discharge instructions reviewed with pt. Pt to follow up with her doctors at her next appointments. Pt verbalized understanding with no further questions or concerns.  Left unit ambulatory

## 2022-09-20 NOTE — DISCHARGE INSTRUCTIONS
Home Undelivered Discharge Instructions    After Discharge Orders:    Future Appointments   Date Time Provider Kunal Yostisti   9/23/2022 10:45 AM Roddy Bloch, MD [facility]   9/27/2022 10:45 AM Roddy Bloch, MD [facility]   9/27/2022  2:40 PM Oscar Pino, DO [facility]   9/30/2022 10:45 AM Roddy Bloch, MD [facility]   10/4/2022 10:45 AM Roddy Bloch, MD [facility]   10/7/2022 10:45 AM Roddy Bloch, MD [facility]   10/11/2022 10:45 AM Roddy Bloch, MD [facility]   10/14/2022 10:45 AM Roddy Bloch, MD [facility]       Call physician or midwife's office on *** for instructions. Diet:  normal diet as tolerated    Rest: normal activity as tolerated    Other instructions: Do kick counts once a day on your baby. Choose the time of day your baby is most active. Get in a comfortable lying or sitting position and time how long it takes to feel 10 kicks, twists, turns, swishes, or rolls.  Call your physician or midwife if there have not been 10 kicks in 2 hours    Call physician or midwife, return to Labor and Delivery, call 911, or go to the nearest Emergency Room if: increased leakage or fluid, contractions more than  6 per  1 hour, decreased fetal movement, persistent low back pain or cramping, bleeding from vaginal area, difficulty urinating, pain with urination, difficulty breathing, new calf pain, persistent headache, or vision change

## 2022-09-23 ENCOUNTER — ANCILLARY PROCEDURE (OUTPATIENT)
Dept: OBGYN CLINIC | Age: 32
End: 2022-09-23
Payer: MEDICAID

## 2022-09-23 ENCOUNTER — ROUTINE PRENATAL (OUTPATIENT)
Dept: OBGYN CLINIC | Age: 32
End: 2022-09-23
Payer: MEDICAID

## 2022-09-23 VITALS
BODY MASS INDEX: 56.65 KG/M2 | WEIGHT: 293 LBS | SYSTOLIC BLOOD PRESSURE: 141 MMHG | HEART RATE: 81 BPM | DIASTOLIC BLOOD PRESSURE: 83 MMHG

## 2022-09-23 DIAGNOSIS — R79.89 LOW VITAMIN D LEVEL: ICD-10-CM

## 2022-09-23 DIAGNOSIS — E66.01 MORBID OBESITY WITH BMI OF 50.0-59.9, ADULT (HCC): ICD-10-CM

## 2022-09-23 DIAGNOSIS — O28.0 LOW MATERNAL SERUM VITAMIN B12: ICD-10-CM

## 2022-09-23 DIAGNOSIS — R73.09 ELEVATED HEMOGLOBIN A1C: ICD-10-CM

## 2022-09-23 DIAGNOSIS — E66.01 MATERNAL MORBID OBESITY, ANTEPARTUM (HCC): ICD-10-CM

## 2022-09-23 DIAGNOSIS — R79.0 LOW FERRITIN: ICD-10-CM

## 2022-09-23 DIAGNOSIS — Z3A.33 33 WEEKS GESTATION OF PREGNANCY: ICD-10-CM

## 2022-09-23 DIAGNOSIS — O10.019 BENIGN ESSENTIAL HYPERTENSION, ANTEPARTUM: ICD-10-CM

## 2022-09-23 DIAGNOSIS — O36.8390 VARIABLE FETAL HEART RATE DECELERATIONS, ANTEPARTUM: ICD-10-CM

## 2022-09-23 DIAGNOSIS — O99.210 MATERNAL MORBID OBESITY, ANTEPARTUM (HCC): ICD-10-CM

## 2022-09-23 DIAGNOSIS — O24.414 INSULIN CONTROLLED GESTATIONAL DIABETES MELLITUS (GDM) IN THIRD TRIMESTER: Primary | ICD-10-CM

## 2022-09-23 LAB
GLUCOSE URINE, POC: NORMAL
PROTEIN UA: NEGATIVE

## 2022-09-23 PROCEDURE — G8427 DOCREV CUR MEDS BY ELIG CLIN: HCPCS | Performed by: OBSTETRICS & GYNECOLOGY

## 2022-09-23 PROCEDURE — 1111F DSCHRG MED/CURRENT MED MERGE: CPT | Performed by: OBSTETRICS & GYNECOLOGY

## 2022-09-23 PROCEDURE — 1036F TOBACCO NON-USER: CPT | Performed by: OBSTETRICS & GYNECOLOGY

## 2022-09-23 PROCEDURE — 99213 OFFICE O/P EST LOW 20 MIN: CPT | Performed by: OBSTETRICS & GYNECOLOGY

## 2022-09-23 PROCEDURE — 76818 FETAL BIOPHYS PROFILE W/NST: CPT | Performed by: OBSTETRICS & GYNECOLOGY

## 2022-09-23 PROCEDURE — 81002 URINALYSIS NONAUTO W/O SCOPE: CPT | Performed by: OBSTETRICS & GYNECOLOGY

## 2022-09-23 PROCEDURE — 99214 OFFICE O/P EST MOD 30 MIN: CPT | Performed by: OBSTETRICS & GYNECOLOGY

## 2022-09-23 PROCEDURE — 76820 UMBILICAL ARTERY ECHO: CPT | Performed by: OBSTETRICS & GYNECOLOGY

## 2022-09-23 PROCEDURE — G8417 CALC BMI ABV UP PARAM F/U: HCPCS | Performed by: OBSTETRICS & GYNECOLOGY

## 2022-09-23 NOTE — PATIENT INSTRUCTIONS
Please arrive for your scheduled appointment at least 15 minutes early with your actual insurance card+ a photo ID. Also if you need any refills ordered or have questions, it may take up 48 hours to reply. Please allow ample time for your refills. Call me when you use last refill. Thank you for your cooperation. You might be having an NST at your next appt. Please eat a large snack or breakfast before coming to office. Thank you Call your primary obstetrician with bleeding, leaking of fluid, abdominal tenderness, headache, blurry vision, epigastric pain and increased urinary frequency. If you are experiencing an emergency and need immediate help, call 911 or go to go emergency room or labor and delivery. Assisted with pelvic exam, pap smear obtained, labeled  and delivered to lab.

## 2022-09-23 NOTE — PROGRESS NOTES
Pt here for nst only  Fm +(fair)  Pt denies lof vag bleeding or contractions  Pt voiced left diabetic log in car

## 2022-09-25 ENCOUNTER — HOSPITAL ENCOUNTER (OUTPATIENT)
Age: 32
Discharge: HOME | End: 2022-09-25
Attending: OBSTETRICS & GYNECOLOGY | Admitting: OBSTETRICS & GYNECOLOGY
Payer: MEDICAID

## 2022-09-25 ENCOUNTER — ANCILLARY PROCEDURE (OUTPATIENT)
Dept: OBGYN CLINIC | Age: 32
End: 2022-09-25
Payer: MEDICAID

## 2022-09-25 ENCOUNTER — APPOINTMENT (OUTPATIENT)
Dept: LABOR AND DELIVERY | Age: 32
End: 2022-09-25
Payer: MEDICAID

## 2022-09-25 VITALS
DIASTOLIC BLOOD PRESSURE: 86 MMHG | HEIGHT: 67 IN | HEART RATE: 75 BPM | OXYGEN SATURATION: 100 % | BODY MASS INDEX: 45.99 KG/M2 | RESPIRATION RATE: 17 BRPM | TEMPERATURE: 98.7 F | SYSTOLIC BLOOD PRESSURE: 137 MMHG | WEIGHT: 293 LBS

## 2022-09-25 PROBLEM — O36.8390 VARIABLE FETAL HEART RATE DECELERATIONS, ANTEPARTUM: Status: ACTIVE | Noted: 2022-09-25

## 2022-09-25 PROBLEM — E66.813 CLASS 3 SEVERE OBESITY WITHOUT SERIOUS COMORBIDITY IN ADULT (HCC): Status: ACTIVE | Noted: 2022-06-15

## 2022-09-25 PROBLEM — Z3A.34 34 WEEKS GESTATION OF PREGNANCY: Status: ACTIVE | Noted: 2022-09-25

## 2022-09-25 PROCEDURE — 59025 FETAL NON-STRESS TEST: CPT

## 2022-09-25 PROCEDURE — 76819 FETAL BIOPHYS PROFIL W/O NST: CPT | Performed by: OBSTETRICS & GYNECOLOGY

## 2022-09-25 PROCEDURE — 99211 OFF/OP EST MAY X REQ PHY/QHP: CPT

## 2022-09-25 NOTE — PROGRESS NOTES
Patient arrived for scheduled non stress test per physician. Reports positive fetal movement, denies any cramping or bleeding at this time. Placed on EFM.

## 2022-09-25 NOTE — PROGRESS NOTES
Dr. Lee Lobo notified of patient arrival for non stress test with variable decels present on tracing.  Physician at bedside to complete bio ultrasound exam.

## 2022-09-25 NOTE — PROGRESS NOTES
22    Loi Raiens MD  [address] [facility]     RE:  Nikhil Mackey  : 1990   AGE: 27 y.o. This report has been created using voice recognition software. It may contain errors which are inherent in voice recognition technology. Dear Dr. Jeffries Car:    Mian Meade had an appointment today for the following indications:    Patient Active Problem List   Diagnosis    Maternal morbid obesity, antepartum ([address]    Incompetent cervix in pregnancy, antepartum, with cervical cerclage. Benign essential hypertension, antepartum    Morbid obesity with body mass index of 50.0-59.9 in adult (HCC)    Anemia    Elevated hemoglobin A1c    Low ferritin    Low maternal serum vitamin B12    Low vitamin D level     Mian Meade is a 27 y.o. female, who is G3(0,0,2,0). She has an Estimated Date of Delivery: 22 based on her previous ultrasound assessment. She is currently 33 weeks 5 days gestation based on that assessment. The patient was seen today for follow-up assessment for incompetent cervix with shortening of the cervix with funneling of the amniotic membranes through the cervical cerclage, chronic hypertension treated with Procardia, diabetes treated with insulin, and morbid obesity. She denied any vaginal bleeding or leaking of amniotic fluid. She is having no uterine contraction activity. She stated that she is experiencing fetal movement intermittently. The patient's hemoglobin A1c value from 2022 was elevated to 5.8%. The patient currently takes 22 units of insulin glargine each evening and 6 units of Humalog insulin with each meal.  She did not bring her glucose log in for review today but states her blood sugars have been within the range she has been given the follow-up. The cervix was noted to be open with the amniotic membranes funneling to the cervical cerclage. The patient has hypertension.   She currently takes Procardia for management of her hypertension. Her initial blood pressure today was 141/83. She had no symptoms of hypertension. The nonstress test performed today was reactive. Moderate variability was present. Intermittent variable decelerations were noted. A fetal ultrasound evaluation was performed on 9/20/2022. A detailed report included in the EMR under the imaging tab. A living ziegler intrauterine fetus was identified in the cephalic presentation, with normal fetal heart motion and normal fetal motion noted. The amniotic fluid index was 18.1 cm. The placenta was anterior. The estimated fetal weight was at the 33rd growth percentile for gestational age. Visualization of the fetal anatomy was limited secondary to poor acoustic transmission to the patient's anterior abdominal wall in the fetal position. The biophysical profile cord Doppler studies were both reassuring. There was no evidence of absence, or reversal of end-diastolic flow in the samples evaluated. The ultrasound report from 9/23/2022 was included in the EMR under the imaging tab. A living ziegler intrauterine fetus was identified in the cephalic presentation, with normal fetal heart motion and normal fetal motion noted. The placenta was anterior. The amniotic fluid index was 14.6 cm. The biophysical profile and cord Doppler studies were both reassuring. There was no evidence of absence, or reversal of end-diastolic flow in the segments evaluated.                      GENETIC SCREENING/TERATOLOGY COUNSELING                  (Includes patient, FTB, and any affected family members)    Patient Age > 35 Years NO   Thalassemia ( MVC<80) NO   Congential Heart Defect NO   Neural Tube Defect NO   Cisco-Sachs NO   Sickle Cell Disease NO   Sickle Cell Trait NO   Sickle C Disease or Trait NO   Hemophilia NO   Muscular Dystrophy NO   Cystic Fibrosis NO   [place] Disease NO   Autism NO   Mental Retardation NO   History of Fragile X NO   Maternal Diabetes NO   Other Genetic Disease or Syndrome NO   Previous Child With Congenital Abnormality Not Listed NO   Recreational Drugs NO                                        INFECTION HISTORY     HEPATITIS IMMUNIZED:  YES   HEPATITIS INFECTION:  NO   EXPOSURE TO TB NO   PARVOVIRUS B-19 NO   CHICKEN POX  YES   MEASLES NO   HIV NO   OTHER RASH OR VIRAL ILLNESS SINCE LMP NO   UTI RECURRENT NO   HPV NO     OB History    Para Term  AB Living   3 0 0 0 2 0   SAB IAB Ectopic Molar Multiple Live Births   2 0 0 0 0 0      # Outcome Date GA Lbr Skyler/2nd Weight Sex Delivery Anes PTL Lv   3 Current            2 2021 6w0d          1 2020 5w0d            PAST GYNECOLOGICAL  HISTORY:  Negative for abnormal pap smears. Negative for sexually transmitted diseases. Negative for cervical LEEP / conization /cryosurgery. Negative for uterine surgery. Negative for ovarian or tubal surgery.      Past Medical History:   Diagnosis Date    Anemia 2022    Hypertension     Insulin controlled gestational diabetes mellitus (GDM) during pregnancy 2022       Past Surgical History:   Procedure Laterality Date    CERVICAL CERCLAGE N/A 2022    CERVIX CERCLAGE PLACEMENT performed by Ab Paul MD at [facility] L&D OR    WISDOM TOOTH EXTRACTION         No Known Allergies      Current Outpatient Medications:     BD INSULIN SYRINGE U/F 31G X \" 0.3 ML MISC, USE FOUR TIMES DAILY, Disp: , Rfl:     Cholecalciferol (VITAMIN D3) 50 MCG ( UT) CAPS, Take 1 capsule by mouth daily, Disp: 30 capsule, Rfl: 3    insulin lispro (HUMALOG) 100 UNIT/ML SOLN injection vial, Inject 6 Units into the skin 3 times daily (with meals), Disp: 1 each, Rfl: 1    Prenatal Vit-Fe Fumarate-FA (PRENATAL VITAMIN) 27-1 MG TABS tablet, Take 1 tablet by mouth daily, Disp: 30 tablet, Rfl: 11    aspirin 81 MG chewable tablet, Take 1 tablet by mouth in the morning., Disp: 30 tablet, Rfl: 3    ferrous sulfate (IRON 325) 325 (65 Fe) MG tablet, Take 1 tablet by mouth in the morning and 1 tablet in the evening. Take with meals. , Disp: 30 tablet, Rfl: 3    vitamin B-12 1000 MCG tablet, Take 1 tablet by mouth in the morning., Disp: 30 tablet, Rfl: 3    docusate sodium (COLACE, DULCOLAX) 100 MG CAPS, Take 100 mg by mouth in the morning and 100 mg before bedtime. , Disp: 30 capsule, Rfl: 1    glucose monitoring (FREESTYLE FREEDOM) kit, 1 kit by Does not apply route 4 times daily, Disp: 1 kit, Rfl: 0    progesterone (PROMETRIUM) 200 MG CAPS capsule, Place 1 capsule into the vagina at bedtime, Disp: 30 capsule, Rfl: 3    Blood Glucose Monitoring Suppl (ONE TOUCH ULTRA 2) w/Device KIT, USE AS DIRECTED, Disp: , Rfl:     ONETOUCH ULTRA strip, TEST AS DIRECTED FOUR TIMES DAILY, Disp: , Rfl:     Lancets (ONETOUCH DELICA PLUS [id number]) MISC, 22 Units At night, Disp: , Rfl:     Glucose 4-6 GM-MG CHEW, CHEW AND SWALLOW 4 TABLETS BY MOUTH AS NEEDED FOR LOW BLOOD SUGAR (Patient not taking: No sig reported), Disp: , Rfl:     labetalol (NORMODYNE) 100 MG tablet, , Disp: , Rfl:     NIFEdipine (ADALAT CC) 30 MG extended release tablet, Take 1 tablet by mouth in the morning and 1 tablet in the evening.  (Patient not taking: Reported on 9/16/2022), Disp: 30 tablet, Rfl: 3    glucose 4 g chewable tablet, Take 4 tablets by mouth as needed for Low blood sugar (Patient not taking: No sig reported), Disp: 60 tablet, Rfl: 3    insulin glargine (LANTUS) 100 UNIT/ML injection vial, Inject 22 Units into the skin nightly, Disp: 10 mL, Rfl: 3    glucagon, rDNA, 1 MG injection, Inject 1 mg into the muscle as needed for Low blood sugar (Blood glucose less than 70 mg/dL and patient NOT ALERT or NPO and does not have IV access.) (Patient not taking: No sig reported), Disp: 1 each, Rfl: 1    Social History     Tobacco Use    Smoking status: Former    Smokeless tobacco: Never   Substance Use Topics    Alcohol use: Not Currently     Comment: social     FAMILY MEDICAL HISTORY:   Negative for congenital abnormalities, autism, genetic disease and mental retardation, not listed above. Review of Systems :   CONSTITUTIONAL : No fever, no chills   HEENT : No headache, no visual changes, no rhinorrhea, no sore throat   CARDIOVASCULAR : No pain, no palpitations, no edema   RESPIRATORY : No pain, no shortness of breath   GASTROINTESTINAL : No N/V, no D/C, no abdominal pain   GENITOURINARY : No dysuria, hematuria and no incontinence   MUSCULOSKELETAL : No myalgia, No back pain  NEUROLOGICAL : No numbness, no tingling, no tremors. No history of seizures  ALL OTHER SYSTEMS WERE REPORTED AS NEGATIVE. PERTINENT PHYSICAL EXAMINATION:   BP (!) 141/83   Pulse 81   Wt (!) 361 lb 11.2 oz (164.1 kg)   LMP 01/30/2022   BMI 56.65 kg/m²   Urine dipstick:   Negative Glucose  Negative Protein    GENERAL:   The patient is a well developed, female who is alert cooperative and oriented times three in no acute distress. HEENT:  Normo cephalic and atraumatic. No facial edema. ABDOMEN:   Her uterus is gravid. She had no complaint of abdominal pain or tenderness. The fetal heart rate is 127 bpm.     EXTREMITIES:  No peripheral edema is noted. PELVIC EXAMINATION: 9/7/2022  Transvaginal ultrasound assessment of the cervix was performed. The amniotic membranes extending to the cervical cerclage. IMPRESSION:    1.  IUP at 33 weeks 5 days gestation based on her Estimated Date of Delivery: 11/6/22    2. Incompetent cervix, with the amniotic membranes extending to the cervical cerclage    3. Morbid obesity    4. Fully vaccinated for COVID-19 January 2022. 5.  Two previous first trimester pregnancy losses    6. MSAFP 0.87 MoM, (drawn 6/14/2022)    7. Limited visualization of the fetal anatomy    8. Essential hypertension taking Procardia    9. O positive blood type  10. Cephalic presentation 6/95/7311  11. Completed Celestone course on 7/16/2022 with rescue dose administered 8/22/2022  12.   Completed magnesium sulfate course on 2022  13. Reassuring BPP and cord Doppler testing 2022  14. Reactive nonstress test with intermittent variable decelerations 2022  15. The patient received a rescue dose of Celestone on 2022  16. Hemoglobin A1c value was 5.8% on 2022. Taking Lantus and Humalog insulin as indicated. 17.  Asymptomatic, slightly elevated blood pressure on 2022. PLAN:  I discussed the patient with Dr. Tanya Ureña who was covering her practice at the time of the patient's assessment. Recommended that the patient have a repeat nonstress test performed on 2022 in the labor and delivery unit. I personally scheduled the procedure at the time of the patient's evaluation today. The patient is to remain sexually abstinent through the balance of her pregnancy. I advised her to immediately come to the hospital if she had any uterine contractions, rupture of the amniotic membranes, vaginal bleeding, abdominal pain or tenderness, or any new symptomatology she was concerned about. I advised the patient to continue to do home glucose monitoring. She is to check her blood sugars fasting and 2 hours after each meal.  The goal is  to maintain her post prandial blood sugars 80<120. Her fasting blood sugars should be maintained 80<92. She is to call if her blood sugars are outside of these parameters. The patient is to continue to take her medications as directed. She should be closely watched for development of superimposed preeclampsia. I would recommend that the patient count fetal movements and call if she notices any subjective decrease in fetal movements, particularly if there are less than 10 major movements in 2 hours. Non-stress testing should be performed every 3 to 4 days through the balance of the pregnancy. Serial ultrasounds to assess fetal anatomy and growth should be performed.  The patient is at increase risk for  morbidity and mortality secondary to her history. Weekly BPP and cord Doppler testing should be performed, unless there is a clinical indication to perform the testing more frequently. The patient was advised to call if she has any increased vaginal discharge, vaginal bleeding, contractions, abdominal pain, back pain or any new significant symptomatology prior to her next visit. I advised her that these are signs and symptoms of cervical change and require follow-up assessment when they occur. The patient is to continue to follow with you in your office for ongoing prenatal care. The patient is scheduled to return to our office for follow-up assessment in 4 days, unless she has a clinical indication to return prior to that time. Further evaluation and management will be dependent on the results of the patient's testing and her clinical presentation. The total time in minutes spent with the patient, reviewing medical records, reviewing imaging studies, performing ultrasonic imaging, reviewing laboratory testing, and documenting information was 31 minutes, of which, 50% of the time was spent in patient education, counseling, and coordinating care with the patient, her provider, and/or her family. Available paper and electronic medical records were reviewed. This included my telephone conversation with Dr. Ubaldo Mata at the time of the patient's assessment to discuss the findings on today's evaluation and my recommendations for management. This also included my telephone conversation with the labor and delivery staff and scheduling department, to schedule the nonstress test for Sunday, September 25, 2022. I reviewed with the patient the results of the fetal testing performed today. I discussed with her limitations in her physical and sexual activity recommended after placement of the cervical cerclage.   I stressed to her the importance of coming immediately to the hospital if she had any vaginal bleeding, abdominal pain or tenderness, leaking amniotic fluid, or any new symptomatology she was concerned about. I discussed with her ongoing evaluation and management of her hypertension and diabetes to the balance for pregnancy and recommended follow-up assessment with her primary care physician following delivery. I discussed with her the increased risks associated with her various comorbidities as related to pregnancy. I answered all of her questions to her satisfaction. I asked her to call if she had any additional questions prior to her next visit. If you have any questions regarding her management, please contact me at your convenience and thank you for allowing me to participate in her care.     Sincerely,        Bryan Hand MD, MS, Abigail Stinson, ELISEOCS, RDMS, RVT  Director [address] [place] [address]  [phone]

## 2022-09-25 NOTE — PROGRESS NOTES
of her hypertension. Her initial blood pressure today was 154/105, however, this normalized to 115/77 with rest.  A second blood pressure evaluation taken prior to her leaving the office was 123/81. She had no symptoms of hypertension. The nonstress test performed today was reactive. Moderate variability was present. A fetal ultrasound evaluation was performed on 9/20/2022. A detailed report included in the EMR under the imaging tab. A living ziegler intrauterine fetus was identified in the cephalic presentation, with normal fetal heart motion and normal fetal motion noted. The amniotic fluid index was 18.1 cm. The placenta was anterior. The estimated fetal weight was at the 33rd growth percentile for gestational age. Visualization of the fetal anatomy was limited secondary to poor acoustic transmission to the patient's anterior abdominal wall in the fetal position. The biophysical profile cord Doppler studies were both reassuring. There was no evidence of absence, or reversal of end-diastolic flow in the samples evaluated.                        GENETIC SCREENING/TERATOLOGY COUNSELING                  (Includes patient, FTB, and any affected family members)    Patient Age > 35 Years NO   Thalassemia ( MVC<80) NO   Congential Heart Defect NO   Neural Tube Defect NO   Cisco-Sachs NO   Sickle Cell Disease NO   Sickle Cell Trait NO   Sickle C Disease or Trait NO   Hemophilia NO   Muscular Dystrophy NO   Cystic Fibrosis NO   Lindsey Disease NO   Autism NO   Mental Retardation NO   History of Fragile X NO   Maternal Diabetes NO   Other Genetic Disease or Syndrome NO   Previous Child With Congenital Abnormality Not Listed NO   Recreational Drugs NO                                        INFECTION HISTORY     HEPATITIS IMMUNIZED:  YES   HEPATITIS INFECTION:  NO   EXPOSURE TO TB NO   PARVOVIRUS B-19 NO   CHICKEN POX  YES   MEASLES NO   HIV NO   OTHER RASH OR VIRAL ILLNESS SINCE LMP NO   UTI RECURRENT NO HPV NO     OB History    Para Term  AB Living   3 0 0 0 2 0   SAB IAB Ectopic Molar Multiple Live Births   2 0 0 0 0 0      # Outcome Date GA Lbr Skyler/2nd Weight Sex Delivery Anes PTL Lv   3 Current            2 2021 6w0d          1 2020 5w0d            PAST GYNECOLOGICAL  HISTORY:  Negative for abnormal pap smears. Negative for sexually transmitted diseases. Negative for cervical LEEP / conization /cryosurgery. Negative for uterine surgery. Negative for ovarian or tubal surgery. Past Medical History:   Diagnosis Date    Anemia 2022    Hypertension     Insulin controlled gestational diabetes mellitus (GDM) during pregnancy 2022       Past Surgical History:   Procedure Laterality Date    CERVICAL CERCLAGE N/A 2022    CERVIX CERCLAGE PLACEMENT performed by Gopi Snow MD at [facility] L[facility] OR    WISDOM TOOTH EXTRACTION         No Known Allergies      Current Outpatient Medications:     Glucose 4-6 GM-MG CHEW, CHEW AND SWALLOW 4 TABLETS BY MOUTH AS NEEDED FOR LOW BLOOD SUGAR (Patient not taking: No sig reported), Disp: , Rfl:     labetalol (NORMODYNE) 100 MG tablet, , Disp: , Rfl:     BD INSULIN SYRINGE U/F 31G X \" 0.3 ML MISC, USE FOUR TIMES DAILY, Disp: , Rfl:     Cholecalciferol (VITAMIN D3) 50 MCG ( UT) CAPS, Take 1 capsule by mouth daily, Disp: 30 capsule, Rfl: 3    insulin lispro (HUMALOG) 100 UNIT/ML SOLN injection vial, Inject 6 Units into the skin 3 times daily (with meals), Disp: 1 each, Rfl: 1    Prenatal Vit-Fe Fumarate-FA (PRENATAL VITAMIN) 27-1 MG TABS tablet, Take 1 tablet by mouth daily, Disp: 30 tablet, Rfl: 11    NIFEdipine (ADALAT CC) 30 MG extended release tablet, Take 1 tablet by mouth in the morning and 1 tablet in the evening.  (Patient not taking: Reported on 2022), Disp: 30 tablet, Rfl: 3    aspirin 81 MG chewable tablet, Take 1 tablet by mouth in the morning., Disp: 30 tablet, Rfl: 3    glucose 4 g chewable tablet, Take 4 tablets by GENITOURINARY : No dysuria, hematuria and no incontinence   MUSCULOSKELETAL : No myalgia, No back pain  NEUROLOGICAL : No numbness, no tingling, no tremors. No history of seizures  ALL OTHER SYSTEMS WERE REPORTED AS NEGATIVE. PERTINENT PHYSICAL EXAMINATION:   /81   Pulse 100   Ht 5' 7\" (1.702 m)   Wt (!) 357 lb (161.9 kg)   LMP 01/30/2022   BMI 55.91 kg/m²   Urine dipstick:   Negative for Glucose    Trace for Albumin     GENERAL:   The patient is a well developed, female who is alert cooperative and oriented times three in no acute distress. HEENT:  Normo cephalic and atraumatic. No facial edema. ABDOMEN:   Her uterus is gravid. She had no complaint of abdominal pain or tenderness. The fetal heart rate is 148 bpm.     EXTREMITIES:  No peripheral edema is noted. PELVIC EXAMINATION: 9/7/2022  Transvaginal ultrasound assessment of the cervix was performed. The amniotic membranes extending to the cervical cerclage. IMPRESSION:    1.  IUP at 33 weeks 2 days gestation based on her Estimated Date of Delivery: 11/6/22    2. Incompetent cervix, with the amniotic membranes extending to the cervical cerclage    3. Morbid obesity    4. Fully vaccinated for COVID-19 January 2022. 5.  Two previous first trimester pregnancy losses    6. MSAFP 0.87 MoM, (drawn 6/14/2022)    7. Limited visualization of the fetal anatomy    8. Essential hypertension taking Procardia    9. O positive blood type  10. Cephalic presentation 9/88/3756  11. Completed Celestone course on 7/16/2022 with rescue dose administered 8/22/2022  12. Completed magnesium sulfate course on 7/16/2022  13. Reassuring BPP and cord Doppler testing 9/20/2022  14. Reactive nonstress test 9/20/2022  15. The patient received a rescue dose of Celestone on 8/22/2022  16. Hemoglobin A1c value was 5.8% on 8/23/2022. Taking Lantus and Humalog insulin as indicated.   17.  Asymptomatic, elevated initial blood pressure on 9/20/2022, which normalized with rest.    PLAN:  The patient is to remain sexually abstinent through the balance of her pregnancy. I advised her to immediately come to the hospital if she had any uterine contractions, rupture of the amniotic membranes, vaginal bleeding, abdominal pain or tenderness, or any new symptomatology she was concerned about. I advised the patient to continue to do home glucose monitoring. She is to check her blood sugars fasting and 2 hours after each meal.  The goal is  to maintain her post prandial blood sugars 80<120. Her fasting blood sugars should be maintained 80<92. She is to call if her blood sugars are outside of these parameters. The patient is to continue to take her medications as directed. She should be closely watched for development of superimposed preeclampsia. I would recommend that the patient count fetal movements and call if she notices any subjective decrease in fetal movements, particularly if there are less than 10 major movements in 2 hours. Non-stress testing should be performed every 3 to 4 days through the balance of the pregnancy. Serial ultrasounds to assess fetal anatomy and growth should be performed. The patient is at increase risk for  morbidity and mortality secondary to her history. Weekly BPP and cord Doppler testing should be performed, unless there is a clinical indication to perform the testing more frequently. The patient was advised to call if she has any increased vaginal discharge, vaginal bleeding, contractions, abdominal pain, back pain or any new significant symptomatology prior to her next visit. I advised her that these are signs and symptoms of cervical change and require follow-up assessment when they occur. The patient is to continue to follow with you in your office for ongoing prenatal care.     The patient is scheduled to return to our office for follow-up assessment in 4 days, unless she has a clinical indication to return prior to that time. Magnesium sulfate should be administered if the patient appears likely to deliver prior to 32 weeks gestational age. Further evaluation and management will be dependent on the results of the patient's testing and her clinical presentation. The total time in minutes spent with the patient, reviewing medical records, reviewing imaging studies, performing ultrasonic imaging, reviewing laboratory testing, and documenting information was 31 minutes, of which, 50% of the time was spent in patient education, counseling, and coordinating care with the patient, her provider, and/or her family. Available paper and electronic medical records were reviewed. I reviewed with the patient the results of the fetal testing performed today. I discussed with her limitations in her physical and sexual activity recommended after placement of the cervical cerclage. I stressed to her the importance of coming immediately to the hospital if she had any vaginal bleeding, abdominal pain or tenderness, leaking amniotic fluid, or any new symptomatology she was concerned about. I discussed with her ongoing evaluation and management of her hypertension and diabetes to the balance for pregnancy and recommended follow-up assessment with her primary care physician following delivery. I discussed with her the increased risks associated with her various comorbidities as related to pregnancy. I answered all of her questions to her satisfaction. I asked her to call if she had any additional questions prior to her next visit. If you have any questions regarding her management, please contact me at your convenience and thank you for allowing me to participate in her care.     Sincerely,        Andre Yip MD, MS, Gilberto Crespo, DAX, RDMS, RVT  Director [address] [place] [address]  [phone]

## 2022-09-25 NOTE — PROGRESS NOTES
Fetal heart tracing with variable decelerations reviewed with Dr. Padmini Galvan and Dr. Monroe Trevino. Bio Profile noted 8/8. [id number] Emmy Perez with Dr. Padmini Galvan for patient to discharge home and follow up in office as scheduled with [facility]. Dr. Monroe Trevino on call for Dr. Anjana Jovel this day, ok to discharge home at this time.

## 2022-09-25 NOTE — DISCHARGE INSTRUCTIONS
Home Undelivered Discharge Instructions    After Discharge Orders:    Future Appointments   Date Time Provider Kunal Scherer   9/27/2022 10:45 AM Terry Worrell MD [facility]   9/27/2022  2:40 PM DO MARCIO Matta [facility]   9/30/2022 10:45 AM Terry Worrell MD [facility]   10/4/2022 10:45 AM Terry Worrell MD [facility]   10/7/2022 10:45 AM Terry Worrell MD [facility]   10/11/2022 10:45 AM Terry Worrell MD [facility]   10/14/2022 10:45 AM Terry Worrell MD [facility]                   Diet:  normal diet as tolerated    Rest: {rest:69173}    Other instructions: Do kick counts once a day on your baby. Choose the time of day your baby is most active. Get in a comfortable lying or sitting position and time how long it takes to feel 10 kicks, twists, turns, swishes, or rolls.  Call your physician or midwife if there have not been 10 kicks in 2 hours    Call physician or midwife, return to Labor and Delivery, call 911, or go to the nearest Emergency Room if: increased leakage or fluid, contractions more than  6 per  1 hour, decreased fetal movement, persistent low back pain or cramping, bleeding from vaginal area, difficulty urinating, pain with urination, difficulty breathing, new calf pain, persistent headache, or vision change

## 2022-09-25 NOTE — CONSULTS
[unfilled]   [facility] HEMAL ALANIZ 3S HIGH RISK AP/PP MATERNITY  [address]  [facility] [address]  Dept: [phone]  Loc: [phone]     2022    RE:  Andrew Clancy     : 1990   AGE: 27 y.o. Dear Dr. Lillian Ramirez ref. provider found,    Thank you for allowing me to see Andrew Clancy. As I'm sure you will recall, Andrew Clancy is a 27 y.o. [id number]'s last menstrual period was 2022.  Estimated Date of Delivery: 22 at 34w0d seen in our office today for the following:    REASON FOR VISIT: BPP and NST    Patient Active Problem List    Diagnosis Date Noted    Variable fetal heart rate decelerations, antepartum 2022    34 weeks gestation of pregnancy 2022    Non-reactive NST (non-stress test) 2022    32 weeks gestation of pregnancy 2022    Third pregnancy 2022    Vaginal spotting 2022    Insulin controlled gestational diabetes mellitus (GDM) in third trimester 08/15/2022    Elevated hemoglobin A1c 2022    Low ferritin 2022    Low maternal serum vitamin B12 2022    Low vitamin D level 2022    Abnormal ultrasonic finding on  screening of mother 2022    Anemia 2022    Maternal morbid obesity, antepartum ([address] 06/15/2022    Incompetent cervix in pregnancy, antepartum, second trimester 06/15/2022    Benign essential hypertension, antepartum 06/15/2022    Class 3 severe obesity without serious comorbidity in adult ([address] 06/15/2022        PAST HISTORY:  OB History    Para Term  AB Living   3 0 0 0 2 0   SAB IAB Ectopic Molar Multiple Live Births   2 0 0 0 0 0      # Outcome Date GA Lbr Skyler/2nd Weight Sex Delivery Anes PTL Lv   3 Current            2 2021 6w0d          1 2020 5w0d                 MEDICAL:  Past Medical History:   Diagnosis Date    Anemia 2022    Hypertension     Insulin controlled gestational diabetes mellitus (GDM) during pregnancy 2022 to continue to follow with you in your office for ongoing obstetric care. PLAN:    As noted above or sooner prn.     Sincerely,        Roland Caldwell MD

## 2022-09-27 ENCOUNTER — ROUTINE PRENATAL (OUTPATIENT)
Dept: OBGYN CLINIC | Age: 32
End: 2022-09-27
Payer: MEDICAID

## 2022-09-27 ENCOUNTER — ANCILLARY PROCEDURE (OUTPATIENT)
Dept: OBGYN CLINIC | Age: 32
End: 2022-09-27
Payer: MEDICAID

## 2022-09-27 VITALS
WEIGHT: 293 LBS | BODY MASS INDEX: 57.03 KG/M2 | DIASTOLIC BLOOD PRESSURE: 75 MMHG | HEART RATE: 90 BPM | SYSTOLIC BLOOD PRESSURE: 128 MMHG

## 2022-09-27 DIAGNOSIS — O36.8390 VARIABLE FETAL HEART RATE DECELERATIONS, ANTEPARTUM: ICD-10-CM

## 2022-09-27 DIAGNOSIS — O24.414 INSULIN CONTROLLED GESTATIONAL DIABETES MELLITUS (GDM) IN THIRD TRIMESTER: ICD-10-CM

## 2022-09-27 DIAGNOSIS — Z3A.34 34 WEEKS GESTATION OF PREGNANCY: ICD-10-CM

## 2022-09-27 DIAGNOSIS — E66.01 MATERNAL MORBID OBESITY, ANTEPARTUM (HCC): ICD-10-CM

## 2022-09-27 DIAGNOSIS — O10.019 BENIGN ESSENTIAL HYPERTENSION, ANTEPARTUM: Primary | ICD-10-CM

## 2022-09-27 DIAGNOSIS — O34.32 INCOMPETENT CERVIX IN PREGNANCY, ANTEPARTUM, SECOND TRIMESTER: ICD-10-CM

## 2022-09-27 DIAGNOSIS — O28.0 LOW MATERNAL SERUM VITAMIN B12: ICD-10-CM

## 2022-09-27 DIAGNOSIS — E66.01 CLASS 3 SEVERE OBESITY WITHOUT SERIOUS COMORBIDITY IN ADULT, UNSPECIFIED BMI, UNSPECIFIED OBESITY TYPE (HCC): ICD-10-CM

## 2022-09-27 DIAGNOSIS — R79.89 LOW VITAMIN D LEVEL: ICD-10-CM

## 2022-09-27 DIAGNOSIS — O99.210 MATERNAL MORBID OBESITY, ANTEPARTUM (HCC): ICD-10-CM

## 2022-09-27 DIAGNOSIS — R79.0 LOW FERRITIN: ICD-10-CM

## 2022-09-27 DIAGNOSIS — R73.09 ELEVATED HEMOGLOBIN A1C: ICD-10-CM

## 2022-09-27 PROCEDURE — 76820 UMBILICAL ARTERY ECHO: CPT | Performed by: OBSTETRICS & GYNECOLOGY

## 2022-09-27 PROCEDURE — 99213 OFFICE O/P EST LOW 20 MIN: CPT | Performed by: OBSTETRICS & GYNECOLOGY

## 2022-09-27 PROCEDURE — G8417 CALC BMI ABV UP PARAM F/U: HCPCS | Performed by: OBSTETRICS & GYNECOLOGY

## 2022-09-27 PROCEDURE — 1036F TOBACCO NON-USER: CPT | Performed by: OBSTETRICS & GYNECOLOGY

## 2022-09-27 PROCEDURE — 81002 URINALYSIS NONAUTO W/O SCOPE: CPT | Performed by: OBSTETRICS & GYNECOLOGY

## 2022-09-27 PROCEDURE — 76818 FETAL BIOPHYS PROFILE W/NST: CPT | Performed by: OBSTETRICS & GYNECOLOGY

## 2022-09-27 PROCEDURE — G8427 DOCREV CUR MEDS BY ELIG CLIN: HCPCS | Performed by: OBSTETRICS & GYNECOLOGY

## 2022-09-27 PROCEDURE — 1111F DSCHRG MED/CURRENT MED MERGE: CPT | Performed by: OBSTETRICS & GYNECOLOGY

## 2022-09-27 NOTE — PATIENT INSTRUCTIONS

## 2022-09-27 NOTE — PROGRESS NOTES
22    Juventino Khalil MD  [address] [facility]     RE:  Gris Bateman  : 1990   AGE: 27 y.o. This report has been created using voice recognition software. It may contain errors which are inherent in voice recognition technology. Dear Dr. Brandyn Polk:    Casper Rosa had an appointment today for the following indications:    Patient Active Problem List   Diagnosis    Maternal morbid obesity, antepartum ([address]    Incompetent cervix in pregnancy, antepartum, with cervical cerclage. Benign essential hypertension, antepartum    Morbid obesity with body mass index of 50.0-59.9 in adult (HCC)    Anemia    Elevated hemoglobin A1c    Low ferritin    Low maternal serum vitamin B12    Low vitamin D level     Casper Rosa is a 27 y.o. female, who is G3(0,0,2,0). She has an Estimated Date of Delivery: 22 based on her previous ultrasound assessment. She is currently 34 weeks 2 days gestation based on that assessment. The patient was seen today for follow-up assessment for incompetent cervix with shortening of the cervix with funneling of the amniotic membranes through the cervical cerclage, chronic hypertension treated with Procardia, diabetes treated with insulin, and morbid obesity. She denied any vaginal bleeding or leaking of amniotic fluid. She is having no uterine contraction activity. She stated that she is experiencing fetal movement intermittently. The patient's hemoglobin A1c value from 2022 was elevated to 5.8%. The patient currently takes 22 units of insulin glargine each evening and 6 units of Humalog insulin with each meal.  She did not bring her glucose log in for review today but states her blood sugars have been within the range she has been given the follow-up. The patient has hypertension. She currently takes Procardia for management of her hypertension. Her initial blood pressure today was 146/99.   Repeat assessments were 134/94, 132/79, and 128/75. Her blood pressures normalized with rest. She had no symptoms of hypertension. The nonstress test performed today was reactive. Moderate variability was present. Occasional variable decelerations were noted. A fetal ultrasound evaluation was performed on 9/20/2022. A detailed report included in the EMR under the imaging tab. A living ziegler intrauterine fetus was identified in the cephalic presentation, with normal fetal heart motion and normal fetal motion noted. The amniotic fluid index was 18.1 cm. The placenta was anterior. The estimated fetal weight was at the 33rd growth percentile for gestational age. Visualization of the fetal anatomy was limited secondary to poor acoustic transmission to the patient's anterior abdominal wall in the fetal position. The biophysical profile cord Doppler studies were both reassuring. There was no evidence of absence, or reversal of end-diastolic flow in the samples evaluated. The ultrasound report from 9/27/2022 was included in the EMR under the imaging tab. A living ziegler intrauterine fetus was identified in the cephalic presentation, with normal fetal heart motion and normal fetal motion noted. The placenta was anterior. The amniotic fluid index was 12.6 cm. The biophysical profile and cord Doppler studies were both reassuring. There was no evidence of absence, or reversal of end-diastolic flow in the segments evaluated.                      GENETIC SCREENING/TERATOLOGY COUNSELING                  (Includes patient, FTB, and any affected family members)    Patient Age > 35 Years NO   Thalassemia ( MVC<80) NO   Congential Heart Defect NO   Neural Tube Defect NO   Cisco-Sachs NO   Sickle Cell Disease NO   Sickle Cell Trait NO   Sickle C Disease or Trait NO   Hemophilia NO   Muscular Dystrophy NO   Cystic Fibrosis NO   Coamo Disease NO   Autism NO   Mental Retardation NO   History of Fragile X NO   Maternal Diabetes NO Other Genetic Disease or Syndrome NO   Previous Child With Congenital Abnormality Not Listed NO   Recreational Drugs NO                                        INFECTION HISTORY     HEPATITIS IMMUNIZED:  YES   HEPATITIS INFECTION:  NO   EXPOSURE TO TB NO   PARVOVIRUS B-19 NO   CHICKEN POX  YES   MEASLES NO   HIV NO   OTHER RASH OR VIRAL ILLNESS SINCE LMP NO   UTI RECURRENT NO   HPV NO     OB History    Para Term  AB Living   3 0 0 0 2 0   SAB IAB Ectopic Molar Multiple Live Births   2 0 0 0 0 0      # Outcome Date GA Lbr Skyler/2nd Weight Sex Delivery Anes PTL Lv   3 Current            2 2021 6w0d          1 2020 5w0d            PAST GYNECOLOGICAL  HISTORY:  Negative for abnormal pap smears. Negative for sexually transmitted diseases. Negative for cervical LEEP / conization /cryosurgery. Negative for uterine surgery. Negative for ovarian or tubal surgery.      Past Medical History:   Diagnosis Date    Anemia 2022    Hypertension     Insulin controlled gestational diabetes mellitus (GDM) during pregnancy 2022       Past Surgical History:   Procedure Laterality Date    CERVICAL CERCLAGE N/A 2022    CERVIX CERCLAGE PLACEMENT performed by Yuli Casey MD at [facility] L&D OR    WISDOM TOOTH EXTRACTION         No Known Allergies      Current Outpatient Medications:     labetalol (NORMODYNE) 100 MG tablet, Take 100 mg by mouth 2 times daily, Disp: , Rfl:     BD INSULIN SYRINGE U/F 31G X \" 0.3 ML MISC, USE FOUR TIMES DAILY, Disp: , Rfl:     Cholecalciferol (VITAMIN D3) 50 MCG (2000) CAPS, Take 1 capsule by mouth daily, Disp: 30 capsule, Rfl: 3    insulin lispro (HUMALOG) 100 UNIT/ML SOLN injection vial, Inject 6 Units into the skin 3 times daily (with meals), Disp: 1 each, Rfl: 1    Prenatal Vit-Fe Fumarate-FA (PRENATAL VITAMIN) 27-1 MG TABS tablet, Take 1 tablet by mouth daily, Disp: 30 tablet, Rfl: 11    aspirin 81 MG chewable tablet, Take 1 tablet by mouth in the morning., Disp: 30 tablet, Rfl: 3    insulin glargine (LANTUS) 100 UNIT/ML injection vial, Inject 22 Units into the skin nightly, Disp: 10 mL, Rfl: 3    ferrous sulfate (IRON 325) 325 (65 Fe) MG tablet, Take 1 tablet by mouth in the morning and 1 tablet in the evening. Take with meals. , Disp: 30 tablet, Rfl: 3    vitamin B-12 1000 MCG tablet, Take 1 tablet by mouth in the morning., Disp: 30 tablet, Rfl: 3    docusate sodium (COLACE, DULCOLAX) 100 MG CAPS, Take 100 mg by mouth in the morning and 100 mg before bedtime. , Disp: 30 capsule, Rfl: 1    glucose monitoring (FREESTYLE FREEDOM) kit, 1 kit by Does not apply route 4 times daily, Disp: 1 kit, Rfl: 0    progesterone (PROMETRIUM) 200 MG CAPS capsule, Place 1 capsule into the vagina at bedtime, Disp: 30 capsule, Rfl: 3    Blood Glucose Monitoring Suppl (ONE TOUCH ULTRA 2) w/Device KIT, USE AS DIRECTED, Disp: , Rfl:     ONETOUCH ULTRA strip, TEST AS DIRECTED FOUR TIMES DAILY, Disp: , Rfl:     Lancets (ONETOUCH DELICA PLUS [id number]) MISC, 22 Units At night, Disp: , Rfl:     Glucose 4-6 GM-MG CHEW, CHEW AND SWALLOW 4 TABLETS BY MOUTH AS NEEDED FOR LOW BLOOD SUGAR (Patient not taking: No sig reported), Disp: , Rfl:     NIFEdipine (ADALAT CC) 30 MG extended release tablet, Take 1 tablet by mouth in the morning and 1 tablet in the evening.  (Patient not taking: Reported on 9/27/2022), Disp: 30 tablet, Rfl: 3    glucose 4 g chewable tablet, Take 4 tablets by mouth as needed for Low blood sugar (Patient not taking: No sig reported), Disp: 60 tablet, Rfl: 3    glucagon, rDNA, 1 MG injection, Inject 1 mg into the muscle as needed for Low blood sugar (Blood glucose less than 70 mg/dL and patient NOT ALERT or NPO and does not have IV access.) (Patient not taking: No sig reported), Disp: 1 each, Rfl: 1    Social History     Tobacco Use    Smoking status: Former    Smokeless tobacco: Never   Substance Use Topics    Alcohol use: Not Currently     Comment: social     FAMILY MEDICAL HISTORY:   Negative for congenital abnormalities, autism, genetic disease and mental retardation, not listed above. Review of Systems :   CONSTITUTIONAL : No fever, no chills   HEENT : No headache, no visual changes, no rhinorrhea, no sore throat   CARDIOVASCULAR : No pain, no palpitations, no edema   RESPIRATORY : No pain, no shortness of breath   GASTROINTESTINAL : No N/V, no D/C, no abdominal pain   GENITOURINARY : No dysuria, hematuria and no incontinence   MUSCULOSKELETAL : No myalgia, No back pain  NEUROLOGICAL : No numbness, no tingling, no tremors. No history of seizures  ALL OTHER SYSTEMS WERE REPORTED AS NEGATIVE. PERTINENT PHYSICAL EXAMINATION:   /75   Pulse 90   Wt (!) 364 lb 2 oz (165.2 kg)   LMP 01/30/2022   BMI 57.03 kg/m²   Urine dipstick:   Negative Glucose  Negative Protein    GENERAL:   The patient is a well developed, female who is alert cooperative and oriented times three in no acute distress. HEENT:  Normo cephalic and atraumatic. No facial edema. ABDOMEN:   Her uterus is gravid. She had no complaint of abdominal pain or tenderness. The fetal heart rate is 153 bpm.     EXTREMITIES:  No peripheral edema is noted. PELVIC EXAMINATION: 9/7/2022  Transvaginal ultrasound assessment of the cervix was performed. The amniotic membranes extending to the cervical cerclage. IMPRESSION:    1.  IUP at 34 weeks 2 days gestation based on her Estimated Date of Delivery: 11/6/22    2. Incompetent cervix, with the amniotic membranes extending to the cervical cerclage    3. Morbid obesity    4. Fully vaccinated for COVID-19 January 2022. 5.  Two previous first trimester pregnancy losses    6. MSAFP 0.87 MoM, (drawn 6/14/2022)    7. Limited visualization of the fetal anatomy    8. Essential hypertension taking Procardia    9. O positive blood type  10. Cephalic presentation 9/58/5153  11.   Completed Celestone course on 7/16/2022 with rescue dose administered 2022  12. Completed magnesium sulfate course on 2022  13. Reassuring BPP and cord Doppler testing 2022  14. Reactive nonstress test with intermittent variable decelerations 2022  15. The patient received a rescue dose of Celestone on 2022  16. Hemoglobin A1c value was 5.8% on 2022. Taking Lantus and Humalog insulin as indicated above. 17.  Asymptomatic, slightly elevated blood pressure which normalized with rest on 2022. PLAN:  The patient is to remain sexually abstinent through the balance of her pregnancy. I advised her to immediately come to the hospital if she had any uterine contractions, rupture of the amniotic membranes, vaginal bleeding, abdominal pain or tenderness, or any new symptomatology she was concerned about. I advised the patient to continue to do home glucose monitoring. She is to check her blood sugars fasting and 2 hours after each meal.  The goal is  to maintain her post prandial blood sugars 80<120. Her fasting blood sugars should be maintained 80<92. She is to call if her blood sugars are outside of these parameters. The patient is to continue to take her medications as directed. She should be closely watched for development of superimposed preeclampsia. I would recommend that the patient count fetal movements and call if she notices any subjective decrease in fetal movements, particularly if there are less than 10 major movements in 2 hours. Non-stress testing should be performed every 3 to 4 days through the balance of the pregnancy. Serial ultrasounds to assess fetal anatomy and growth should be performed. The patient is at increase risk for  morbidity and mortality secondary to her history. Weekly BPP and cord Doppler testing should be performed, unless there is a clinical indication to perform the testing more frequently.     The patient was advised to call if she has any increased vaginal discharge, vaginal bleeding, have any questions regarding her management, please contact me at your convenience and thank you for allowing me to participate in her care.     Sincerely,        Lenin Merrill MD, MS, Mary Miller RDCS, RDMS, RVT  Director [address] [place] [address]  [phone]

## 2022-09-28 LAB
GLUCOSE URINE, POC: NORMAL
PROTEIN UA: NEGATIVE

## 2022-09-30 ENCOUNTER — ROUTINE PRENATAL (OUTPATIENT)
Dept: OBGYN CLINIC | Age: 32
End: 2022-09-30
Payer: MEDICAID

## 2022-09-30 VITALS
HEART RATE: 102 BPM | BODY MASS INDEX: 57.17 KG/M2 | WEIGHT: 293 LBS | SYSTOLIC BLOOD PRESSURE: 135 MMHG | DIASTOLIC BLOOD PRESSURE: 85 MMHG

## 2022-09-30 DIAGNOSIS — E66.01 CLASS 3 SEVERE OBESITY WITHOUT SERIOUS COMORBIDITY IN ADULT, UNSPECIFIED BMI, UNSPECIFIED OBESITY TYPE (HCC): ICD-10-CM

## 2022-09-30 DIAGNOSIS — O34.32 INCOMPETENT CERVIX IN PREGNANCY, ANTEPARTUM, SECOND TRIMESTER: ICD-10-CM

## 2022-09-30 DIAGNOSIS — O24.414 INSULIN CONTROLLED GESTATIONAL DIABETES MELLITUS (GDM) IN THIRD TRIMESTER: Primary | ICD-10-CM

## 2022-09-30 DIAGNOSIS — R79.0 LOW FERRITIN: ICD-10-CM

## 2022-09-30 DIAGNOSIS — R73.09 ELEVATED HEMOGLOBIN A1C: ICD-10-CM

## 2022-09-30 DIAGNOSIS — O10.019 BENIGN ESSENTIAL HYPERTENSION, ANTEPARTUM: ICD-10-CM

## 2022-09-30 LAB
GLUCOSE URINE, POC: NORMAL
PROTEIN UA: POSITIVE

## 2022-09-30 PROCEDURE — 81002 URINALYSIS NONAUTO W/O SCOPE: CPT | Performed by: OBSTETRICS & GYNECOLOGY

## 2022-09-30 PROCEDURE — 99999 PR OFFICE/OUTPT VISIT,PROCEDURE ONLY: CPT | Performed by: OBSTETRICS & GYNECOLOGY

## 2022-09-30 PROCEDURE — 59025 FETAL NON-STRESS TEST: CPT | Performed by: OBSTETRICS & GYNECOLOGY

## 2022-09-30 PROCEDURE — 99213 OFFICE O/P EST LOW 20 MIN: CPT | Performed by: OBSTETRICS & GYNECOLOGY

## 2022-09-30 NOTE — PROGRESS NOTES
22    Kristy Snyder MD  [address] [facility]     RE:  Carissa Segal  : 1990   AGE: 27 y.o. This report has been created using voice recognition software. It may contain errors which are inherent in voice recognition technology. Dear Dr. Maritza Hernandez:    Nixon Manning had a nonstress test performed today for the following indications:    Patient Active Problem List   Diagnosis    Maternal morbid obesity, antepartum ([address]    Incompetent cervix in pregnancy, antepartum, with cervical cerclage. Benign essential hypertension, antepartum    Morbid obesity with body mass index of 50.0-59.9 in adult (HCC)    Anemia    Elevated hemoglobin A1c    Low ferritin    Low maternal serum vitamin B12    Low vitamin D level     Nixon Manning is a 27 y.o. female, who is G3(0,0,2,0). She has an Estimated Date of Delivery: 22 based on her previous ultrasound assessment. She is currently 34 weeks 5 days gestation based on that assessment. The nonstress test performed today was reactive. Moderate variability was present. A fetal ultrasound evaluation was performed on 2022. A detailed report included in the EMR under the imaging tab. A living ziegler intrauterine fetus was identified in the cephalic presentation, with normal fetal heart motion and normal fetal motion noted. The amniotic fluid index was 18.1 cm. The placenta was anterior. The estimated fetal weight was at the 33rd growth percentile for gestational age. Visualization of the fetal anatomy was limited secondary to poor acoustic transmission to the patient's anterior abdominal wall in the fetal position. The biophysical profile cord Doppler studies were both reassuring. There was no evidence of absence, or reversal of end-diastolic flow in the samples evaluated. The ultrasound report from 2022 was included in the EMR under the imaging tab.   A living ziegler intrauterine fetus was identified in the cephalic presentation, with normal fetal heart motion and normal fetal motion noted. The placenta was anterior. The amniotic fluid index was 12.6 cm. The biophysical profile and cord Doppler studies were both reassuring. There was no evidence of absence, or reversal of end-diastolic flow in the segments evaluated. IMPRESSION:    1.  IUP at 34 weeks 5 days gestation based on her Estimated Date of Delivery: 11/6/22    2. Incompetent cervix, with the amniotic membranes extending to the cervical cerclage    3. Morbid obesity    4. Fully vaccinated for COVID-19 January 2022. 5.  Two previous first trimester pregnancy losses    6. MSAFP 0.87 MoM, (drawn 6/14/2022)    7. Limited visualization of the fetal anatomy    8. Essential hypertension taking Procardia    9. O positive blood type  10. Cephalic presentation 2/89/8548  11. Completed Celestone course on 7/16/2022 with rescue dose administered 8/22/2022  12. Completed magnesium sulfate course on 7/16/2022  13. Reassuring BPP and cord Doppler testing 9/27/2022  14. Reactive nonstress test 9/30/2022  15. The patient received a rescue dose of Celestone on 8/22/2022  16. Hemoglobin A1c value was 5.8% on 8/23/2022. Taking Lantus and Humalog insulin as indicated above. 17.  Asymptomatic, slightly elevated blood pressure which normalized with rest on 9/27/2022. PLAN:  The patient is to remain sexually abstinent through the balance of her pregnancy. I advised her to immediately come to the hospital if she had any uterine contractions, rupture of the amniotic membranes, vaginal bleeding, abdominal pain or tenderness, or any new symptomatology she was concerned about. I advised the patient to continue to do home glucose monitoring. She is to check her blood sugars fasting and 2 hours after each meal.  The goal is  to maintain her post prandial blood sugars 80<120. Her fasting blood sugars should be maintained 80<92.  She is to call if her blood sugars are outside of these parameters. The patient is to continue to take her medications as directed. She should be closely watched for development of superimposed preeclampsia. I would recommend that the patient count fetal movements and call if she notices any subjective decrease in fetal movements, particularly if there are less than 10 major movements in 2 hours. Non-stress testing should be performed every 3 to 4 days through the balance of the pregnancy. Serial ultrasounds to assess fetal anatomy and growth should be performed. The patient is at increase risk for  morbidity and mortality secondary to her history. Weekly BPP and cord Doppler testing should be performed, unless there is a clinical indication to perform the testing more frequently. The patient was advised to call if she has any increased vaginal discharge, vaginal bleeding, contractions, abdominal pain, back pain or any new significant symptomatology prior to her next visit. I advised her that these are signs and symptoms of cervical change and require follow-up assessment when they occur. The patient is to continue to follow with you in your office for ongoing prenatal care. The patient is scheduled to return to our office for follow-up assessment in 4 days, unless she has a clinical indication to return prior to that time. Further evaluation and management will be dependent on the results of the patient's testing and her clinical presentation. If you have any questions regarding her management, please contact me at your convenience and thank you for allowing me to participate in her care.     Sincerely,        Vamsi Hill MD, MS, Avtar Handler, RDCS, RDMS, RVT  Director [address] [place] [address]  [phone]

## 2022-09-30 NOTE — PROGRESS NOTES
Patient is here today for NST. Denies any vaginal bleeding, or leakage of fluids. Cramping increased the past two nights. Patient reports good fetal movement.

## 2022-10-03 ENCOUNTER — TELEPHONE (OUTPATIENT)
Dept: OBGYN CLINIC | Age: 32
End: 2022-10-03

## 2022-10-03 NOTE — TELEPHONE ENCOUNTER
Pt called in stating that she took 2 Mucinex tablets for congestions and she should have only taken 1. She states that she took the medication around 1230 today. She states that she feels ok and +FM noted. Discussed with Dr Mikhail Conn. Pt informed that she may call the number on the package and that if she has any problems  or if the baby is not moving or decreased movement. she may come to the hospital for evaluation.   She verbalized understanding

## 2022-10-04 ENCOUNTER — ANCILLARY PROCEDURE (OUTPATIENT)
Dept: OBGYN CLINIC | Age: 32
DRG: 540 | End: 2022-10-04
Payer: MEDICAID

## 2022-10-04 ENCOUNTER — HOSPITAL ENCOUNTER (INPATIENT)
Age: 32
LOS: 3 days | Discharge: HOME | DRG: 540 | End: 2022-10-08
Attending: OBSTETRICS & GYNECOLOGY | Admitting: OBSTETRICS & GYNECOLOGY
Payer: MEDICAID

## 2022-10-04 ENCOUNTER — ROUTINE PRENATAL (OUTPATIENT)
Dept: OBGYN CLINIC | Age: 32
DRG: 540 | End: 2022-10-04
Payer: MEDICAID

## 2022-10-04 VITALS
BODY MASS INDEX: 57.03 KG/M2 | HEART RATE: 90 BPM | WEIGHT: 293 LBS | SYSTOLIC BLOOD PRESSURE: 150 MMHG | DIASTOLIC BLOOD PRESSURE: 95 MMHG

## 2022-10-04 DIAGNOSIS — O16.3 ELEVATED BLOOD PRESSURE AFFECTING PREGNANCY IN THIRD TRIMESTER, ANTEPARTUM: ICD-10-CM

## 2022-10-04 DIAGNOSIS — R73.09 ELEVATED HEMOGLOBIN A1C: ICD-10-CM

## 2022-10-04 DIAGNOSIS — O10.019 BENIGN ESSENTIAL HYPERTENSION, ANTEPARTUM: ICD-10-CM

## 2022-10-04 DIAGNOSIS — O16.3 ELEVATED BLOOD PRESSURE AFFECTING PREGNANCY IN THIRD TRIMESTER, ANTEPARTUM: Primary | ICD-10-CM

## 2022-10-04 DIAGNOSIS — E66.01 MATERNAL MORBID OBESITY, ANTEPARTUM (HCC): ICD-10-CM

## 2022-10-04 DIAGNOSIS — D64.9 ANEMIA, UNSPECIFIED TYPE: ICD-10-CM

## 2022-10-04 DIAGNOSIS — O99.210 MATERNAL MORBID OBESITY, ANTEPARTUM (HCC): ICD-10-CM

## 2022-10-04 DIAGNOSIS — O24.414 INSULIN CONTROLLED GESTATIONAL DIABETES MELLITUS (GDM) IN THIRD TRIMESTER: ICD-10-CM

## 2022-10-04 DIAGNOSIS — R79.0 LOW FERRITIN: ICD-10-CM

## 2022-10-04 DIAGNOSIS — Z3A.35 35 WEEKS GESTATION OF PREGNANCY: ICD-10-CM

## 2022-10-04 DIAGNOSIS — O34.32 INCOMPETENT CERVIX IN PREGNANCY, ANTEPARTUM, SECOND TRIMESTER: ICD-10-CM

## 2022-10-04 DIAGNOSIS — O28.0 LOW MATERNAL SERUM VITAMIN B12: ICD-10-CM

## 2022-10-04 DIAGNOSIS — R79.89 LOW VITAMIN D LEVEL: Primary | ICD-10-CM

## 2022-10-04 DIAGNOSIS — E66.01 CLASS 3 SEVERE OBESITY WITHOUT SERIOUS COMORBIDITY IN ADULT, UNSPECIFIED BMI, UNSPECIFIED OBESITY TYPE (HCC): ICD-10-CM

## 2022-10-04 DIAGNOSIS — Z3A.35 PREGNANCY WITH 35 COMPLETED WEEKS GESTATION: ICD-10-CM

## 2022-10-04 DIAGNOSIS — G89.18 POST-OP PAIN: ICD-10-CM

## 2022-10-04 LAB
ALBUMIN SERPL-MCNC: 3.2 G/DL (ref 3.5–5.2)
ALP BLD-CCNC: 72 U/L (ref 35–104)
ALT SERPL-CCNC: 21 U/L (ref 0–32)
AMPHETAMINE SCREEN, URINE: POSITIVE
ANION GAP SERPL CALCULATED.3IONS-SCNC: 12 MMOL/L (ref 7–16)
AST SERPL-CCNC: 27 U/L (ref 0–31)
BARBITURATE SCREEN URINE: NOT DETECTED
BENZODIAZEPINE SCREEN, URINE: NOT DETECTED
BILIRUB SERPL-MCNC: <0.2 MG/DL (ref 0–1.2)
BUN BLDV-MCNC: 7 MG/DL (ref 6–20)
CALCIUM SERPL-MCNC: 9.3 MG/DL (ref 8.6–10.2)
CANNABINOID SCREEN URINE: NOT DETECTED
CHLORIDE BLD-SCNC: 103 MMOL/L (ref 98–107)
CO2: 20 MMOL/L (ref 22–29)
COCAINE METABOLITE SCREEN URINE: NOT DETECTED
CREAT SERPL-MCNC: 0.6 MG/DL (ref 0.5–1)
CREATININE URINE: 402 MG/DL (ref 29–226)
FENTANYL SCREEN, URINE: NOT DETECTED
GFR AFRICAN AMERICAN: >60
GFR NON-AFRICAN AMERICAN: >60 ML/MIN/1.73
GLUCOSE BLD-MCNC: 146 MG/DL (ref 74–99)
HCT VFR BLD CALC: 35.3 % (ref 34–48)
HCT VFR BLD CALC: 36.7 % (ref 34–48)
HEMOGLOBIN: 11.5 G/DL (ref 11.5–15.5)
HEMOGLOBIN: 12 G/DL (ref 11.5–15.5)
Lab: ABNORMAL
MCH RBC QN AUTO: 28.5 PG (ref 26–35)
MCH RBC QN AUTO: 28.8 PG (ref 26–35)
MCHC RBC AUTO-ENTMCNC: 32.6 % (ref 32–34.5)
MCHC RBC AUTO-ENTMCNC: 32.7 % (ref 32–34.5)
MCV RBC AUTO: 87.2 FL (ref 80–99.9)
MCV RBC AUTO: 88.3 FL (ref 80–99.9)
METHADONE SCREEN, URINE: NOT DETECTED
OPIATE SCREEN URINE: NOT DETECTED
OXYCODONE URINE: NOT DETECTED
PDW BLD-RTO: 14.2 FL (ref 11.5–15)
PDW BLD-RTO: 14.3 FL (ref 11.5–15)
PHENCYCLIDINE SCREEN URINE: NOT DETECTED
PLATELET # BLD: 319 E9/L (ref 130–450)
PLATELET # BLD: 340 E9/L (ref 130–450)
PMV BLD AUTO: 9.5 FL (ref 7–12)
PMV BLD AUTO: 9.7 FL (ref 7–12)
POTASSIUM SERPL-SCNC: 3.6 MMOL/L (ref 3.5–5)
PROTEIN PROTEIN: 31 MG/DL (ref 0–12)
PROTEIN UA: NEGATIVE
PROTEIN/CREAT RATIO: 0.1
PROTEIN/CREAT RATIO: 0.1 (ref 0–0.2)
RBC # BLD: 4 E12/L (ref 3.5–5.5)
RBC # BLD: 4.21 E12/L (ref 3.5–5.5)
SODIUM BLD-SCNC: 135 MMOL/L (ref 132–146)
TOTAL PROTEIN: 6.1 G/DL (ref 6.4–8.3)
URIC ACID, SERUM: 5.9 MG/DL (ref 2.4–5.7)
WBC # BLD: 7.6 E9/L (ref 4.5–11.5)
WBC # BLD: 8.2 E9/L (ref 4.5–11.5)

## 2022-10-04 PROCEDURE — 76820 UMBILICAL ARTERY ECHO: CPT | Performed by: OBSTETRICS & GYNECOLOGY

## 2022-10-04 PROCEDURE — 1111F DSCHRG MED/CURRENT MED MERGE: CPT | Performed by: OBSTETRICS & GYNECOLOGY

## 2022-10-04 PROCEDURE — 81002 URINALYSIS NONAUTO W/O SCOPE: CPT | Performed by: OBSTETRICS & GYNECOLOGY

## 2022-10-04 PROCEDURE — G0480 DRUG TEST DEF 1-7 CLASSES: HCPCS

## 2022-10-04 PROCEDURE — 86850 RBC ANTIBODY SCREEN: CPT

## 2022-10-04 PROCEDURE — 36415 COLL VENOUS BLD VENIPUNCTURE: CPT

## 2022-10-04 PROCEDURE — 85027 COMPLETE CBC AUTOMATED: CPT

## 2022-10-04 PROCEDURE — 76818 FETAL BIOPHYS PROFILE W/NST: CPT | Performed by: OBSTETRICS & GYNECOLOGY

## 2022-10-04 PROCEDURE — 1036F TOBACCO NON-USER: CPT | Performed by: OBSTETRICS & GYNECOLOGY

## 2022-10-04 PROCEDURE — 99214 OFFICE O/P EST MOD 30 MIN: CPT | Performed by: OBSTETRICS & GYNECOLOGY

## 2022-10-04 PROCEDURE — 99212 OFFICE O/P EST SF 10 MIN: CPT | Performed by: ADVANCED PRACTICE MIDWIFE

## 2022-10-04 PROCEDURE — 86901 BLOOD TYPING SEROLOGIC RH(D): CPT

## 2022-10-04 PROCEDURE — G8427 DOCREV CUR MEDS BY ELIG CLIN: HCPCS | Performed by: OBSTETRICS & GYNECOLOGY

## 2022-10-04 PROCEDURE — 84156 ASSAY OF PROTEIN URINE: CPT

## 2022-10-04 PROCEDURE — 99213 OFFICE O/P EST LOW 20 MIN: CPT | Performed by: OBSTETRICS & GYNECOLOGY

## 2022-10-04 PROCEDURE — 80307 DRUG TEST PRSMV CHEM ANLYZR: CPT

## 2022-10-04 PROCEDURE — 86900 BLOOD TYPING SEROLOGIC ABO: CPT

## 2022-10-04 PROCEDURE — G8484 FLU IMMUNIZE NO ADMIN: HCPCS | Performed by: OBSTETRICS & GYNECOLOGY

## 2022-10-04 PROCEDURE — G0378 HOSPITAL OBSERVATION PER HR: HCPCS

## 2022-10-04 PROCEDURE — 84550 ASSAY OF BLOOD/URIC ACID: CPT

## 2022-10-04 PROCEDURE — 82570 ASSAY OF URINE CREATININE: CPT

## 2022-10-04 PROCEDURE — G8417 CALC BMI ABV UP PARAM F/U: HCPCS | Performed by: OBSTETRICS & GYNECOLOGY

## 2022-10-04 PROCEDURE — 80053 COMPREHEN METABOLIC PANEL: CPT

## 2022-10-04 NOTE — PROGRESS NOTES
35.2 of Dr Camilo Kitchen sent in for elevaed blood pressures and generalized edema, +fetal movement, denies vb or lof. Denies headache, dizziness or blurred vision.

## 2022-10-04 NOTE — H&P
CHIEF COMPLAINT:  Sent in from office due to elevated b/p, edema. HISTORY OF PRESENT ILLNESS:      The patient is a 34 y.o. female at 32w1d. OB History          3    Para   0    Term   0       0    AB   2    Living   0         SAB   2    IAB   0    Ectopic   0    Molar   0    Multiple   0    Live Births   0            Patient presents with a chief complaint as above and is being admitted for observation    Estimated Due Date: Estimated Date of Delivery: 22    PRENATAL CARE:    Complicated by: obesity, incompetent cervix cerclage in place, hypertension    PAST OB HISTORY  OB History          3    Para   0    Term   0       0    AB   2    Living   0         SAB   2    IAB   0    Ectopic   0    Molar   0    Multiple   0    Live Births   0                Past Medical History:        Diagnosis Date    Anemia 2022    Hypertension     Insulin controlled gestational diabetes mellitus (GDM) during pregnancy 2022     Past Surgical History:        Procedure Laterality Date    CERVICAL CERCLAGE N/A 2022    CERVIX CERCLAGE PLACEMENT performed by Francis Mendoza MD at [facility] L&D OR    WISDOM TOOTH EXTRACTION       Allergies:  Patient has no known allergies.   Social History:    Social History     Socioeconomic History    Marital status: Single     Spouse name: Not on file    Number of children: Not on file    Years of education: Not on file    Highest education level: Not on file   Occupational History    Not on file   Tobacco Use    Smoking status: Former    Smokeless tobacco: Never   Vaping Use    Vaping Use: Never used   Substance and Sexual Activity    Alcohol use: Not Currently     Comment: social    Drug use: No    Sexual activity: Not on file   Other Topics Concern    Not on file   Social History Narrative    Not on file     Social Determinants of Health     Financial Resource Strain: Not on file   Food Insecurity: Not on file   Transportation Needs: Not on file Physical Activity: Not on file   Stress: Not on file   Social Connections: Not on file   Intimate Partner Violence: Not on file   Housing Stability: Not on file     Family History:   History reviewed. No pertinent family history. Medications Prior to Admission:  Medications Prior to Admission: Glucose 4-6 GM-MG CHEW, CHEW AND SWALLOW 4 TABLETS BY MOUTH AS NEEDED FOR LOW BLOOD SUGAR (Patient not taking: No sig reported)  labetalol (NORMODYNE) 100 MG tablet, Take 100 mg by mouth 2 times daily  BD INSULIN SYRINGE U/F 31G X 5/16\" 0.3 ML MISC, USE FOUR TIMES DAILY  Cholecalciferol (VITAMIN D3) 50 MCG (2000 UT) CAPS, Take 1 capsule by mouth daily  insulin lispro (HUMALOG) 100 UNIT/ML SOLN injection vial, Inject 6 Units into the skin 3 times daily (with meals)  Prenatal Vit-Fe Fumarate-FA (PRENATAL VITAMIN) 27-1 MG TABS tablet, Take 1 tablet by mouth daily  NIFEdipine (ADALAT CC) 30 MG extended release tablet, Take 1 tablet by mouth in the morning and 1 tablet in the evening. (Patient not taking: Reported on 10/4/2022)  aspirin 81 MG chewable tablet, Take 1 tablet by mouth in the morning. glucose 4 g chewable tablet, Take 4 tablets by mouth as needed for Low blood sugar (Patient not taking: No sig reported)  insulin glargine (LANTUS) 100 UNIT/ML injection vial, Inject 22 Units into the skin nightly  glucagon, rDNA, 1 MG injection, Inject 1 mg into the muscle as needed for Low blood sugar (Blood glucose less than 70 mg/dL and patient NOT ALERT or NPO and does not have IV access.) (Patient not taking: No sig reported)  ferrous sulfate (IRON 325) 325 (65 Fe) MG tablet, Take 1 tablet by mouth in the morning and 1 tablet in the evening. Take with meals. vitamin B-12 1000 MCG tablet, Take 1 tablet by mouth in the morning. docusate sodium (COLACE, DULCOLAX) 100 MG CAPS, Take 100 mg by mouth in the morning and 100 mg before bedtime.   glucose monitoring (FREESTYLE FREEDOM) kit, 1 kit by Does not apply route 4 times daily  progesterone (PROMETRIUM) 200 MG CAPS capsule, Place 1 capsule into the vagina at bedtime (Patient not taking: Reported on 10/4/2022)  Blood Glucose Monitoring Suppl (ONE TOUCH ULTRA 2) w/Device KIT, USE AS DIRECTED  ONETOUCH ULTRA strip, TEST AS DIRECTED FOUR TIMES DAILY  Lancets (ONETOUCH DELICA PLUS [id number]) MISC, 22 Units At night    REVIEW OF SYSTEMS:    CONSTITUTIONAL:  negative  HEENT: negative  BREAST: negative  RESPIRATORY:  negative  CARDIOVASCULAR:  negative  GASTROINTESTINAL:  negative  : negative  ALLERGIC/IMMUNOLOGIC:  negative  NEUROLOGICAL:  negative  ENDOCRINE: negative  BEHAVIOR/PSYCH:  negative    PHYSICAL EXAM:  Vitals:    10/04/22 1344   BP: 130/73   Pulse: (!) 105   Resp: 18   Temp: 98 °F (36.7 °C)   TempSrc: Oral     General appearance:  awake, alert, cooperative, no apparent distress, and appears stated age  Skin: warm and dry. No rash observed  Breast: Warm, no redness or masses felt. No nipple discharge  Neurologic:  Awake, alert, oriented to name, place and time. Lungs:  No increased work of breathing, good air exchange  Abdomen:  Soft, non tender, gravid, consistent with her gestational age, EFW by Leopald's manouever was aga   Fetal heart rate:  Reassuring.   Pelvis:  Adequate pelvis  Cervix:    deferred  Contraction frequency:  none    Membranes:  Intact    ASSESSMENT AND PLAN:    Orders per LEONIE and Dr Billy Martinez

## 2022-10-04 NOTE — PROGRESS NOTES
Assumed are of patient, report received from [facility]. Dr Moreland Never called for order clarification. Orders received.

## 2022-10-04 NOTE — PROGRESS NOTES
Dr Frankie Flores updated with labs and blood pressures. He said he will call Dr Nasir Saavedra and discuss poc.

## 2022-10-04 NOTE — PROGRESS NOTES
10/4/22    Alba Cartagena MD  [address] [facility]     RE:  Hair Thurmond  : 1990   AGE: 34 y.o. This report has been created using voice recognition software. It may contain errors which are inherent in voice recognition technology. Dear Dr. Ryan Escalera:    Cherie Dukes had an appointment today for the following indications:    Patient Active Problem List   Diagnosis    Maternal morbid obesity, antepartum ([address]    Incompetent cervix in pregnancy, antepartum, with cervical cerclage. Benign essential hypertension, antepartum    Morbid obesity with body mass index of 50.0-59.9 in adult (HCC)    Anemia    Elevated hemoglobin A1c    Low ferritin    Low maternal serum vitamin B12    Low vitamin D level     Cherie Dukes is a 34 y.o. female, who is G3(0,0,2,0). She has an Estimated Date of Delivery: 22 based on her previous ultrasound assessment. She is currently 35 weeks 2 days gestation based on that assessment. The patient was seen today for follow-up assessment for incompetent cervix with shortening of the cervix with funneling of the amniotic membranes through the cervical cerclage, chronic hypertension treated with Procardia, diabetes treated with insulin, and morbid obesity. She denied any vaginal bleeding or leaking of amniotic fluid. She is having no uterine contraction activity. She stated that she is experiencing fetal movement intermittently. The patient's hemoglobin A1c value from 2022 was elevated to 5.8%. The patient currently takes 22 units of insulin glargine each evening and 6 units of Humalog insulin with each meal.  She did not bring her glucose log in for review today but states her blood sugars have been within the range she has been given the follow-up. The patient has hypertension. She currently takes Procardia for management of her hypertension. Her initial blood pressure today was 137/97.   Repeat assessments were 147/106 and 150/95. Her blood pressures did not normalize with rest. She had no symptoms of hypertension. The nonstress test performed today was reactive. Moderate variability was present. A fetal ultrasound evaluation was performed on 9/20/2022. A detailed report included in the EMR under the imaging tab. A living ziegler intrauterine fetus was identified in the cephalic presentation, with normal fetal heart motion and normal fetal motion noted. The amniotic fluid index was 18.1 cm. The placenta was anterior. The estimated fetal weight was at the 33rd growth percentile for gestational age. Visualization of the fetal anatomy was limited secondary to poor acoustic transmission to the patient's anterior abdominal wall in the fetal position. The biophysical profile cord Doppler studies were both reassuring. There was no evidence of absence, or reversal of end-diastolic flow in the samples evaluated. The ultrasound report from 10/4/2022 was included in the EMR under the imaging tab. A living ziegler intrauterine fetus was identified in the cephalic presentation, with normal fetal heart motion and normal fetal motion noted. The placenta was anterior. The amniotic fluid index was 11.1 cm. The biophysical profile and cord Doppler studies were both reassuring. There was no evidence of absence, or reversal of end-diastolic flow in the segments evaluated.                      GENETIC SCREENING/TERATOLOGY COUNSELING                  (Includes patient, FTB, and any affected family members)    Patient Age > 35 Years NO   Thalassemia ( MVC<80) NO   Congential Heart Defect NO   Neural Tube Defect NO   Cisco-Sachs NO   Sickle Cell Disease NO   Sickle Cell Trait NO   Sickle C Disease or Trait NO   Hemophilia NO   Muscular Dystrophy NO   Cystic Fibrosis NO   Lindsey Disease NO   Autism NO   Mental Retardation NO   History of Fragile X NO   Maternal Diabetes NO   Other Genetic Disease or Syndrome NO   Previous Child With Congenital Abnormality Not Listed NO   Recreational Drugs NO                                        INFECTION HISTORY     HEPATITIS IMMUNIZED:  YES   HEPATITIS INFECTION:  NO   EXPOSURE TO TB NO   PARVOVIRUS B-19 NO   CHICKEN POX  YES   MEASLES NO   HIV NO   OTHER RASH OR VIRAL ILLNESS SINCE LMP NO   UTI RECURRENT NO   HPV NO     OB History    Para Term  AB Living   3 0 0 0 2 0   SAB IAB Ectopic Molar Multiple Live Births   2 0 0 0 0 0      # Outcome Date GA Lbr Skyler/2nd Weight Sex Delivery Anes PTL Lv   3 Current            2 2021 6w0d          1 2020 5w0d            PAST GYNECOLOGICAL  HISTORY:  Negative for abnormal pap smears. Negative for sexually transmitted diseases. Negative for cervical LEEP / conization /cryosurgery. Negative for uterine surgery. Negative for ovarian or tubal surgery. Past Medical History:   Diagnosis Date    Anemia 2022    Hypertension     Insulin controlled gestational diabetes mellitus (GDM) during pregnancy 2022       Past Surgical History:   Procedure Laterality Date    CERVICAL CERCLAGE N/A 2022    CERVIX CERCLAGE PLACEMENT performed by Juan Carroll MD at [facility] L&D OR    WISDOM TOOTH EXTRACTION         No Known Allergies    No current outpatient medications on file. No current facility-administered medications for this visit. Social History     Tobacco Use    Smoking status: Former    Smokeless tobacco: Never   Substance Use Topics    Alcohol use: Not Currently     Comment: social     FAMILY MEDICAL HISTORY:   Negative for congenital abnormalities, autism, genetic disease and mental retardation, not listed above.      Review of Systems :   CONSTITUTIONAL : No fever, no chills   HEENT : No headache, no visual changes, no rhinorrhea, no sore throat   CARDIOVASCULAR : No pain, no palpitations, no edema   RESPIRATORY : No pain, no shortness of breath   GASTROINTESTINAL : No N/V, no D/C, no abdominal pain GENITOURINARY : No dysuria, hematuria and no incontinence   MUSCULOSKELETAL : No myalgia, No back pain  NEUROLOGICAL : No numbness, no tingling, no tremors. No history of seizures  ALL OTHER SYSTEMS WERE REPORTED AS NEGATIVE. PERTINENT PHYSICAL EXAMINATION:   BP (!) 150/95   Pulse 90   Wt (!) 364 lb 2 oz (165.2 kg)   LMP 01/30/2022   BMI 57.03 kg/m²   Urine dipstick:   Negative Glucose  Trace Protein    GENERAL:   The patient is a well developed, female who is alert cooperative and oriented times three in no acute distress. HEENT:  Normo cephalic and atraumatic. No facial edema. ABDOMEN:   Her uterus is gravid. She had no complaint of abdominal pain or tenderness. The fetal heart rate is 148 bpm.     EXTREMITIES:  1+ peripheral edema is noted. PELVIC EXAMINATION: 9/7/2022  Transvaginal ultrasound assessment of the cervix was performed. The amniotic membranes extending to the cervical cerclage. IMPRESSION:    1.  IUP at 35 weeks 2 days gestation based on her Estimated Date of Delivery: 11/6/22    2. Incompetent cervix, with the amniotic membranes extending to the cervical cerclage    3. Morbid obesity    4. Fully vaccinated for COVID-19 January 2022. 5.  Two previous first trimester pregnancy losses    6. MSAFP 0.87 MoM, (drawn 6/14/2022)    7. Limited visualization of the fetal anatomy    8. Essential hypertension taking Procardia    9. O positive blood type  10. Cephalic presentation 05/3/6263  11. Completed Celestone course on 7/16/2022 with rescue dose administered 8/22/2022  12. Completed magnesium sulfate course on 7/16/2022  13. Reassuring BPP and cord Doppler testing 10/4/2022  14. Reactive nonstress test 10/4/2022  15. The patient received a rescue dose of Celestone on 8/22/2022  16. Hemoglobin A1c value was 5.8% on 8/23/2022. Taking Lantus and Humalog insulin as indicated above.   17.  Asymptomatic, significantly elevated blood pressure which did not normalize with rest on 10/4/2022. PLAN:  As we discussed at the time of the patient's assessment, I recommended that he be admitted to the hospital for further evaluation and management secondary to her significantly elevated blood pressures today. Laboratory testing should be ordered including a CMP, CBC, uric acid, urine drug screen, and protein creatinine ratio. Continuous fetal heart rate and uterine contraction monitoring should be used for now. Serial blood pressure monitoring should be performed during her admission. Further evaluation and management will be dependent on the results of the patient's testing and her clinical presentation. The total time in minutes spent with the patient, reviewing medical records, reviewing imaging studies, performing ultrasonic imaging, reviewing laboratory testing, and documenting information was 32 minutes, of which, 50% of the time was spent in patient education, counseling, and coordinating care with the patient, her provider, and/or her family. Available paper and electronic medical records were reviewed. This included our telephone conversation at the time of the patient's assessment to discuss the findings on today's evaluation and my recommendations for management. This also included my telephone conversation with the labor and delivery staff to discuss the patient's findings, and recommended orders, and management. I reviewed with the patient the results of the fetal testing performed today. I discussed with the patient my recommendations for hospital admission secondary to her significantly elevated blood pressures today. We reviewed the results of her fetal ultrasound assessment, fetal testing, including limitations of the studies. I discussed with her the possibility of need for delivery depending on her laboratory testing results and ongoing blood pressure assessment. I answered all of her questions to her satisfaction.  I asked her to call if she had any additional questions prior to her next visit. If you have any questions regarding her management, please contact me at your convenience and thank you for allowing me to participate in her care.     Sincerely,        Leona Sarkar MD, MS, Rohini Ivy RDCS, RDMS, RVT  Director [address] [place] [address]  [phone]

## 2022-10-04 NOTE — PROGRESS NOTES
Pt here for BPP/NST  Pt states good fetal movement  Pt denies any bleeding but c/o cramping and pelvic pressure

## 2022-10-04 NOTE — PATIENT INSTRUCTIONS

## 2022-10-04 NOTE — PROGRESS NOTES
Called Dr Neeta Hernandez office to update him on her Bps and labs. Doctor is with a patient, Dr will call back when available.

## 2022-10-05 ENCOUNTER — ANESTHESIA (OUTPATIENT)
Dept: LABOR AND DELIVERY | Age: 32
DRG: 540 | End: 2022-10-05
Payer: MEDICAID

## 2022-10-05 ENCOUNTER — ANESTHESIA EVENT (OUTPATIENT)
Dept: LABOR AND DELIVERY | Age: 32
DRG: 540 | End: 2022-10-05
Payer: MEDICAID

## 2022-10-05 PROBLEM — Z3A.35 35 WEEKS GESTATION OF PREGNANCY: Status: ACTIVE | Noted: 2022-10-05

## 2022-10-05 LAB
ABO/RH: NORMAL
ALBUMIN SERPL-MCNC: 3.5 G/DL (ref 3.5–5.2)
ALP BLD-CCNC: 79 U/L (ref 35–104)
ALT SERPL-CCNC: 24 U/L (ref 0–32)
ANION GAP SERPL CALCULATED.3IONS-SCNC: 14 MMOL/L (ref 7–16)
ANTIBODY SCREEN: NORMAL
APTT: 28.4 SEC (ref 24.5–35.1)
AST SERPL-CCNC: 30 U/L (ref 0–31)
BACTERIA: ABNORMAL /HPF
BASOPHILS ABSOLUTE: 0.03 E9/L (ref 0–0.2)
BASOPHILS RELATIVE PERCENT: 0.4 % (ref 0–2)
BILIRUB SERPL-MCNC: 0.2 MG/DL (ref 0–1.2)
BILIRUBIN URINE: ABNORMAL
BLOOD, URINE: ABNORMAL
BUN BLDV-MCNC: 5 MG/DL (ref 6–20)
CALCIUM SERPL-MCNC: 9.2 MG/DL (ref 8.6–10.2)
CHLORIDE BLD-SCNC: 101 MMOL/L (ref 98–107)
CLARITY: CLEAR
CO2: 18 MMOL/L (ref 22–29)
COLOR: YELLOW
CREAT SERPL-MCNC: 0.5 MG/DL (ref 0.5–1)
CREATININE URINE: 285 MG/DL (ref 29–226)
EOSINOPHILS ABSOLUTE: 0.09 E9/L (ref 0.05–0.5)
EOSINOPHILS RELATIVE PERCENT: 1.1 % (ref 0–6)
EPITHELIAL CELLS, UA: ABNORMAL /HPF
FERRITIN: 55 NG/ML
FIBRINOGEN: >700 MG/DL (ref 200–400)
FOLATE: >20 NG/ML (ref 4.8–24.2)
GFR AFRICAN AMERICAN: >60
GFR NON-AFRICAN AMERICAN: >60 ML/MIN/1.73
GLUCOSE BLD-MCNC: 84 MG/DL (ref 74–99)
GLUCOSE URINE: NEGATIVE MG/DL
HBA1C MFR BLD: 5.7 % (ref 4–5.6)
HCT VFR BLD CALC: 37.1 % (ref 34–48)
HEMOGLOBIN: 12.1 G/DL (ref 11.5–15.5)
IMMATURE GRANULOCYTES #: 0.03 E9/L
IMMATURE GRANULOCYTES %: 0.4 % (ref 0–5)
INR BLD: 1
KETONES, URINE: 40 MG/DL
LACTATE DEHYDROGENASE: 222 U/L (ref 135–214)
LEUKOCYTE ESTERASE, URINE: ABNORMAL
LYMPHOCYTES ABSOLUTE: 1.54 E9/L (ref 1.5–4)
LYMPHOCYTES RELATIVE PERCENT: 19.5 % (ref 20–42)
MAGNESIUM: 1.9 MG/DL (ref 1.6–2.6)
MCH RBC QN AUTO: 28.6 PG (ref 26–35)
MCHC RBC AUTO-ENTMCNC: 32.6 % (ref 32–34.5)
MCV RBC AUTO: 87.7 FL (ref 80–99.9)
METER GLUCOSE: 90 MG/DL (ref 74–99)
MONOCYTES ABSOLUTE: 0.84 E9/L (ref 0.1–0.95)
MONOCYTES RELATIVE PERCENT: 10.7 % (ref 2–12)
MUCUS: PRESENT /LPF
NEUTROPHILS ABSOLUTE: 5.35 E9/L (ref 1.8–7.3)
NEUTROPHILS RELATIVE PERCENT: 67.9 % (ref 43–80)
NITRITE, URINE: NEGATIVE
PDW BLD-RTO: 14.3 FL (ref 11.5–15)
PH UA: 6 (ref 5–9)
PLATELET # BLD: 348 E9/L (ref 130–450)
PMV BLD AUTO: 9.6 FL (ref 7–12)
POTASSIUM SERPL-SCNC: 3.8 MMOL/L (ref 3.5–5)
PROTEIN PROTEIN: 20 MG/DL (ref 0–12)
PROTEIN UA: NEGATIVE MG/DL
PROTEIN/CREAT RATIO: 0.1
PROTEIN/CREAT RATIO: 0.1 (ref 0–0.2)
PROTHROMBIN TIME: 11.1 SEC (ref 9.3–12.4)
RBC # BLD: 4.23 E12/L (ref 3.5–5.5)
RBC UA: ABNORMAL /HPF (ref 0–2)
SODIUM BLD-SCNC: 133 MMOL/L (ref 132–146)
SPECIFIC GRAVITY UA: 1.02 (ref 1–1.03)
T3 FREE: 3.5 PG/ML (ref 2–4.4)
T4 FREE: 0.92 NG/DL (ref 0.93–1.7)
TOTAL PROTEIN: 6.6 G/DL (ref 6.4–8.3)
TSH SERPL DL<=0.05 MIU/L-ACNC: 2.43 UIU/ML (ref 0.27–4.2)
URIC ACID, SERUM: 6.1 MG/DL (ref 2.4–5.7)
UROBILINOGEN, URINE: 0.2 E.U./DL
VITAMIN B-12: 691 PG/ML (ref 211–946)
VITAMIN D 25-HYDROXY: 29 NG/ML (ref 30–100)
WBC # BLD: 7.9 E9/L (ref 4.5–11.5)
WBC UA: ABNORMAL /HPF (ref 0–5)

## 2022-10-05 PROCEDURE — 82607 VITAMIN B-12: CPT

## 2022-10-05 PROCEDURE — 86376 MICROSOMAL ANTIBODY EACH: CPT

## 2022-10-05 PROCEDURE — 82728 ASSAY OF FERRITIN: CPT

## 2022-10-05 PROCEDURE — 6370000000 HC RX 637 (ALT 250 FOR IP): Performed by: OBSTETRICS & GYNECOLOGY

## 2022-10-05 PROCEDURE — 6360000002 HC RX W HCPCS: Performed by: OBSTETRICS & GYNECOLOGY

## 2022-10-05 PROCEDURE — 84439 ASSAY OF FREE THYROXINE: CPT

## 2022-10-05 PROCEDURE — 85613 RUSSELL VIPER VENOM DILUTED: CPT

## 2022-10-05 PROCEDURE — 88307 TISSUE EXAM BY PATHOLOGIST: CPT

## 2022-10-05 PROCEDURE — 6360000002 HC RX W HCPCS: Performed by: ANESTHESIOLOGY

## 2022-10-05 PROCEDURE — 99252 IP/OBS CONSLTJ NEW/EST SF 35: CPT | Performed by: OBSTETRICS & GYNECOLOGY

## 2022-10-05 PROCEDURE — 83036 HEMOGLOBIN GLYCOSYLATED A1C: CPT

## 2022-10-05 PROCEDURE — G0378 HOSPITAL OBSERVATION PER HR: HCPCS

## 2022-10-05 PROCEDURE — 82746 ASSAY OF FOLIC ACID SERUM: CPT

## 2022-10-05 PROCEDURE — 86146 BETA-2 GLYCOPROTEIN ANTIBODY: CPT

## 2022-10-05 PROCEDURE — 84550 ASSAY OF BLOOD/URIC ACID: CPT

## 2022-10-05 PROCEDURE — 83615 LACTATE (LD) (LDH) ENZYME: CPT

## 2022-10-05 PROCEDURE — 84156 ASSAY OF PROTEIN URINE: CPT

## 2022-10-05 PROCEDURE — 81001 URINALYSIS AUTO W/SCOPE: CPT

## 2022-10-05 PROCEDURE — 84481 FREE ASSAY (FT-3): CPT

## 2022-10-05 PROCEDURE — 6360000002 HC RX W HCPCS

## 2022-10-05 PROCEDURE — 82962 GLUCOSE BLOOD TEST: CPT

## 2022-10-05 PROCEDURE — 82306 VITAMIN D 25 HYDROXY: CPT

## 2022-10-05 PROCEDURE — 36415 COLL VENOUS BLD VENIPUNCTURE: CPT

## 2022-10-05 PROCEDURE — 7100000000 HC PACU RECOVERY - FIRST 15 MIN: Performed by: OBSTETRICS & GYNECOLOGY

## 2022-10-05 PROCEDURE — 3609079900 HC CESAREAN SECTION: Performed by: OBSTETRICS & GYNECOLOGY

## 2022-10-05 PROCEDURE — 86800 THYROGLOBULIN ANTIBODY: CPT

## 2022-10-05 PROCEDURE — 82570 ASSAY OF URINE CREATININE: CPT

## 2022-10-05 PROCEDURE — 85610 PROTHROMBIN TIME: CPT

## 2022-10-05 PROCEDURE — 83735 ASSAY OF MAGNESIUM: CPT

## 2022-10-05 PROCEDURE — 86147 CARDIOLIPIN ANTIBODY EA IG: CPT

## 2022-10-05 PROCEDURE — 2580000003 HC RX 258

## 2022-10-05 PROCEDURE — 2500000003 HC RX 250 WO HCPCS

## 2022-10-05 PROCEDURE — 3700000001 HC ADD 15 MINUTES (ANESTHESIA): Performed by: OBSTETRICS & GYNECOLOGY

## 2022-10-05 PROCEDURE — 1220000001 HC SEMI PRIVATE L&D R&B

## 2022-10-05 PROCEDURE — 85730 THROMBOPLASTIN TIME PARTIAL: CPT

## 2022-10-05 PROCEDURE — 85384 FIBRINOGEN ACTIVITY: CPT

## 2022-10-05 PROCEDURE — 3700000000 HC ANESTHESIA ATTENDED CARE: Performed by: OBSTETRICS & GYNECOLOGY

## 2022-10-05 PROCEDURE — 2500000003 HC RX 250 WO HCPCS: Performed by: OBSTETRICS & GYNECOLOGY

## 2022-10-05 PROCEDURE — 2580000003 HC RX 258: Performed by: OBSTETRICS & GYNECOLOGY

## 2022-10-05 PROCEDURE — 85025 COMPLETE CBC W/AUTO DIFF WBC: CPT

## 2022-10-05 PROCEDURE — 80053 COMPREHEN METABOLIC PANEL: CPT

## 2022-10-05 PROCEDURE — 2709999900 HC NON-CHARGEABLE SUPPLY: Performed by: OBSTETRICS & GYNECOLOGY

## 2022-10-05 PROCEDURE — 7100000001 HC PACU RECOVERY - ADDTL 15 MIN: Performed by: OBSTETRICS & GYNECOLOGY

## 2022-10-05 PROCEDURE — 84443 ASSAY THYROID STIM HORMONE: CPT

## 2022-10-05 RX ORDER — SODIUM CHLORIDE 0.9 % (FLUSH) 0.9 %
10 SYRINGE (ML) INJECTION PRN
Status: DISCONTINUED | OUTPATIENT
Start: 2022-10-05 | End: 2022-10-05

## 2022-10-05 RX ORDER — SODIUM CHLORIDE 0.9 % (FLUSH) 0.9 %
10 SYRINGE (ML) INJECTION EVERY 12 HOURS SCHEDULED
Status: DISCONTINUED | OUTPATIENT
Start: 2022-10-05 | End: 2022-10-05

## 2022-10-05 RX ORDER — TRISODIUM CITRATE DIHYDRATE AND CITRIC ACID MONOHYDRATE 500; 334 MG/5ML; MG/5ML
30 SOLUTION ORAL ONCE
Status: COMPLETED | OUTPATIENT
Start: 2022-10-05 | End: 2022-10-05

## 2022-10-05 RX ORDER — BUPIVACAINE HYDROCHLORIDE 7.5 MG/ML
INJECTION, SOLUTION INTRASPINAL PRN
Status: DISCONTINUED | OUTPATIENT
Start: 2022-10-05 | End: 2022-10-05 | Stop reason: SDUPTHER

## 2022-10-05 RX ORDER — LABETALOL HYDROCHLORIDE 5 MG/ML
80 INJECTION, SOLUTION INTRAVENOUS
Status: COMPLETED | OUTPATIENT
Start: 2022-10-05 | End: 2022-10-05

## 2022-10-05 RX ORDER — GLYCOPYRROLATE 0.2 MG/ML
INJECTION INTRAMUSCULAR; INTRAVENOUS PRN
Status: DISCONTINUED | OUTPATIENT
Start: 2022-10-05 | End: 2022-10-05 | Stop reason: SDUPTHER

## 2022-10-05 RX ORDER — SODIUM CHLORIDE 9 MG/ML
INJECTION, SOLUTION INTRAVENOUS PRN
Status: DISCONTINUED | OUTPATIENT
Start: 2022-10-05 | End: 2022-10-05

## 2022-10-05 RX ORDER — DOCUSATE SODIUM 100 MG/1
100 CAPSULE, LIQUID FILLED ORAL 2 TIMES DAILY
Status: DISCONTINUED | OUTPATIENT
Start: 2022-10-06 | End: 2022-10-08 | Stop reason: HOSPADM

## 2022-10-05 RX ORDER — KETOROLAC TROMETHAMINE 30 MG/ML
15 INJECTION, SOLUTION INTRAMUSCULAR; INTRAVENOUS EVERY 6 HOURS PRN
Status: DISPENSED | OUTPATIENT
Start: 2022-10-05 | End: 2022-10-06

## 2022-10-05 RX ORDER — LABETALOL HYDROCHLORIDE 5 MG/ML
40 INJECTION, SOLUTION INTRAVENOUS
Status: COMPLETED | OUTPATIENT
Start: 2022-10-05 | End: 2022-10-05

## 2022-10-05 RX ORDER — HYDRALAZINE HYDROCHLORIDE 20 MG/ML
10 INJECTION INTRAMUSCULAR; INTRAVENOUS
Status: COMPLETED | OUTPATIENT
Start: 2022-10-05 | End: 2022-10-05

## 2022-10-05 RX ORDER — SODIUM CHLORIDE, SODIUM LACTATE, POTASSIUM CHLORIDE, AND CALCIUM CHLORIDE .6; .31; .03; .02 G/100ML; G/100ML; G/100ML; G/100ML
1000 INJECTION, SOLUTION INTRAVENOUS ONCE
Status: DISCONTINUED | OUTPATIENT
Start: 2022-10-05 | End: 2022-10-05

## 2022-10-05 RX ORDER — NALBUPHINE HCL 10 MG/ML
5 AMPUL (ML) INJECTION EVERY 4 HOURS PRN
Status: ACTIVE | OUTPATIENT
Start: 2022-10-05 | End: 2022-10-06

## 2022-10-05 RX ORDER — LABETALOL 100 MG/1
100 TABLET, FILM COATED ORAL EVERY 12 HOURS SCHEDULED
Status: DISCONTINUED | OUTPATIENT
Start: 2022-10-05 | End: 2022-10-05

## 2022-10-05 RX ORDER — SODIUM CHLORIDE, SODIUM LACTATE, POTASSIUM CHLORIDE, CALCIUM CHLORIDE 600; 310; 30; 20 MG/100ML; MG/100ML; MG/100ML; MG/100ML
INJECTION, SOLUTION INTRAVENOUS CONTINUOUS
Status: DISCONTINUED | OUTPATIENT
Start: 2022-10-05 | End: 2022-10-05

## 2022-10-05 RX ORDER — OXYCODONE HYDROCHLORIDE 5 MG/1
5 TABLET ORAL EVERY 4 HOURS PRN
Status: DISCONTINUED | OUTPATIENT
Start: 2022-10-05 | End: 2022-10-08 | Stop reason: HOSPADM

## 2022-10-05 RX ORDER — CARBOPROST TROMETHAMINE 250 UG/ML
INJECTION, SOLUTION INTRAMUSCULAR PRN
Status: DISCONTINUED | OUTPATIENT
Start: 2022-10-05 | End: 2022-10-05 | Stop reason: SDUPTHER

## 2022-10-05 RX ORDER — HYDRALAZINE HYDROCHLORIDE 20 MG/ML
5 INJECTION INTRAMUSCULAR; INTRAVENOUS
Status: COMPLETED | OUTPATIENT
Start: 2022-10-05 | End: 2022-10-05

## 2022-10-05 RX ORDER — ACETAMINOPHEN 500 MG
1000 TABLET ORAL EVERY 8 HOURS PRN
Status: DISCONTINUED | OUTPATIENT
Start: 2022-10-05 | End: 2022-10-08 | Stop reason: HOSPADM

## 2022-10-05 RX ORDER — OXYCODONE HYDROCHLORIDE 5 MG/1
10 TABLET ORAL EVERY 4 HOURS PRN
Status: ACTIVE | OUTPATIENT
Start: 2022-10-05 | End: 2022-10-06

## 2022-10-05 RX ORDER — ONDANSETRON 2 MG/ML
4 INJECTION INTRAMUSCULAR; INTRAVENOUS EVERY 6 HOURS PRN
Status: DISCONTINUED | OUTPATIENT
Start: 2022-10-05 | End: 2022-10-05

## 2022-10-05 RX ORDER — ONDANSETRON 2 MG/ML
4 INJECTION INTRAMUSCULAR; INTRAVENOUS EVERY 6 HOURS PRN
Status: ACTIVE | OUTPATIENT
Start: 2022-10-05 | End: 2022-10-06

## 2022-10-05 RX ORDER — MAGNESIUM SULFATE HEPTAHYDRATE 40 MG/ML
4000 INJECTION, SOLUTION INTRAVENOUS ONCE
Status: COMPLETED | OUTPATIENT
Start: 2022-10-05 | End: 2022-10-05

## 2022-10-05 RX ORDER — MORPHINE SULFATE 1 MG/ML
INJECTION, SOLUTION EPIDURAL; INTRATHECAL; INTRAVENOUS PRN
Status: DISCONTINUED | OUTPATIENT
Start: 2022-10-05 | End: 2022-10-05 | Stop reason: SDUPTHER

## 2022-10-05 RX ORDER — OXYCODONE HYDROCHLORIDE 5 MG/1
5 TABLET ORAL EVERY 4 HOURS PRN
Status: ACTIVE | OUTPATIENT
Start: 2022-10-05 | End: 2022-10-06

## 2022-10-05 RX ORDER — ONDANSETRON 2 MG/ML
INJECTION INTRAMUSCULAR; INTRAVENOUS PRN
Status: DISCONTINUED | OUTPATIENT
Start: 2022-10-05 | End: 2022-10-05 | Stop reason: SDUPTHER

## 2022-10-05 RX ORDER — PHENYLEPHRINE HCL IN 0.9% NACL 1 MG/10 ML
SYRINGE (ML) INTRAVENOUS PRN
Status: DISCONTINUED | OUTPATIENT
Start: 2022-10-05 | End: 2022-10-05 | Stop reason: SDUPTHER

## 2022-10-05 RX ORDER — SODIUM CHLORIDE, SODIUM LACTATE, POTASSIUM CHLORIDE, CALCIUM CHLORIDE 600; 310; 30; 20 MG/100ML; MG/100ML; MG/100ML; MG/100ML
INJECTION, SOLUTION INTRAVENOUS CONTINUOUS PRN
Status: DISCONTINUED | OUTPATIENT
Start: 2022-10-05 | End: 2022-10-05 | Stop reason: SDUPTHER

## 2022-10-05 RX ORDER — MAGNESIUM SULFATE IN WATER 40 MG/ML
INJECTION, SOLUTION INTRAVENOUS
Status: COMPLETED
Start: 2022-10-05 | End: 2022-10-05

## 2022-10-05 RX ORDER — OXYCODONE HYDROCHLORIDE 5 MG/1
10 TABLET ORAL EVERY 4 HOURS PRN
Status: DISCONTINUED | OUTPATIENT
Start: 2022-10-05 | End: 2022-10-08 | Stop reason: HOSPADM

## 2022-10-05 RX ORDER — LABETALOL HYDROCHLORIDE 5 MG/ML
20 INJECTION, SOLUTION INTRAVENOUS
Status: COMPLETED | OUTPATIENT
Start: 2022-10-05 | End: 2022-10-05

## 2022-10-05 RX ADMIN — Medication 100 MCG: at 17:27

## 2022-10-05 RX ADMIN — SODIUM CHLORIDE, POTASSIUM CHLORIDE, SODIUM LACTATE AND CALCIUM CHLORIDE: 600; 310; 30; 20 INJECTION, SOLUTION INTRAVENOUS at 16:48

## 2022-10-05 RX ADMIN — Medication 200 MCG: at 17:15

## 2022-10-05 RX ADMIN — GLYCOPYRROLATE 0.3 MG: 0.2 INJECTION INTRAMUSCULAR; INTRAVENOUS at 17:27

## 2022-10-05 RX ADMIN — BUPIVACAINE HYDROCHLORIDE 1.8 ML: 7.5 INJECTION, SOLUTION SUBARACHNOID at 17:10

## 2022-10-05 RX ADMIN — LABETALOL HYDROCHLORIDE 80 MG: 5 INJECTION, SOLUTION INTRAVENOUS at 11:02

## 2022-10-05 RX ADMIN — MAGNESIUM SULFATE HEPTAHYDRATE 2000 MG/HR: 40 INJECTION, SOLUTION INTRAVENOUS at 12:29

## 2022-10-05 RX ADMIN — CARBOPROST TROMETHAMINE 250 MCG: 250 INJECTION, SOLUTION INTRAMUSCULAR at 17:31

## 2022-10-05 RX ADMIN — KETOROLAC TROMETHAMINE 15 MG: 30 INJECTION, SOLUTION INTRAMUSCULAR at 18:34

## 2022-10-05 RX ADMIN — HYDRALAZINE HYDROCHLORIDE 5 MG: 20 INJECTION INTRAMUSCULAR; INTRAVENOUS at 12:45

## 2022-10-05 RX ADMIN — LABETALOL HYDROCHLORIDE 100 MG: 100 TABLET, FILM COATED ORAL at 09:59

## 2022-10-05 RX ADMIN — LABETALOL HYDROCHLORIDE 20 MG: 5 INJECTION, SOLUTION INTRAVENOUS at 10:00

## 2022-10-05 RX ADMIN — LABETALOL HYDROCHLORIDE 40 MG: 5 INJECTION, SOLUTION INTRAVENOUS at 10:26

## 2022-10-05 RX ADMIN — HYDRALAZINE HYDROCHLORIDE 10 MG: 20 INJECTION INTRAMUSCULAR; INTRAVENOUS at 11:19

## 2022-10-05 RX ADMIN — SODIUM CITRATE AND CITRIC ACID MONOHYDRATE 30 ML: 500; 334 SOLUTION ORAL at 16:21

## 2022-10-05 RX ADMIN — MORPHINE SULFATE 0.15 MG: 1 INJECTION, SOLUTION EPIDURAL; INTRATHECAL; INTRAVENOUS at 17:10

## 2022-10-05 RX ADMIN — ONDANSETRON 4 MG: 2 INJECTION INTRAMUSCULAR; INTRAVENOUS at 16:48

## 2022-10-05 RX ADMIN — LABETALOL HYDROCHLORIDE 100 MG: 100 TABLET, FILM COATED ORAL at 23:14

## 2022-10-05 RX ADMIN — Medication 100 MCG: at 17:19

## 2022-10-05 RX ADMIN — MAGNESIUM SULFATE HEPTAHYDRATE 4000 MG: 40 INJECTION, SOLUTION INTRAVENOUS at 12:05

## 2022-10-05 RX ADMIN — CEFAZOLIN 3000 MG: 10 INJECTION, POWDER, FOR SOLUTION INTRAVENOUS at 16:21

## 2022-10-05 ASSESSMENT — PAIN SCALES - GENERAL
PAINLEVEL_OUTOF10: 8
PAINLEVEL_OUTOF10: 0

## 2022-10-05 ASSESSMENT — PAIN DESCRIPTION - DESCRIPTORS: DESCRIPTORS: ACHING

## 2022-10-05 ASSESSMENT — PAIN DESCRIPTION - LOCATION: LOCATION: ABDOMEN

## 2022-10-05 ASSESSMENT — ENCOUNTER SYMPTOMS: SHORTNESS OF BREATH: 1

## 2022-10-05 ASSESSMENT — PAIN DESCRIPTION - ORIENTATION: ORIENTATION: LOWER

## 2022-10-05 NOTE — PROGRESS NOTES
Spoke with Dr. [place] Alex,   Notified of patient, high blood pressures 155/88 this morning and now 175/110, 169/100, 169/97 and that patient has not received prescribed PO labetalol since 1pm. Also notified that patient takes insulin with meals and lantus at night. States she will speak with Dr. Romayne Ames. New orders noted for labetalol BID and labetalol protocol to begin now. New pih orders placed by Dr. Florida Alex.

## 2022-10-05 NOTE — PROGRESS NOTES
Spoke with Dr. Dominique Vides regarding blood sugars. Would like [facility] to see her and manage.

## 2022-10-05 NOTE — PROGRESS NOTES
House Ob Ante-Partum Note         S:  Asked to see patient by MFM due to elevated blood pressures. Magnesium sulfate ordered. Pt admits to headache 6/10. O: BP (!) 168/102   Pulse 90   Temp 98.1 °F (36.7 °C) (Oral)   Resp 18   LMP 2022  Last /90. EXT:     No hyper-reflexia         IMP:  1. IUP @ 35 3/7 weeks           2. Chronic hypertension with labile pressures, r/o superimposed preeclampsia  Patient Active Problem List   Diagnosis    Maternal morbid obesity, antepartum ([address]    Incompetent cervix in pregnancy, antepartum, second trimester    Benign essential hypertension, antepartum    Class 3 severe obesity without serious comorbidity in adult ([address]    Abnormal ultrasonic finding on  screening of mother    Anemia    Elevated hemoglobin A1c    Low ferritin    Low maternal serum vitamin B12    Low vitamin D level    Insulin controlled gestational diabetes mellitus (GDM) in third trimester    Vaginal spotting    Non-reactive NST (non-stress test)    Variable fetal heart rate decelerations, antepartum    Elevated blood pressure affecting pregnancy in third trimester, antepartum    Pregnancy with 35 completed weeks gestation    35 weeks gestation of pregnancy                 Plan: 1. Magnesium sulfate bolus           2.   Will re-order hypertension protocol

## 2022-10-05 NOTE — ANESTHESIA PRE PROCEDURE
Department of Anesthesiology  Preprocedure Note       Name:  Belem Lutz   Age:  34 y.o.  :  1990                                          MRN:  [id number]         Date:  10/5/2022      Surgeon: Baltazar Storey): Axel Hernandez MD    Procedure: Procedure(s):   SECTION    Medications prior to admission:   Prior to Admission medications    Medication Sig Start Date End Date Taking? Authorizing Provider   Glucose 4-6 GM-MG CHEW CHEW AND SWALLOW 4 TABLETS BY MOUTH AS NEEDED FOR LOW BLOOD SUGAR  Patient not taking: No sig reported 22   Historical Provider, MD   labetalol (NORMODYNE) 100 MG tablet Take 100 mg by mouth 2 times daily 9/10/22   Historical Provider, MD   BD INSULIN SYRINGE U/F 31G X \" 0.3 ML MISC USE FOUR TIMES DAILY 22   Historical Provider, MD   Cholecalciferol (VITAMIN D3) 50 MCG (2000) CAPS Take 1 capsule by mouth daily 22   Amanda Manning MD   insulin lispro (HUMALOG) 100 UNIT/ML SOLN injection vial Inject 6 Units into the skin 3 times daily (with meals) 22   Kyung Knott MD   Prenatal Vit-Fe Fumarate-FA (PRENATAL VITAMIN) 27-1 MG TABS tablet Take 1 tablet by mouth daily 22   Kyung Knott MD   NIFEdipine (ADALAT CC) 30 MG extended release tablet Take 1 tablet by mouth in the morning and 1 tablet in the evening. Patient not taking: Reported on 10/4/2022 8/15/22   Kyung Knott MD   aspirin 81 MG chewable tablet Take 1 tablet by mouth in the morning.  22   DONTAE Lerma CNM   glucose 4 g chewable tablet Take 4 tablets by mouth as needed for Low blood sugar  Patient not taking: No sig reported 22   DONTAE Lerma CNM   insulin glargine (LANTUS) 100 UNIT/ML injection vial Inject 22 Units into the skin nightly 22   DONTAE Lerma CNM   glucagon, rDNA, 1 MG injection Inject 1 mg into the muscle as needed for Low blood sugar (Blood glucose less than 70 mg/dL and patient NOT ALERT or NPO and does not have IV access.)  Patient not taking: No sig reported 8/1/22 7/27/23  Emaniee Plaster, APRN - CNM   ferrous sulfate (IRON 325) 325 (65 Fe) MG tablet Take 1 tablet by mouth in the morning and 1 tablet in the evening. Take with meals. 8/1/22   Emaniee Plaster, APRN - CNM   vitamin B-12 1000 MCG tablet Take 1 tablet by mouth in the morning. 8/2/22   Emaniee Plaster, APRN - CNM   docusate sodium (COLACE, DULCOLAX) 100 MG CAPS Take 100 mg by mouth in the morning and 100 mg before bedtime.  8/1/22   Emaniee Plaster, APRN - CNM   glucose monitoring (FREESTYLE FREEDOM) kit 1 kit by Does not apply route 4 times daily 8/1/22   Miguel Angel Plaster, APRN - CNM   progesterone (PROMETRIUM) 200 MG CAPS capsule Place 1 capsule into the vagina at bedtime  Patient not taking: Reported on 10/4/2022 7/15/22   Jong Cho MD   indomethacin (INDOCIN) 50 MG capsule Take 50 mg by mouth 2 times daily (with meals)  8/1/22  Historical Provider, MD   Blood Glucose Monitoring Suppl (ONE TOUCH ULTRA 2) w/Device KIT USE AS DIRECTED 4/13/22   Historical Provider, MD   ONETOUCH ULTRA strip TEST AS [facility] 4/13/22   Historical Provider, MD   Lancets ([place] [address] PLUS [id number]) MISC 22 Units At night 4/13/22   Historical Provider, MD   ibuprofen (IBU) 800 MG tablet Take 1 tablet by mouth every 8 hours as needed for Pain 7/7/20 3/27/21  DONTAE Amaya - CNP       Current medications:    Current Facility-Administered Medications   Medication Dose Route Frequency Provider Last Rate Last Admin    labetalol (NORMODYNE) tablet 100 mg  100 mg Oral 2 times per day Jong Cho MD   100 mg at 10/05/22 0959    magnesium sulfate (76688 mg/500mL infusion)  2,000 mg/hr IntraVENous Continuous Jong Cho MD   Stopped at 10/05/22 1533    ondansetron (ZOFRAN) injection 4 mg  4 mg IntraVENous Q6H PRN Evert Urbano MD        oxytocin (PITOCIN) 30 units in 500 mL infusion Override Pull             lactated ringers infusion IntraVENous Continuous Any Bull MD        lactated ringers bolus  1,000 mL IntraVENous Once Any Bull MD        sodium chloride flush 0.9 % injection 10 mL  10 mL IntraVENous 2 times per day Any Bull MD        sodium chloride flush 0.9 % injection 10 mL  10 mL IntraVENous PRN Any Bull MD        0.9 % sodium chloride infusion   IntraVENous PRN Any Bull MD        oxytocin (PITOCIN) 30 units in 500 mL infusion  87.3 gilberto-units/min IntraVENous Continuous PRN Any Bull MD        And    oxytocin (PITOCIN) 10 unit bolus from the bag  10 Units IntraVENous PRN Any Bull MD        ceFAZolin (ANCEF) 3,000 mg in dextrose 5 % 100 mL IVPB  3,000 mg IntraVENous Once Any Bull  mL/hr at 10/05/22 1621 3,000 mg at 10/05/22 1621       Allergies:  No Known Allergies    Problem List:    Patient Active Problem List   Diagnosis Code    Maternal morbid obesity, antepartum ([facility] O99.210, E66.01    Incompetent cervix in pregnancy, antepartum, second trimester O34.32    Benign essential hypertension, antepartum O10.019    Class 3 severe obesity without serious comorbidity in adult ([facility] E66.01    Abnormal ultrasonic finding on  screening of mother O28.3    Anemia D64.9    Elevated hemoglobin A1c R73.09    Low ferritin R79.0    Low maternal serum vitamin B12 O28.0    Low vitamin D level R79.89    Insulin controlled gestational diabetes mellitus (GDM) in third trimester O24.414    Vaginal spotting N93.9    Non-reactive NST (non-stress test) O28.8    Variable fetal heart rate decelerations, antepartum N45.3197    Elevated blood pressure affecting pregnancy in third trimester, antepartum O16.3    Pregnancy with 28 completed weeks gestation Z3A.35    35 weeks gestation of pregnancy Z3A.35       Past Medical History:        Diagnosis Date    Anemia 2022    Hypertension     Insulin controlled gestational diabetes mellitus (GDM) during pregnancy 09/11/2022       Past Surgical History:        Procedure Laterality Date    CERVICAL CERCLAGE N/A 6/16/2022    CERVIX CERCLAGE PLACEMENT performed by Gia Stout MD at [facility] L&D OR    WISDOM TOOTH EXTRACTION         Social History:    Social History     Tobacco Use    Smoking status: Former    Smokeless tobacco: Never   Substance Use Topics    Alcohol use: Not Currently     Comment: social                                Counseling given: Not Answered      Vital Signs (Current):   Vitals:    10/05/22 1418 10/05/22 1423 10/05/22 1428 10/05/22 1534   BP:    (!) 141/84   Pulse:    91   Resp:       Temp:       TempSrc:       SpO2: 98% 99% 100%    Weight:    (!) 367 lb (166.5 kg)   Height:    5' 7\" (1.702 m)                                              BP Readings from Last 3 Encounters:   10/05/22 (!) 141/84   10/04/22 (!) 150/95   09/30/22 135/85       NPO Status: Time of last liquid consumption: 1320                        Time of last solid consumption: 2100                        Date of last liquid consumption: 10/05/22                        Date of last solid food consumption: 10/04/22    BMI:   Wt Readings from Last 3 Encounters:   10/05/22 (!) 367 lb (166.5 kg)   10/04/22 (!) 364 lb 2 oz (165.2 kg)   09/30/22 (!) 365 lb (165.6 kg)     Body mass index is 57.48 kg/m².     CBC:   Lab Results   Component Value Date/Time    WBC 7.9 10/05/2022 10:20 AM    RBC 4.23 10/05/2022 10:20 AM    HGB 12.1 10/05/2022 10:20 AM    HCT 37.1 10/05/2022 10:20 AM    MCV 87.7 10/05/2022 10:20 AM    RDW 14.3 10/05/2022 10:20 AM     10/05/2022 10:20 AM       CMP:   Lab Results   Component Value Date/Time     10/05/2022 10:20 AM    K 3.8 10/05/2022 10:20 AM    K 4.4 03/06/2022 08:21 AM     10/05/2022 10:20 AM    CO2 18 10/05/2022 10:20 AM    BUN 5 10/05/2022 10:20 AM    CREATININE 0.5 10/05/2022 10:20 AM    GFRAA >60 10/05/2022 10:20 AM    LABGLOM >60 10/05/2022 10:20 AM    GLUCOSE 84 10/05/2022 10:20 AM    GLUCOSE 94 10/25/2011 10:45 AM    PROT 6.6 10/05/2022 10:20 AM    CALCIUM 9.2 10/05/2022 10:20 AM    BILITOT 0.2 10/05/2022 10:20 AM    ALKPHOS 79 10/05/2022 10:20 AM    AST 30 10/05/2022 10:20 AM    ALT 24 10/05/2022 10:20 AM       POC Tests: No results for input(s): POCGLU, POCNA, POCK, POCCL, POCBUN, POCHEMO, POCHCT in the last 72 hours. Coags:   Lab Results   Component Value Date/Time    PROTIME 11.1 10/05/2022 10:20 AM    INR 1.0 10/05/2022 10:20 AM    APTT 28.4 10/05/2022 10:20 AM       HCG (If Applicable):   Lab Results   Component Value Date    PREGTESTUR positive 03/15/2022        ABGs: No results found for: PHART, PO2ART, GGZ4XJB, YUZ1TDY, BEART, L8VQKDAI     Type & Screen (If Applicable):  No results found for: LABABO, LABRH    Drug/Infectious Status (If Applicable):  No results found for: HIV, HEPCAB    COVID-19 Screening (If Applicable):   Lab Results   Component Value Date/Time    COVID19 SEE BELOW 07/13/2022 08:55 AM           Anesthesia Evaluation  Patient summary reviewed and Nursing notes reviewed no history of anesthetic complications:   Airway: Mallampati: II  TM distance: >3 FB   Neck ROM: full  Mouth opening: > = 3 FB   Dental: normal exam         Pulmonary:normal exam  breath sounds clear to auscultation  (+) shortness of breath:                             Cardiovascular:    (+) hypertension: moderate,         Rhythm: regular  Rate: normal                    Neuro/Psych:   Negative Neuro/Psych ROS              GI/Hepatic/Renal:   (+) GERD:,           Endo/Other:    (+) Diabetes, . Abdominal:             Vascular: negative vascular ROS. Other Findings:           Anesthesia Plan      spinal     ASA 3             Anesthetic plan and risks discussed with patient.                         Carilyn Fleischer, APRN - CRNA   10/5/2022      DOS STAFF ADDENDUM:    Pt seen and examined, physical exam updated, chart reviewed including anesthesia, drug and allergy history. H&P reviewed. No interval changes to history or physical examination (unless noted above). NPO status confirmed. Anesthetic plan, risks, benefits, alternatives discussed with patient. Patient verbalized an understanding and agrees to proceed.      Nia Leonard MD  Anesthesiologist

## 2022-10-05 NOTE — ANESTHESIA PROCEDURE NOTES
Spinal Block    Patient location during procedure: OR  End time: 10/5/2022 5:10 PM  Reason for block: primary anesthetic  Staffing  Performed: other anesthesia staff   Anesthesiologist: Farhan Nash MD  Resident/CRNA: DONTAE Whitfield - ANNELIESE  Other anesthesia staff: Donnell Branch RN  Spinal Block  Patient position: sitting  Prep: ChloraPrep  Patient monitoring: continuous pulse ox and frequent blood pressure checks  Approach: midline  Location: L3/L4  Provider prep: mask and sterile gloves  Local infiltration: lidocaine  Needle  Needle type: Pencan   Needle gauge: 25 G  Needle length: 3.5 in  Assessment  Sensory level: T4  Swirl obtained: Yes  CSF: clear  Attempts: 1  Hemodynamics: stable  Preanesthetic Checklist  Completed: patient identified, IV checked, site marked, risks and benefits discussed, surgical/procedural consents, equipment checked, pre-op evaluation, timeout performed, anesthesia consent given, oxygen available and monitors applied/VS acknowledged

## 2022-10-05 NOTE — PROGRESS NOTES
Assumed care of patient, , 35w3d. Denies vaginal bleeding, contractions or LOF, reports feeling good fetal movement.

## 2022-10-05 NOTE — PROGRESS NOTES
Assumed care of patient, patient refusing for monitor to be adjusted at this time, states positive fetal movement, denies ctx, denies lof or vb, iv placed and labs drawn in case of delivery.

## 2022-10-05 NOTE — CONSULTS
A consult was requested by Dr. Taisha Camarillo       RE:   Luca  : 1990   AGE: 34 y.o. Dear Dr. Georgina Gottron:    I saw your patient Elder Smith today for the following indications: blood pressure elevations    Patient Active Problem List   Diagnosis    Maternal morbid obesity, antepartum ([address]    Incompetent cervix in pregnancy, antepartum, second trimester    Benign essential hypertension, antepartum    Class 3 severe obesity without serious comorbidity in adult ([address]    Abnormal ultrasonic finding on  screening of mother    Anemia    Elevated hemoglobin A1c    Low ferritin    Low maternal serum vitamin B12    Low vitamin D level    Insulin controlled gestational diabetes mellitus (GDM) in third trimester    Vaginal spotting    Non-reactive NST (non-stress test)    Variable fetal heart rate decelerations, antepartum    Elevated blood pressure affecting pregnancy in third trimester, antepartum    Pregnancy with 35 completed weeks gestation    35 weeks gestation of pregnancy       As you know, Ms. Roosevelt Nash is a 34 y.o.  at 35w3d  (LMP = [address]) who is admitted with severe blood pressure elevations. The patient was sent from the office to the hospital on 10/4/2022 secondary to elevated blood pressures and swelling. The patient's blood pressures were mild to moderately elevated at admission. A preeclampsia panel was done at admission. Her results included: Hemoglobin/hematocrit 11.5/35.3, MCV 88.3, platelet count 307,778, Potassium 3.6, creatinine 0.6, calcium 9.3, ALT 21, AST 27, uric acid 5.9, urine protein creatinine ratio 0.1, urine (provisional below), type and screen O+/antibody screen negative. This morning, the patient's blood pressures have been severely elevated. She has required IV labetalol and hydralazine for blood pressure control. She was also started on oral labetalol.   Blood pressures continue to be severely elevated and magnesium sulfate was recommended for seizure prophylaxis. Since starting the magnesium, the patient's blood pressures have been normal to mild range with intermittent severe range values. Presently, the patient reports feeling okay. She states that she has a mild headache. She denies having any vision change, chest pain, shortness of breath, nausea, vomiting, and or right upper quadrant pain. She notes fetal movement. She denies having leaking of fluid or vaginal bleeding. She reports having intermittent contractions. A Maternal-Fetal Medicine consult was requested by Dr. Dmitriy Ramírez to address these issue(s). PAST OBSTETRICAL HISTORY:     12/2020 -- 5 week SAB, no D&C     9/2021 -- 6 week SAB, no D&C     All pregnancies are with the same partner. PAST GYNECOLOGICAL  HISTORY:  Negative for abnormal pap smears. Negative for sexually transmitted diseases. Positive for cervical LEEP / conization /cryosurgery. Rescue cerclage   Negative for uterine surgery. Negative for ovarian or tubal surgery. PAST MEDICAL HISTORY:      MEDICATIONS:  Prenatal vitamin  Procardia XL  LDASA  Lispro 6 units TID  Lantus 22 units QHS  Ferrous sulfate 325 mg BID  Vitamin B12  Vitamin D3  Colace  Vaginal Prometrium     ILLNESSES:  CHTN  Gestational diabetes on insulin  Nutritional deficiencies     BLOOD TRANSFUSIONS:  None     IMMUNIZATIONS:   Up-to-date, no flu vaccine, s/p COVID vaccine     SURGERIES:  Cerclage, Martin teeth extraction  She denies any issues with anesthesia     HOSPITALIZATIONS:  Pregnancy     ALLERGIES:  NKDA, she denies any latex or shellfish allergies     SOCIAL HISTORY:  She denies any tobacco, alcohol, or drug use  She is on bedrest secondary to the pregnancy. 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          FAMILY HISTORY:  CANCER: None  CAD: None  CVA: None  CONGENITAL ANOMALIES: None  DIABETES: Father  DVT: None  BLEEDING DISORDERS: None  AUTOIMMUNE DISORDERS: None        Current Facility-Administered Medications:     labetalol (NORMODYNE) tablet 100 mg, 100 mg, Oral, 2 times per day, Elijah Blancas MD, 100 mg at 10/05/22 0959    [COMPLETED] magnesium sulfate 4000 mg in 100 mL IVPB premix, 4,000 mg, IntraVENous, Once, Stopped at 10/05/22 1225 **FOLLOWED BY** magnesium sulfate (07905 mg/500mL infusion), 2,000 mg/hr, IntraVENous, Continuous, Elijah Blancas MD, Last Rate: 50 mL/hr at 10/05/22 1229, 2,000 mg/hr at 10/05/22 1229    ondansetron (ZOFRAN) injection 4 mg, 4 mg, IntraVENous, Q6H PRN, Edwar Troy MD    oxytocin (PITOCIN) 30 units in 500 mL infusion Override Pull, , , ,     lactated ringers infusion, , IntraVENous, Continuous, Raul Soriano MD    lactated ringers bolus, 1,000 mL, IntraVENous, Once, Raul Soriano MD    sodium chloride flush 0.9 % injection 10 mL, 10 mL, IntraVENous, 2 times per day, Raul Soriano MD    sodium chloride flush 0.9 % injection 10 mL, 10 mL, IntraVENous, PRN, Raul Soriano MD    0.9 % sodium chloride infusion, , IntraVENous, PRN, Raul Soriano MD    citric acid-sodium citrate (BICITRA) solution 30 mL, 30 mL, Oral, Once, Raul Soriano MD    oxytocin (PITOCIN) 30 units in 500 mL infusion, 87.3 gilberto-units/min, IntraVENous, Continuous PRN **AND** oxytocin (PITOCIN) 10 unit bolus from the bag, 10 Units, IntraVENous, PRN, Raul Soriano MD    ceFAZolin (ANCEF) 3,000 mg in dextrose 5 % 100 mL IVPB, 3,000 mg, IntraVENous, Once, Raul Soriano MD      PERTINENT PHYSICAL EXAMINATION:   Patient Vitals for the past 24 hrs:   BP Temp Temp src Pulse Resp SpO2   10/05/22 1428 -- -- -- -- -- 100 %   10/05/22 1423 -- -- -- -- -- 99 %   10/05/22 1418 -- -- -- -- -- 98 %   10/05/22 1413 -- -- -- -- -- 99 %   10/05/22 1408 -- -- -- -- -- 96 %   10/05/22 1403 -- -- -- -- -- 98 %   10/05/22 1358 -- -- -- -- -- 98 %   10/05/22 1333 (!) 144/88 -- -- 87 -- 99 %   10/05/22 1328 -- -- -- -- -- 99 %   10/05/22 1323 -- -- -- -- -- 99 %   10/05/22 1318 -- -- -- -- -- 98 %   10/05/22 1313 -- -- -- -- -- 98 %   10/05/22 1308 -- -- -- -- -- 99 %   10/05/22 1303 -- -- -- -- -- 100 %   10/05/22 1258 -- -- -- -- -- 98 %   10/05/22 1253 -- -- -- -- -- 98 %   10/05/22 1248 -- -- -- -- -- 100 %   10/05/22 1243 -- -- -- -- -- 99 %   10/05/22 1238 -- -- -- -- -- 99 %   10/05/22 1237 (!) 172/103 -- -- 85 -- --   10/05/22 1233 (!) 181/105 -- -- 73 -- 93 %   10/05/22 1207 (!) 168/102 -- -- 90 -- --   10/05/22 1203 (!) 181/106 -- -- 86 -- --   10/05/22 1143 (!) 205/91 -- -- 91 -- --   10/05/22 1119 (!) 174/95 -- -- 84 -- --   10/05/22 1107 (!) 170/74 -- -- 87 -- --   10/05/22 1054 (!) 168/92 -- -- 85 -- --   10/05/22 1044 (!) 160/94 -- -- 77 -- --   10/05/22 1034 (!) 158/87 -- -- 87 -- --   10/05/22 1025 (!) 176/109 -- -- 87 -- --   10/05/22 1014 (!) 144/99 -- -- 92 -- --   10/05/22 0933 (!) 169/97 -- -- 93 -- --   10/05/22 0931 (!) 169/100 -- -- 87 -- --   10/05/22 0930 (!) 175/110 -- -- 95 -- --   10/05/22 0823 (!) 155/88 98.1 °F (36.7 °C) Oral 93 18 --   10/05/22 0612 137/73 -- -- 87 -- --   10/05/22 0309 (!) 150/95 -- -- 88 -- --   10/05/22 0106 137/86 -- -- (!) 114 -- --   10/04/22 2307 (!) 150/95 -- -- 96 -- --   10/04/22 2139 124/66 -- -- 89 -- --   10/04/22 2110 (!) 160/81 -- -- 86 -- --   10/04/22 2039 (!) 161/92 -- -- 90 -- --   10/04/22 2010 (!) 130/98 -- -- 92 -- --   10/04/22 1939 (!) 157/81 -- -- 90 -- --   10/04/22 1908 (!) 141/83 -- -- 86 -- --   10/04/22 1806 (!) 156/100 -- -- 94 -- --   10/04/22 1719 (!) 166/92 -- -- 88 -- --   10/04/22 1617 137/75 -- -- 95 -- --   10/04/22 1547 (!) 145/82 -- -- 98 -- --   10/04/22 1518 (!) 155/86 -- -- 99 -- --       GENERAL: The patient is a well developed, female who is alert cooperative and oriented times three in no acute distress. CARDIOVASCULAR: RRR  LUNGS: CTA B  ABDOMEN: Her uterus is gravid. She had no complaint of abdominal pain or tenderness.    EXTREMITIES:  No calf TTP BLE, +1 edema BLE, +2 patellar reflexes BLE, no clonus BLE      Review of Systems :   CONSTITUTIONAL : No fever, no chills   HEENT : No headache, no visual changes, no rhinorrhea, no sore throat   CARDIOVASCULAR : No pain, no palpitations, no edema   RESPIRATORY : No pain, no shortness of breath   GASTROINTESTINAL : No N/V, no D/C, no abdominal pain   GENITOURINARY : No dysuria, hematuria and no incontinence   MUSCULOSKELETAL : No myalgia, No back pain  NEUROLOGICAL : No numbness, no tingling, no tremors.  No history of seizures    Admission on 10/04/2022   Component Date Value Ref Range Status    WBC 10/04/2022 7.6  4.5 - 11.5 E9/L Final    RBC 10/04/2022 4.00  3.50 - 5.50 E12/L Final    Hemoglobin 10/04/2022 11.5  11.5 - 15.5 g/dL Final    Hematocrit 10/04/2022 35.3  34.0 - 48.0 % Final    MCV 10/04/2022 88.3  80.0 - 99.9 fL Final    MCH 10/04/2022 28.8  26.0 - 35.0 pg Final    MCHC 10/04/2022 32.6  32.0 - 34.5 % Final    RDW 10/04/2022 14.2  11.5 - 15.0 fL Final    Platelets 15/90/5749 319  130 - 450 E9/L Final    MPV 10/04/2022 9.5  7.0 - 12.0 fL Final    Sodium 10/04/2022 135  132 - 146 mmol/L Final    Potassium 10/04/2022 3.6  3.5 - 5.0 mmol/L Final    Chloride 10/04/2022 103  98 - 107 mmol/L Final    CO2 10/04/2022 20 (A)  22 - 29 mmol/L Final    Anion Gap 10/04/2022 12  7 - 16 mmol/L Final    Glucose 10/04/2022 146 (A)  74 - 99 mg/dL Final    BUN 10/04/2022 7  6 - 20 mg/dL Final    Creatinine 10/04/2022 0.6  0.5 - 1.0 mg/dL Final    GFR Non- 10/04/2022 >60  >=60 mL/min/1.73 Final    GFR  10/04/2022 >60   Final    Calcium 10/04/2022 9.3  8.6 - 10.2 mg/dL Final    Total Protein 10/04/2022 6.1 (A)  6.4 - 8.3 g/dL Final    Albumin 10/04/2022 3.2 (A)  3.5 - 5.2 g/dL Final    Total Bilirubin 10/04/2022 <0.2  0.0 - 1.2 mg/dL Final    Alkaline Phosphatase 10/04/2022 72  35 - 104 U/L Final    ALT 10/04/2022 21  0 - 32 U/L Final    AST 10/04/2022 27  0 - 31 U/L Final    Uric Acid, Serum 10/04/2022 5.9 (A)  2.4 - 5.7 mg/dL Final    Protein, Ur 10/04/2022 31 (A)  0 - 12 mg/dL Final    Creatinine, Ur 10/04/2022 402 (A)  29 - 226 mg/dL Final    Protein/Creat Ratio 10/04/2022 0.1  0.0 - 0.2 Final    Protein/Creat Ratio 10/04/2022 0.1   Final    Amphetamine Screen, Urine 10/04/2022 POSITIVE (A)  Negative <1000 ng/mL Final    Barbiturate Screen, Ur 10/04/2022 NOT DETECTED  Negative < 200 ng/mL Final    Benzodiazepine Screen, Urine 10/04/2022 NOT DETECTED  Negative < 200 ng/mL Final    Cannabinoid Scrn, Ur 10/04/2022 NOT DETECTED  Negative < 50ng/mL Final    Cocaine Metabolite Screen, Urine 10/04/2022 NOT DETECTED  Negative < 300 ng/mL Final    Opiate Scrn, Ur 10/04/2022 NOT DETECTED  Negative < 300ng/mL Final    PCP Screen, Urine 10/04/2022 NOT DETECTED  Negative < 25 ng/mL Final    Methadone Screen, Urine 10/04/2022 NOT DETECTED  Negative <300 ng/mL Final    Oxycodone Urine 10/04/2022 NOT DETECTED  Negative <100 ng/mL Final    FENTANYL SCREEN, URINE 10/04/2022 NOT DETECTED  Negative <1 ng/mL Final    Drug Screen Comment: 10/04/2022 see below   Final    ABO/Rh 10/04/2022 O POS   Final    Antibody Screen 10/04/2022 NEG   Final    WBC 10/04/2022 8.2  4.5 - 11.5 E9/L Final    RBC 10/04/2022 4.21  3.50 - 5.50 E12/L Final    Hemoglobin 10/04/2022 12.0  11.5 - 15.5 g/dL Final    Hematocrit 10/04/2022 36.7  34.0 - 48.0 % Final    MCV 10/04/2022 87.2  80.0 - 99.9 fL Final    MCH 10/04/2022 28.5  26.0 - 35.0 pg Final    MCHC 10/04/2022 32.7  32.0 - 34.5 % Final    RDW 10/04/2022 14.3  11.5 - 15.0 fL Final    Platelets 86/36/4039 340  130 - 450 E9/L Final    MPV 10/04/2022 9.7  7.0 - 12.0 fL Final    WBC 10/05/2022 7.9  4.5 - 11.5 E9/L Final    RBC 10/05/2022 4.23  3.50 - 5.50 E12/L Final    Hemoglobin 10/05/2022 12.1  11.5 - 15.5 g/dL Final    Hematocrit 10/05/2022 37.1  34.0 - 48.0 % Final    MCV 10/05/2022 87.7  80.0 - 99.9 fL Final    MCH 10/05/2022 28.6  26.0 - 35.0 pg Final    MCHC 10/05/2022 32.6  32.0 - 34.5 % Final    RDW 10/05/2022 14.3  11.5 - 15.0 fL Final Platelets 41/83/3765 348  130 - 450 E9/L Final    MPV 10/05/2022 9.6  7.0 - 12.0 fL Final    Neutrophils % 10/05/2022 67.9  43.0 - 80.0 % Final    Immature Granulocytes % 10/05/2022 0.4  0.0 - 5.0 % Final    Lymphocytes % 10/05/2022 19.5 (A)  20.0 - 42.0 % Final    Monocytes % 10/05/2022 10.7  2.0 - 12.0 % Final    Eosinophils % 10/05/2022 1.1  0.0 - 6.0 % Final    Basophils % 10/05/2022 0.4  0.0 - 2.0 % Final    Neutrophils Absolute 10/05/2022 5.35  1.80 - 7.30 E9/L Final    Immature Granulocytes # 10/05/2022 0.03  E9/L Final    Lymphocytes Absolute 10/05/2022 1.54  1.50 - 4.00 E9/L Final    Monocytes Absolute 10/05/2022 0.84  0.10 - 0.95 E9/L Final    Eosinophils Absolute 10/05/2022 0.09  0.05 - 0.50 E9/L Final    Basophils Absolute 10/05/2022 0.03  0.00 - 0.20 E9/L Final    Sodium 10/05/2022 133  132 - 146 mmol/L Final    Potassium 10/05/2022 3.8  3.5 - 5.0 mmol/L Final    Chloride 10/05/2022 101  98 - 107 mmol/L Final    CO2 10/05/2022 18 (A)  22 - 29 mmol/L Final    Anion Gap 10/05/2022 14  7 - 16 mmol/L Final    Glucose 10/05/2022 84  74 - 99 mg/dL Final    BUN 10/05/2022 5 (A)  6 - 20 mg/dL Final    Creatinine 10/05/2022 0.5  0.5 - 1.0 mg/dL Final    GFR Non- 10/05/2022 >60  >=60 mL/min/1.73 Final    GFR  10/05/2022 >60   Final    Calcium 10/05/2022 9.2  8.6 - 10.2 mg/dL Final    Total Protein 10/05/2022 6.6  6.4 - 8.3 g/dL Final    Albumin 10/05/2022 3.5  3.5 - 5.2 g/dL Final    Total Bilirubin 10/05/2022 0.2  0.0 - 1.2 mg/dL Final    Alkaline Phosphatase 10/05/2022 79  35 - 104 U/L Final    ALT 10/05/2022 24  0 - 32 U/L Final    AST 10/05/2022 30  0 - 31 U/L Final    Ferritin 10/05/2022 55  ng/mL Final    Vit D, 25-Hydroxy 10/05/2022 29 (A)  30 - 100 ng/mL Final    Magnesium 10/05/2022 1.9  1.6 - 2.6 mg/dL Final    Uric Acid, Serum 10/05/2022 6.1 (A)  2.4 - 5.7 mg/dL Final    LD 10/05/2022 222 (A)  135 - 214 U/L Final    aPTT 10/05/2022 28.4  24.5 - 35.1 sec Final Protime 10/05/2022 11.1  9.3 - 12.4 sec Final    INR 10/05/2022 1.0   Final    Protein, Ur 10/05/2022 20 (A)  0 - 12 mg/dL Final    Creatinine, Ur 10/05/2022 285 (A)  29 - 226 mg/dL Final    Protein/Creat Ratio 10/05/2022 0.1  0.0 - 0.2 Final    Protein/Creat Ratio 10/05/2022 0.1   Final    Fibrinogen 10/05/2022 >700 (A)  200 - 400 mg/dL Final    TSH 10/05/2022 2.430  0.270 - 4.200 uIU/mL Final    T4 Free 10/05/2022 0.92 (A)  0.93 - 1.70 ng/dL Final    T3, Free 10/05/2022 3.5  2.0 - 4.4 pg/mL Final    Hemoglobin A1C 10/05/2022 5.7 (A)  4.0 - 5.6 % Final    Color, UA 10/05/2022 Yellow  Straw/Yellow Final    Clarity, UA 10/05/2022 Clear  Clear Final    Glucose, Ur 10/05/2022 Negative  Negative mg/dL Final    Bilirubin Urine 10/05/2022 SMALL (A)  Negative Final    Ketones, Urine 10/05/2022 40 (A)  Negative mg/dL Final    Specific Gravity, UA 10/05/2022 1.025  1.005 - 1.030 Final    Blood, Urine 10/05/2022 TRACE-INTACT  Negative Final    pH, UA 10/05/2022 6.0  5.0 - 9.0 Final    Protein, UA 10/05/2022 Negative  Negative mg/dL Final    Urobilinogen, Urine 10/05/2022 0.2  <2.0 E.U./dL Final    Nitrite, Urine 10/05/2022 Negative  Negative Final    Leukocyte Esterase, Urine 10/05/2022 TRACE (A)  Negative Final    Mucus, UA 10/05/2022 Present (A)  None Seen /LPF Final    WBC, UA 10/05/2022 1-3  0 - 5 /HPF Final    RBC, UA 10/05/2022 NONE  0 - 2 /HPF Final    Epithelial Cells, UA 10/05/2022 FEW  /HPF Final    Bacteria, UA 10/05/2022 NONE SEEN  None Seen /HPF Final          ASSESSMENT/PLAN:  34 y.o.  at 35w3d (LMP = 7 wk US) with:      Chronic versus Gestational hypertension with superimposed preeclampsia -- The patient was sent from the office to the hospital on 10/4/2022 secondary to elevated blood pressures and swelling. The patient's blood pressures were mild to moderately elevated at admission. A preeclampsia panel was done at admission.   Her results included: Hemoglobin/hematocrit 11.5/35.3, MCV 88.3, platelet count 319,000, Potassium 3.6, creatinine 0.6, calcium 9.3, ALT 21, AST 27, uric acid 5.9, urine protein creatinine ratio 0.1, urine (provisional below), type and screen O+/antibody screen negative. This morning, the patient's blood pressures have been severely elevated. She has required IV labetalol and hydralazine for blood pressure control. She was also started on oral labetalol. Blood pressures continue to be severely elevated and magnesium sulfate was recommended for seizure prophylaxis. Since starting the magnesium, the patient's blood pressures have been normal to mild range with intermittent severe range values. Presently, the patient reports feeling okay. She states that she has a mild headache. She denies having any vision change, chest pain, shortness of breath, nausea, vomiting, and or right upper quadrant pain. She notes fetal movement. She denies having leaking of fluid or vaginal bleeding. She reports having intermittent contractions. The patient was initially diagnosed with hypertension at 23 weeks gestation. She was started on Procardia XL, 30 mg daily at that time. Her Procardia was subsequently increased to twice daily on 7/15/2022. The patient's blood pressures have been stable on Procardia until yesterday. The patient's prenatal records were previously reviewed. Her blood pressure has been elevated since 6 weeks gestation. Thus there is concern for underlying chronic hypertension. At this time, there is concern for superimposed preeclampsia based on the persistently severe range blood pressures that are refractory to treatment. She is currently stable on museum sulfate. Secondary to her severe blood pressure elevations and gestational age, delivery was recommended. The risks and benefits of expectant management versus delivery were reviewed with the patient. The patient's referring provider was contacted regarding the patient and recommendations for delivery.     The patient was counseled regarding the diagnosis of preeclampsia, the risks associated with preeclampsia, and general management recommendations. She was counseled regarding the implications of preeclampsia in pregnancy, the difference between preeclampsia with and without severe features,  the risks of expectant management (i.e. risk of severe disease with associated maternal risks of seizure, vision change/blindness, stroke pulmonary edema, liver failure, HELLP, renal failure, abruption, IUGR, oligohydramnios, and overall increased maternal and fetal morbidity and mortality), monitoring strategies, general management strategies, and delivery planning. The patient was also counseled that symptoms can sometimes worsen after delivery before they improved. She expressed verbal understanding of this counseling. Recommendations:  Delivery is recommended at this time. Even though the patient does not currently have proteinuria, there is concern for severe preeclampsia given the severe blood pressures refractory to treatment  Continue magnesium sulfate for seizure prophylaxis for 24 hours postpartum  Continue oral labetalol at this time. Can transition back to Procardia postpartum if desired. Repeat preeclampsia panel and APAS screening ordered  Continue daily low-dose aspirin for at least 6 to 8 weeks postpartum  Long-term follow-up with PCP or cardiology for monitoring and management of hypertension      2. Cervical insufficiency status post cerclage placement -- The patient's prenatal records were previously reviewed. The patient was seen for a consultation by maternal-fetal medicine on 6/15/2022. The patient's cervix was noted to be open with the amniotic membranes tunneled to the level of the external cervical os. She was sent to the hospital for additional evaluation and possible cerclage placement. The patient underwent an exam/ultrasound indicated cerclage on 3/19/6288 without complication.      She had a follow-up visit with Dr. Yanick Leach on 6/23/2022. Per that note, the cerclage stitch was visualized and appeared to be intact. The patient appeared to have yeast on a speculum exam.     The patient's cervix was reevaluated on 7/12/2022. The cervix appeared to be completely funneled to the level of the external os. Is unclear if the cervix is dilated. The recommendation was made for betamethasone for fetal benefit and magnesium sulfate for neuro protection. A transvaginal ultrasound was repeated on 7/16/2022. The patient's cervix is approximately 3.5 mm in length. The cerclage appeared to be intact. Funneling was seen measuring 2 cm in width by 3 cm in length. The patient presented to the hospital on 8/23/22 with complaints of increased cramping and lower abdominal pressure. Transvaginal ultrasound was repeated and the patient's cervix appeared stable at 3 to 5 mm. The cerclage was intact. Status post betamethasone 7/14-7/15  Status post magnesium sulfate for fetal benefit 7/16-7/17  Infection screening recommended. Screening completed 7/15/2022 and includes: Wet prep negative, GC chlamydia negative, genital culture negative, urine culture mixed  Recommend vaginal progesterone given there is residual cervical length. The patient indicated that she has started the medication. She is tolerating it well. Continue vaginal progesterone until 36-37 weeks gestation; can discontinue vaginal progesterone  Status post NICU consult    -- Recommend cerclage removal pre or postoperatively. Referring provider aware of need for cerclage removal     3. Intra amniotic debris/sludge, resolved -- The ultrasound was reviewed with the patient. At 23 weeks 2 days, intra amniotic debris was seen overlying the internal cervical os. The patient was counseled that this finding can be associated with hemorrhage or intra amniotic infection. The patient was being followed for cervical insufficiency. Transvaginal imaging was repeated on 7/16/2022 at 23 weeks 6 days. A defined area of debris/sludge was not seen. The amniotic fluid within the cervical canal appeared slightly increased in echogenicity. Transvaginal imaging was repeated at 29 weeks 2 days. The debris/sludge was not seen. Counseling was previously provided. Counseling was reviewed. The patient did not have any additional questions or concerns. -- The patient is tolerated the antibiotics well. She completed treatment on 7/23/2022.        4.  Anemia -- The patient's H/H during a prior admission was noted to be low at 10.9/32.9  --Baseline nutrition panel, TFTs, hemoglobin electrophoresis, reticulocyte count, and peripheral smear ordered  --Testing completed 7/15/2022, the patient results included: H/H10.9/32.9, MCV 88.2, platelet count 125,691, potassium 4, creatinine 0.6, calcium 9.7, ALT 10, AST 9, uric acid 4.2, , ferritin 20, folate 14.1, vitamin B12 321, vitamin D 25, magnesium 1.7, TSH 1.12, free T4 0.9, free T3 2.6, TPO pending, antithyroglobulin antibody pending, hemoglobin electrophoresis pending, hemoglobin A1c 5.7%, urine protein creatinine ratio 0.1, GC chlamydia pending, wet prep negative, genital culture pending, urinalysis positive for glucose, urine culture pending. --Additional recommendations are below     5. Obesity in pregnancy -- Counseling was previously provided. Counseling was reviewed. She did not have any additional questions or concerns today. The patient has gained 17 pounds during this pregnancy as of 10/4/22. Given the increased risks previously described, additional testing is recommended. Fetal growth should be monitored every 3-4 weeks starting at 24-26 weeks' gestation. Fetal growth was appropriate for the gestational age today. The patient should monitor fetal kick counts daily, starting at 28 weeks' gestation.   She should be scheduled for increased fetal surveillance with twice weekly fetal testing after 32 weeks' gestation. In the absence of any complications, delivery is recommended at 39 weeks' gestation. Given the increased risk for hypertensive disorders of pregnancy,  the potential utility of a low dose aspirin in reducing her risk for hypertensive disorders of pregnancy was reviewed. The risks and benefits of low dose aspirin were discussed. She was counseled that she could take 81 mg of aspirin, daily. She should continue this for the remainder of pregnancy and 6-8 weeks postpartum. Additional maternal evaluation was recommended with a nutrition panel, thyroid function studies, and a hemoglobin A1c. --Testing completed 7/15/2022, results summarized above. Additional recommendations are below. 6.  Elevated glucose/hemoglobin A1c/gestational diabetes on insulin -- During the patient's hospitalization in July, the patient's random glucose was elevated at 192. Her urinalysis was positive for glucose. The patient reports that she completed an early glucose screen that was slightly abnormal.  She has been monitoring fasting blood sugars at home. She had not required treatment. Since the patient's admission in July, she has been diagnosed with gestational diabetes and is managed with insulin. Her current insulin dose includes Lantus 22 units at bedtime and lispro 6 units with meals. The patient reports that her blood sugars have been well controlled on this regimen. Recommendations:  Continue to monitor blood sugar every to 2 hours while n.p.o. Hold insulin at this time  Recommend fasting blood sugar postpartum  The patient was encouraged to monitor her blood sugars postpartum in order to minimize the risk for a wound infection  2-hour oral glucose tolerance test in 8 weeks postpartum  Early screening for gestational diabetes and a future pregnancy  Yearly monitoring for type 2 diabetes and as needed     7.    Hypothyroxinemia -- The patient's lab results were reviewed following her consultation. Her free T4 was mildly decreased at 0.9. Her TSH was normal at 1.12 and free T3 normal at 2.6. Screening for thyroid peroxidase antibody and antithyroglobulin antibody was pending.     --Counseling was previously provided. Counseling was reviewed. The patient did not have any additional questions or concerns. Per the American thyroid Association, treatment is not recommended for women with isolated hypothyroxinemia. However, thyroid function study should be monitored closely, every 4 weeks throughout the pregnancy. --Recommend repeat thyroid function studies in 4 weeks, on/after 8/12/2022     -- Repeat testing completed 8/23/2022, her results included: H/H11.7/36.1, MCV 90.7, platelet count 588,308, potassium 3.8, creatinine 0.5, calcium 9.5, ALT 11, AST 19, magnesium 1.9, ferritin 37, folate 13.3, vitamin B12 847, vitamin D 22, uric acid 5.4, , TSH 1.81, free T4 1, free T3 3, TPO negative, antithyroglobulin antibody negative, hemoglobin electrophoresis pending, hemoglobin A1c 5.8%, CMV IgG positive/IgM negative, parvovirus pending, Kleihauer-Betke screen negative, genital culture pending, urine protein creatinine ratio 0.1, urine culture mixed, urinalysis negative. -- The patient's free T4 was now normal.  Her TSH and free T3 were also normal.    -- Repeat testing 10/5/2022: TSH 2.43, free T4 0.92, free T3 3.5, TPO negative, antithyroglobulin antibody negative  -- The patient's free T4 is again low. The remainder of her thyroid function studies are normal  --Recommend repeat thyroid function studies at 6-week postpartum visit  --Long-term follow-up with PCP for monitoring management     8.   Low ferritin -- The patient's ferritin was low at 20 (considered low if <15 in absence of anemia or <40 in setting of anemia)  --Ferrous sulfate 325 mg BID prescribed  --Monitor levels serially  --Monitor nutrition panel q4-6 weeks (CBC, CMP, magnesium, ferritin, folate, vitamin B12, vitamin D25OH), 8/12/2022     -- Repeat testing completed 8/23/2022, ferritin 37  --The patient was counseled to continue her iron supplement    -- Recommend repeat testing in 4 to 6 weeks, on/after 9/20/2022    -- Repeat testing completed 10/5/2022, the patient's results included: H/H 12.1/37.1, MCV 87.7, platelet count 974,406,BIPZVHUMP 3.8, creatinine 0 point calcium 9.2, ALT 30, ferritin 55, folate >20, vitamin B12 691, vitamin D 29, magnesium 1.9, uric acid 6.1, , APTT 28.4, PT/INR 11.1/1, fibrinogen >700, TSH 2.43, free T4 0.92, free T3 3.5, TPO negative, antithyroglobulin antibody negative, hemoglobin A1c 5.7%, LAC negative, anticardiolipin antibody negative, beta-2 glycoprotein antibody negative, urine protein creatinine ratio 0.1, urinalysis negative    -- The patient's ferritin was improved at 55. The patient should continue her iron supplement for 6 to 8 weeks postpartum  --Recommend repeat nutrition panel at 6-week postpartum visit  --Follow up with PCP for long term monitoring and management     9. Low vitamin D -- The patient's vitamin D was low at 25  --Recommend vitamin D3 2000 IU daily  --Monitor levels serially  --Monitor nutrition panel q4-6 weeks (CBC, CMP, magnesium, ferritin, folate, vitamin B12, vitamin D25OH), on/after 8/12/2022     -- Repeat vitamin D 8/23/2022, 22. The patient's vitamin D is again low. The patient reports that she has not been taking a vitamin D supplement. --The patient should take a vitamin D supplement, 2000 IU daily. A prescription was provided. --Recommend repeat testing in 4 weeks, on/after 9/20/2022    -- Repeat testing 10/5/2022, vitamin D 29  --The patient's vitamin D is still low but improving. She was counseled to continue her vitamin D supplement for at least 6 to 8 weeks postpartum  --Repeat nutrition panel at 6 weeks postpartum visit  --Follow up with PCP for long term monitoring and management     10.   Low vitamin B12 -- The patient's vitamin B12 level was borderline at 381. Individuals with vitamin B12 level between 200 and 400 are increased risk for anemia and side effects related to low vitamin B12. Thus, supplementation is recommended  --Recommend vitamin B12, 1000 mcg daily  --Monitor levels serially  --Repeat nutrition panel (CBC, CMP, magnesium, ferritin, folate, vitamin B12, vitamin D 25 [place]) in 4 weeks, 8/12/2022     -- Repeat vitamin B12 8/23/2022, 847  --The patient was counseled that she could decrease her supplement to every other day  --Recommend repeat testing in 4 to 6 weeks, on/after 9/20/2022    --Repeat testing 10/4/2022, vitamin B12 691  --The patient was counseled to continue her B12 supplement for the remainder of pregnancy and 6 to 8 weeks postpartum  --Recommend repeat nutrition panel at 6-week postpartum visit  --Long-term follow-up with PCP for monitoring and management     11. 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 She was counseled that she may be at increased risk for viral exposures such as CMV and parvovirus. -- Baseline screening completed 8/23/2022. CMV IgG positive and IgM negative indicative of past exposure. -- Parvovirus IgG/IgM negative     12. Routine OB -- FHTs q shift, start NST TID at 24 wga  --GBS unknown     13. Fetal well-being --  NST BID and prn  --Continue growth scans q 3 wks. Last growth US: 23w2d, 43rd percentile  --Fetal heart tones q shift. --BMZ 7/14-7/15  -- Status post rescue steroids on 8/22/2022  --Magnesium sulfate for neuro protection 7/16-7/17     14. DVT prophylaxis -- Recommend SHAHRZAD hose or Jonnie      --The total time spent on today's visit was 45 minutes.   This included preparation for the consultation (i.e. reviewing prior external notes and test results), performance of a medically appropriate history and examination, counseling, orders for medications, tests or other procedures, and coordination of care. Greater than 50% of the time was spent face-to-face with the patient. This time is exclusive of procedures performed. I answered all of  the patient's questions to her satisfaction. --The patient was counseled to follow-up with her referring provider for postpartum care. --The patient was advised to call if she has any increased vaginal discharge, vaginal bleeding, contractions, abdominal pain, back pain or any new significant symptomatology prior to her next visit. I advised her that these are signs and symptoms of cervical change and require follow-up assessment when they occur. Preeclampsia precautions were also reviewed with the patient. --The patient was also counseled to call and/or return with any concerns for decreased fetal activity. The patient is to continue to follow with you in your office for ongoing obstetric care. If you have any questions regarding her management, please contact me at your convenience and thank you for allowing me to participate in her care. Sincerely,          Anastasia Ford MD, [facility]  [phone]      *All or parts of this note may have been generated using a voice recognition program. There may be typo, grammar, or Word substitution errors that have escaped my review of this note.

## 2022-10-05 NOTE — PROGRESS NOTES
Abdominal binder located and put on for efm monitoring. Pt denies any ctx's, headache, dizziness or blurred vision. Waiting further orders.

## 2022-10-05 NOTE — PROGRESS NOTES
Spoke with Dr. Alex Saavedra, notified that patient received entire labetalol protocol and last bp after hydralazine was 205/91. New order for mag 4:2. Requested that H.O. come and evaluate patient,   Called Dr. Lyle Ramsey,   Spoke with Dr. Lyle Ramsey, will come see patient.

## 2022-10-05 NOTE — PROCEDURES
Tracy Slaughter is a 34 y.o. female patient. 1. Low vitamin D level    2. Low maternal serum vitamin B12    3. Insulin controlled gestational diabetes mellitus (GDM) in third trimester    4. Elevated blood pressure affecting pregnancy in third trimester, antepartum    5. Pregnancy with 35 completed weeks gestation    6. Maternal morbid obesity, antepartum ([address]    7. Anemia, unspecified type    8. Low ferritin    9. Elevated hemoglobin A1c      Past Medical History:   Diagnosis Date    Anemia 2022    Hypertension     Insulin controlled gestational diabetes mellitus (GDM) during pregnancy 2022     Blood pressure (!) 141/84, pulse 91, temperature 98.1 °F (36.7 °C), temperature source Oral, resp. rate 18, height 5' 7\" (1.702 m), weight (!) 367 lb (166.5 kg), last menstrual period 2022, SpO2 100 %, unknown if currently breastfeeding. Procedures  PREOPERATIVE DIAGNOSES:     1.GA@ intrauterine pregnancy. 2.  preclampsia       POSTOPERATIVE DIAGNOSES:     Same. PROCEDURE: primary low transverse  section        SURGEON: Elan Cruz MD       ASSISTANT:       ESTIMATED BLOOD LOSS:  142LZ        COMPLICATIONS:  None. ANESTHESIA:   Spinal anesthesia        FINDINGS:   Live Born  Sex:  Male  Fetal Position:  Cephalic, left occiput anterior  Apgars:  1 minute: peding   Weight:  5 pounds, 9 ounces  Tubes, uterus, ovaries:  normal          DETAILS OF PROCEDURE:    The patient was taken to the operating room where Spinal anesthesia was administered and found to be adequate. Abdomen was prepped   and draped in the normal sterile fashion. Wilde catheter had previously been   placed. Pfannenstiel skin incision made with a scalpel, carried down to the fascia with cautery. The fascia was nicked in the midline and extended laterally with   cautery. Muscles were  in the midline. Peritoneum was grasped with   hemostats, tented up, and entered sharply.  Peritoneal incision was extended superiorly and inferiorly with good visualization of bladder. Delee bladder blade was introduced. Bladder flap was created with sharp dissection. Low transverse uterine incision was made with a scalpel and extended bluntly. Membranes ruptured for Clear fluid. A viable male infant was delivered in the cephalic presentation without diffiuclty. Baby was bulb suctioned on the abdomen. Cord was clamped and cut, and handed to waiting R.N. Cord gases and blood were obtained. Placenta was manually extracted with 3 vessel cord. Uterus was exteriorized, cleared of all clot and debris. Uterine incision   was closed with vicryl in a running locked fashion. Good hemostasis was   noted. Uterus, tubes, and ovaries appeared normal. Uterus was returned to   the pelvis. The pelvis was irrigated, cleared of all clot and debris. Fascia was closed with 0 stratophyx  in a running fashion. Subcutaneous tissue was irrigated, made hemostatic. Skin was closed with absorbable subcutaneous staples. Baby and mom to recovery room in stable condition. Placenta to pathology: Yes.     MD Jenifer Matamoros MD  10/5/2022

## 2022-10-06 LAB
ALBUMIN SERPL-MCNC: 3.2 G/DL (ref 3.5–5.2)
ALP BLD-CCNC: 71 U/L (ref 35–104)
ALT SERPL-CCNC: 22 U/L (ref 0–32)
ANION GAP SERPL CALCULATED.3IONS-SCNC: 10 MMOL/L (ref 7–16)
AST SERPL-CCNC: 26 U/L (ref 0–31)
BILIRUB SERPL-MCNC: 0.2 MG/DL (ref 0–1.2)
BUN BLDV-MCNC: 5 MG/DL (ref 6–20)
CALCIUM SERPL-MCNC: 8.1 MG/DL (ref 8.6–10.2)
CHLORIDE BLD-SCNC: 100 MMOL/L (ref 98–107)
CO2: 22 MMOL/L (ref 22–29)
CREAT SERPL-MCNC: 0.7 MG/DL (ref 0.5–1)
GFR AFRICAN AMERICAN: >60
GFR NON-AFRICAN AMERICAN: >60 ML/MIN/1.73
GLUCOSE BLD-MCNC: 109 MG/DL (ref 74–99)
GLUCOSE URINE, POC: NORMAL
HCT VFR BLD CALC: 34.8 % (ref 34–48)
HEMOGLOBIN: 11.4 G/DL (ref 11.5–15.5)
LUPUS ANTICOAG DVVT: NORMAL
MCH RBC QN AUTO: 29.4 PG (ref 26–35)
MCHC RBC AUTO-ENTMCNC: 32.8 % (ref 32–34.5)
MCV RBC AUTO: 89.7 FL (ref 80–99.9)
METER GLUCOSE: 111 MG/DL (ref 74–99)
METER GLUCOSE: 129 MG/DL (ref 74–99)
METER GLUCOSE: 169 MG/DL (ref 74–99)
PDW BLD-RTO: 14.6 FL (ref 11.5–15)
PLATELET # BLD: 322 E9/L (ref 130–450)
PMV BLD AUTO: 9.3 FL (ref 7–12)
POTASSIUM SERPL-SCNC: 4.5 MMOL/L (ref 3.5–5)
RBC # BLD: 3.88 E12/L (ref 3.5–5.5)
SODIUM BLD-SCNC: 132 MMOL/L (ref 132–146)
TOTAL PROTEIN: 6.1 G/DL (ref 6.4–8.3)
WBC # BLD: 10.9 E9/L (ref 4.5–11.5)

## 2022-10-06 PROCEDURE — 36415 COLL VENOUS BLD VENIPUNCTURE: CPT

## 2022-10-06 PROCEDURE — 2580000003 HC RX 258: Performed by: OBSTETRICS & GYNECOLOGY

## 2022-10-06 PROCEDURE — 2500000003 HC RX 250 WO HCPCS

## 2022-10-06 PROCEDURE — 6360000002 HC RX W HCPCS: Performed by: ANESTHESIOLOGY

## 2022-10-06 PROCEDURE — 6370000000 HC RX 637 (ALT 250 FOR IP): Performed by: INTERNAL MEDICINE

## 2022-10-06 PROCEDURE — 85027 COMPLETE CBC AUTOMATED: CPT

## 2022-10-06 PROCEDURE — 6370000000 HC RX 637 (ALT 250 FOR IP): Performed by: OBSTETRICS & GYNECOLOGY

## 2022-10-06 PROCEDURE — 6360000002 HC RX W HCPCS: Performed by: OBSTETRICS & GYNECOLOGY

## 2022-10-06 PROCEDURE — 1220000000 HC SEMI PRIVATE OB R&B

## 2022-10-06 PROCEDURE — 80053 COMPREHEN METABOLIC PANEL: CPT

## 2022-10-06 PROCEDURE — 82962 GLUCOSE BLOOD TEST: CPT

## 2022-10-06 PROCEDURE — 99253 IP/OBS CNSLTJ NEW/EST LOW 45: CPT | Performed by: INTERNAL MEDICINE

## 2022-10-06 RX ORDER — SODIUM CHLORIDE 0.9 % (FLUSH) 0.9 %
5-40 SYRINGE (ML) INJECTION PRN
Status: DISCONTINUED | OUTPATIENT
Start: 2022-10-06 | End: 2022-10-06 | Stop reason: HOSPADM

## 2022-10-06 RX ORDER — FENTANYL CITRATE 50 UG/ML
25 INJECTION, SOLUTION INTRAMUSCULAR; INTRAVENOUS EVERY 5 MIN PRN
Status: DISCONTINUED | OUTPATIENT
Start: 2022-10-06 | End: 2022-10-06 | Stop reason: HOSPADM

## 2022-10-06 RX ORDER — LABETALOL HYDROCHLORIDE 5 MG/ML
INJECTION, SOLUTION INTRAVENOUS
Status: COMPLETED
Start: 2022-10-06 | End: 2022-10-06

## 2022-10-06 RX ORDER — MODIFIED LANOLIN
OINTMENT (GRAM) TOPICAL
Status: DISCONTINUED | OUTPATIENT
Start: 2022-10-06 | End: 2022-10-08 | Stop reason: HOSPADM

## 2022-10-06 RX ORDER — SODIUM CHLORIDE 9 MG/ML
INJECTION, SOLUTION INTRAVENOUS PRN
Status: DISCONTINUED | OUTPATIENT
Start: 2022-10-06 | End: 2022-10-08 | Stop reason: HOSPADM

## 2022-10-06 RX ORDER — INSULIN GLARGINE 100 [IU]/ML
10 INJECTION, SOLUTION SUBCUTANEOUS NIGHTLY
Status: DISCONTINUED | OUTPATIENT
Start: 2022-10-06 | End: 2022-10-08 | Stop reason: HOSPADM

## 2022-10-06 RX ORDER — PROCHLORPERAZINE EDISYLATE 5 MG/ML
5 INJECTION INTRAMUSCULAR; INTRAVENOUS
Status: DISCONTINUED | OUTPATIENT
Start: 2022-10-06 | End: 2022-10-06 | Stop reason: HOSPADM

## 2022-10-06 RX ORDER — LABETALOL 100 MG/1
100 TABLET, FILM COATED ORAL EVERY 12 HOURS SCHEDULED
Status: DISCONTINUED | OUTPATIENT
Start: 2022-10-06 | End: 2022-10-08 | Stop reason: HOSPADM

## 2022-10-06 RX ORDER — SODIUM CHLORIDE, SODIUM LACTATE, POTASSIUM CHLORIDE, CALCIUM CHLORIDE 600; 310; 30; 20 MG/100ML; MG/100ML; MG/100ML; MG/100ML
INJECTION, SOLUTION INTRAVENOUS CONTINUOUS
Status: DISCONTINUED | OUTPATIENT
Start: 2022-10-06 | End: 2022-10-06

## 2022-10-06 RX ORDER — CHLORTHALIDONE 25 MG/1
25 TABLET ORAL DAILY
Status: DISCONTINUED | OUTPATIENT
Start: 2022-10-06 | End: 2022-10-08 | Stop reason: HOSPADM

## 2022-10-06 RX ORDER — SODIUM CHLORIDE 9 MG/ML
INJECTION, SOLUTION INTRAVENOUS PRN
Status: DISCONTINUED | OUTPATIENT
Start: 2022-10-06 | End: 2022-10-06 | Stop reason: HOSPADM

## 2022-10-06 RX ORDER — NIFEDIPINE 30 MG/1
30 TABLET, FILM COATED, EXTENDED RELEASE ORAL DAILY
Status: DISCONTINUED | OUTPATIENT
Start: 2022-10-06 | End: 2022-10-08 | Stop reason: HOSPADM

## 2022-10-06 RX ORDER — SODIUM CHLORIDE 0.9 % (FLUSH) 0.9 %
5-40 SYRINGE (ML) INJECTION PRN
Status: DISCONTINUED | OUTPATIENT
Start: 2022-10-06 | End: 2022-10-08 | Stop reason: HOSPADM

## 2022-10-06 RX ORDER — INSULIN LISPRO 100 [IU]/ML
0-4 INJECTION, SOLUTION INTRAVENOUS; SUBCUTANEOUS NIGHTLY
Status: DISCONTINUED | OUTPATIENT
Start: 2022-10-06 | End: 2022-10-08 | Stop reason: HOSPADM

## 2022-10-06 RX ORDER — SODIUM CHLORIDE 0.9 % (FLUSH) 0.9 %
5-40 SYRINGE (ML) INJECTION EVERY 12 HOURS SCHEDULED
Status: DISCONTINUED | OUTPATIENT
Start: 2022-10-06 | End: 2022-10-06 | Stop reason: HOSPADM

## 2022-10-06 RX ORDER — SODIUM CHLORIDE 0.9 % (FLUSH) 0.9 %
5-40 SYRINGE (ML) INJECTION EVERY 12 HOURS SCHEDULED
Status: DISCONTINUED | OUTPATIENT
Start: 2022-10-06 | End: 2022-10-08 | Stop reason: HOSPADM

## 2022-10-06 RX ORDER — INSULIN LISPRO 100 [IU]/ML
0-4 INJECTION, SOLUTION INTRAVENOUS; SUBCUTANEOUS
Status: DISCONTINUED | OUTPATIENT
Start: 2022-10-06 | End: 2022-10-08 | Stop reason: HOSPADM

## 2022-10-06 RX ORDER — LABETALOL HYDROCHLORIDE 5 MG/ML
10 INJECTION, SOLUTION INTRAVENOUS EVERY 6 HOURS PRN
Status: DISCONTINUED | OUTPATIENT
Start: 2022-10-06 | End: 2022-10-08 | Stop reason: HOSPADM

## 2022-10-06 RX ORDER — DEXTROSE MONOHYDRATE 100 MG/ML
INJECTION, SOLUTION INTRAVENOUS CONTINUOUS PRN
Status: DISCONTINUED | OUTPATIENT
Start: 2022-10-06 | End: 2022-10-08 | Stop reason: HOSPADM

## 2022-10-06 RX ORDER — FENTANYL CITRATE 50 UG/ML
50 INJECTION, SOLUTION INTRAMUSCULAR; INTRAVENOUS EVERY 5 MIN PRN
Status: DISCONTINUED | OUTPATIENT
Start: 2022-10-06 | End: 2022-10-06 | Stop reason: HOSPADM

## 2022-10-06 RX ADMIN — DOCUSATE SODIUM 100 MG: 100 CAPSULE, LIQUID FILLED ORAL at 20:47

## 2022-10-06 RX ADMIN — ACETAMINOPHEN 1000 MG: 500 TABLET ORAL at 18:09

## 2022-10-06 RX ADMIN — MAGNESIUM SULFATE HEPTAHYDRATE 2000 MG/HR: 40 INJECTION, SOLUTION INTRAVENOUS at 01:59

## 2022-10-06 RX ADMIN — KETOROLAC TROMETHAMINE 15 MG: 30 INJECTION, SOLUTION INTRAMUSCULAR at 12:57

## 2022-10-06 RX ADMIN — LABETALOL HYDROCHLORIDE: 5 INJECTION INTRAVENOUS at 18:47

## 2022-10-06 RX ADMIN — DOCUSATE SODIUM 100 MG: 100 CAPSULE, LIQUID FILLED ORAL at 12:57

## 2022-10-06 RX ADMIN — CHLORTHALIDONE 25 MG: 25 TABLET ORAL at 20:49

## 2022-10-06 RX ADMIN — INSULIN GLARGINE 10 UNITS: 100 INJECTION, SOLUTION SUBCUTANEOUS at 20:48

## 2022-10-06 RX ADMIN — MAGNESIUM SULFATE HEPTAHYDRATE 2000 MG/HR: 40 INJECTION, SOLUTION INTRAVENOUS at 12:44

## 2022-10-06 RX ADMIN — KETOROLAC TROMETHAMINE 15 MG: 30 INJECTION, SOLUTION INTRAMUSCULAR at 02:04

## 2022-10-06 RX ADMIN — NIFEDIPINE 30 MG: 30 TABLET, EXTENDED RELEASE ORAL at 19:02

## 2022-10-06 RX ADMIN — LABETALOL HYDROCHLORIDE 100 MG: 100 TABLET, FILM COATED ORAL at 12:57

## 2022-10-06 RX ADMIN — Medication 10 ML: at 20:47

## 2022-10-06 ASSESSMENT — PAIN SCALES - GENERAL
PAINLEVEL_OUTOF10: 7
PAINLEVEL_OUTOF10: 8

## 2022-10-06 ASSESSMENT — PAIN DESCRIPTION - LOCATION
LOCATION: ABDOMEN
LOCATION: ABDOMEN

## 2022-10-06 ASSESSMENT — PAIN DESCRIPTION - DESCRIPTORS
DESCRIPTORS: SORE;PRESSURE
DESCRIPTORS: ACHING;SHARP

## 2022-10-06 NOTE — PROGRESS NOTES
Repeat bp mild range after readjusting cuff and repositioning pt, no house officer involvement needed at this time, will continue to monitor and will update MD SACHA Burton when he returns page

## 2022-10-06 NOTE — PROGRESS NOTES
Spoke with Dr. Naz Zafar via telephone at this time;  states that he would like the hospitalist to be consulted to manage BP and glucose. Order for hosptialist placeed per charge nurse. Glucose reading at this time 165 ( 2 hours following meal). /91.

## 2022-10-06 NOTE — PROGRESS NOTES
Discussed POC with hospitalist, per MD , pt will be on MD LACY's service and the hospitalist will notify MD [facility] of pt

## 2022-10-06 NOTE — PROGRESS NOTES
Brief follow-up progress note. I was contacted by nursing staff on OB GYN at approximately [address], 10/6/2022, in regards to patient having still elevated blood pressure despite labetalol 100 mg orally being given earlier. We have initiated labetalol 100 mg twice daily. Apparently patient also takes Procardia long-acting 30 mg, that will be initiated as well, this is one of her home medications. We will also write for chlorthalidone 25 mg to be given. Suspect that blood pressure will reduce soon. Discontinued IV hydration.

## 2022-10-06 NOTE — LACTATION NOTE
First time mom. Mom is very sleepy and on magnesium. I will return later to provide education. Mom is requesting a double electric breast pump for home use.

## 2022-10-06 NOTE — PROGRESS NOTES
Perfect serve to Dr. Vicente Buckner at this time for consult for blood sugars. Per patient insurance card, Dr. Vicente Buckner is patient's PCP.

## 2022-10-06 NOTE — LACTATION NOTE
Instructed on normal infant behavior, benefits of colostrum/breast milk for baby and mom,  benefits of skin to skin and components of safe positioning. Encouraged rooming-in and avoidance of pacifier use until breastfeeding is well established. Reviewed latch techniques, positioning, signs of effective milk transfer, waking techniques and the importance of frequent feedings- 8-12 times/ 24 hrs to stimulate/maintain milk production. Taught hand expression and encouraged to express drops of colostrum at start of feeding. Reviewed hunger cues and expected urine/stool output and transition. Encouraged to feed infant as often and for as long as the infant wishes to do so. Went over breastfeeding resources. Assisted with positioning and latch. Baby was able to latch and nurse for 15 minutes. Offered support and encouraged to call for assistance or concerns.

## 2022-10-06 NOTE — ANESTHESIA POSTPROCEDURE EVALUATION
Department of Anesthesiology  Postprocedure Note    Patient: Gab Nelson  MRN: [id number]  YOB: 1990  Date of evaluation: 10/6/2022      Procedure Summary     Date: 10/05/22 Room / Location: [facility]    Anesthesia Start: 4976 Anesthesia Stop:     Procedure:  SECTION (Uterus) Diagnosis:       Born premature at 27 weeks of completed gestation      (Born premature at 27 weeks of completed gestation [P07.38])    Surgeons: Jose Daniel Gaines MD Responsible Provider: Yanely Aaron MD    Anesthesia Type: spinal ASA Status: 3          Anesthesia Type: No value filed.     Wilian Phase I: Wilian Score: 10    Wilian Phase II:        Anesthesia Post Evaluation    Patient location during evaluation: bedside  Patient participation: complete - patient participated  Level of consciousness: awake and alert  Pain score: 1  Airway patency: patent  Nausea & Vomiting: no nausea and no vomiting  Complications: no  Cardiovascular status: hemodynamically stable  Respiratory status: acceptable and spontaneous ventilation  Hydration status: stable

## 2022-10-06 NOTE — PROGRESS NOTES
Progress Note    SUBJECTIVE:   Pt comfortable; -void; -flatus; +ambulation;+guajardo    OBJECTIVE:    VITALS:  BP (!) 183/108   Pulse 86   Temp 98 °F (36.7 °C) (Oral)   Resp 16   Ht 5' 7\" (1.702 m)   Wt (!) 367 lb (166.5 kg)   LMP 01/30/2022   SpO2 99%   Breastfeeding Unknown   BMI 57.48 kg/m²   Physical Exam  ABDOMEN:  hypoactive bowel sounds, non-distended, non-tender and Incision d/i  Ext: nt    DATA:  Hemoglobin/Hematocrit:    Lab Results   Component Value Date/Time    HGB 11.4 10/06/2022 09:00 AM    HCT 34.8 10/06/2022 09:00 AM       ASSESSMENT AND PLAN:  S/p ltcs  Principal Problem:    Elevated blood pressure affecting pregnancy in third trimester, antepartum  Active Problems:    Pregnancy with 35 completed weeks gestation    35 weeks gestation of pregnancy  Resolved Problems:    * No resolved hospital problems.  *    POD#1  Discontinue MgSO4  Plan discharge home in 2 days  House medicine is on consult for BP and BS    Electronically signed by Leopoldo Roles, DO on 10/6/2022 at 6:32 PM

## 2022-10-06 NOTE — PROGRESS NOTES
Dr. Yonathan Hill in room to assess  patient; orders received for Labetalol 100 mg po BID to begin now and orders will be placed per Dr for insulin.

## 2022-10-06 NOTE — PROGRESS NOTES
Assisted patient up to bathroom at this time, ambulated per self with nurse hand held assist Personal care rendered. Abd drsg dry and intact. Patients' mother at bedside assisting patient with care.

## 2022-10-06 NOTE — PROGRESS NOTES
MD LACY ordering PO medications after pt states that she IS IN FACT taking po procardia at home (pt previously denied taking this medication).  Will medicate pt when meds come from pharmacy

## 2022-10-06 NOTE — CONSULTS
[address] [facility]      Reason for Consult: Uncontrolled hypertension, hyperglycemia    History of Present Illness: This is a pleasant 58-year-old female who [place] [facility] Patient presented at 35 weeks and 2 days of pregnancy. Patient did undergo a successful mid transverse  section 10/5/2022. Patient has a history of benign essential hypertension, previously took labetalol 100 mg twice a day. She states that prior to pregnancy that her blood pressure was relatively normal.  She has preserved renal function. Denies headache, visual changes, nausea, vomiting. Informant(s) for H&P: Patient    REVIEW OF SYSTEMS:  A comprehensive review of systems was negative except for: what is in the HPI      PMH:  Past Medical History:   Diagnosis Date    Anemia 2022    Hypertension     Insulin controlled gestational diabetes mellitus (GDM) during pregnancy 2022       Surgical History:  Past Surgical History:   Procedure Laterality Date    CERVICAL CERCLAGE N/A 2022    CERVIX CERCLAGE PLACEMENT performed by Veria Kayser, MD at [facility]     SECTION N/A 10/5/2022     SECTION performed by Laury Medeiros MD at [facility]    WISDOM TOOTH EXTRACTION         Medications Prior to Admission:    Prior to Admission medications    Medication Sig Start Date End Date Taking?  Authorizing Provider   Glucose 4-6 GM-MG CHEW CHEW AND SWALLOW 4 TABLETS BY MOUTH AS NEEDED FOR LOW BLOOD SUGAR  Patient not taking: No sig reported 22   Historical Provider, MD   labetalol (NORMODYNE) 100 MG tablet Take 100 mg by mouth 2 times daily 9/10/22   Historical Provider, MD   BD INSULIN SYRINGE U/F 31G X \" 0.3 ML MISC USE FOUR TIMES DAILY 22   Historical Provider, MD   Cholecalciferol (VITAMIN D3) 50 MCG ( UT) CAPS Take 1 capsule by mouth daily 22   Georgie , MD   insulin lispro (HUMALOG) 100 UNIT/ML SOLN injection vial Inject 6 Units into the skin 3 times daily (with meals) 8/19/22   Aliza Lo MD   Prenatal Vit-Fe Fumarate-FA (PRENATAL VITAMIN) 27-1 MG TABS tablet Take 1 tablet by mouth daily 8/19/22   Aliza Lo MD   NIFEdipine (ADALAT CC) 30 MG extended release tablet Take 1 tablet by mouth in the morning and 1 tablet in the evening. Patient not taking: Reported on 10/4/2022 8/15/22   Aliza Lo MD   aspirin 81 MG chewable tablet Take 1 tablet by mouth in the morning. 8/2/22   Marshall Rhymes, APRN - CNM   glucose 4 g chewable tablet Take 4 tablets by mouth as needed for Low blood sugar  Patient not taking: No sig reported 8/1/22   Marshall Rhymes, APRN - CNM   insulin glargine (LANTUS) 100 UNIT/ML injection vial Inject 22 Units into the skin nightly 8/1/22   Marshall Rhymes, APRN - CNM   glucagon, rDNA, 1 MG injection Inject 1 mg into the muscle as needed for Low blood sugar (Blood glucose less than 70 mg/dL and patient NOT ALERT or NPO and does not have IV access.)  Patient not taking: No sig reported 8/1/22 7/27/23  Marshall Rhymes, APRN - CNM   ferrous sulfate (IRON 325) 325 (65 Fe) MG tablet Take 1 tablet by mouth in the morning and 1 tablet in the evening. Take with meals. 8/1/22   Marshall Rhymes, APRN - CNM   vitamin B-12 1000 MCG tablet Take 1 tablet by mouth in the morning. 8/2/22   Marshall Rhymes, APRN - CNM   docusate sodium (COLACE, DULCOLAX) 100 MG CAPS Take 100 mg by mouth in the morning and 100 mg before bedtime.  8/1/22   Marshall Rhymes, APRN - CNM   glucose monitoring (FREESTYLE FREEDOM) kit 1 kit by Does not apply route 4 times daily 8/1/22   Marshall Rhymes, APRN - CNM   progesterone (PROMETRIUM) 200 MG CAPS capsule Place 1 capsule into the vagina at bedtime  Patient not taking: Reported on 10/4/2022 7/15/22   Yogesh Nagy MD   indomethacin (INDOCIN) 50 MG capsule Take 50 mg by mouth 2 times daily (with meals)  8/1/22  Historical Provider, MD   Blood Glucose Monitoring Suppl (ONE TOUCH ULTRA 2) w/Device KIT USE AS DIRECTED 4/13/22   Historical Provider, MD Arteaga Pieleidy strip TEST AS DIRECTED FOUR TIMES DAILY 4/13/22   Historical Provider, MD   Lancets (Creasie Chess PLUS [id number]) MISC 22 Units At night 4/13/22   Historical Provider, MD   ibuprofen (IBU) 800 MG tablet Take 1 tablet by mouth every 8 hours as needed for Pain 7/7/20 3/27/21  DONTAE Pena - CNP       Allergies:    Patient has no known allergies. Social History:    reports that she has quit smoking. She has never used smokeless tobacco. She reports that she does not currently use alcohol. She reports that she does not use drugs. Family History:   Positive family history for hypertension    PHYSICAL EXAM:  Vitals:  BP (!) 175/92   Pulse (!) 102   Temp 98 °F (36.7 °C) (Oral)   Resp 16   Ht 5' 7\" (1.702 m)   Wt (!) 367 lb (166.5 kg)   LMP 01/30/2022   SpO2 99%   Breastfeeding Unknown   BMI 57.48 kg/m²     Physical examination deferred due to lactation with infant    LABS:  Recent Labs     10/04/22  1414 10/05/22  1020 10/06/22  0900    133 132   K 3.6 3.8 4.5    101 100   CO2 20* 18* 22   BUN 7 5* 5*   CREATININE 0.6 0.5 0.7   GLUCOSE 146* 84 109*   CALCIUM 9.3 9.2 8.1*       Recent Labs     10/04/22  2315 10/05/22  1020 10/06/22  0900   WBC 8.2 7.9 10.9   RBC 4.21 4.23 3.88   HGB 12.0 12.1 11.4*   HCT 36.7 37.1 34.8   MCV 87.2 87.7 89.7   MCH 28.5 28.6 29.4   MCHC 32.7 32.6 32.8   RDW 14.3 14.3 14.6    348 322   MPV 9.7 9.6 9.3       No results for input(s): POCGLU in the last 72 hours.     BMP:    Lab Results   Component Value Date/Time     10/06/2022 09:00 AM    K 4.5 10/06/2022 09:00 AM    K 4.4 03/06/2022 08:21 AM     10/06/2022 09:00 AM    CO2 22 10/06/2022 09:00 AM    BUN 5 10/06/2022 09:00 AM    LABALBU 3.2 10/06/2022 09:00 AM    CREATININE 0.7 10/06/2022 09:00 AM    CALCIUM 8.1 10/06/2022 09:00 AM    GFRAA >60 10/06/2022 09:00 AM LABGLOM >60 10/06/2022 09:00 AM    GLUCOSE 109 10/06/2022 09:00 AM    GLUCOSE 94 10/25/2011 10:45 AM       Radiology:     No orders to display       ASSESSMENT:      Principal Problem:  Elevated blood pressure affecting pregnancy in third trimester, antepartum  Active Problems:  Pregnancy with 35 completed weeks gestation  35 weeks gestation of pregnancy    PLAN:    1. Initiate labetalol 100 mg p.o. twice a day. Will monitor while inpatient and adjust as needed. Suspect that she will have a gradual decline in blood pressure. No endorgan symptoms such as headache, nausea, vomiting. 2.  Hemoglobin A1c prediabetes. Previously taking long-acting and prandial.  insulin. Will initiate Lantus 10 units and low insulin sliding scale. Diabetic diet. Will need follow-up hemoglobin A1c in approximately 3 months. 3.  Recommend repeat thyroid function studies in 4 weeks  4. We will follow. Thank you very much for this interesting consult and we will continue to follow along. Feel free to call with any questions at [phone]. NOTE: This report was transcribed using voice recognition software. Every effort was made to ensure accuracy; however, inadvertent computerized transcription errors may be present.   Electronically signed by Ofelia Benavides MD on 10/6/2022 at 12:47 PM

## 2022-10-07 LAB
AMPHETAMINE QUANTITATIVE URINE: <100
COMMENT: NORMAL
EPHEDRINE, QUANTITATIVE, URINE: <100
MDA, QUANTITATIVE, URINE: <100
MDEA, QUANTITATIVE, URINE: <100
MDMA, QUANTITATIVE, URINE: <100
METER GLUCOSE: 102 MG/DL (ref 74–99)
METER GLUCOSE: 118 MG/DL (ref 74–99)
METER GLUCOSE: 82 MG/DL (ref 74–99)
METER GLUCOSE: 96 MG/DL (ref 74–99)
METHAMPHETAMINE QUANTITATIVE URINE: <100
PHENTERMINE, URINE, QUANTITATIVE: <100

## 2022-10-07 PROCEDURE — 1220000000 HC SEMI PRIVATE OB R&B

## 2022-10-07 PROCEDURE — 6370000000 HC RX 637 (ALT 250 FOR IP): Performed by: OBSTETRICS & GYNECOLOGY

## 2022-10-07 PROCEDURE — 99231 SBSQ HOSP IP/OBS SF/LOW 25: CPT | Performed by: INTERNAL MEDICINE

## 2022-10-07 PROCEDURE — 2580000003 HC RX 258: Performed by: OBSTETRICS & GYNECOLOGY

## 2022-10-07 PROCEDURE — 82962 GLUCOSE BLOOD TEST: CPT

## 2022-10-07 PROCEDURE — 6370000000 HC RX 637 (ALT 250 FOR IP): Performed by: INTERNAL MEDICINE

## 2022-10-07 RX ORDER — IBUPROFEN 800 MG/1
800 TABLET ORAL EVERY 8 HOURS PRN
Status: DISCONTINUED | OUTPATIENT
Start: 2022-10-07 | End: 2022-10-08 | Stop reason: HOSPADM

## 2022-10-07 RX ORDER — CHLORTHALIDONE 25 MG/1
25 TABLET ORAL DAILY
Qty: 30 TABLET | Refills: 0 | Status: SHIPPED | OUTPATIENT
Start: 2022-10-07

## 2022-10-07 RX ADMIN — ACETAMINOPHEN 1000 MG: 500 TABLET ORAL at 04:06

## 2022-10-07 RX ADMIN — LABETALOL HYDROCHLORIDE 100 MG: 100 TABLET, FILM COATED ORAL at 11:36

## 2022-10-07 RX ADMIN — ACETAMINOPHEN 1000 MG: 500 TABLET ORAL at 11:58

## 2022-10-07 RX ADMIN — Medication 10 ML: at 09:26

## 2022-10-07 RX ADMIN — LABETALOL HYDROCHLORIDE 100 MG: 100 TABLET, FILM COATED ORAL at 00:24

## 2022-10-07 RX ADMIN — NIFEDIPINE 30 MG: 30 TABLET, EXTENDED RELEASE ORAL at 18:19

## 2022-10-07 RX ADMIN — INSULIN GLARGINE 10 UNITS: 100 INJECTION, SOLUTION SUBCUTANEOUS at 21:43

## 2022-10-07 RX ADMIN — DOCUSATE SODIUM 100 MG: 100 CAPSULE, LIQUID FILLED ORAL at 09:26

## 2022-10-07 RX ADMIN — DOCUSATE SODIUM 100 MG: 100 CAPSULE, LIQUID FILLED ORAL at 21:43

## 2022-10-07 RX ADMIN — LABETALOL HYDROCHLORIDE 100 MG: 100 TABLET, FILM COATED ORAL at 22:36

## 2022-10-07 RX ADMIN — IBUPROFEN 800 MG: 800 TABLET, FILM COATED ORAL at 18:19

## 2022-10-07 RX ADMIN — CHLORTHALIDONE 25 MG: 25 TABLET ORAL at 13:26

## 2022-10-07 RX ADMIN — ACETAMINOPHEN 1000 MG: 500 TABLET ORAL at 22:35

## 2022-10-07 RX ADMIN — OXYCODONE HYDROCHLORIDE 10 MG: 5 TABLET ORAL at 00:16

## 2022-10-07 ASSESSMENT — PAIN SCALES - GENERAL
PAINLEVEL_OUTOF10: 8
PAINLEVEL_OUTOF10: 8
PAINLEVEL_OUTOF10: 6
PAINLEVEL_OUTOF10: 7
PAINLEVEL_OUTOF10: 6

## 2022-10-07 ASSESSMENT — PAIN DESCRIPTION - DESCRIPTORS
DESCRIPTORS: DISCOMFORT;ACHING;CRAMPING
DESCRIPTORS: ACHING;DISCOMFORT;SORE
DESCRIPTORS: DISCOMFORT;PRESSURE
DESCRIPTORS: PRESSURE;DISCOMFORT

## 2022-10-07 ASSESSMENT — PAIN - FUNCTIONAL ASSESSMENT
PAIN_FUNCTIONAL_ASSESSMENT: ACTIVITIES ARE NOT PREVENTED
PAIN_FUNCTIONAL_ASSESSMENT: ACTIVITIES ARE NOT PREVENTED

## 2022-10-07 ASSESSMENT — PAIN DESCRIPTION - ORIENTATION
ORIENTATION: LOWER
ORIENTATION: LOWER

## 2022-10-07 ASSESSMENT — PAIN DESCRIPTION - LOCATION
LOCATION: ABDOMEN
LOCATION: INCISION;ABDOMEN
LOCATION: ABDOMEN
LOCATION: ABDOMEN;INCISION

## 2022-10-07 NOTE — PROGRESS NOTES
Progress Note    SUBJECTIVE:   Pt comfortable; +void; +flatus; +ambulation    OBJECTIVE:    VITALS:  BP (!) 141/84   Pulse 89   Temp 97.8 °F (36.6 °C) (Oral)   Resp 16   Ht 5' 7\" (1.702 m)   Wt (!) 367 lb (166.5 kg)   LMP 01/30/2022   SpO2 98%   Breastfeeding Unknown   BMI 57.48 kg/m²   Physical Exam  ABDOMEN:  normal bowel sounds, non-distended, non-tender and Incision d/i  Ext: nt    DATA:  Hemoglobin/Hematocrit:    Lab Results   Component Value Date/Time    HGB 11.4 10/06/2022 09:00 AM    HCT 34.8 10/06/2022 09:00 AM       ASSESSMENT AND PLAN:  S/p ltcs  Principal Problem:    Elevated blood pressure affecting pregnancy in third trimester, antepartum  Active Problems:    Pregnancy with 35 completed weeks gestation    35 weeks gestation of pregnancy  Resolved Problems:    * No resolved hospital problems.  *    POD#2  Plan discharge home tomorrow    Electronically signed by Gini Barakat DO on 10/7/2022 at 4:06 PM

## 2022-10-07 NOTE — PROGRESS NOTES
[facility] Progress Note    Admitting Date and Time: 10/4/2022  1:18 PM  Admit Dx: Elevated blood pressure affecting pregnancy in third trimester, antepartum [O16.3]  Pregnancy with 35 completed weeks gestation [Z3A.35]  35 weeks gestation of pregnancy [Z3A.35]    Subjective:    Patient seen and examined 10/7/2022. No acute events overnight. Blood pressure controlled.   Denies headache, visual changes, nausea, vomiting.     sodium chloride flush  5-40 mL IntraVENous 2 times per day    rho(D) immune globulin  300 mcg IntraMUSCular Once    Tdap-Dtap  0.5 mL IntraMUSCular Prior to discharge    labetalol  100 mg Oral 2 times per day    insulin glargine  10 Units SubCUTAneous Nightly    insulin lispro  0-4 Units SubCUTAneous TID WC    insulin lispro  0-4 Units SubCUTAneous Nightly    chlorthalidone  25 mg Oral Daily    NIFEdipine  30 mg Oral Daily    docusate sodium  100 mg Oral BID     sodium chloride flush, 5-40 mL, PRN  sodium chloride, , PRN  lanolin, , Q1H PRN  glucose, 4 tablet, PRN  dextrose bolus, 125 mL, PRN   Or  dextrose bolus, 250 mL, PRN  glucagon (rDNA), 1 mg, PRN  dextrose, , Continuous PRN  labetalol, 10 mg, Q6H PRN  acetaminophen, 1,000 mg, Q8H PRN  oxyCODONE, 5 mg, Q4H PRN   Or  oxyCODONE, 10 mg, Q4H PRN         Objective:    /77   Pulse 88   Temp 97.8 °F (36.6 °C) (Oral)   Resp 16   Ht 5' 7\" (1.702 m)   Wt (!) 367 lb (166.5 kg)   LMP 01/30/2022   SpO2 98%   Breastfeeding Unknown   BMI 57.48 kg/m²     General Appearance: alert and oriented to person, place and time and in no acute distress  Pulmonary/Chest: clear to auscultation bilaterally  Cardiovascular: normal rate, normal S1 and S2   Abdomen: soft, non-tender, non-distended, normal bowel sounds,  Extremities: no cyanosis, no clubbing and no edema  Neurologic: no cranial nerve deficit and speech normal    Recent Labs     10/04/22  1414 10/05/22  1020 10/06/22  0900    133 132   K 3.6 3.8 4.5    101 100   CO2 20* 18* 22   BUN 7 5* 5*   CREATININE 0.6 0.5 0.7   GLUCOSE 146* 84 109*   CALCIUM 9.3 9.2 8.1*       Recent Labs     10/04/22  2315 10/05/22  1020 10/06/22  0900   WBC 8.2 7.9 10.9   RBC 4.21 4.23 3.88   HGB 12.0 12.1 11.4*   HCT 36.7 37.1 34.8   MCV 87.2 87.7 89.7   MCH 28.5 28.6 29.4   MCHC 32.7 32.6 32.8   RDW 14.3 14.3 14.6    348 322   MPV 9.7 9.6 9.3       Assessment:    Principal Problem:  Elevated blood pressure affecting pregnancy in third trimester, antepartum  Active Problems:  Pregnancy with 35 completed weeks gestation  35 weeks gestation of pregnancy  Benign essential hypertension  Prediabetes versus gestational diabetes    Plan:  1. Blood pressure improved. No objection to discharge. Continue labetalol 100 mg p.o. twice daily, chlorthalidone 25 mg p.o. daily, nifedipine extended release 30 mg p.o. daily. Recommend follow-up with PCP in 1 to 2 weeks post discharge for medication review. Counseled patient that there may be a time that she does not need 3 medications for blood pressure control. Prescription written for chlorthalidone and sent to [place]. The other 2 blood pressure medications, calcium channel blocker and beta-blocker are previous home medications. 2.  Hemoglobin A1c not suggestive of diabetes mellitus. Patient does take insulin at home long and short acting. Would continue for now. 3.  We will continue to follow while inpatient. Thank you for the consultation. NOTE: This report was transcribed using voice recognition software. Every effort was made to ensure accuracy; however, inadvertent computerized transcription errors may be present.   Electronically signed by Carey Miller MD on 10/7/2022 at 10:29 AM

## 2022-10-07 NOTE — CARE COORDINATION
SW Discharge Planning   SW received consult for \"  depression scale 13\"      SW met with Max Weaver and provided her with information, resources and education on post partum depression. Suresh easily engaged in conversation and appeared bonded to baby.  Max Weaver denied any other needs       Electronically signed by TEODORO Child on 10/7/2022 at 2:56 PM

## 2022-10-07 NOTE — LACTATION NOTE
Completed lactation teaching and reviewed      Guide to Breastfeeding. Baby getting circumcised. Patient will call for assistance with next feeding.

## 2022-10-07 NOTE — PROGRESS NOTES
[employer] [place] [facility] screening results were discussed with parent. Questions answered. Brochure given to parent. Advised to monitor developmental milestones and contact physician for any concerns.    Yohannes Jurado

## 2022-10-07 NOTE — LACTATION NOTE
Patient states she has been mostly formula feeding due to latch problems and would like breastfeeding assistance with next feeding. Patient will call lactation help.

## 2022-10-08 VITALS
DIASTOLIC BLOOD PRESSURE: 66 MMHG | HEART RATE: 78 BPM | TEMPERATURE: 97.7 F | SYSTOLIC BLOOD PRESSURE: 125 MMHG | RESPIRATION RATE: 18 BRPM | OXYGEN SATURATION: 96 %

## 2022-10-08 VITALS
DIASTOLIC BLOOD PRESSURE: 89 MMHG | TEMPERATURE: 98.7 F | HEIGHT: 67 IN | HEART RATE: 70 BPM | OXYGEN SATURATION: 98 % | SYSTOLIC BLOOD PRESSURE: 130 MMHG | BODY MASS INDEX: 45.99 KG/M2 | RESPIRATION RATE: 18 BRPM | WEIGHT: 293 LBS

## 2022-10-08 LAB
ANION GAP SERPL CALCULATED.3IONS-SCNC: 13 MMOL/L (ref 7–16)
ANTICARDIOLIPIN IGA ANTIBODY: <10 APL
ANTICARDIOLIPIN IGG ANTIBODY: <10 GPL
BETA-2 GLYCOPROTEIN 1 IGG ANTIBODY: <10 SGU
BETA-2 GLYCOPROTEIN 1 IGM ANTIBODY: <10 SMU
BUN BLDV-MCNC: 8 MG/DL (ref 6–20)
CALCIUM SERPL-MCNC: 8.9 MG/DL (ref 8.6–10.2)
CARDIOLIPIN AB IGM: <10 MPL
CHLORIDE BLD-SCNC: 102 MMOL/L (ref 98–107)
CO2: 22 MMOL/L (ref 22–29)
CREAT SERPL-MCNC: 0.6 MG/DL (ref 0.5–1)
GFR AFRICAN AMERICAN: >60
GFR NON-AFRICAN AMERICAN: >60 ML/MIN/1.73
GLUCOSE BLD-MCNC: 99 MG/DL (ref 74–99)
METER GLUCOSE: 75 MG/DL (ref 74–99)
METER GLUCOSE: 85 MG/DL (ref 74–99)
POTASSIUM SERPL-SCNC: 3.9 MMOL/L (ref 3.5–5)
SODIUM BLD-SCNC: 137 MMOL/L (ref 132–146)

## 2022-10-08 PROCEDURE — 80048 BASIC METABOLIC PNL TOTAL CA: CPT

## 2022-10-08 PROCEDURE — 6370000000 HC RX 637 (ALT 250 FOR IP): Performed by: OBSTETRICS & GYNECOLOGY

## 2022-10-08 PROCEDURE — 2580000003 HC RX 258: Performed by: OBSTETRICS & GYNECOLOGY

## 2022-10-08 PROCEDURE — 82962 GLUCOSE BLOOD TEST: CPT

## 2022-10-08 PROCEDURE — 6370000000 HC RX 637 (ALT 250 FOR IP): Performed by: INTERNAL MEDICINE

## 2022-10-08 PROCEDURE — 36415 COLL VENOUS BLD VENIPUNCTURE: CPT

## 2022-10-08 RX ORDER — HYDROCODONE BITARTRATE AND ACETAMINOPHEN 5; 325 MG/1; MG/1
1 TABLET ORAL EVERY 6 HOURS PRN
Qty: 12 TABLET | Refills: 0 | Status: SHIPPED | OUTPATIENT
Start: 2022-10-08 | End: 2022-10-11

## 2022-10-08 RX ADMIN — LABETALOL HYDROCHLORIDE 100 MG: 100 TABLET, FILM COATED ORAL at 11:14

## 2022-10-08 RX ADMIN — Medication 10 ML: at 09:20

## 2022-10-08 RX ADMIN — IBUPROFEN 800 MG: 800 TABLET, FILM COATED ORAL at 03:04

## 2022-10-08 RX ADMIN — CHLORTHALIDONE 25 MG: 25 TABLET ORAL at 09:19

## 2022-10-08 RX ADMIN — DOCUSATE SODIUM 100 MG: 100 CAPSULE, LIQUID FILLED ORAL at 09:19

## 2022-10-08 RX ADMIN — NIFEDIPINE 30 MG: 30 TABLET, EXTENDED RELEASE ORAL at 09:19

## 2022-10-08 RX ADMIN — IBUPROFEN 800 MG: 800 TABLET, FILM COATED ORAL at 18:16

## 2022-10-08 ASSESSMENT — PAIN DESCRIPTION - LOCATION
LOCATION: ABDOMEN
LOCATION: INCISION

## 2022-10-08 ASSESSMENT — PAIN - FUNCTIONAL ASSESSMENT: PAIN_FUNCTIONAL_ASSESSMENT: ACTIVITIES ARE NOT PREVENTED

## 2022-10-08 ASSESSMENT — PAIN DESCRIPTION - DESCRIPTORS: DESCRIPTORS: ACHING;DISCOMFORT

## 2022-10-08 ASSESSMENT — PAIN SCALES - GENERAL
PAINLEVEL_OUTOF10: 8
PAINLEVEL_OUTOF10: 4

## 2022-10-08 ASSESSMENT — PAIN DESCRIPTION - ORIENTATION: ORIENTATION: LOWER

## 2022-10-08 NOTE — DISCHARGE INSTRUCTIONS
Follow-up with your OB doctor in 1 week if  delivery or in  6 weeks for vaginal delivery unless otherwise instructed. Call office for an appointment. For breastfeeding support, you can contact our lactation specialists at [phone] or [phone]    DIET  Eat a well balanced diet focusing on foods high in fiber and protein  Drink plenty of fluids especially water. To avoid constipation you may take a mild stool softener as recommended by your doctor or midwife. ACTIVITY  Gradually increase your activity. Resume exercise regimen only after advised by your doctor or midwife. Avoid lifting anything heavier than your baby or a gallon of milk for SIX weeks. Avoid driving until your doctor or midwife has given their approval.  Tarah Bright slowly from a lying to sitting and then a standing position. Climb stairs one at a time. Use caution when carrying your baby up and down the stairs. No sexual activity for 6 weeks or until advised by your doctor - Nothing in vagina: intercourse, tampons, or douching. Be prepared to discuss family planning at your follow-up OB visit. You may feel tired or have a lack of energy. You may continue your prenatal vitamin to replenish nutrients post delivery. Nap when baby naps to catch up on sleep. May return to work or school in 6 weeks or as directed by OB. EMOTIONS  You may feed zazueta, sad, teary, & overwhelmed. Contact your OB provider if you feel you may be showing signs of postpartum depression, or have thoughts of harming yourself or your infant. If infant will not stop crying, contact another adult for help or place infant in their crib on their back and take a break. NEVER shake your infant. BLEEDING  Vaginal bleeding will decrease in amount over the next few weeks. You will notice that as your activity increases, your flow may increase. This is your body's way of telling you, you need to take things easier and rest more often.   Call your OB/ER if you are saturating more than one maxi pad in an hour. BREAST CARE  Take medications as recommended by your doctor or midwife for pain  If you develop a warm, red, tender area on your breast or develop a fever contact your OB provider. For breastfeeding moms:  If you become engorged, feeding may be more difficult or painful for 1-2 days. You may find it helpful to hand express some milk so that the infant can latch on more easily. While breastfeeding, continue to take your prenatal vitamins as directed by your doctor or midwife. Only take medications verified as safe for breastfeeding. For non-breastfeeding moms:  You may apply ice packs to your breasts over your bra for twenty minutes at a time for comfort. Avoid stimulation to your breasts, when showering allow the water to strike your back not your breasts. Wear a good fitting bra until your milk dries, such as a sports bra. INCISIONAL CARE / NIXON CARE  Clean your incision in the shower with mild soap. After shower pat the incision area dry and leave open to air. If used, Steri-stipes should be removed by 2 weeks. If used, M[place] Arch should be removed by the OB in office by 1 week. If used/ordered, an abdominal binder may provide support for your incision. Use the nixon-bottle after toileting until bleeding stops. Cleanse your perineum from front to back  If used, stitches or internal clips will dissolve in 4-6 weeks. You may use a sitz bath or soak in a clean tub as needed for comfort. Kegel exercises will help restore bladder control. SWELLING  Keep your legs elevated when sitting or lying. When wearing stocking or socks, make sure they are not too tight. WHEN TO CALL THE DOCTOR  If you have a temp of 100.4 or more. If your bleeding has increased and you are saturating a pad in an hour. Your abdomen is tender to touch. You are passing blood clots bigger than the size of a lemon.   If you are experiencing extreme weakness or dizziness. If you are having flu-like symptoms such as achy muscles or joints. There is a foul smell or a green color to your vaginal bleeding. If you have pain that cannot be relieved. You have persistent burning with urination or frequency. Call if you have concerns about your well-being. You are unable to sleep, eat, or are having thoughts of harming yourself or your baby. You have swelling, bleeding, drainage, foul odor, redness, or warmth in/around your incision or stitches. You have a red, warm, tender area in you calf.

## 2022-10-08 NOTE — PROGRESS NOTES
Progress Note    SUBJECTIVE: patient status post c section delivery day 2 doing well , normal postpartum course. Accepted level of pain    OBJECTIVE:    VITALS:  /79   Pulse 87   Temp 97.8 °F (36.6 °C) (Oral)   Resp 16   Ht 5' 7\" (1.702 m)   Wt (!) 367 lb (166.5 kg)   LMP 2022   SpO2 98%   Breastfeeding Unknown   BMI 57.48 kg/m²   Physical Exam  lung:cta  Heart : regular rythm  Abdomen:soft  Appropriate tendernessr ,firm uterus ,bs present,incision clear and dry.   Extremities :normal no evidence of DVT  DATA:  CBC:   Lab Results   Component Value Date/Time    WBC 10.9 10/06/2022 09:00 AM    RBC 3.88 10/06/2022 09:00 AM    HGB 11.4 10/06/2022 09:00 AM    HCT 34.8 10/06/2022 09:00 AM    MCV 89.7 10/06/2022 09:00 AM    MCH 29.4 10/06/2022 09:00 AM    MCHC 32.8 10/06/2022 09:00 AM    RDW 14.6 10/06/2022 09:00 AM     10/06/2022 09:00 AM    MPV 9.3 10/06/2022 09:00 AM       ASSESSMENT AND PLAN:  Patient Active Problem List   Diagnosis    Maternal morbid obesity, antepartum ([address]    Incompetent cervix in pregnancy, antepartum, second trimester    Benign essential hypertension, antepartum    Class 3 severe obesity without serious comorbidity in adult ([address]    Abnormal ultrasonic finding on  screening of mother    Anemia    Elevated hemoglobin A1c    Low ferritin    Low maternal serum vitamin B12    Low vitamin D level    Insulin controlled gestational diabetes mellitus (GDM) in third trimester    Vaginal spotting    Non-reactive NST (non-stress test)    Variable fetal heart rate decelerations, antepartum    Elevated blood pressure affecting pregnancy in third trimester, antepartum    Pregnancy with 35 completed weeks gestation    35 weeks gestation of pregnancy       Normal post partum care   Anticipate discharge home

## 2022-10-08 NOTE — PLAN OF CARE
Problem: Pain  Goal: Verbalizes/displays adequate comfort level or baseline comfort level  Outcome: Progressing     Problem: Postpartum  Goal: Experiences normal postpartum course  Description:  Postpartum OB-Pregnancy care plan goal which identifies if the mother is experiencing a normal postpartum course  Outcome: Progressing     Problem: Safety - Adult  Goal: Free from fall injury  Outcome: Progressing

## 2022-10-10 LAB
THYROGLOBULIN ANTIBODY: 16 IU/ML (ref 0–40)
THYROID PEROXIDASE (TPO) ABS: <4 IU/ML (ref 0–25)

## 2022-10-27 RX ORDER — NIFEDIPINE 30 MG/1
TABLET, FILM COATED, EXTENDED RELEASE ORAL
Qty: 60 TABLET | OUTPATIENT
Start: 2022-10-27

## 2022-10-27 RX ORDER — INSULIN LISPRO 100 [IU]/ML
INJECTION, SOLUTION INTRAVENOUS; SUBCUTANEOUS
Qty: 10 ML | OUTPATIENT
Start: 2022-10-27

## 2022-11-04 ENCOUNTER — APPOINTMENT (OUTPATIENT)
Dept: ULTRASOUND IMAGING | Age: 32
DRG: 284 | End: 2022-11-04
Payer: MEDICAID

## 2022-11-04 ENCOUNTER — APPOINTMENT (OUTPATIENT)
Dept: CT IMAGING | Age: 32
DRG: 284 | End: 2022-11-04
Payer: MEDICAID

## 2022-11-04 ENCOUNTER — HOSPITAL ENCOUNTER (INPATIENT)
Age: 32
LOS: 1 days | Discharge: HOME | DRG: 284 | End: 2022-11-05
Attending: EMERGENCY MEDICINE | Admitting: SURGERY
Payer: MEDICAID

## 2022-11-04 DIAGNOSIS — K80.20 CALCULUS OF GALLBLADDER WITHOUT CHOLECYSTITIS WITHOUT OBSTRUCTION: Primary | ICD-10-CM

## 2022-11-04 DIAGNOSIS — K80.50 BILIARY COLIC: ICD-10-CM

## 2022-11-04 LAB
ALBUMIN SERPL-MCNC: 3.9 G/DL (ref 3.5–5.2)
ALP BLD-CCNC: 115 U/L (ref 35–104)
ALT SERPL-CCNC: 75 U/L (ref 0–32)
ANION GAP SERPL CALCULATED.3IONS-SCNC: 9 MMOL/L (ref 7–16)
AST SERPL-CCNC: 127 U/L (ref 0–31)
BACTERIA: ABNORMAL /HPF
BASOPHILS ABSOLUTE: 0.02 E9/L (ref 0–0.2)
BASOPHILS RELATIVE PERCENT: 0.3 % (ref 0–2)
BILIRUB SERPL-MCNC: 1.2 MG/DL (ref 0–1.2)
BILIRUBIN URINE: ABNORMAL
BLOOD, URINE: NEGATIVE
BUN BLDV-MCNC: 5 MG/DL (ref 6–20)
CALCIUM SERPL-MCNC: 9.3 MG/DL (ref 8.6–10.2)
CHLORIDE BLD-SCNC: 106 MMOL/L (ref 98–107)
CLARITY: CLEAR
CO2: 27 MMOL/L (ref 22–29)
COLOR: YELLOW
CREAT SERPL-MCNC: 0.7 MG/DL (ref 0.5–1)
EOSINOPHILS ABSOLUTE: 0.08 E9/L (ref 0.05–0.5)
EOSINOPHILS RELATIVE PERCENT: 1 % (ref 0–6)
GFR SERPL CREATININE-BSD FRML MDRD: >60 ML/MIN/1.73
GLUCOSE BLD-MCNC: 98 MG/DL (ref 74–99)
GLUCOSE URINE: NEGATIVE MG/DL
HCG(URINE) PREGNANCY TEST: NEGATIVE
HCT VFR BLD CALC: 41.5 % (ref 34–48)
HEMOGLOBIN: 13.2 G/DL (ref 11.5–15.5)
IMMATURE GRANULOCYTES #: 0.03 E9/L
IMMATURE GRANULOCYTES %: 0.4 % (ref 0–5)
KETONES, URINE: NEGATIVE MG/DL
LACTIC ACID: 0.7 MMOL/L (ref 0.5–2.2)
LEUKOCYTE ESTERASE, URINE: NEGATIVE
LIPASE: 25 U/L (ref 13–60)
LYMPHOCYTES ABSOLUTE: 2.07 E9/L (ref 1.5–4)
LYMPHOCYTES RELATIVE PERCENT: 26.1 % (ref 20–42)
MCH RBC QN AUTO: 28.6 PG (ref 26–35)
MCHC RBC AUTO-ENTMCNC: 31.8 % (ref 32–34.5)
MCV RBC AUTO: 89.8 FL (ref 80–99.9)
MONOCYTES ABSOLUTE: 0.62 E9/L (ref 0.1–0.95)
MONOCYTES RELATIVE PERCENT: 7.8 % (ref 2–12)
NEUTROPHILS ABSOLUTE: 5.12 E9/L (ref 1.8–7.3)
NEUTROPHILS RELATIVE PERCENT: 64.4 % (ref 43–80)
NITRITE, URINE: NEGATIVE
PDW BLD-RTO: 13.9 FL (ref 11.5–15)
PH UA: 8.5 (ref 5–9)
PLATELET # BLD: 360 E9/L (ref 130–450)
PMV BLD AUTO: 9.9 FL (ref 7–12)
POTASSIUM SERPL-SCNC: 4.1 MMOL/L (ref 3.5–5)
PROTEIN UA: NEGATIVE MG/DL
RBC # BLD: 4.62 E12/L (ref 3.5–5.5)
RBC UA: ABNORMAL /HPF (ref 0–2)
SODIUM BLD-SCNC: 142 MMOL/L (ref 132–146)
SPECIFIC GRAVITY UA: 1.01 (ref 1–1.03)
TOTAL PROTEIN: 7 G/DL (ref 6.4–8.3)
UROBILINOGEN, URINE: >=8 E.U./DL
WBC # BLD: 7.9 E9/L (ref 4.5–11.5)
WBC UA: ABNORMAL /HPF (ref 0–5)

## 2022-11-04 PROCEDURE — 80053 COMPREHEN METABOLIC PANEL: CPT

## 2022-11-04 PROCEDURE — 85025 COMPLETE CBC W/AUTO DIFF WBC: CPT

## 2022-11-04 PROCEDURE — 87088 URINE BACTERIA CULTURE: CPT

## 2022-11-04 PROCEDURE — 6360000002 HC RX W HCPCS: Performed by: PHYSICIAN ASSISTANT

## 2022-11-04 PROCEDURE — 74178 CT ABD&PLV WO CNTR FLWD CNTR: CPT

## 2022-11-04 PROCEDURE — 81025 URINE PREGNANCY TEST: CPT

## 2022-11-04 PROCEDURE — 83690 ASSAY OF LIPASE: CPT

## 2022-11-04 PROCEDURE — 6360000004 HC RX CONTRAST MEDICATION: Performed by: RADIOLOGY

## 2022-11-04 PROCEDURE — 83605 ASSAY OF LACTIC ACID: CPT

## 2022-11-04 PROCEDURE — 81001 URINALYSIS AUTO W/SCOPE: CPT

## 2022-11-04 PROCEDURE — 76705 ECHO EXAM OF ABDOMEN: CPT

## 2022-11-04 PROCEDURE — 99285 EMERGENCY DEPT VISIT HI MDM: CPT

## 2022-11-04 PROCEDURE — 96372 THER/PROPH/DIAG INJ SC/IM: CPT

## 2022-11-04 RX ORDER — KETOROLAC TROMETHAMINE 30 MG/ML
15 INJECTION, SOLUTION INTRAMUSCULAR; INTRAVENOUS ONCE
Status: DISCONTINUED | OUTPATIENT
Start: 2022-11-04 | End: 2022-11-04

## 2022-11-04 RX ORDER — MORPHINE SULFATE 4 MG/ML
8 INJECTION, SOLUTION INTRAMUSCULAR; INTRAVENOUS ONCE
Status: DISCONTINUED | OUTPATIENT
Start: 2022-11-04 | End: 2022-11-05

## 2022-11-04 RX ORDER — KETOROLAC TROMETHAMINE 30 MG/ML
30 INJECTION, SOLUTION INTRAMUSCULAR; INTRAVENOUS ONCE
Status: COMPLETED | OUTPATIENT
Start: 2022-11-04 | End: 2022-11-04

## 2022-11-04 RX ADMIN — KETOROLAC TROMETHAMINE 30 MG: 30 INJECTION, SOLUTION INTRAMUSCULAR; INTRAVENOUS at 10:04

## 2022-11-04 RX ADMIN — IOPAMIDOL 75 ML: 755 INJECTION, SOLUTION INTRAVENOUS at 16:16

## 2022-11-04 ASSESSMENT — PAIN SCALES - GENERAL: PAINLEVEL_OUTOF10: 3

## 2022-11-04 ASSESSMENT — PAIN DESCRIPTION - LOCATION: LOCATION: ABDOMEN

## 2022-11-04 ASSESSMENT — PAIN DESCRIPTION - DESCRIPTORS: DESCRIPTORS: ACHING

## 2022-11-04 NOTE — ED NOTES
Department of Emergency Medicine  FIRST PROVIDER TRIAGE NOTE             [facility]           22  9:32 AM EDT    Date of Encounter: 22   MRN: [id number]      HPI: Tracy Slaughter is a 34 y.o. female who presents to the ED for No chief complaint on file. Patient is a 54-year-old presenting for right lower quadrant pain for the last few days. Patient's pertinent history consist of a  1 month ago on 10/5 by Dr. Ana Segovia. Patient is not breast-feeding. Patient states that she has not resumed intercourse. Patient has no urgency, frequency, burning. Patient has no longer any vaginal bleeding    ROS: Negative for cp, sob, back pain, fever, or cough. PE: Gen Appearance/Constitutional: alert  HEENT: NC/NT. PERRLA,  Airway patent. Neck: supple     Initial Plan of Care: All treatment areas with department are currently occupied. Plan to order/Initiate the following while awaiting opening in ED: labs and imaging studies.   Initiate Treatment-Testing, Proceed toTreatment Area When Bed Available for ED Attending/MLP to Continue Care    Electronically signed by Vikki Gordon PA-C   DD: 22       Vikki Gordon PA-C  [phone]

## 2022-11-04 NOTE — ED PROVIDER NOTES
HPI:  22,   Time: 10:54 AM EDT         Mariah Cardoso is a 34 y.o. female presenting to the ED for right-sided abdominal discomfort as well as suprapubic incisional discomfort and a recent headache on the left side of her head with some visual disturbance which completely resolved 2 days ago, beginning 1 day ago. The complaint has been persistent, moderate in severity, and worsened by nothing. Patient states last night she had onset of flank pain and suprapubic pain had an episode of vomiting and then was able to fall asleep. Patient states her headache was 2 days ago with her associated visual disturbance but is now gone. Patient denies peripheral edema she states she had some edema following her recent pregnancy but it is now resolved. Patient denies hematuria or dysuria. She states she is no longer having any vaginal bleeding or discharge. Patient states she had chills 1 day ago. She has had no diarrhea and denies nausea at the current time    ROS:   Pertinent positives and negatives are stated within HPI, all other systems reviewed and are negative.  --------------------------------------------- PAST HISTORY ---------------------------------------------  Past Medical History:  has a past medical history of Anemia, Hypertension, and Insulin controlled gestational diabetes mellitus (GDM) during pregnancy. Past Surgical History:  has a past surgical history that includes [place] tooth extraction; Cervical Cerclage (N/A, 2022); and  section (N/A, 10/5/2022). Social History:  reports that she has quit smoking. She has never used smokeless tobacco. She reports that she does not currently use alcohol. She reports that she does not use drugs. Family History: family history is not on file. The patients home medications have been reviewed. Allergies: Patient has no known allergies.     -------------------------------------------------- RESULTS -------------------------------------------------  All laboratory and radiology results have been personally reviewed by myself   LABS:  Results for orders placed or performed during the hospital encounter of 11/04/22   CBC with Auto Differential   Result Value Ref Range    WBC 7.9 4.5 - 11.5 E9/L    RBC 4.62 3.50 - 5.50 E12/L    Hemoglobin 13.2 11.5 - 15.5 g/dL    Hematocrit 41.5 34.0 - 48.0 %    MCV 89.8 80.0 - 99.9 fL    MCH 28.6 26.0 - 35.0 pg    MCHC 31.8 (L) 32.0 - 34.5 %    RDW 13.9 11.5 - 15.0 fL    Platelets 929 741 - 275 E9/L    MPV 9.9 7.0 - 12.0 fL    Neutrophils % 64.4 43.0 - 80.0 %    Immature Granulocytes % 0.4 0.0 - 5.0 %    Lymphocytes % 26.1 20.0 - 42.0 %    Monocytes % 7.8 2.0 - 12.0 %    Eosinophils % 1.0 0.0 - 6.0 %    Basophils % 0.3 0.0 - 2.0 %    Neutrophils Absolute 5.12 1.80 - 7.30 E9/L    Immature Granulocytes # 0.03 E9/L    Lymphocytes Absolute 2.07 1.50 - 4.00 E9/L    Monocytes Absolute 0.62 0.10 - 0.95 E9/L    Eosinophils Absolute 0.08 0.05 - 0.50 E9/L    Basophils Absolute 0.02 0.00 - 0.20 E9/L   Comprehensive Metabolic Panel   Result Value Ref Range    Sodium 142 132 - 146 mmol/L    Potassium 4.1 3.5 - 5.0 mmol/L    Chloride 106 98 - 107 mmol/L    CO2 27 22 - 29 mmol/L    Anion Gap 9 7 - 16 mmol/L    Glucose 98 74 - 99 mg/dL    BUN 5 (L) 6 - 20 mg/dL    Creatinine 0.7 0.5 - 1.0 mg/dL    Est, Glom Filt Rate >60 >=60 mL/min/1.73    Calcium 9.3 8.6 - 10.2 mg/dL    Total Protein 7.0 6.4 - 8.3 g/dL    Albumin 3.9 3.5 - 5.2 g/dL    Total Bilirubin 1.2 0.0 - 1.2 mg/dL    Alkaline Phosphatase 115 (H) 35 - 104 U/L    ALT 75 (H) 0 - 32 U/L     (H) 0 - 31 U/L   Lactic Acid   Result Value Ref Range    Lactic Acid 0.7 0.5 - 2.2 mmol/L   Lipase   Result Value Ref Range    Lipase 25 13 - 60 U/L   Urinalysis with Microscopic   Result Value Ref Range    Color, UA Yellow Straw/Yellow    Clarity, UA Clear Clear    Glucose, Ur Negative Negative mg/dL    Bilirubin Urine SMALL (A) Negative Ketones, Urine Negative Negative mg/dL    Specific Gravity, UA 1.015 1.005 - 1.030    Blood, Urine Negative Negative    pH, UA 8.5 5.0 - 9.0    Protein, UA Negative Negative mg/dL    Urobilinogen, Urine >=8.0 (A) <2.0 E.U./dL    Nitrite, Urine Negative Negative    Leukocyte Esterase, Urine Negative Negative    WBC, UA NONE 0 - 5 /HPF    RBC, UA NONE 0 - 2 /HPF    Bacteria, UA NONE SEEN None Seen /HPF   Pregnancy, urine   Result Value Ref Range    HCG(Urine) Pregnancy Test NEGATIVE NEGATIVE       RADIOLOGY:  Interpreted by Radiologist.  Sincere Zavala   Final Result   Numerous tiny echogenic gallstones layer in the gallbladder. No significant   gallbladder wall thickening or pericholecystic fluid. CT ABDOMEN PELVIS W WO CONTRAST Additional Contrast? None   Final Result   1. No evidence of urolithiasis or hydronephrosis. 2. Subcutaneous fat stranding along the ventral lower pelvis may represent   postoperative change or cellulitis. No evidence of soft tissue abscess. ------------------------- NURSING NOTES AND VITALS REVIEWED ---------------------------   The nursing notes within the ED encounter and vital signs as below have been reviewed. BP (!) 132/103   Pulse 69   Temp 98 °F (36.7 °C)   Resp 19   Wt (!) 367 lb (166.5 kg)   LMP 01/30/2022   SpO2 97%   BMI 57.48 kg/m²   Oxygen Saturation Interpretation: Normal      ---------------------------------------------------PHYSICAL EXAM--------------------------------------      Constitutional/General: Alert and oriented x3, well appearing, non toxic in NAD  Head: NC/AT  Eyes: PERRL, EOMI patient is noted to have a disconjugate gaze  Mouth: Oropharynx clear, handling secretions, no trismus  Neck: Supple, full ROM, no meningeal signs  Pulmonary: Lungs clear to auscultation bilaterally, no wheezes, rales, or rhonchi. Not in respiratory distress  Cardiovascular:  Regular rate and rhythm, no murmurs, gallops, or rubs.  2+ distal pulses  Abdomen: Soft, flank tender suprapubic region and tenderness in the right upper quadrant no guarding or rebound  Extremities: Moves all extremities x 4. Warm and well perfused  Skin: warm and dry without rash  Neurologic: GCS 15, cranial nerves intact 5+ strength normal gait no hyperreflexia  Psych: Normal Affect      ------------------------------ ED COURSE/MEDICAL DECISION MAKING----------------------  Medications   morphine (PF) injection 8 mg (has no administration in time range)   ketorolac (TORADOL) injection 30 mg (30 mg IntraMUSCular Given 11/4/22 1004)   iopamidol (ISOVUE-370) 76 % injection 75 mL (75 mLs IntraVENous Given 11/4/22 0676)         Medical Decision Making:    Patient was medicated for pain and imaging and lab work was ordered. CT was unremarkable ultrasound revealed cholelithiasis. Patient was medicated a second time for pain because she had persistent discomfort. She was offered the option of discharge with medication for pain or surgical consultation and she opted for a surgical consultation. Call was placed to Dr. Dean Maria this. Counseling: The emergency provider has spoken with the patient and discussed todays results, in addition to providing specific details for the plan of care and counseling regarding the diagnosis and prognosis. Questions are answered at this time and they are agreeable with the plan.      --------------------------------- IMPRESSION AND DISPOSITION ---------------------------------    IMPRESSION  1. Calculus of gallbladder without cholecystitis without obstruction    2.  Biliary colic        DISPOSITION  Disposition: Other Disposition: As per consultant  Patient condition is stable                  Elder Norton MD  11/04/22 9301

## 2022-11-04 NOTE — ED NOTES
Pt resting comfortably at this time, ax4 no s/s of acute distress.      Frankie Buenrostro RN  11/04/22 [id number]

## 2022-11-05 ENCOUNTER — ANESTHESIA (OUTPATIENT)
Dept: OPERATING ROOM | Age: 32
DRG: 284 | End: 2022-11-05
Payer: MEDICAID

## 2022-11-05 ENCOUNTER — ANESTHESIA EVENT (OUTPATIENT)
Dept: OPERATING ROOM | Age: 32
DRG: 284 | End: 2022-11-05
Payer: MEDICAID

## 2022-11-05 VITALS
BODY MASS INDEX: 57.48 KG/M2 | RESPIRATION RATE: 16 BRPM | TEMPERATURE: 97.9 F | DIASTOLIC BLOOD PRESSURE: 70 MMHG | SYSTOLIC BLOOD PRESSURE: 146 MMHG | HEART RATE: 70 BPM | WEIGHT: 293 LBS | OXYGEN SATURATION: 96 %

## 2022-11-05 PROBLEM — K80.20 CALCULUS OF GALLBLADDER WITHOUT CHOLECYSTITIS WITHOUT OBSTRUCTION: Status: ACTIVE | Noted: 2022-11-05

## 2022-11-05 PROBLEM — K81.0 ACUTE CHOLECYSTITIS: Status: ACTIVE | Noted: 2022-11-05

## 2022-11-05 LAB
ABO/RH: NORMAL
ALBUMIN SERPL-MCNC: 3.7 G/DL (ref 3.5–5.2)
ALP BLD-CCNC: 137 U/L (ref 35–104)
ALT SERPL-CCNC: 128 U/L (ref 0–32)
ANION GAP SERPL CALCULATED.3IONS-SCNC: 10 MMOL/L (ref 7–16)
ANTIBODY SCREEN: NORMAL
AST SERPL-CCNC: 104 U/L (ref 0–31)
BASOPHILS ABSOLUTE: 0.02 E9/L (ref 0–0.2)
BASOPHILS RELATIVE PERCENT: 0.3 % (ref 0–2)
BILIRUB SERPL-MCNC: 0.5 MG/DL (ref 0–1.2)
BILIRUBIN DIRECT: 0.2 MG/DL (ref 0–0.3)
BILIRUBIN, INDIRECT: 0.3 MG/DL (ref 0–1)
BUN BLDV-MCNC: 6 MG/DL (ref 6–20)
CALCIUM SERPL-MCNC: 9.2 MG/DL (ref 8.6–10.2)
CHLORIDE BLD-SCNC: 105 MMOL/L (ref 98–107)
CO2: 25 MMOL/L (ref 22–29)
CREAT SERPL-MCNC: 0.8 MG/DL (ref 0.5–1)
EOSINOPHILS ABSOLUTE: 0.16 E9/L (ref 0.05–0.5)
EOSINOPHILS RELATIVE PERCENT: 2.7 % (ref 0–6)
GFR SERPL CREATININE-BSD FRML MDRD: >60 ML/MIN/1.73
GLUCOSE BLD-MCNC: 97 MG/DL (ref 74–99)
HCT VFR BLD CALC: 41.5 % (ref 34–48)
HEMOGLOBIN: 13.3 G/DL (ref 11.5–15.5)
IMMATURE GRANULOCYTES #: 0.02 E9/L
IMMATURE GRANULOCYTES %: 0.3 % (ref 0–5)
INR BLD: 1.2
LYMPHOCYTES ABSOLUTE: 1.86 E9/L (ref 1.5–4)
LYMPHOCYTES RELATIVE PERCENT: 31.1 % (ref 20–42)
MCH RBC QN AUTO: 28.6 PG (ref 26–35)
MCHC RBC AUTO-ENTMCNC: 32 % (ref 32–34.5)
MCV RBC AUTO: 89.2 FL (ref 80–99.9)
MONOCYTES ABSOLUTE: 0.61 E9/L (ref 0.1–0.95)
MONOCYTES RELATIVE PERCENT: 10.2 % (ref 2–12)
NEUTROPHILS ABSOLUTE: 3.32 E9/L (ref 1.8–7.3)
NEUTROPHILS RELATIVE PERCENT: 55.4 % (ref 43–80)
PDW BLD-RTO: 14 FL (ref 11.5–15)
PLATELET # BLD: 328 E9/L (ref 130–450)
PMV BLD AUTO: 9.8 FL (ref 7–12)
POTASSIUM REFLEX MAGNESIUM: 3.7 MMOL/L (ref 3.5–5)
PROTHROMBIN TIME: 12.8 SEC (ref 9.3–12.4)
RBC # BLD: 4.65 E12/L (ref 3.5–5.5)
SODIUM BLD-SCNC: 140 MMOL/L (ref 132–146)
TOTAL PROTEIN: 6.8 G/DL (ref 6.4–8.3)
WBC # BLD: 6 E9/L (ref 4.5–11.5)

## 2022-11-05 PROCEDURE — 2580000003 HC RX 258

## 2022-11-05 PROCEDURE — 80048 BASIC METABOLIC PNL TOTAL CA: CPT

## 2022-11-05 PROCEDURE — 96361 HYDRATE IV INFUSION ADD-ON: CPT

## 2022-11-05 PROCEDURE — 2500000003 HC RX 250 WO HCPCS

## 2022-11-05 PROCEDURE — 85610 PROTHROMBIN TIME: CPT

## 2022-11-05 PROCEDURE — 86900 BLOOD TYPING SEROLOGIC ABO: CPT

## 2022-11-05 PROCEDURE — 86901 BLOOD TYPING SEROLOGIC RH(D): CPT

## 2022-11-05 PROCEDURE — 86850 RBC ANTIBODY SCREEN: CPT

## 2022-11-05 PROCEDURE — 6360000002 HC RX W HCPCS

## 2022-11-05 PROCEDURE — 99284 EMERGENCY DEPT VISIT MOD MDM: CPT | Performed by: SURGERY

## 2022-11-05 PROCEDURE — 96372 THER/PROPH/DIAG INJ SC/IM: CPT

## 2022-11-05 PROCEDURE — 80076 HEPATIC FUNCTION PANEL: CPT

## 2022-11-05 PROCEDURE — G0378 HOSPITAL OBSERVATION PER HR: HCPCS

## 2022-11-05 PROCEDURE — 96375 TX/PRO/DX INJ NEW DRUG ADDON: CPT

## 2022-11-05 PROCEDURE — 85025 COMPLETE CBC W/AUTO DIFF WBC: CPT

## 2022-11-05 PROCEDURE — 96365 THER/PROPH/DIAG IV INF INIT: CPT

## 2022-11-05 PROCEDURE — 1200000000 HC SEMI PRIVATE

## 2022-11-05 PROCEDURE — 96366 THER/PROPH/DIAG IV INF ADDON: CPT

## 2022-11-05 RX ORDER — METRONIDAZOLE 500 MG/100ML
500 INJECTION, SOLUTION INTRAVENOUS EVERY 8 HOURS
Status: DISCONTINUED | OUTPATIENT
Start: 2022-11-05 | End: 2022-11-05 | Stop reason: HOSPADM

## 2022-11-05 RX ORDER — SODIUM CHLORIDE, SODIUM LACTATE, POTASSIUM CHLORIDE, CALCIUM CHLORIDE 600; 310; 30; 20 MG/100ML; MG/100ML; MG/100ML; MG/100ML
INJECTION, SOLUTION INTRAVENOUS CONTINUOUS
Status: DISCONTINUED | OUTPATIENT
Start: 2022-11-05 | End: 2022-11-05 | Stop reason: HOSPADM

## 2022-11-05 RX ORDER — SODIUM CHLORIDE 9 MG/ML
INJECTION, SOLUTION INTRAVENOUS PRN
Status: DISCONTINUED | OUTPATIENT
Start: 2022-11-05 | End: 2022-11-05 | Stop reason: HOSPADM

## 2022-11-05 RX ORDER — ONDANSETRON 4 MG/1
4 TABLET, ORALLY DISINTEGRATING ORAL EVERY 8 HOURS PRN
Status: DISCONTINUED | OUTPATIENT
Start: 2022-11-05 | End: 2022-11-05 | Stop reason: HOSPADM

## 2022-11-05 RX ORDER — SODIUM CHLORIDE 0.9 % (FLUSH) 0.9 %
10 SYRINGE (ML) INJECTION EVERY 12 HOURS SCHEDULED
Status: DISCONTINUED | OUTPATIENT
Start: 2022-11-05 | End: 2022-11-05 | Stop reason: HOSPADM

## 2022-11-05 RX ORDER — OXYCODONE HYDROCHLORIDE 5 MG/1
5 TABLET ORAL EVERY 4 HOURS PRN
Status: DISCONTINUED | OUTPATIENT
Start: 2022-11-05 | End: 2022-11-05 | Stop reason: HOSPADM

## 2022-11-05 RX ORDER — ONDANSETRON 2 MG/ML
4 INJECTION INTRAMUSCULAR; INTRAVENOUS EVERY 6 HOURS PRN
Status: DISCONTINUED | OUTPATIENT
Start: 2022-11-05 | End: 2022-11-05 | Stop reason: HOSPADM

## 2022-11-05 RX ORDER — ENOXAPARIN SODIUM 100 MG/ML
40 INJECTION SUBCUTANEOUS 2 TIMES DAILY
Status: DISCONTINUED | OUTPATIENT
Start: 2022-11-05 | End: 2022-11-05 | Stop reason: HOSPADM

## 2022-11-05 RX ORDER — SODIUM CHLORIDE 0.9 % (FLUSH) 0.9 %
10 SYRINGE (ML) INJECTION PRN
Status: DISCONTINUED | OUTPATIENT
Start: 2022-11-05 | End: 2022-11-05 | Stop reason: HOSPADM

## 2022-11-05 RX ORDER — ACETAMINOPHEN 325 MG/1
650 TABLET ORAL EVERY 6 HOURS
Status: DISCONTINUED | OUTPATIENT
Start: 2022-11-05 | End: 2022-11-05 | Stop reason: HOSPADM

## 2022-11-05 RX ADMIN — ENOXAPARIN SODIUM 40 MG: 100 INJECTION SUBCUTANEOUS at 10:30

## 2022-11-05 RX ADMIN — SODIUM CHLORIDE, POTASSIUM CHLORIDE, SODIUM LACTATE AND CALCIUM CHLORIDE: 600; 310; 30; 20 INJECTION, SOLUTION INTRAVENOUS at 00:36

## 2022-11-05 RX ADMIN — METRONIDAZOLE 500 MG: 500 INJECTION, SOLUTION INTRAVENOUS at 02:30

## 2022-11-05 RX ADMIN — Medication 10 ML: at 10:36

## 2022-11-05 RX ADMIN — CEFTRIAXONE 2000 MG: 2 INJECTION, POWDER, FOR SOLUTION INTRAMUSCULAR; INTRAVENOUS at 00:45

## 2022-11-05 RX ADMIN — METRONIDAZOLE 500 MG: 500 INJECTION, SOLUTION INTRAVENOUS at 10:30

## 2022-11-05 NOTE — DISCHARGE SUMMARY
Physician Discharge Summary     Patient ID:  Milagros Leiva  [id number]  43 y.o.  1990    Admit date: 11/4/2022    Discharge date and time: 11/5/2022 11:57 AM     Admitting Physician: Mone Lorenz MD     Admission Diagnoses: Acute cholecystitis [K81.0]    Discharge Diagnoses: Principal Problem:    Acute cholecystitis  Resolved Problems:    * No resolved hospital problems. *      Admission Condition: good    Discharged Condition: stable      Hospital Course:  Milagros Leiva is a 34 y.o. female who presented with abdominal pain. Work up revealed acute cholecystitis. Pain was resolved in the AM and the patient requested an elective outpatient cholecystectomy. Discharged in stable condition       Consults:   IP CONSULT TO GENERAL SURGERY    Significant Diagnostic Studies:   CT ABDOMEN PELVIS W WO CONTRAST Additional Contrast? None    Result Date: 11/4/2022  EXAMINATION: CT OF THE ABDOMEN AND PELVIS WITH AND WITHOUT CONTRAST 11/4/2022 4:03 pm TECHNIQUE: CT of the abdomen and pelvis was performed with and without the administration of intravenous contrast.  Multiplanar reformatted images are provided for review. Automated exposure control, iterative reconstruction, and/or weight based adjustment of the mA/kV was utilized to reduce the radiation dose to as low as reasonably achievable. COMPARISON: None. HISTORY: ORDERING SYSTEM PROVIDED HISTORY: kidney stone vs post op abscess TECHNOLOGIST PROVIDED HISTORY: Reason for exam:->kidney stone vs post op abscess Additional Contrast?->None Decision Support Exception - unselect if not a suspected or confirmed emergency medical condition->Emergency Medical Condition (MA) What reading provider will be dictating this exam?->CRC FINDINGS: There is no evidence of urolithiasis or hydronephrosis. The urinary bladder is grossly unremarkable in appearance. There is no evidence of renal cortical lesion bilaterally.   The liver, spleen, pancreas, and adrenal glands are unremarkable. The gallbladder is intact without evidence of biliary ductal dilatation. There is no evidence of bowel obstruction, pneumoperitoneum, or ascites. The uterus and adnexa are within normal limits. The appendix is unremarkable in appearance. The lung bases are clear. There are degenerative changes within the spine. There is subcutaneous fat stranding along the ventral pelvis that may represent postoperative change or cellulitis. There is no evidence of soft tissue abscess. 1. No evidence of urolithiasis or hydronephrosis. 2. Subcutaneous fat stranding along the ventral lower pelvis may represent postoperative change or cellulitis. No evidence of soft tissue abscess. US GALLBLADDER RUQ    Result Date: 11/4/2022  EXAMINATION: RIGHT UPPER QUADRANT ULTRASOUND 11/4/2022 7:28 pm COMPARISON: None. HISTORY: ORDERING SYSTEM PROVIDED HISTORY: cholecystitis TECHNOLOGIST PROVIDED HISTORY: Reason for exam:->cholecystitis What reading provider will be dictating this exam?->CRC FINDINGS: Numerous tiny echogenic gallstones layer in the gallbladder. No significant gallbladder wall thickening or pericholecystic fluid. Common bile duct measures approximately 5 mm in diameter. There is a negative sonographic Mccollum sign. No evidence of ascites. Numerous tiny echogenic gallstones layer in the gallbladder. No significant gallbladder wall thickening or pericholecystic fluid. Discharge Exam:  GENERAL:  NAD. A&Ox3. HEAD:  Normocephalic, atraumatic. EYES:   No scleral icterus. PERRLA. LUNGS:  No increased work of breathing. CARDIOVASCULAR: RR  ABDOMEN:  Soft, non-distended, RUQ tenderness. Negative mccollum. No guarding, rigidity, rebound.        Disposition: home    In process/preliminary results:  Outstanding Order Results       Date and Time Order Name Status Description    11/4/2022 10:39 AM Culture, Urine In process             Patient Instructions:   Current Discharge Medication List CONTINUE these medications which have NOT CHANGED    Details   chlorthalidone (HYGROTON) 25 MG tablet Take 1 tablet by mouth daily  Qty: 30 tablet, Refills: 0      BD INSULIN SYRINGE U/F 31G X 5/16\" 0.3 ML MISC USE FOUR TIMES DAILY      Prenatal Vit-Fe Fumarate-FA (PRENATAL VITAMIN) 27-1 MG TABS tablet Take 1 tablet by mouth daily  Qty: 30 tablet, Refills: 11    Associated Diagnoses: Insulin controlled gestational diabetes mellitus (GDM) in third trimester      aspirin 81 MG chewable tablet Take 1 tablet by mouth in the morning. Qty: 30 tablet, Refills: 3    Associated Diagnoses: 23 weeks gestation of pregnancy; Morbid obesity with body mass index of 50.0-59.9 in adult [facility]); Benign essential hypertension, antepartum; Incompetent cervix in pregnancy, antepartum, second trimester      !! glucose monitoring (FREESTYLE FREEDOM) kit 1 kit by Does not apply route 4 times daily  Qty: 1 kit, Refills: 0    Comments: Patient lost her glucometer, please give her whatever machine her insurance covers      !! Blood Glucose Monitoring Suppl (ONE TOUCH ULTRA 2) w/Device KIT USE AS DIRECTED      Lancets (ONETOUCH DELICA PLUS [id number]) MISC 22 Units At night       !! - Potential duplicate medications found. Please discuss with provider. STOP taking these medications       indomethacin (INDOCIN) 50 MG capsule Comments:   Reason for Stopping:         ibuprofen (IBU) 800 MG tablet Comments:   Reason for Stopping: Follow up:   No follow-up provider specified. Signed:   Janet Ledbetter MD  11/5/2022  6:19 AM

## 2022-11-05 NOTE — H&P
GENERAL SURGERY  HISTORY AND PHYSICAL  2022    Chief Complaint   Patient presents with    Abdominal Pain     Abdominal pain from her  1 month ago. SRIRAM Santacruz is a 34 y.o. female hx of recently delivery who presents for evaluation of presents of abdominal pain yesterday morning. General surgery was consulted for cholelithiasis. She states her pain began in the RUQ that radiated to the flank and the right shoulder that started last night and woke her from her sleep. She reports ongoing RUQ sharp pain that she now rates a 5/10 after IV pain medication. Reports some initial nausea and 1x episode of emesis. No further nausea or emesis. Some chills no fevers. No bowel changes or urinary changes. Unsure if the pain is related to fatty foods. She reports one prior episodes of this kind of pain during her pregnancy. No prior medical hx except hypertension and gestational diabetes. Currently not on any medication or thinners. She is not breastfeeding. Only surgical hx of  section 10/5/22. Past Medical History:   Diagnosis Date    Anemia 2022    Hypertension     Insulin controlled gestational diabetes mellitus (GDM) during pregnancy 2022       Past Surgical History:   Procedure Laterality Date    CERVICAL CERCLAGE N/A 2022    CERVIX CERCLAGE PLACEMENT performed by Frank Dobbs MD at [facility] L[facility] OR     SECTION N/A 10/5/2022     SECTION performed by Kristin Ballard MD at [facility] OR    WISDOM TOOTH EXTRACTION         Prior to Admission medications    Medication Sig Start Date End Date Taking?  Authorizing Provider   chlorthalidone (HYGROTON) 25 MG tablet Take 1 tablet by mouth daily 10/7/22   Aziza Hermosillo MD   BD INSULIN SYRINGE U/F 31G X \" 0.3 ML MISC USE FOUR TIMES DAILY 22   Historical Provider, MD   Prenatal Vit-Fe Fumarate-FA (PRENATAL VITAMIN) 27-1 MG TABS tablet Take 1 tablet by mouth daily 22   Rosie Ortega MD aspirin 81 MG chewable tablet Take 1 tablet by mouth in the morning. 8/2/22   DONTAE Lazcano CNM   glucose monitoring (FREESTYLE FREEDOM) kit 1 kit by Does not apply route 4 times daily 8/1/22   DONTAE Lazcano CNM   indomethacin (INDOCIN) 50 MG capsule Take 50 mg by mouth 2 times daily (with meals)  8/1/22  Historical Provider, MD   Blood Glucose Monitoring Suppl (ONE TOUCH ULTRA 2) w/Device KIT USE AS DIRECTED 4/13/22   Historical Provider, MD   Lancets (Paloma Jointer PLUS [id number]) [address] [facility] 22 Units At night 4/13/22   Historical Provider, MD   ibuprofen (IBU) 800 MG tablet Take 1 tablet by mouth every 8 hours as needed for Pain 7/7/20 3/27/21  DONTAE Pepe - CNP       No Known Allergies    No family history on file. Social History     Tobacco Use    Smoking status: Former    Smokeless tobacco: Never   Vaping Use    Vaping Use: Never used   Substance Use Topics    Alcohol use: Not Currently     Comment: social    Drug use: No         Review of Systems: pertinent ROS listed in HPI, all others negative       PHYSICAL EXAM:    Vitals:    11/04/22 2230   BP: (!) 144/112   Pulse: 99   Resp:    Temp: 98.2 °F (36.8 °C)   SpO2: 97%       GENERAL:  NAD. A&Ox3. HEAD:  Normocephalic, atraumatic. EYES:   No scleral icterus. PERRLA. LUNGS:  No increased work of breathing. CARDIOVASCULAR: RR  ABDOMEN:  Soft, non-distended, RUQ tenderness. Negative ho. No guarding, rigidity, rebound. ASSESSMENT/PLAN:  34 y.o. female with abdominal pain 2/2 to acute cholecystitis     - plan for laparoscopic cholecystectomy 11/5  - IV abx   - NPO, IVF  - admit to general floor   - pain and nausea control     Plan discussed with Dr. Alicia Stout .     Talisha Dobbins MD  Surgery Resident PGY-1  11/5/2022  12:10 AM

## 2022-11-05 NOTE — PROGRESS NOTES
SURGERY NOTE  Patient re-seen in ED  Symptoms have resolved and has no pain on palpation  No fever, normal WBC and US w/o evidence cholecystitis  Will have her follow up in the office to schedule elective cholecystectomy for symptomatic gallstones  Patient agrees on plan and explained if symptoms worsen, develops fever, nausea/vomiting, etc to return to hospital    Naima Jimenez DO  Surgery Resident PGY-5  11/5/2022  11:12 AM

## 2022-11-05 NOTE — ED NOTES
Pt alert oriented skin warm dry resp easy lungs cta abd large soft tender to right and left upper quads bowel sounds distant     Paulette May, RN  11/05/22 002

## 2022-11-06 LAB — URINE CULTURE, ROUTINE: NORMAL

## 2022-11-09 ENCOUNTER — TELEPHONE (OUTPATIENT)
Dept: SURGERY | Age: 32
End: 2022-11-09

## 2022-11-11 ENCOUNTER — TELEPHONE (OUTPATIENT)
Dept: SURGERY | Age: 32
End: 2022-11-11

## 2022-11-30 LAB
ALBUMIN SERPL-MCNC: 3.9 G/DL (ref 3.5–5.2)
ALP BLD-CCNC: 160 U/L (ref 35–104)
ALT SERPL-CCNC: 93 U/L (ref 0–32)
ANION GAP SERPL CALCULATED.3IONS-SCNC: 11 MMOL/L (ref 7–16)
AST SERPL-CCNC: 168 U/L (ref 0–31)
BASOPHILS ABSOLUTE: 0.03 E9/L (ref 0–0.2)
BASOPHILS RELATIVE PERCENT: 0.2 % (ref 0–2)
BILIRUB SERPL-MCNC: 0.6 MG/DL (ref 0–1.2)
BUN BLDV-MCNC: 5 MG/DL (ref 6–20)
CALCIUM SERPL-MCNC: 9.1 MG/DL (ref 8.6–10.2)
CHLORIDE BLD-SCNC: 100 MMOL/L (ref 98–107)
CO2: 24 MMOL/L (ref 22–29)
CREAT SERPL-MCNC: 0.5 MG/DL (ref 0.5–1)
EOSINOPHILS ABSOLUTE: 0.06 E9/L (ref 0.05–0.5)
EOSINOPHILS RELATIVE PERCENT: 0.5 % (ref 0–6)
GFR SERPL CREATININE-BSD FRML MDRD: >60 ML/MIN/1.73
GLUCOSE BLD-MCNC: 144 MG/DL (ref 74–99)
HCT VFR BLD CALC: 41.2 % (ref 34–48)
HEMOGLOBIN: 13.5 G/DL (ref 11.5–15.5)
IMMATURE GRANULOCYTES #: 0.03 E9/L
IMMATURE GRANULOCYTES %: 0.2 % (ref 0–5)
LIPASE: 135 U/L (ref 13–60)
LYMPHOCYTES ABSOLUTE: 1.44 E9/L (ref 1.5–4)
LYMPHOCYTES RELATIVE PERCENT: 12 % (ref 20–42)
MCH RBC QN AUTO: 28.5 PG (ref 26–35)
MCHC RBC AUTO-ENTMCNC: 32.8 % (ref 32–34.5)
MCV RBC AUTO: 87.1 FL (ref 80–99.9)
MONOCYTES ABSOLUTE: 0.56 E9/L (ref 0.1–0.95)
MONOCYTES RELATIVE PERCENT: 4.7 % (ref 2–12)
NEUTROPHILS ABSOLUTE: 9.91 E9/L (ref 1.8–7.3)
NEUTROPHILS RELATIVE PERCENT: 82.4 % (ref 43–80)
PDW BLD-RTO: 13.8 FL (ref 11.5–15)
PLATELET # BLD: 421 E9/L (ref 130–450)
PMV BLD AUTO: 9.3 FL (ref 7–12)
POTASSIUM REFLEX MAGNESIUM: 3.6 MMOL/L (ref 3.5–5)
RBC # BLD: 4.73 E12/L (ref 3.5–5.5)
SODIUM BLD-SCNC: 135 MMOL/L (ref 132–146)
TOTAL PROTEIN: 7 G/DL (ref 6.4–8.3)
WBC # BLD: 12 E9/L (ref 4.5–11.5)

## 2022-11-30 PROCEDURE — 85025 COMPLETE CBC W/AUTO DIFF WBC: CPT

## 2022-11-30 PROCEDURE — 96375 TX/PRO/DX INJ NEW DRUG ADDON: CPT

## 2022-11-30 PROCEDURE — 83690 ASSAY OF LIPASE: CPT

## 2022-11-30 PROCEDURE — 96374 THER/PROPH/DIAG INJ IV PUSH: CPT

## 2022-11-30 PROCEDURE — 99285 EMERGENCY DEPT VISIT HI MDM: CPT

## 2022-11-30 PROCEDURE — 81001 URINALYSIS AUTO W/SCOPE: CPT

## 2022-11-30 PROCEDURE — 80053 COMPREHEN METABOLIC PANEL: CPT

## 2022-12-01 ENCOUNTER — HOSPITAL ENCOUNTER (EMERGENCY)
Age: 32
Discharge: HOME | End: 2022-12-01
Attending: EMERGENCY MEDICINE
Payer: MEDICAID

## 2022-12-01 ENCOUNTER — APPOINTMENT (OUTPATIENT)
Dept: CT IMAGING | Age: 32
End: 2022-12-01
Payer: MEDICAID

## 2022-12-01 ENCOUNTER — TELEPHONE (OUTPATIENT)
Dept: SURGERY | Age: 32
End: 2022-12-01

## 2022-12-01 VITALS
DIASTOLIC BLOOD PRESSURE: 101 MMHG | RESPIRATION RATE: 16 BRPM | SYSTOLIC BLOOD PRESSURE: 158 MMHG | HEIGHT: 67 IN | TEMPERATURE: 97.6 F | BODY MASS INDEX: 45.99 KG/M2 | HEART RATE: 77 BPM | WEIGHT: 293 LBS | OXYGEN SATURATION: 98 %

## 2022-12-01 DIAGNOSIS — K80.50 BILIARY COLIC: ICD-10-CM

## 2022-12-01 DIAGNOSIS — R10.11 RIGHT UPPER QUADRANT ABDOMINAL PAIN: Primary | ICD-10-CM

## 2022-12-01 LAB
BACTERIA: ABNORMAL /HPF
BILIRUBIN URINE: NEGATIVE
BLOOD, URINE: NEGATIVE
CLARITY: CLEAR
COLOR: YELLOW
EPITHELIAL CELLS, UA: ABNORMAL /HPF
GLUCOSE URINE: NEGATIVE MG/DL
HCG(URINE) PREGNANCY TEST: NEGATIVE
KETONES, URINE: NEGATIVE MG/DL
LEUKOCYTE ESTERASE, URINE: NEGATIVE
NITRITE, URINE: NEGATIVE
PH UA: 7 (ref 5–9)
PROTEIN UA: NEGATIVE MG/DL
RBC UA: ABNORMAL /HPF (ref 0–2)
SPECIFIC GRAVITY UA: 1.02 (ref 1–1.03)
UROBILINOGEN, URINE: 4 E.U./DL
WBC UA: ABNORMAL /HPF (ref 0–5)

## 2022-12-01 PROCEDURE — 2580000003 HC RX 258: Performed by: RADIOLOGY

## 2022-12-01 PROCEDURE — 74177 CT ABD & PELVIS W/CONTRAST: CPT

## 2022-12-01 PROCEDURE — 2580000003 HC RX 258: Performed by: EMERGENCY MEDICINE

## 2022-12-01 PROCEDURE — 6360000002 HC RX W HCPCS: Performed by: EMERGENCY MEDICINE

## 2022-12-01 PROCEDURE — 96375 TX/PRO/DX INJ NEW DRUG ADDON: CPT

## 2022-12-01 PROCEDURE — 81025 URINE PREGNANCY TEST: CPT

## 2022-12-01 PROCEDURE — 6360000004 HC RX CONTRAST MEDICATION: Performed by: RADIOLOGY

## 2022-12-01 PROCEDURE — 96374 THER/PROPH/DIAG INJ IV PUSH: CPT

## 2022-12-01 RX ORDER — HYDROCODONE BITARTRATE AND ACETAMINOPHEN 5; 325 MG/1; MG/1
1 TABLET ORAL EVERY 4 HOURS PRN
Qty: 18 TABLET | Refills: 0 | Status: SHIPPED | OUTPATIENT
Start: 2022-12-01 | End: 2022-12-04

## 2022-12-01 RX ORDER — ONDANSETRON 2 MG/ML
4 INJECTION INTRAMUSCULAR; INTRAVENOUS ONCE
Status: COMPLETED | OUTPATIENT
Start: 2022-12-01 | End: 2022-12-01

## 2022-12-01 RX ORDER — MORPHINE SULFATE 4 MG/ML
4 INJECTION, SOLUTION INTRAMUSCULAR; INTRAVENOUS ONCE
Status: COMPLETED | OUTPATIENT
Start: 2022-12-01 | End: 2022-12-01

## 2022-12-01 RX ORDER — 0.9 % SODIUM CHLORIDE 0.9 %
1000 INTRAVENOUS SOLUTION INTRAVENOUS ONCE
Status: COMPLETED | OUTPATIENT
Start: 2022-12-01 | End: 2022-12-01

## 2022-12-01 RX ORDER — SODIUM CHLORIDE 0.9 % (FLUSH) 0.9 %
10 SYRINGE (ML) INJECTION PRN
Status: COMPLETED | OUTPATIENT
Start: 2022-12-01 | End: 2022-12-01

## 2022-12-01 RX ORDER — ONDANSETRON 4 MG/1
4 TABLET, FILM COATED ORAL EVERY 8 HOURS PRN
Qty: 12 TABLET | Refills: 0 | Status: SHIPPED | OUTPATIENT
Start: 2022-12-01

## 2022-12-01 RX ADMIN — MORPHINE SULFATE 4 MG: 4 INJECTION, SOLUTION INTRAMUSCULAR; INTRAVENOUS at 03:59

## 2022-12-01 RX ADMIN — SODIUM CHLORIDE 1000 ML: 9 INJECTION, SOLUTION INTRAVENOUS at 04:00

## 2022-12-01 RX ADMIN — SODIUM CHLORIDE, PRESERVATIVE FREE 10 ML: 5 INJECTION INTRAVENOUS at 03:36

## 2022-12-01 RX ADMIN — IOPAMIDOL 75 ML: 755 INJECTION, SOLUTION INTRAVENOUS at 03:40

## 2022-12-01 RX ADMIN — ONDANSETRON 4 MG: 2 INJECTION INTRAMUSCULAR; INTRAVENOUS at 03:59

## 2022-12-01 ASSESSMENT — PAIN DESCRIPTION - ORIENTATION: ORIENTATION: RIGHT

## 2022-12-01 ASSESSMENT — PAIN DESCRIPTION - LOCATION: LOCATION: FLANK

## 2022-12-01 ASSESSMENT — PAIN SCALES - GENERAL: PAINLEVEL_OUTOF10: 7

## 2022-12-01 ASSESSMENT — PAIN - FUNCTIONAL ASSESSMENT: PAIN_FUNCTIONAL_ASSESSMENT: NONE - DENIES PAIN

## 2022-12-01 NOTE — ED PROVIDER NOTES
HPI:  22,   Time: 3:33 AM ALIYAH Zelaya is a 34 y.o. female presenting to the ED for abdominal pain, beginning 1 day ago. The complaint has been persistent, moderate in severity, and worsened by nothing. The patient states that she has cholelithiasis. She states she was scheduled to get a cholecystectomy but had to leave it as she wanted to talk to her OB/GYN to see if it was safe since she had a recent . She states that today she [employer] after eating AAA she began having upper back pain that radiated around her side into her right upper quadrant. No nausea vomiting diarrhea. No fevers or chills. No numbness tingling. Review of Systems:   Pertinent positives and negatives are stated within HPI, all other systems reviewed and are negative.          --------------------------------------------- PAST HISTORY ---------------------------------------------  Past Medical History:  has a past medical history of Anemia, Hypertension, and Insulin controlled gestational diabetes mellitus (GDM) during pregnancy. Past Surgical History:  has a past surgical history that includes [place] tooth extraction; Cervical Cerclage (N/A, 2022); and  section (N/A, 10/5/2022). Social History:  reports that she has quit smoking. She has never used smokeless tobacco. She reports that she does not currently use alcohol. She reports that she does not use drugs. Family History: family history is not on file. The patients home medications have been reviewed. Allergies: Patient has no known allergies.         ---------------------------------------------------PHYSICAL EXAM--------------------------------------    Constitutional/General: Alert and oriented x3, well appearing, non toxic in NAD  Head: Normocephalic and atraumatic  Eyes: PERRL, EOMI, conjunctive normal, sclera non icteric  Mouth: Oropharynx clear, handling secretions, no trismus, no asymmetry of the posterior oropharynx or uvular edema  Neck: Supple, full ROM, non tender to palpation in the midline, no stridor, no crepitus, no meningeal signs  Respiratory: Lungs clear to auscultation bilaterally, no wheezes, rales, or rhonchi. Not in respiratory distress  Cardiovascular:  Regular rate. Regular rhythm. No murmurs, gallops, or rubs. 2+ distal pulses  GI:  Abdomen Soft, pain to palpation in right upper quadrant non distended. +BS. No organomegaly, no palpable masses,  No rebound, guarding, or rigidity. Musculoskeletal: Moves all extremities x 4. Warm and well perfused, no clubbing, cyanosis, or edema. Capillary refill <3 seconds  Integument: skin warm and dry. No rashes. Neurologic: GCS 15, no focal deficits,   Psychiatric: Normal Affect    -------------------------------------------------- RESULTS -------------------------------------------------  I have personally reviewed all laboratory and imaging results for this patient. Results are listed below.      LABS:  Results for orders placed or performed during the hospital encounter of 12/01/22   CBC with Auto Differential   Result Value Ref Range    WBC 12.0 (H) 4.5 - 11.5 E9/L    RBC 4.73 3.50 - 5.50 E12/L    Hemoglobin 13.5 11.5 - 15.5 g/dL    Hematocrit 41.2 34.0 - 48.0 %    MCV 87.1 80.0 - 99.9 fL    MCH 28.5 26.0 - 35.0 pg    MCHC 32.8 32.0 - 34.5 %    RDW 13.8 11.5 - 15.0 fL    Platelets 239 496 - 501 E9/L    MPV 9.3 7.0 - 12.0 fL    Neutrophils % 82.4 (H) 43.0 - 80.0 %    Immature Granulocytes % 0.2 0.0 - 5.0 %    Lymphocytes % 12.0 (L) 20.0 - 42.0 %    Monocytes % 4.7 2.0 - 12.0 %    Eosinophils % 0.5 0.0 - 6.0 %    Basophils % 0.2 0.0 - 2.0 %    Neutrophils Absolute 9.91 (H) 1.80 - 7.30 E9/L    Immature Granulocytes # 0.03 E9/L    Lymphocytes Absolute 1.44 (L) 1.50 - 4.00 E9/L    Monocytes Absolute 0.56 0.10 - 0.95 E9/L    Eosinophils Absolute 0.06 0.05 - 0.50 E9/L    Basophils Absolute 0.03 0.00 - 0.20 E9/L   Comprehensive Metabolic Panel w/ Reflex to MG   Result Value Ref Range    Sodium 135 132 - 146 mmol/L    Potassium reflex Magnesium 3.6 3.5 - 5.0 mmol/L    Chloride 100 98 - 107 mmol/L    CO2 24 22 - 29 mmol/L    Anion Gap 11 7 - 16 mmol/L    Glucose 144 (H) 74 - 99 mg/dL    BUN 5 (L) 6 - 20 mg/dL    Creatinine 0.5 0.5 - 1.0 mg/dL    Est, Glom Filt Rate >60 >=60 mL/min/1.73    Calcium 9.1 8.6 - 10.2 mg/dL    Total Protein 7.0 6.4 - 8.3 g/dL    Albumin 3.9 3.5 - 5.2 g/dL    Total Bilirubin 0.6 0.0 - 1.2 mg/dL    Alkaline Phosphatase 160 (H) 35 - 104 U/L    ALT 93 (H) 0 - 32 U/L     (H) 0 - 31 U/L   Urinalysis with Microscopic   Result Value Ref Range    Color, UA Yellow Straw/Yellow    Clarity, UA Clear Clear    Glucose, Ur Negative Negative mg/dL    Bilirubin Urine Negative Negative    Ketones, Urine Negative Negative mg/dL    Specific Gravity, UA 1.020 1.005 - 1.030    Blood, Urine Negative Negative    pH, UA 7.0 5.0 - 9.0    Protein, UA Negative Negative mg/dL    Urobilinogen, Urine 4.0 (A) <2.0 E.U./dL    Nitrite, Urine Negative Negative    Leukocyte Esterase, Urine Negative Negative    WBC, UA NONE 0 - 5 /HPF    RBC, UA NONE 0 - 2 /HPF    Epithelial Cells, UA MANY /HPF    Bacteria, UA MODERATE (A) None Seen /HPF   Lipase   Result Value Ref Range    Lipase 135 (H) 13 - 60 U/L   Pregnancy, urine   Result Value Ref Range    HCG(Urine) Pregnancy Test NEGATIVE NEGATIVE       RADIOLOGY:  Interpreted by Radiologist.  CT ABDOMEN PELVIS W IV CONTRAST Additional Contrast? None   Final Result   1. No acute disease. 2. Normal appendix right lower quadrant. RECOMMENDATIONS:   Careful clinical correlation and follow up recommended. ------------------------- NURSING NOTES AND VITALS REVIEWED ---------------------------   The nursing notes within the ED encounter and vital signs as below have been reviewed by myself.   BP (!) 158/101   Pulse 77   Temp 97.6 °F (36.4 °C) (Temporal)   Resp 16   Ht 5' 7\" (1.702 m)   Wt (!) 320 lb (145.2 kg)   SpO2 98%   BMI 50.12 kg/m²   Oxygen Saturation Interpretation: Normal    The patients available past medical records and past encounters were reviewed. ------------------------------ ED COURSE/MEDICAL DECISION MAKING----------------------  Medications   0.9 % sodium chloride bolus (1,000 mLs IntraVENous New Bag 12/1/22 0400)   iopamidol (ISOVUE-370) 76 % injection 75 mL (75 mLs IntraVENous Given 12/1/22 0340)   sodium chloride flush 0.9 % injection 10 mL (10 mLs IntraVENous Given 12/1/22 0336)   morphine sulfate (PF) injection 4 mg (4 mg IntraVENous Given 12/1/22 0359)   ondansetron (ZOFRAN) injection 4 mg (4 mg IntraVENous Given 12/1/22 0359)         ED COURSE:       Medical Decision Making:    Is a 54-year-old female presented to the ED for abdominal pain. Underwent laboratory work-up which showed a normal CBC except for white count of 12.0. Chemistry showed a glucose of 144. Mild ovation's in alkaline phosphatase ALT and AST relatively similar to previous. Lipase nonspecific at 135. Urinalysis shows many epithelial cells and moderate bacteria but no real signs of infection. Pregnancy test negative. CT ab pelvis unremarkable per radiology. Patient does have a history of gallstones and this is likely the cause of the patient's pain. No cholecystitis. Patient has close follow-up with general surgery for outpatient surgery. Therefore patient stable for discharge with close surgical follow-up. Patient's pain well controlled after pain medications here in the emergency department. Strict return precautions given. Patient agrees with plan. I, Dr. Smith Lynn, am the primary provider for this encounter    This patient's ED course included: a personal history and physicial examination, re-evaluation prior to disposition, and multiple bedside re-evaluations    This patient has remained hemodynamically stable during their ED course. Re-Evaluations:             Re-evaluation.   Patients symptoms are improving    Counseling: The emergency provider has spoken with the patient and discussed todays results, in addition to providing specific details for the plan of care and counseling regarding the diagnosis and prognosis. Questions are answered at this time and they are agreeable with the plan.       --------------------------------- IMPRESSION AND DISPOSITION ---------------------------------    IMPRESSION  1. Right upper quadrant abdominal pain    2. Biliary colic        DISPOSITION  Disposition: Discharge to home  Patient condition is stable    NOTE: This report was transcribed using voice recognition software.  Every effort was made to ensure accuracy; however, inadvertent computerized transcription errors may be present        Beau Closs, DO  12/01/22 [id number]

## 2022-12-01 NOTE — ED NOTES
Department of Emergency Medicine  FIRST PROVIDER TRIAGE NOTE             Independent MLP           11/30/22  7:14 PM EST    Date of Encounter: 11/30/22   MRN: [id number]      HPI: Drusilla Alpers is a 34 y.o. female who presents to the ED for Flank Pain (Right side flank pain, kidney stones, surgery was supposed to be Monday but was rescheduled) and Emesis  Patient states that she has a right-sided kidney stone, so staff surgery on Monday but they restarted it. States that she has a have pain in the right flank as well as emesis    ROS: Negative for cp or sob. PE: Gen Appearance/Constitutional: alert  CV: regular rate  Pulm: CTA bilat     Initial Plan of Care: All treatment areas with department are currently occupied. Plan to order/Initiate the following while awaiting opening in ED: labs.   Initiate Treatment-Testing, Proceed toTreatment Area When Bed Available for ED Attending/MLP to Continue Care    Electronically signed by DONTAE Puckett CNP   DD: 11/30/22       DONTAE Puckett CNP  11/30/22 [id number]

## 2022-12-05 NOTE — TELEPHONE ENCOUNTER
LPN called patient to reschedule gallbladder removal for January with Dr Lorena Foss. Left message with patient, instructing to call office back.     Electronically signed by Keysha Wang LPN on 53/9/26 at 9:42 AM EST

## 2023-01-08 NOTE — DISCHARGE SUMMARY
Obstetric Discharge Summary    Admitting Diagnosis  IUP 35 weeks  OB History as of 2022          3    Para   1    Term   0       1    AB   2    Living   1         SAB   2    IAB   0    Ectopic   0    Molar   0    Multiple   0    Live Births   1                Reasons for Admission on 10/4/2022  1:18 PM  Elevated blood pressure affecting pregnancy in third trimester, antepartum [O16.3]  Pregnancy with 35 completed weeks gestation [Z3A.35]  35 weeks gestation of pregnancy [Z3A.35]  No comment available   Section (Primary)    Prenatal Procedures  None    Intrapartum Procedures                  Delivery: : Low Cervical, Transverse       Postpartum Procedures  None    Postpartum/Operative Complications        Data  Information for the patient's :  Moe Do [[id number]]   male   Birth Weight: 5 lb 9.2 oz (2.53 kg)   Discharge With Mother  Complications: No    Discharge Diagnosis       Discharge Information  Discharge Medication List as of 10/8/2022  3:30 PM        START taking these medications    Details   HYDROcodone-acetaminophen (NORCO) 5-325 MG per tablet Take 1 tablet by mouth every 6 hours as needed for Pain for up to 3 days. Intended supply: 3 days. Take lowest dose possible to manage pain, Disp-12 tablet, R-0Normal      chlorthalidone (HYGROTON) 25 MG tablet Take 1 tablet by mouth daily, Disp-30 tablet, R-0Normal           CONTINUE these medications which have NOT CHANGED    Details   BD INSULIN SYRINGE U/F 31G X \" 0.3 ML MIS ORLY, Historical Med      Prenatal Vit-Fe Fumarate-FA (PRENATAL VITAMIN) 27-1 MG TABS tablet Take 1 tablet by mouth daily, Disp-30 tablet, R-11Normal      aspirin 81 MG chewable tablet Take 1 tablet by mouth in the morning., Disp-30 tablet, R-3Normal      !! glucose monitoring (FREESTYLE FREEDOM) kit 4 TIMES DAILY Starting Mon 2022, Disp-1 kit, R-0, Normal      !!  Blood Glucose Monitoring Suppl (ONE TOUCH ULTRA 2) w/Device KIT Historical Med      Lancets (ONETOUCH DELICA PLUS [id number]) MISC 22 Units At nightHistorical Med       !! - Potential duplicate medications found. Please discuss with provider. STOP taking these medications       Glucose 4-6 GM-MG CHEW Comments:   Reason for Stopping:         labetalol (NORMODYNE) 100 MG tablet Comments:   Reason for Stopping:         Cholecalciferol (VITAMIN D3) 50 MCG (2000 UT) CAPS Comments:   Reason for Stopping:         insulin lispro (HUMALOG) 100 UNIT/ML SOLN injection vial Comments:   Reason for Stopping:         NIFEdipine (ADALAT CC) 30 MG extended release tablet Comments:   Reason for Stopping:         glucose 4 g chewable tablet Comments:   Reason for Stopping:         insulin glargine (LANTUS) 100 UNIT/ML injection vial Comments:   Reason for Stopping:         glucagon, rDNA, 1 MG injection Comments:   Reason for Stopping:         ferrous sulfate (IRON 325) 325 (65 Fe) MG tablet Comments:   Reason for Stopping:         vitamin B-12 1000 MCG tablet Comments:   Reason for Stopping:         docusate sodium (COLACE, DULCOLAX) 100 MG CAPS Comments:   Reason for Stopping:         progesterone (PROMETRIUM) 200 MG CAPS capsule Comments:   Reason for Stopping:         indomethacin (INDOCIN) 50 MG capsule Comments:   Reason for Stopping:         ONETOUCH ULTRA strip Comments:   Reason for Stopping:         ibuprofen (IBU) 800 MG tablet Comments:   Reason for Stopping:               No discharge procedures on file.     Discharge to: Home  Follow up in 2 weeks       Comments

## 2023-03-18 ENCOUNTER — HOSPITAL ENCOUNTER (EMERGENCY)
Age: 33
Discharge: HOME | End: 2023-03-19
Payer: MEDICAID

## 2023-03-18 DIAGNOSIS — S62.636A CLOSED DISPLACED FRACTURE OF DISTAL PHALANX OF RIGHT LITTLE FINGER, INITIAL ENCOUNTER: Primary | ICD-10-CM

## 2023-03-18 PROCEDURE — 96372 THER/PROPH/DIAG INJ SC/IM: CPT

## 2023-03-18 PROCEDURE — 99284 EMERGENCY DEPT VISIT MOD MDM: CPT

## 2023-03-19 ENCOUNTER — APPOINTMENT (OUTPATIENT)
Dept: GENERAL RADIOLOGY | Age: 33
End: 2023-03-19
Payer: MEDICAID

## 2023-03-19 VITALS
SYSTOLIC BLOOD PRESSURE: 139 MMHG | RESPIRATION RATE: 16 BRPM | OXYGEN SATURATION: 98 % | WEIGHT: 293 LBS | BODY MASS INDEX: 45.99 KG/M2 | TEMPERATURE: 98 F | DIASTOLIC BLOOD PRESSURE: 98 MMHG | HEART RATE: 74 BPM | HEIGHT: 67 IN

## 2023-03-19 PROCEDURE — 96372 THER/PROPH/DIAG INJ SC/IM: CPT

## 2023-03-19 PROCEDURE — 6360000002 HC RX W HCPCS: Performed by: NURSE PRACTITIONER

## 2023-03-19 PROCEDURE — 99284 EMERGENCY DEPT VISIT MOD MDM: CPT

## 2023-03-19 PROCEDURE — 73130 X-RAY EXAM OF HAND: CPT

## 2023-03-19 RX ORDER — HYDROCODONE BITARTRATE AND ACETAMINOPHEN 5; 325 MG/1; MG/1
1 TABLET ORAL EVERY 8 HOURS PRN
Qty: 6 TABLET | Refills: 0 | Status: SHIPPED | OUTPATIENT
Start: 2023-03-19 | End: 2023-03-22

## 2023-03-19 RX ORDER — KETOROLAC TROMETHAMINE 30 MG/ML
30 INJECTION, SOLUTION INTRAMUSCULAR; INTRAVENOUS ONCE
Status: COMPLETED | OUTPATIENT
Start: 2023-03-19 | End: 2023-03-19

## 2023-03-19 RX ADMIN — KETOROLAC TROMETHAMINE 30 MG: 30 INJECTION, SOLUTION INTRAMUSCULAR; INTRAVENOUS at 01:01

## 2023-03-19 ASSESSMENT — PAIN SCALES - GENERAL: PAINLEVEL_OUTOF10: 9

## 2023-03-19 NOTE — ED PROVIDER NOTES
for follow-up    Plan of Care/Counseling:  DONTAE Paredes CNP reviewed today's visit with the patient in addition to providing specific details for the plan of care and counseling regarding the diagnosis and prognosis. Questions are answered at this time and are agreeable with the plan. ASSESSMENT     1. Closed displaced fracture of distal phalanx of right little finger, initial encounter        DISPOSITION   Discharged home. Patient condition is good    Discharge Instructions:   Patient referred to  Shania Nunez DO  [address] [place] [address] Drive  [id number] [facility] Emergency Department  [address] [address]  [phone]    If symptoms worsen    NEW MEDICATIONS     New Prescriptions    HYDROCODONE-ACETAMINOPHEN (NORCO) 5-325 MG PER TABLET    Take 1 tablet by mouth every 8 hours as needed for Pain for up to 3 days. Intended supply: 3 days. Take lowest dose possible to manage pain Max Daily Amount: 3 tablets     Electronically signed by DONTAE Paredes CNP   DD: 3/19/23  **This report was transcribed using voice recognition software. Every effort was made to ensure accuracy; however, inadvertent computerized transcription errors may be present.   END OF ED PROVIDER NOTE      DONTAE Paredes CNP  03/19/23 7012

## 2023-03-22 ENCOUNTER — OFFICE VISIT (OUTPATIENT)
Dept: ORTHOPEDIC SURGERY | Age: 33
End: 2023-03-22

## 2023-03-22 DIAGNOSIS — S62.667A CLOSED NONDISPLACED FRACTURE OF DISTAL PHALANX OF LEFT LITTLE FINGER, INITIAL ENCOUNTER: Primary | ICD-10-CM

## 2023-03-22 NOTE — PROGRESS NOTES
New Wrist/Hand Patient Visit     Referring Provider:   No referring provider defined for this encounter. CHIEF COMPLAINT:   Chief Complaint   Patient presents with    Fracture     Right pinky fracture  Soreness /10, tender to touch  Wearing brace        HPI:      Jim Meléndez is a 34y.o. year old female who is seen today  for evaluation of right small finger pain. Patient is right-hand dominant. 3 days ago she closed the car door on her right small finger and had immediate pain and swelling to that finger. There is a small blood blister over the pulp of the finger. She is able to flex and extend the PIP as well as the DIP. She continues to have some pain to this finger today however it is improving slowly. No other orthopedic complaints at this time. PAST MEDICAL HISTORY  Past Medical History:   Diagnosis Date    Anemia 2022    Hypertension     Insulin controlled gestational diabetes mellitus (GDM) during pregnancy 2022       PAST SURGICAL HISTORY  Past Surgical History:   Procedure Laterality Date    CERVICAL CERCLAGE N/A 2022    CERVIX CERCLAGE PLACEMENT performed by Hemalatha Salazar MD at [facility] L&D OR     SECTION N/A 10/5/2022     SECTION performed by Deb Maldonado MD at [facility] L&D OR    WISDOM TOOTH EXTRACTION         FAMILY HISTORY   No family history on file. SOCIAL HISTORY  Social History     Occupational History    Not on file   Tobacco Use    Smoking status: Former    Smokeless tobacco: Never   Vaping Use    Vaping Use: Never used   Substance and Sexual Activity    Alcohol use: Not Currently    Drug use: No    Sexual activity: Not on file       CURRENT MEDICATIONS     Current Outpatient Medications:     HYDROcodone-acetaminophen (NORCO) 5-325 MG per tablet, Take 1 tablet by mouth every 8 hours as needed for Pain for up to 3 days. Intended supply: 3 days.  Take lowest dose possible to manage pain Max Daily Amount: 3 tablets, Disp: 6 tablet, Rfl: 0

## 2023-06-18 ENCOUNTER — HOSPITAL ENCOUNTER (EMERGENCY)
Age: 33
Discharge: HOME | End: 2023-06-18
Attending: EMERGENCY MEDICINE
Payer: MEDICAID

## 2023-06-18 ENCOUNTER — APPOINTMENT (OUTPATIENT)
Dept: ULTRASOUND IMAGING | Age: 33
End: 2023-06-18
Payer: MEDICAID

## 2023-06-18 VITALS
HEART RATE: 78 BPM | WEIGHT: 293 LBS | SYSTOLIC BLOOD PRESSURE: 136 MMHG | TEMPERATURE: 98.8 F | HEIGHT: 67 IN | DIASTOLIC BLOOD PRESSURE: 69 MMHG | OXYGEN SATURATION: 99 % | RESPIRATION RATE: 16 BRPM | BODY MASS INDEX: 45.99 KG/M2

## 2023-06-18 DIAGNOSIS — O03.4 INCOMPLETE ABORTION: Primary | ICD-10-CM

## 2023-06-18 LAB
ABO + RH BLD: NORMAL
ALBUMIN SERPL-MCNC: 3.7 G/DL (ref 3.5–5.2)
ALP SERPL-CCNC: 63 U/L (ref 35–104)
ALT SERPL-CCNC: 10 U/L (ref 0–32)
ANION GAP SERPL CALCULATED.3IONS-SCNC: 11 MMOL/L (ref 7–16)
AST SERPL-CCNC: 9 U/L (ref 0–31)
BACTERIA URNS QL MICRO: ABNORMAL /HPF
BASOPHILS # BLD: 0.03 E9/L (ref 0–0.2)
BASOPHILS NFR BLD: 0.3 % (ref 0–2)
BILIRUB SERPL-MCNC: 0.2 MG/DL (ref 0–1.2)
BILIRUB UR QL STRIP: NEGATIVE
BLD GP AB SCN SERPL QL: NORMAL
BUN SERPL-MCNC: 6 MG/DL (ref 6–20)
CALCIUM SERPL-MCNC: 8.8 MG/DL (ref 8.6–10.2)
CHLORIDE SERPL-SCNC: 102 MMOL/L (ref 98–107)
CLARITY UR: ABNORMAL
CO2 SERPL-SCNC: 23 MMOL/L (ref 22–29)
COLOR UR: ABNORMAL
CREAT SERPL-MCNC: 0.6 MG/DL (ref 0.5–1)
EOSINOPHIL # BLD: 0.19 E9/L (ref 0.05–0.5)
EOSINOPHIL NFR BLD: 1.9 % (ref 0–6)
EPI CELLS #/AREA URNS HPF: ABNORMAL /HPF
ERYTHROCYTE [DISTWIDTH] IN BLOOD BY AUTOMATED COUNT: 14.4 FL (ref 11.5–15)
GLUCOSE SERPL-MCNC: 121 MG/DL (ref 74–99)
GLUCOSE UR STRIP-MCNC: NEGATIVE MG/DL
GONADOTROPIN, CHORIONIC (HCG) QUANT: 5068 MIU/ML
HCT VFR BLD AUTO: 34.5 % (ref 34–48)
HGB BLD-MCNC: 11.2 G/DL (ref 11.5–15.5)
HGB UR QL STRIP: ABNORMAL
IMM GRANULOCYTES # BLD: 0.04 E9/L
IMM GRANULOCYTES NFR BLD: 0.4 % (ref 0–5)
KETONES UR STRIP-MCNC: ABNORMAL MG/DL
LEUKOCYTE ESTERASE UR QL STRIP: ABNORMAL
LYMPHOCYTES # BLD: 2.23 E9/L (ref 1.5–4)
LYMPHOCYTES NFR BLD: 22.8 % (ref 20–42)
MCH RBC QN AUTO: 28.3 PG (ref 26–35)
MCHC RBC AUTO-ENTMCNC: 32.5 % (ref 32–34.5)
MCV RBC AUTO: 87.1 FL (ref 80–99.9)
MONOCYTES # BLD: 0.4 E9/L (ref 0.1–0.95)
MONOCYTES NFR BLD: 4.1 % (ref 2–12)
NEUTROPHILS # BLD: 6.91 E9/L (ref 1.8–7.3)
NEUTS SEG NFR BLD: 70.5 % (ref 43–80)
NITRITE UR QL STRIP: POSITIVE
PH UR STRIP: 6.5 [PH] (ref 5–9)
PLATELET # BLD AUTO: 427 E9/L (ref 130–450)
PMV BLD AUTO: 9.1 FL (ref 7–12)
POTASSIUM SERPL-SCNC: 3.6 MMOL/L (ref 3.5–5)
PROT SERPL-MCNC: 6.6 G/DL (ref 6.4–8.3)
PROT UR STRIP-MCNC: 100 MG/DL
RBC # BLD AUTO: 3.96 E12/L (ref 3.5–5.5)
RBC #/AREA URNS HPF: ABNORMAL /HPF (ref 0–2)
SODIUM SERPL-SCNC: 136 MMOL/L (ref 132–146)
SP GR UR STRIP: 1.02 (ref 1–1.03)
UROBILINOGEN UR STRIP-ACNC: 1 E.U./DL
WBC # BLD: 9.8 E9/L (ref 4.5–11.5)
WBC #/AREA URNS HPF: ABNORMAL /HPF (ref 0–5)

## 2023-06-18 PROCEDURE — 6370000000 HC RX 637 (ALT 250 FOR IP): Performed by: STUDENT IN AN ORGANIZED HEALTH CARE EDUCATION/TRAINING PROGRAM

## 2023-06-18 PROCEDURE — 86900 BLOOD TYPING SEROLOGIC ABO: CPT

## 2023-06-18 PROCEDURE — 86850 RBC ANTIBODY SCREEN: CPT

## 2023-06-18 PROCEDURE — 84702 CHORIONIC GONADOTROPIN TEST: CPT

## 2023-06-18 PROCEDURE — 86901 BLOOD TYPING SEROLOGIC RH(D): CPT

## 2023-06-18 PROCEDURE — 76817 TRANSVAGINAL US OBSTETRIC: CPT

## 2023-06-18 PROCEDURE — 80053 COMPREHEN METABOLIC PANEL: CPT

## 2023-06-18 PROCEDURE — 85025 COMPLETE CBC W/AUTO DIFF WBC: CPT

## 2023-06-18 PROCEDURE — 99284 EMERGENCY DEPT VISIT MOD MDM: CPT

## 2023-06-18 PROCEDURE — 81001 URINALYSIS AUTO W/SCOPE: CPT

## 2023-06-18 RX ORDER — ACETAMINOPHEN 500 MG
500 TABLET ORAL 4 TIMES DAILY PRN
Qty: 120 TABLET | Refills: 0 | Status: SHIPPED | OUTPATIENT
Start: 2023-06-18

## 2023-06-18 RX ORDER — MORPHINE SULFATE 4 MG/ML
4 INJECTION, SOLUTION INTRAMUSCULAR; INTRAVENOUS ONCE
Status: DISCONTINUED | OUTPATIENT
Start: 2023-06-18 | End: 2023-06-18 | Stop reason: HOSPADM

## 2023-06-18 RX ORDER — ACETAMINOPHEN 500 MG
1000 TABLET ORAL ONCE
Status: COMPLETED | OUTPATIENT
Start: 2023-06-18 | End: 2023-06-18

## 2023-06-18 RX ORDER — IBUPROFEN 600 MG/1
600 TABLET ORAL 3 TIMES DAILY PRN
Qty: 30 TABLET | Refills: 0 | Status: SHIPPED | OUTPATIENT
Start: 2023-06-18

## 2023-06-18 RX ADMIN — ACETAMINOPHEN 1000 MG: 500 TABLET ORAL at 11:00

## 2023-06-18 ASSESSMENT — PAIN DESCRIPTION - LOCATION
LOCATION: ABDOMEN;PELVIS

## 2023-06-18 ASSESSMENT — ENCOUNTER SYMPTOMS
PHOTOPHOBIA: 0
DIARRHEA: 0
SHORTNESS OF BREATH: 0
BACK PAIN: 1
VOMITING: 0
NAUSEA: 1
COUGH: 0
SORE THROAT: 0
ABDOMINAL PAIN: 1

## 2023-06-18 ASSESSMENT — PAIN DESCRIPTION - ORIENTATION
ORIENTATION: LOWER

## 2023-06-18 ASSESSMENT — PAIN SCALES - GENERAL
PAINLEVEL_OUTOF10: 8
PAINLEVEL_OUTOF10: 6
PAINLEVEL_OUTOF10: 7

## 2023-06-18 ASSESSMENT — PAIN DESCRIPTION - DESCRIPTORS
DESCRIPTORS: ACHING;CRAMPING

## 2023-06-18 ASSESSMENT — PAIN - FUNCTIONAL ASSESSMENT: PAIN_FUNCTIONAL_ASSESSMENT: 0-10

## 2024-05-29 ENCOUNTER — HOSPITAL ENCOUNTER (EMERGENCY)
Age: 34
Discharge: HOME | End: 2024-05-30

## 2024-05-29 ENCOUNTER — APPOINTMENT (OUTPATIENT)
Dept: GENERAL RADIOLOGY | Age: 34
End: 2024-05-29

## 2024-05-29 VITALS
RESPIRATION RATE: 18 BRPM | SYSTOLIC BLOOD PRESSURE: 148 MMHG | HEART RATE: 92 BPM | TEMPERATURE: 97.6 F | DIASTOLIC BLOOD PRESSURE: 112 MMHG | OXYGEN SATURATION: 99 % | HEIGHT: 67 IN | BODY MASS INDEX: 45.99 KG/M2 | WEIGHT: 293 LBS

## 2024-05-29 DIAGNOSIS — S90.425A BLISTER OF TOE WITH INFECTION, LEFT, INITIAL ENCOUNTER: Primary | ICD-10-CM

## 2024-05-29 DIAGNOSIS — L08.9 BLISTER OF TOE WITH INFECTION, LEFT, INITIAL ENCOUNTER: Primary | ICD-10-CM

## 2024-05-29 PROCEDURE — 6370000000 HC RX 637 (ALT 250 FOR IP): Performed by: PHYSICIAN ASSISTANT

## 2024-05-29 PROCEDURE — 6370000000 HC RX 637 (ALT 250 FOR IP)

## 2024-05-29 PROCEDURE — 73630 X-RAY EXAM OF FOOT: CPT

## 2024-05-29 PROCEDURE — 99283 EMERGENCY DEPT VISIT LOW MDM: CPT

## 2024-05-29 RX ORDER — DOXYCYCLINE HYCLATE 100 MG/1
100 CAPSULE ORAL ONCE
Status: COMPLETED | OUTPATIENT
Start: 2024-05-29 | End: 2024-05-29

## 2024-05-29 RX ORDER — GINSENG 100 MG
CAPSULE ORAL
Status: COMPLETED
Start: 2024-05-29 | End: 2024-05-29

## 2024-05-29 RX ORDER — HYDROCODONE BITARTRATE AND ACETAMINOPHEN 5; 325 MG/1; MG/1
1 TABLET ORAL ONCE
Status: COMPLETED | OUTPATIENT
Start: 2024-05-29 | End: 2024-05-29

## 2024-05-29 RX ORDER — CEPHALEXIN 500 MG/1
CAPSULE ORAL
Status: COMPLETED
Start: 2024-05-29 | End: 2024-05-29

## 2024-05-29 RX ORDER — CEPHALEXIN 500 MG/1
500 CAPSULE ORAL ONCE
Status: COMPLETED | OUTPATIENT
Start: 2024-05-29 | End: 2024-05-29

## 2024-05-29 RX ADMIN — BACITRACIN: 500 OINTMENT TOPICAL at 23:47

## 2024-05-29 RX ADMIN — DOXYCYCLINE HYCLATE 100 MG: 100 CAPSULE ORAL at 23:45

## 2024-05-29 RX ADMIN — CEPHALEXIN 500 MG: 500 CAPSULE ORAL at 23:44

## 2024-05-29 RX ADMIN — HYDROCODONE BITARTRATE AND ACETAMINOPHEN 1 TABLET: 5; 325 TABLET ORAL at 23:45

## 2024-05-29 ASSESSMENT — LIFESTYLE VARIABLES
HOW MANY STANDARD DRINKS CONTAINING ALCOHOL DO YOU HAVE ON A TYPICAL DAY: PATIENT DOES NOT DRINK
HOW OFTEN DO YOU HAVE A DRINK CONTAINING ALCOHOL: NEVER

## 2024-05-29 ASSESSMENT — PAIN SCALES - GENERAL
PAINLEVEL_OUTOF10: 8
PAINLEVEL_OUTOF10: 8

## 2024-05-29 ASSESSMENT — PAIN DESCRIPTION - ORIENTATION: ORIENTATION: LEFT

## 2024-05-29 ASSESSMENT — PAIN DESCRIPTION - DESCRIPTORS: DESCRIPTORS: BURNING;ACHING

## 2024-05-29 ASSESSMENT — PAIN DESCRIPTION - LOCATION: LOCATION: FOOT

## 2024-05-30 RX ORDER — CEPHALEXIN 500 MG/1
500 CAPSULE ORAL 4 TIMES DAILY
Qty: 28 CAPSULE | Refills: 0 | Status: SHIPPED | OUTPATIENT
Start: 2024-05-30 | End: 2024-06-06

## 2024-05-30 RX ORDER — DOXYCYCLINE HYCLATE 100 MG
100 TABLET ORAL 2 TIMES DAILY
Qty: 14 TABLET | Refills: 0 | Status: SHIPPED | OUTPATIENT
Start: 2024-05-30 | End: 2024-06-06

## 2024-05-30 RX ORDER — CLOTRIMAZOLE 1 %
CREAM (GRAM) TOPICAL
Qty: 30 G | Refills: 1 | Status: SHIPPED | OUTPATIENT
Start: 2024-05-30 | End: 2024-06-06

## 2024-05-30 NOTE — DISCHARGE INSTR - COC
Continuity of Care Form    Patient Name: Suresh Roberto   :  1990  MRN:  [id number]    Admit date:  2024  Discharge date:  ***    Code Status Order: Prior   Advance Directives:     Admitting Physician:  No admitting provider for patient encounter.  PCP: Paul Lynne DO    Discharging Nurse: ***  Discharging Hospital Unit/Room#:   Discharging Unit Phone Number: ***    Emergency Contact:   Extended Emergency Contact Information  Primary Emergency Contact: Janny Roberto  Address: [address] [place] [address]  Home Phone: [phone]  Mobile Phone: [phone]  Relation: Parent  Hearing or visual needs: None  Other needs: None  Preferred language: English   needed? No    Past Surgical History:  Past Surgical History:   Procedure Laterality Date    CERVICAL CERCLAGE N/A 2022    CERVIX CERCLAGE PLACEMENT performed by David Laguerre MD at [facility] L&D OR     SECTION N/A 10/5/2022     SECTION performed by Dean Lakhani MD at [facility] L&D OR    WISDOM TOOTH EXTRACTION         Immunization History:   There is no immunization history for the selected administration types on file for this patient.    Active Problems:  Patient Active Problem List   Diagnosis Code    Maternal morbid obesity, antepartum ([facility]) O99.210, E66.01    Incompetent cervix in pregnancy, antepartum, second trimester O34.32    Benign essential hypertension, antepartum O10.019    Class 3 severe obesity without serious comorbidity in adult ([facility]) E66.01    Abnormal ultrasonic finding on  screening of mother O28.3    Anemia D64.9    Elevated hemoglobin A1c R73.09    Low ferritin R79.0    Low maternal serum vitamin B12 O28.0    Low vitamin D level R79.89    Insulin controlled gestational diabetes mellitus (GDM) in third trimester O24.414    Vaginal spotting N93.9    Non-reactive NST (non-stress test) O28.8    Variable fetal heart rate decelerations, antepartum

## 2024-05-30 NOTE — ED PROVIDER NOTES
Discharge Medication List as of 5/30/2024 12:07 AM        START taking these medications    Details   cephALEXin (KEFLEX) 500 MG capsule Take 1 capsule by mouth 4 times daily for 7 days, Disp-28 capsule, R-0Normal      doxycycline hyclate (VIBRA-TABS) 100 MG tablet Take 1 tablet by mouth 2 times daily for 7 days, Disp-14 tablet, R-0Normal      clotrimazole (LOTRIMIN) 1 % cream Apply topically 2 times daily., Disp-30 g, R-1, Normal           Electronically signed by Mercy Potter PA-C   DD: 5/30/24  **This report was transcribed using voice recognition software. Every effort was made to ensure accuracy; however, inadvertent computerized transcription errors may be present.  END OF ED PROVIDER NOTE

## 2024-08-21 ENCOUNTER — HOSPITAL ENCOUNTER (EMERGENCY)
Age: 34
Discharge: HOME | End: 2024-08-21

## 2024-08-21 VITALS
BODY MASS INDEX: 45.99 KG/M2 | WEIGHT: 293 LBS | TEMPERATURE: 97.8 F | OXYGEN SATURATION: 98 % | HEART RATE: 94 BPM | RESPIRATION RATE: 18 BRPM | DIASTOLIC BLOOD PRESSURE: 99 MMHG | HEIGHT: 67 IN | SYSTOLIC BLOOD PRESSURE: 153 MMHG

## 2024-08-21 DIAGNOSIS — N39.0 ACUTE UTI: ICD-10-CM

## 2024-08-21 DIAGNOSIS — B96.89 BV (BACTERIAL VAGINOSIS): Primary | ICD-10-CM

## 2024-08-21 DIAGNOSIS — Z71.1 CONCERN ABOUT STI IN FEMALE WITHOUT DIAGNOSIS: ICD-10-CM

## 2024-08-21 DIAGNOSIS — N76.0 BV (BACTERIAL VAGINOSIS): Primary | ICD-10-CM

## 2024-08-21 LAB
BACTERIA URNS QL MICRO: ABNORMAL
BILIRUB UR QL STRIP: NEGATIVE
CHP ED QC CHECK: NORMAL
CLARITY UR: ABNORMAL
CLUE CELLS VAG QL WET PREP: ABNORMAL
COLOR UR: YELLOW
GLUCOSE BLD-MCNC: 124 MG/DL
GLUCOSE BLD-MCNC: 124 MG/DL (ref 74–99)
GLUCOSE UR STRIP-MCNC: NEGATIVE MG/DL
HCG, URINE, POC: NEGATIVE
HGB UR QL STRIP.AUTO: NEGATIVE
KETONES UR STRIP-MCNC: NEGATIVE MG/DL
LEUKOCYTE ESTERASE UR QL STRIP: ABNORMAL
Lab: NORMAL
NEGATIVE QC PASS/FAIL: NORMAL
NITRITE UR QL STRIP: NEGATIVE
PH UR STRIP: 6.5 [PH] (ref 5–9)
POSITIVE QC PASS/FAIL: NORMAL
PROT UR STRIP-MCNC: NEGATIVE MG/DL
RBC #/AREA URNS HPF: ABNORMAL /HPF
SOURCE WET PREP: ABNORMAL
SP GR UR STRIP: 1.02 (ref 1–1.03)
T VAGINALIS VAG QL WET PREP: ABNORMAL
UROBILINOGEN UR STRIP-ACNC: 0.2 EU/DL (ref 0–1)
WBC #/AREA URNS HPF: ABNORMAL /HPF
YEAST WET PREP: ABNORMAL

## 2024-08-21 PROCEDURE — 87529 HSV DNA AMP PROBE: CPT

## 2024-08-21 PROCEDURE — 6370000000 HC RX 637 (ALT 250 FOR IP): Performed by: NURSE PRACTITIONER

## 2024-08-21 PROCEDURE — 87591 N.GONORRHOEAE DNA AMP PROB: CPT

## 2024-08-21 PROCEDURE — 81001 URINALYSIS AUTO W/SCOPE: CPT

## 2024-08-21 PROCEDURE — 87086 URINE CULTURE/COLONY COUNT: CPT

## 2024-08-21 PROCEDURE — 6360000002 HC RX W HCPCS: Performed by: NURSE PRACTITIONER

## 2024-08-21 PROCEDURE — 87210 SMEAR WET MOUNT SALINE/INK: CPT

## 2024-08-21 PROCEDURE — 87491 CHLMYD TRACH DNA AMP PROBE: CPT

## 2024-08-21 PROCEDURE — 99284 EMERGENCY DEPT VISIT MOD MDM: CPT

## 2024-08-21 PROCEDURE — 96372 THER/PROPH/DIAG INJ SC/IM: CPT

## 2024-08-21 PROCEDURE — 82962 GLUCOSE BLOOD TEST: CPT

## 2024-08-21 RX ORDER — AZITHROMYCIN 250 MG/1
1000 TABLET, FILM COATED ORAL ONCE
Status: COMPLETED | OUTPATIENT
Start: 2024-08-21 | End: 2024-08-21

## 2024-08-21 RX ORDER — METRONIDAZOLE 500 MG/1
500 TABLET ORAL 2 TIMES DAILY
Qty: 14 TABLET | Refills: 0 | Status: SHIPPED | OUTPATIENT
Start: 2024-08-21 | End: 2024-08-28

## 2024-08-21 RX ORDER — VALACYCLOVIR HYDROCHLORIDE 1 G/1
1000 TABLET, FILM COATED ORAL 2 TIMES DAILY
Qty: 14 TABLET | Refills: 0 | Status: SHIPPED | OUTPATIENT
Start: 2024-08-21 | End: 2024-08-28

## 2024-08-21 RX ORDER — CEFTRIAXONE 1 G/1
1000 INJECTION, POWDER, FOR SOLUTION INTRAMUSCULAR; INTRAVENOUS ONCE
Status: COMPLETED | OUTPATIENT
Start: 2024-08-21 | End: 2024-08-21

## 2024-08-21 RX ORDER — CEPHALEXIN 500 MG/1
500 CAPSULE ORAL 3 TIMES DAILY
Qty: 21 CAPSULE | Refills: 0 | Status: SHIPPED | OUTPATIENT
Start: 2024-08-21 | End: 2024-08-28

## 2024-08-21 RX ORDER — IBUPROFEN 600 MG/1
600 TABLET ORAL ONCE
Status: COMPLETED | OUTPATIENT
Start: 2024-08-21 | End: 2024-08-21

## 2024-08-21 RX ORDER — FLUCONAZOLE 150 MG/1
150 TABLET ORAL ONCE
Status: COMPLETED | OUTPATIENT
Start: 2024-08-21 | End: 2024-08-21

## 2024-08-21 RX ORDER — VALACYCLOVIR HYDROCHLORIDE 500 MG/1
1000 TABLET, FILM COATED ORAL ONCE
Status: COMPLETED | OUTPATIENT
Start: 2024-08-21 | End: 2024-08-21

## 2024-08-21 RX ADMIN — AZITHROMYCIN 1000 MG: 250 TABLET, FILM COATED ORAL at 17:31

## 2024-08-21 RX ADMIN — FLUCONAZOLE 150 MG: 150 TABLET ORAL at 17:31

## 2024-08-21 RX ADMIN — IBUPROFEN 600 MG: 600 TABLET, FILM COATED ORAL at 17:32

## 2024-08-21 RX ADMIN — VALACYCLOVIR 1000 MG: 500 TABLET, FILM COATED ORAL at 17:34

## 2024-08-21 RX ADMIN — CEFTRIAXONE 1000 MG: 1 INJECTION, POWDER, FOR SOLUTION INTRAMUSCULAR; INTRAVENOUS at 17:35

## 2024-08-21 ASSESSMENT — PAIN SCALES - GENERAL
PAINLEVEL_OUTOF10: 10
PAINLEVEL_OUTOF10: 10

## 2024-08-21 ASSESSMENT — LIFESTYLE VARIABLES
HOW OFTEN DO YOU HAVE A DRINK CONTAINING ALCOHOL: MONTHLY OR LESS
HOW MANY STANDARD DRINKS CONTAINING ALCOHOL DO YOU HAVE ON A TYPICAL DAY: 1 OR 2

## 2024-08-21 ASSESSMENT — PAIN DESCRIPTION - LOCATION: LOCATION: VAGINA

## 2024-08-21 ASSESSMENT — PAIN DESCRIPTION - DESCRIPTORS: DESCRIPTORS: ACHING;BURNING

## 2024-08-21 NOTE — ED PROVIDER NOTES
Independent NAYA Visit.          [facility]  Department of Emergency Medicine   ED  Encounter Note  Admit Date/RoomTime: 2024  3:37 PM  ED Room:     NAME: Suresh Roberto  : 1990  MRN: [id number]     Chief Complaint:  Pruritis (Vaginal/groin itching, x3 days, states new laundry detergent )    History of Present Illness       Suresh Roberto is a 33 y.o. old female who presents to the emergency department by private vehicle alone for vaginal irritation/itching and white discharge that is moderate, which occured 3 day(s) prior to arrival.  Patient reports using a new laundry detergent.  Since onset the symptoms have been persistent and gradually worsening and mild-moderate in severity.  Patient reports that she used a Monistat over-the-counter treatment for a possible vaginal yeast infection with no relief of symptoms.  She reports she has not been sexually active for the past 4 months and cannot get into her gynecologist for several weeks and would like to be checked for STIs.  Symptoms are associated with no additional symptoms and denies any abdominal pain, back pain, chest pain, shortness of breath, fever, chills, sweating, nausea, vomiting, anorexia, weight loss, diarrhea, constipation, dark/black stools, blood in stool, blood in emesis, cloudy urine, urinary frequency, dysuria, hematuria, urinary urgency, urinary incontinence, or vaginal spotting. She takes no blood thinning agents.  No LMP recorded.. .  GYN/STD Hx: No prior history of sexually transmitted disease.    ROS   Pertinent positives and negatives are stated within HPI, all other systems reviewed and are negative.    Past Medical History:  has a past medical history of Anemia, Hypertension, and Insulin controlled gestational diabetes mellitus (GDM) during pregnancy.    Surgical History:  has a past surgical history that includes [place] tooth extraction; Cervical Cerclage (N/A, 2022); and  section

## 2024-08-21 NOTE — DISCHARGE INSTR - COC
Continuity of Care Form    Patient Name: Suresh Roberto   :  1990  MRN:  [id number]    Admit date:  2024  Discharge date:  ***    Code Status Order: Prior   Advance Directives:   Advance Care Flowsheet Documentation             Admitting Physician:  No admitting provider for patient encounter.  PCP: Paul Lynne DO    Discharging Nurse: ***  Discharging Hospital Unit/Room#:   Discharging Unit Phone Number: ***    Emergency Contact:   Extended Emergency Contact Information  Primary Emergency Contact: Janny Roberto  Address: [address] [place] [address]  Home Phone: [phone]  Mobile Phone: [phone]  Relation: Parent  Hearing or visual needs: None  Other needs: None  Preferred language: English   needed? No    Past Surgical History:  Past Surgical History:   Procedure Laterality Date    CERVICAL CERCLAGE N/A 2022    CERVIX CERCLAGE PLACEMENT performed by David Laguerre MD at [facility] L&D OR     SECTION N/A 10/5/2022     SECTION performed by Dean Lakhani MD at [facility] L&D OR    WISDOM TOOTH EXTRACTION         Immunization History:   There is no immunization history for the selected administration types on file for this patient.    Active Problems:  Patient Active Problem List   Diagnosis Code    Maternal morbid obesity, antepartum ([facility]) O99.210, E66.01    Incompetent cervix in pregnancy, antepartum, second trimester O34.32    Benign essential hypertension, antepartum O10.019    Class 3 severe obesity without serious comorbidity in adult ([facility]) E66.01    Abnormal ultrasonic finding on  screening of mother O28.3    Anemia D64.9    Elevated hemoglobin A1c R73.09    Low ferritin R79.0    Low maternal serum vitamin B12 O28.0    Low vitamin D level R79.89    Insulin controlled gestational diabetes mellitus (GDM) in third trimester O24.414    Vaginal spotting N93.9    Non-reactive NST (non-stress test) O28.8    Variable

## 2024-08-22 LAB
HSV1 DNA SPEC QL NAA+PROBE: DETECTED
HSV2 DNA SPEC QL NAA+PROBE: NOT DETECTED
SPECIMEN DESCRIPTION: ABNORMAL

## 2024-08-23 LAB
C TRACH DNA SPEC QL PROBE+SIG AMP: NEGATIVE
MICROORGANISM SPEC CULT: ABNORMAL
MICROORGANISM SPEC CULT: ABNORMAL
N GONORRHOEA DNA SPEC QL PROBE+SIG AMP: NEGATIVE
SERVICE CMNT-IMP: ABNORMAL
SPECIMEN DESCRIPTION: ABNORMAL
SPECIMEN DESCRIPTION: NORMAL

## 2024-09-12 NOTE — PROGRESS NOTES
Detail Level: Detailed Spoke to Dr Edis Potter, he discussed poc with provider earlier. Continue to monitor and deliver if pressures continue to elevate.

## 2025-05-07 ENCOUNTER — HOSPITAL ENCOUNTER (EMERGENCY)
Age: 35
Discharge: HOME | End: 2025-05-07

## 2025-05-07 VITALS
TEMPERATURE: 98.1 F | HEART RATE: 89 BPM | BODY MASS INDEX: 45.99 KG/M2 | RESPIRATION RATE: 13 BRPM | OXYGEN SATURATION: 97 % | HEIGHT: 67 IN | SYSTOLIC BLOOD PRESSURE: 136 MMHG | WEIGHT: 293 LBS | DIASTOLIC BLOOD PRESSURE: 85 MMHG

## 2025-05-07 DIAGNOSIS — K04.7 DENTAL INFECTION: Primary | ICD-10-CM

## 2025-05-07 PROCEDURE — 99283 EMERGENCY DEPT VISIT LOW MDM: CPT

## 2025-05-07 PROCEDURE — 6370000000 HC RX 637 (ALT 250 FOR IP): Performed by: PHYSICIAN ASSISTANT

## 2025-05-07 RX ORDER — IBUPROFEN 800 MG/1
800 TABLET, FILM COATED ORAL ONCE
Status: COMPLETED | OUTPATIENT
Start: 2025-05-07 | End: 2025-05-07

## 2025-05-07 RX ORDER — PENICILLIN V POTASSIUM 500 MG/1
500 TABLET, FILM COATED ORAL 4 TIMES DAILY
Qty: 28 TABLET | Refills: 0 | Status: SHIPPED | OUTPATIENT
Start: 2025-05-07 | End: 2025-05-14

## 2025-05-07 RX ORDER — IBUPROFEN 600 MG/1
600 TABLET, FILM COATED ORAL 3 TIMES DAILY PRN
Qty: 30 TABLET | Refills: 0 | Status: SHIPPED | OUTPATIENT
Start: 2025-05-07

## 2025-05-07 RX ORDER — PENICILLIN V POTASSIUM 250 MG/1
500 TABLET, FILM COATED ORAL ONCE
Status: COMPLETED | OUTPATIENT
Start: 2025-05-07 | End: 2025-05-07

## 2025-05-07 RX ADMIN — PENICILLIN V POTASSIUM 500 MG: 250 TABLET, FILM COATED ORAL at 19:10

## 2025-05-07 RX ADMIN — IBUPROFEN 800 MG: 800 TABLET, FILM COATED ORAL at 19:10

## 2025-05-07 ASSESSMENT — PAIN DESCRIPTION - PAIN TYPE: TYPE: ACUTE PAIN

## 2025-05-07 ASSESSMENT — PAIN DESCRIPTION - ORIENTATION: ORIENTATION: LEFT

## 2025-05-07 ASSESSMENT — PAIN DESCRIPTION - LOCATION
LOCATION: FACE
LOCATION: TEETH

## 2025-05-07 ASSESSMENT — PAIN SCALES - GENERAL
PAINLEVEL_OUTOF10: 9
PAINLEVEL_OUTOF10: 8

## 2025-05-07 ASSESSMENT — PAIN - FUNCTIONAL ASSESSMENT: PAIN_FUNCTIONAL_ASSESSMENT: 0-10

## 2025-05-08 NOTE — ED PROVIDER NOTES
Independent NAYA Visit.      [facility]  Department of Emergency Medicine   ED  Encounter Note  Admit Date/RoomTime: 2025  6:02 PM  ED Room: LAYO/LAYO    NAME: Suresh Roberto  : 1990  MRN: [id number]     Chief Complaint:  No chief complaint on file.    History of Present Illness        Suresh Roberto is a 34 y.o. old female who presents to the emergency department by private vehicle, for non-traumatic left lower dental and facial and ear pain, which occured this morning prior to arrival.  Since onset the symptoms have been persistent and mild in severity.  Worsened by  chewing and improved by nothing and pt has taken no otc medications.  Associated Signs & Symptoms:  nothing additional.  Patient reports she tried to make an appointment with her dentist but they did not have availability until mid summer.  Patient reports she has a broken wisdom tooth in the area.  She denies other URI symptoms such as congestion, cough, sore throat.     ROS   Pertinent positives and negatives are stated within HPI, all other systems reviewed and are negative.    Past Medical History:  has a past medical history of Anemia, Hypertension, and Insulin controlled gestational diabetes mellitus (GDM) during pregnancy.    Surgical History:  has a past surgical history that includes [place] tooth extraction; Cervical Cerclage (N/A, 2022); and  section (N/A, 10/5/2022).    Social History:  reports that she has quit smoking. She has never used smokeless tobacco. She reports that she does not currently use alcohol. She reports that she does not use drugs.    Family History: family history is not on file.     Allergies: Patient has no known allergies.    Physical Exam   Oxygen Saturation Interpretation: Normal.        ED Triage Vitals   BP Systolic BP Percentile Diastolic BP Percentile Temp Temp src Pulse Respirations SpO2   25 1724 -- -- 25 1722 -- 25 1722 25 1722 25 1722

## (undated) DEVICE — STAPLER SKIN SQ 30 ABSRB STPL DISP INSORB

## (undated) DEVICE — CONTAINER,SPEC,PNEUM TUBE,3OZ,STRL PATH: Brand: MEDLINE

## (undated) DEVICE — PEN: MARKING STD 100/CS: Brand: MEDICAL ACTION INDUSTRIES

## (undated) DEVICE — SHEET,DRAPE,53X77,STERILE: Brand: MEDLINE

## (undated) DEVICE — SUTURE CHROMIC GUT SZ 1 L36IN ABSRB BRN L48MM CTX 1/2 CIR 905H

## (undated) DEVICE — COUNTER NDL 30 COUNT DBL MAG

## (undated) DEVICE — GLOVE SURG SZ 65 L12IN FNGR THK83MIL CRM POLYISOPRENE

## (undated) DEVICE — SUTURE STRATAFIX SPRL SZ 1 L14IN ABSRB VLT L48CM CTX 1/2 SXPD2B405

## (undated) DEVICE — MEDI-VAC YANKAUER SUCTION HANDLE W/BULBOUS TIP: Brand: CARDINAL HEALTH

## (undated) DEVICE — TUBE BLD COLLECT ST 1 SIL COAT 7ML 10ML

## (undated) DEVICE — 3000CC GUARDIAN II: Brand: GUARDIAN

## (undated) DEVICE — TUBING, SUCTION, 3/16" X 12', STRAIGHT: Brand: MEDLINE

## (undated) DEVICE — CONTAINER SPEC 64OZ POLYPR PATH SNAP LOK CAP W/ LID

## (undated) DEVICE — GLOVE ORTHO 8   MSG9480

## (undated) DEVICE — PENCIL ES L3M BTTN SWCH HOLSTER W/ BLDE ELECTRD EDGE

## (undated) DEVICE — BLADE SURG NO20 S STL STR DISP GLASSVAN

## (undated) DEVICE — COVER,LIGHT HANDLE,FLX,2/PK: Brand: MEDLINE INDUSTRIES, INC.

## (undated) DEVICE — APPLICATOR PREP 26ML 0.7% IOD POVACRYLEX 74% ISO ALC ST

## (undated) DEVICE — ELECTRODE PT RET AD L9FT HI MOIST COND ADH HYDRGEL CORDED

## (undated) DEVICE — GOWN,SIRUS,FABRNF,L,20/CS: Brand: MEDLINE

## (undated) DEVICE — CATHETERIZATION KIT FOL16 FR 2000 CC DRAINAGE BG LUBRICATH

## (undated) DEVICE — GOWN,SIRUS,POLYRNF,BRTHSLV,XLN/XL,20/CS: Brand: MEDLINE

## (undated) DEVICE — TOWEL,OR,DSP,ST,BLUE,STD,6/PK,12PK/CS: Brand: MEDLINE

## (undated) DEVICE — Device: Brand: PORTEX

## (undated) DEVICE — CESAREAN BIRTH PACK II: Brand: MEDLINE INDUSTRIES, INC.

## (undated) DEVICE — STRIP,CLOSURE,WOUND,MEDI-STRIP,1/2X4: Brand: MEDLINE

## (undated) DEVICE — 3M™ STERI-STRIP™ COMPOUND BENZOIN TINCTURE 40 BAGS/CARTON 4 CARTONS/CASE C1544: Brand: 3M™ STERI-STRIP™

## (undated) DEVICE — SPONGE LAP W18XL18IN WHT COT 4 PLY FLD STRUNG RADPQ DISP ST

## (undated) DEVICE — HYPODERMIC SAFETY NEEDLE: Brand: MAGELLAN

## (undated) DEVICE — GLOVE SURG SZ 75 L12IN FNGR THK83MIL CRM POLYISOPRENE